# Patient Record
Sex: FEMALE | Race: WHITE | HISPANIC OR LATINO | Employment: UNEMPLOYED | URBAN - METROPOLITAN AREA
[De-identification: names, ages, dates, MRNs, and addresses within clinical notes are randomized per-mention and may not be internally consistent; named-entity substitution may affect disease eponyms.]

---

## 2019-02-25 ENCOUNTER — HOSPITAL ENCOUNTER (OUTPATIENT)
Dept: LAB | Facility: MEDICAL CENTER | Age: 39
End: 2019-02-25
Attending: NURSE PRACTITIONER

## 2019-02-25 LAB
ALBUMIN SERPL BCP-MCNC: 4.4 G/DL (ref 3.2–4.9)
ALBUMIN/GLOB SERPL: 1.6 G/DL
ALP SERPL-CCNC: 75 U/L (ref 30–99)
ALT SERPL-CCNC: 30 U/L (ref 2–50)
ANION GAP SERPL CALC-SCNC: 5 MMOL/L (ref 0–11.9)
AST SERPL-CCNC: 18 U/L (ref 12–45)
BASOPHILS # BLD AUTO: 0.6 % (ref 0–1.8)
BASOPHILS # BLD: 0.06 K/UL (ref 0–0.12)
BILIRUB SERPL-MCNC: 0.3 MG/DL (ref 0.1–1.5)
BUN SERPL-MCNC: 15 MG/DL (ref 8–22)
CALCIUM SERPL-MCNC: 10.1 MG/DL (ref 8.5–10.5)
CHLORIDE SERPL-SCNC: 108 MMOL/L (ref 96–112)
CHOLEST SERPL-MCNC: 203 MG/DL (ref 100–199)
CO2 SERPL-SCNC: 29 MMOL/L (ref 20–33)
CREAT SERPL-MCNC: 1.01 MG/DL (ref 0.5–1.4)
EOSINOPHIL # BLD AUTO: 0.16 K/UL (ref 0–0.51)
EOSINOPHIL NFR BLD: 1.7 % (ref 0–6.9)
ERYTHROCYTE [DISTWIDTH] IN BLOOD BY AUTOMATED COUNT: 48.6 FL (ref 35.9–50)
FASTING STATUS PATIENT QL REPORTED: NORMAL
GLOBULIN SER CALC-MCNC: 2.8 G/DL (ref 1.9–3.5)
GLUCOSE SERPL-MCNC: 93 MG/DL (ref 65–99)
HCT VFR BLD AUTO: 45.4 % (ref 37–47)
HDLC SERPL-MCNC: 51 MG/DL
HGB BLD-MCNC: 14.7 G/DL (ref 12–16)
IMM GRANULOCYTES # BLD AUTO: 0.1 K/UL (ref 0–0.11)
IMM GRANULOCYTES NFR BLD AUTO: 1 % (ref 0–0.9)
LDLC SERPL CALC-MCNC: 118 MG/DL
LYMPHOCYTES # BLD AUTO: 2.89 K/UL (ref 1–4.8)
LYMPHOCYTES NFR BLD: 30.2 % (ref 22–41)
MCH RBC QN AUTO: 32.2 PG (ref 27–33)
MCHC RBC AUTO-ENTMCNC: 32.4 G/DL (ref 33.6–35)
MCV RBC AUTO: 99.6 FL (ref 81.4–97.8)
MONOCYTES # BLD AUTO: 0.62 K/UL (ref 0–0.85)
MONOCYTES NFR BLD AUTO: 6.5 % (ref 0–13.4)
NEUTROPHILS # BLD AUTO: 5.73 K/UL (ref 2–7.15)
NEUTROPHILS NFR BLD: 60 % (ref 44–72)
NRBC # BLD AUTO: 0 K/UL
NRBC BLD-RTO: 0 /100 WBC
PLATELET # BLD AUTO: 282 K/UL (ref 164–446)
PMV BLD AUTO: 9.7 FL (ref 9–12.9)
POTASSIUM SERPL-SCNC: 3.7 MMOL/L (ref 3.6–5.5)
PROT SERPL-MCNC: 7.2 G/DL (ref 6–8.2)
RBC # BLD AUTO: 4.56 M/UL (ref 4.2–5.4)
SODIUM SERPL-SCNC: 142 MMOL/L (ref 135–145)
TRIGL SERPL-MCNC: 171 MG/DL (ref 0–149)
TSH SERPL DL<=0.005 MIU/L-ACNC: 1.58 UIU/ML (ref 0.38–5.33)
WBC # BLD AUTO: 9.6 K/UL (ref 4.8–10.8)

## 2019-02-25 PROCEDURE — 85025 COMPLETE CBC W/AUTO DIFF WBC: CPT

## 2019-02-25 PROCEDURE — 80053 COMPREHEN METABOLIC PANEL: CPT

## 2019-02-25 PROCEDURE — 36415 COLL VENOUS BLD VENIPUNCTURE: CPT

## 2019-02-25 PROCEDURE — 80061 LIPID PANEL: CPT

## 2019-02-25 PROCEDURE — 84443 ASSAY THYROID STIM HORMONE: CPT

## 2019-05-14 ENCOUNTER — APPOINTMENT (OUTPATIENT)
Dept: RADIOLOGY | Facility: MEDICAL CENTER | Age: 39
DRG: 644 | End: 2019-05-14
Attending: EMERGENCY MEDICINE
Payer: MEDICAID

## 2019-05-14 ENCOUNTER — HOSPITAL ENCOUNTER (INPATIENT)
Facility: MEDICAL CENTER | Age: 39
LOS: 9 days | DRG: 644 | End: 2019-05-23
Attending: EMERGENCY MEDICINE | Admitting: INTERNAL MEDICINE
Payer: MEDICAID

## 2019-05-14 DIAGNOSIS — R50.9 FEVER, UNSPECIFIED FEVER CAUSE: ICD-10-CM

## 2019-05-14 DIAGNOSIS — N64.3 GALACTORRHEA: ICD-10-CM

## 2019-05-14 DIAGNOSIS — G93.89 SUPRASELLAR MASS: ICD-10-CM

## 2019-05-14 PROBLEM — R73.9 HYPERGLYCEMIA: Status: ACTIVE | Noted: 2019-05-14

## 2019-05-14 PROBLEM — F41.9 ANXIETY: Status: ACTIVE | Noted: 2019-05-14

## 2019-05-14 PROBLEM — A41.9 SEPSIS (HCC): Status: ACTIVE | Noted: 2019-05-14

## 2019-05-14 LAB
ALBUMIN SERPL BCP-MCNC: 4.5 G/DL (ref 3.2–4.9)
ALBUMIN/GLOB SERPL: 1.4 G/DL
ALP SERPL-CCNC: 104 U/L (ref 30–99)
ALT SERPL-CCNC: 42 U/L (ref 2–50)
ANION GAP SERPL CALC-SCNC: 11 MMOL/L (ref 0–11.9)
APPEARANCE UR: CLEAR
AST SERPL-CCNC: 25 U/L (ref 12–45)
BACTERIA #/AREA URNS HPF: NEGATIVE /HPF
BASOPHILS # BLD AUTO: 0.3 % (ref 0–1.8)
BASOPHILS # BLD: 0.05 K/UL (ref 0–0.12)
BILIRUB SERPL-MCNC: 0.4 MG/DL (ref 0.1–1.5)
BILIRUB UR QL STRIP.AUTO: NEGATIVE
BUN SERPL-MCNC: 7 MG/DL (ref 8–22)
CALCIUM SERPL-MCNC: 9.3 MG/DL (ref 8.5–10.5)
CHLORIDE SERPL-SCNC: 106 MMOL/L (ref 96–112)
CO2 SERPL-SCNC: 24 MMOL/L (ref 20–33)
COLOR UR: YELLOW
CORTIS SERPL-MCNC: 19.2 UG/DL (ref 0–23)
CREAT SERPL-MCNC: 0.86 MG/DL (ref 0.5–1.4)
EOSINOPHIL # BLD AUTO: 0.15 K/UL (ref 0–0.51)
EOSINOPHIL NFR BLD: 1 % (ref 0–6.9)
EPI CELLS #/AREA URNS HPF: NEGATIVE /HPF
ERYTHROCYTE [DISTWIDTH] IN BLOOD BY AUTOMATED COUNT: 43.4 FL (ref 35.9–50)
FLUAV RNA SPEC QL NAA+PROBE: NEGATIVE
FLUBV RNA SPEC QL NAA+PROBE: NEGATIVE
FSH SERPL-ACNC: 1.3 MIU/ML
GLOBULIN SER CALC-MCNC: 3.3 G/DL (ref 1.9–3.5)
GLUCOSE SERPL-MCNC: 113 MG/DL (ref 65–99)
GLUCOSE UR STRIP.AUTO-MCNC: NEGATIVE MG/DL
HCG SERPL QL: NEGATIVE
HCT VFR BLD AUTO: 42 % (ref 37–47)
HGB BLD-MCNC: 13.7 G/DL (ref 12–16)
HYALINE CASTS #/AREA URNS LPF: NORMAL /LPF
IMM GRANULOCYTES # BLD AUTO: 0.12 K/UL (ref 0–0.11)
IMM GRANULOCYTES NFR BLD AUTO: 0.8 % (ref 0–0.9)
KETONES UR STRIP.AUTO-MCNC: NEGATIVE MG/DL
LACTATE BLD-SCNC: 1.3 MMOL/L (ref 0.5–2)
LEUKOCYTE ESTERASE UR QL STRIP.AUTO: ABNORMAL
LH SERPL-ACNC: <0.2 IU/L
LIPASE SERPL-CCNC: 9 U/L (ref 11–82)
LYMPHOCYTES # BLD AUTO: 2.38 K/UL (ref 1–4.8)
LYMPHOCYTES NFR BLD: 15.9 % (ref 22–41)
MCH RBC QN AUTO: 31.6 PG (ref 27–33)
MCHC RBC AUTO-ENTMCNC: 32.6 G/DL (ref 33.6–35)
MCV RBC AUTO: 96.8 FL (ref 81.4–97.8)
MICRO URNS: ABNORMAL
MONOCYTES # BLD AUTO: 0.97 K/UL (ref 0–0.85)
MONOCYTES NFR BLD AUTO: 6.5 % (ref 0–13.4)
NEUTROPHILS # BLD AUTO: 11.27 K/UL (ref 2–7.15)
NEUTROPHILS NFR BLD: 75.5 % (ref 44–72)
NITRITE UR QL STRIP.AUTO: NEGATIVE
NRBC # BLD AUTO: 0 K/UL
NRBC BLD-RTO: 0 /100 WBC
PH UR STRIP.AUTO: 6.5 [PH]
PLATELET # BLD AUTO: 400 K/UL (ref 164–446)
PMV BLD AUTO: 9.2 FL (ref 9–12.9)
POTASSIUM SERPL-SCNC: 3.6 MMOL/L (ref 3.6–5.5)
PROLACTIN SERPL-MCNC: 44.04 NG/ML (ref 2.8–26)
PROT SERPL-MCNC: 7.8 G/DL (ref 6–8.2)
PROT UR QL STRIP: NEGATIVE MG/DL
RBC # BLD AUTO: 4.34 M/UL (ref 4.2–5.4)
RBC # URNS HPF: NORMAL /HPF
RBC UR QL AUTO: ABNORMAL
S PYO DNA SPEC NAA+PROBE: NOT DETECTED
SODIUM SERPL-SCNC: 141 MMOL/L (ref 135–145)
SP GR UR STRIP.AUTO: 1.01
T4 FREE SERPL-MCNC: 0.93 NG/DL (ref 0.53–1.43)
TSH SERPL DL<=0.005 MIU/L-ACNC: 0.11 UIU/ML (ref 0.38–5.33)
UROBILINOGEN UR STRIP.AUTO-MCNC: 0.2 MG/DL
WBC # BLD AUTO: 14.9 K/UL (ref 4.8–10.8)
WBC #/AREA URNS HPF: NORMAL /HPF

## 2019-05-14 PROCEDURE — 84305 ASSAY OF SOMATOMEDIN: CPT

## 2019-05-14 PROCEDURE — 80053 COMPREHEN METABOLIC PANEL: CPT

## 2019-05-14 PROCEDURE — 700111 HCHG RX REV CODE 636 W/ 250 OVERRIDE (IP): Performed by: EMERGENCY MEDICINE

## 2019-05-14 PROCEDURE — 87040 BLOOD CULTURE FOR BACTERIA: CPT | Mod: 91

## 2019-05-14 PROCEDURE — 700117 HCHG RX CONTRAST REV CODE 255: Performed by: EMERGENCY MEDICINE

## 2019-05-14 PROCEDURE — 700102 HCHG RX REV CODE 250 W/ 637 OVERRIDE(OP)

## 2019-05-14 PROCEDURE — 84443 ASSAY THYROID STIM HORMONE: CPT

## 2019-05-14 PROCEDURE — A9270 NON-COVERED ITEM OR SERVICE: HCPCS

## 2019-05-14 PROCEDURE — 700105 HCHG RX REV CODE 258: Performed by: INTERNAL MEDICINE

## 2019-05-14 PROCEDURE — 87502 INFLUENZA DNA AMP PROBE: CPT

## 2019-05-14 PROCEDURE — 82533 TOTAL CORTISOL: CPT

## 2019-05-14 PROCEDURE — 83002 ASSAY OF GONADOTROPIN (LH): CPT

## 2019-05-14 PROCEDURE — 83605 ASSAY OF LACTIC ACID: CPT

## 2019-05-14 PROCEDURE — 96375 TX/PRO/DX INJ NEW DRUG ADDON: CPT

## 2019-05-14 PROCEDURE — 96365 THER/PROPH/DIAG IV INF INIT: CPT

## 2019-05-14 PROCEDURE — 70450 CT HEAD/BRAIN W/O DYE: CPT

## 2019-05-14 PROCEDURE — 99285 EMERGENCY DEPT VISIT HI MDM: CPT

## 2019-05-14 PROCEDURE — 83690 ASSAY OF LIPASE: CPT

## 2019-05-14 PROCEDURE — 85025 COMPLETE CBC W/AUTO DIFF WBC: CPT

## 2019-05-14 PROCEDURE — 82671 ASSAY OF ESTROGENS: CPT

## 2019-05-14 PROCEDURE — 36415 COLL VENOUS BLD VENIPUNCTURE: CPT

## 2019-05-14 PROCEDURE — 84703 CHORIONIC GONADOTROPIN ASSAY: CPT

## 2019-05-14 PROCEDURE — 700111 HCHG RX REV CODE 636 W/ 250 OVERRIDE (IP): Performed by: INTERNAL MEDICINE

## 2019-05-14 PROCEDURE — 74177 CT ABD & PELVIS W/CONTRAST: CPT

## 2019-05-14 PROCEDURE — 770006 HCHG ROOM/CARE - MED/SURG/GYN SEMI*

## 2019-05-14 PROCEDURE — 84146 ASSAY OF PROLACTIN: CPT

## 2019-05-14 PROCEDURE — 81001 URINALYSIS AUTO W/SCOPE: CPT

## 2019-05-14 PROCEDURE — 85730 THROMBOPLASTIN TIME PARTIAL: CPT

## 2019-05-14 PROCEDURE — 85610 PROTHROMBIN TIME: CPT

## 2019-05-14 PROCEDURE — 84439 ASSAY OF FREE THYROXINE: CPT

## 2019-05-14 PROCEDURE — 700105 HCHG RX REV CODE 258: Performed by: EMERGENCY MEDICINE

## 2019-05-14 PROCEDURE — 87651 STREP A DNA AMP PROBE: CPT

## 2019-05-14 PROCEDURE — 99223 1ST HOSP IP/OBS HIGH 75: CPT | Performed by: INTERNAL MEDICINE

## 2019-05-14 PROCEDURE — 82024 ASSAY OF ACTH: CPT

## 2019-05-14 PROCEDURE — 83001 ASSAY OF GONADOTROPIN (FSH): CPT

## 2019-05-14 RX ORDER — POLYETHYLENE GLYCOL 3350 17 G/17G
1 POWDER, FOR SOLUTION ORAL
Status: DISCONTINUED | OUTPATIENT
Start: 2019-05-14 | End: 2019-05-23 | Stop reason: HOSPADM

## 2019-05-14 RX ORDER — SODIUM CHLORIDE 9 MG/ML
1000 INJECTION, SOLUTION INTRAVENOUS ONCE
Status: COMPLETED | OUTPATIENT
Start: 2019-05-14 | End: 2019-05-14

## 2019-05-14 RX ORDER — AMOXICILLIN 250 MG
2 CAPSULE ORAL 2 TIMES DAILY
Status: DISCONTINUED | OUTPATIENT
Start: 2019-05-15 | End: 2019-05-23 | Stop reason: HOSPADM

## 2019-05-14 RX ORDER — SODIUM CHLORIDE 9 MG/ML
INJECTION, SOLUTION INTRAVENOUS CONTINUOUS
Status: ACTIVE | OUTPATIENT
Start: 2019-05-14 | End: 2019-05-15

## 2019-05-14 RX ORDER — ACETAMINOPHEN 325 MG/1
975 TABLET ORAL ONCE
Status: COMPLETED | OUTPATIENT
Start: 2019-05-14 | End: 2019-05-14

## 2019-05-14 RX ORDER — PROMETHAZINE HYDROCHLORIDE 25 MG/1
12.5-25 TABLET ORAL EVERY 4 HOURS PRN
Status: DISCONTINUED | OUTPATIENT
Start: 2019-05-14 | End: 2019-05-23 | Stop reason: HOSPADM

## 2019-05-14 RX ORDER — LORAZEPAM 2 MG/ML
0.5 INJECTION INTRAMUSCULAR EVERY 4 HOURS PRN
Status: DISCONTINUED | OUTPATIENT
Start: 2019-05-14 | End: 2019-05-23 | Stop reason: HOSPADM

## 2019-05-14 RX ORDER — OXYCODONE HYDROCHLORIDE 5 MG/1
5 TABLET ORAL
Status: DISCONTINUED | OUTPATIENT
Start: 2019-05-14 | End: 2019-05-23 | Stop reason: HOSPADM

## 2019-05-14 RX ORDER — OXYCODONE HYDROCHLORIDE 10 MG/1
10 TABLET ORAL
Status: DISCONTINUED | OUTPATIENT
Start: 2019-05-14 | End: 2019-05-23 | Stop reason: HOSPADM

## 2019-05-14 RX ORDER — ACETAMINOPHEN 325 MG/1
650 TABLET ORAL ONCE
Status: ACTIVE | OUTPATIENT
Start: 2019-05-14 | End: 2019-05-15

## 2019-05-14 RX ORDER — KETOROLAC TROMETHAMINE 30 MG/ML
15 INJECTION, SOLUTION INTRAMUSCULAR; INTRAVENOUS ONCE
Status: COMPLETED | OUTPATIENT
Start: 2019-05-14 | End: 2019-05-14

## 2019-05-14 RX ORDER — ONDANSETRON 2 MG/ML
4 INJECTION INTRAMUSCULAR; INTRAVENOUS EVERY 4 HOURS PRN
Status: DISCONTINUED | OUTPATIENT
Start: 2019-05-14 | End: 2019-05-23 | Stop reason: HOSPADM

## 2019-05-14 RX ORDER — SODIUM CHLORIDE 9 MG/ML
1000 INJECTION, SOLUTION INTRAVENOUS
Status: DISCONTINUED | OUTPATIENT
Start: 2019-05-14 | End: 2019-05-23 | Stop reason: HOSPADM

## 2019-05-14 RX ORDER — ONDANSETRON 4 MG/1
4 TABLET, ORALLY DISINTEGRATING ORAL EVERY 4 HOURS PRN
Status: DISCONTINUED | OUTPATIENT
Start: 2019-05-14 | End: 2019-05-23 | Stop reason: HOSPADM

## 2019-05-14 RX ORDER — HYDROMORPHONE HYDROCHLORIDE 1 MG/ML
0.5 INJECTION, SOLUTION INTRAMUSCULAR; INTRAVENOUS; SUBCUTANEOUS
Status: DISCONTINUED | OUTPATIENT
Start: 2019-05-14 | End: 2019-05-17

## 2019-05-14 RX ORDER — BISACODYL 10 MG
10 SUPPOSITORY, RECTAL RECTAL
Status: DISCONTINUED | OUTPATIENT
Start: 2019-05-14 | End: 2019-05-23 | Stop reason: HOSPADM

## 2019-05-14 RX ORDER — ENALAPRILAT 1.25 MG/ML
1.25 INJECTION INTRAVENOUS EVERY 6 HOURS PRN
Status: DISCONTINUED | OUTPATIENT
Start: 2019-05-14 | End: 2019-05-23 | Stop reason: HOSPADM

## 2019-05-14 RX ORDER — IBUPROFEN 200 MG
400 TABLET ORAL
Status: ON HOLD | COMMUNITY
End: 2021-02-05

## 2019-05-14 RX ORDER — SODIUM CHLORIDE 9 MG/ML
30 INJECTION, SOLUTION INTRAVENOUS
Status: DISCONTINUED | OUTPATIENT
Start: 2019-05-14 | End: 2019-05-23 | Stop reason: HOSPADM

## 2019-05-14 RX ORDER — ACETAMINOPHEN 325 MG/1
650 TABLET ORAL EVERY 6 HOURS PRN
Status: DISCONTINUED | OUTPATIENT
Start: 2019-05-14 | End: 2019-05-23 | Stop reason: HOSPADM

## 2019-05-14 RX ORDER — PROMETHAZINE HYDROCHLORIDE 25 MG/1
12.5-25 SUPPOSITORY RECTAL EVERY 4 HOURS PRN
Status: DISCONTINUED | OUTPATIENT
Start: 2019-05-14 | End: 2019-05-23 | Stop reason: HOSPADM

## 2019-05-14 RX ADMIN — SODIUM CHLORIDE 500 MG: 9 INJECTION, SOLUTION INTRAVENOUS at 23:43

## 2019-05-14 RX ADMIN — ACETAMINOPHEN 975 MG: 325 TABLET, FILM COATED ORAL at 17:31

## 2019-05-14 RX ADMIN — KETOROLAC TROMETHAMINE 15 MG: 30 INJECTION, SOLUTION INTRAMUSCULAR; INTRAVENOUS at 19:00

## 2019-05-14 RX ADMIN — IOHEXOL 100 ML: 350 INJECTION, SOLUTION INTRAVENOUS at 20:46

## 2019-05-14 RX ADMIN — CEFTRIAXONE SODIUM 2 G: 2 INJECTION, POWDER, FOR SOLUTION INTRAMUSCULAR; INTRAVENOUS at 20:15

## 2019-05-14 RX ADMIN — SODIUM CHLORIDE 1000 ML: 9 INJECTION, SOLUTION INTRAVENOUS at 18:00

## 2019-05-14 ASSESSMENT — ENCOUNTER SYMPTOMS
DEPRESSION: 0
STRIDOR: 0
CONSTIPATION: 0
NAUSEA: 1
CHILLS: 1
VOMITING: 1
PALPITATIONS: 0
COUGH: 0
FALLS: 0
SPUTUM PRODUCTION: 0
HEADACHES: 1
DIZZINESS: 1
DIARRHEA: 0
TINGLING: 0
LOSS OF CONSCIOUSNESS: 0
MYALGIAS: 1
SHORTNESS OF BREATH: 0
FEVER: 1
ABDOMINAL PAIN: 0
WEAKNESS: 1

## 2019-05-14 ASSESSMENT — LIFESTYLE VARIABLES: DO YOU DRINK ALCOHOL: NO

## 2019-05-14 NOTE — ED NOTES
Pt brought back from waiting room by volunteer. Pt ambulates with steady gait. No apparent distress.

## 2019-05-14 NOTE — ED TRIAGE NOTES
".[facility]  .  Chief Complaint   Patient presents with   • Headache     onset at 1100 today   • Abdominal Pain     c/o mid abd pain onset at 1100 today.   • N/V     since 1100     Patient to triage with above complaint. Patient giving vague answers in triage. Reports similar s/s 5 months ago. Patient rocking in chair and hyperventilating in triage, RN provided reassurance. .Blood Pressure: 130/73, Pulse: (!) 105, Respiration: 18, Temperature: 37.2 °C (99 °F), Height: 149.9 cm (4' 11\"), Weight: 81.8 kg (180 lb 5.4 oz), Pulse Oximetry: 100 %, O2 Delivery: None (Room Air)    Patient to lobby and instructed to inform staff of any needs.  "

## 2019-05-15 PROBLEM — E66.9 CLASS 2 OBESITY IN ADULT: Status: ACTIVE | Noted: 2019-05-15

## 2019-05-15 PROBLEM — R65.10 SIRS (SYSTEMIC INFLAMMATORY RESPONSE SYNDROME) (HCC): Status: ACTIVE | Noted: 2019-05-15

## 2019-05-15 LAB
ALBUMIN SERPL BCP-MCNC: 3.6 G/DL (ref 3.2–4.9)
ALBUMIN/GLOB SERPL: 1.2 G/DL
ALP SERPL-CCNC: 84 U/L (ref 30–99)
ALT SERPL-CCNC: 31 U/L (ref 2–50)
ANION GAP SERPL CALC-SCNC: 8 MMOL/L (ref 0–11.9)
APTT PPP: 28.7 SEC (ref 24.7–36)
AST SERPL-CCNC: 15 U/L (ref 12–45)
BILIRUB SERPL-MCNC: 0.3 MG/DL (ref 0.1–1.5)
BUN SERPL-MCNC: 7 MG/DL (ref 8–22)
CALCIUM SERPL-MCNC: 8.7 MG/DL (ref 8.5–10.5)
CHLORIDE SERPL-SCNC: 109 MMOL/L (ref 96–112)
CO2 SERPL-SCNC: 26 MMOL/L (ref 20–33)
CREAT SERPL-MCNC: 0.78 MG/DL (ref 0.5–1.4)
ERYTHROCYTE [DISTWIDTH] IN BLOOD BY AUTOMATED COUNT: 43.7 FL (ref 35.9–50)
GLOBULIN SER CALC-MCNC: 3 G/DL (ref 1.9–3.5)
GLUCOSE SERPL-MCNC: 115 MG/DL (ref 65–99)
HCT VFR BLD AUTO: 37.9 % (ref 37–47)
HGB BLD-MCNC: 12.2 G/DL (ref 12–16)
INR PPP: 1.14 (ref 0.87–1.13)
LACTATE BLD-SCNC: 0.9 MMOL/L (ref 0.5–2)
LACTATE BLD-SCNC: 1.3 MMOL/L (ref 0.5–2)
LACTATE BLD-SCNC: 1.9 MMOL/L (ref 0.5–2)
MCH RBC QN AUTO: 31.4 PG (ref 27–33)
MCHC RBC AUTO-ENTMCNC: 32.2 G/DL (ref 33.6–35)
MCV RBC AUTO: 97.7 FL (ref 81.4–97.8)
PLATELET # BLD AUTO: 338 K/UL (ref 164–446)
PMV BLD AUTO: 9.2 FL (ref 9–12.9)
POTASSIUM SERPL-SCNC: 4.1 MMOL/L (ref 3.6–5.5)
PROCALCITONIN SERPL-MCNC: <0.05 NG/ML
PROT SERPL-MCNC: 6.6 G/DL (ref 6–8.2)
PROTHROMBIN TIME: 14.7 SEC (ref 12–14.6)
RBC # BLD AUTO: 3.88 M/UL (ref 4.2–5.4)
SODIUM SERPL-SCNC: 143 MMOL/L (ref 135–145)
WBC # BLD AUTO: 10.1 K/UL (ref 4.8–10.8)

## 2019-05-15 PROCEDURE — 700105 HCHG RX REV CODE 258: Performed by: INTERNAL MEDICINE

## 2019-05-15 PROCEDURE — A9270 NON-COVERED ITEM OR SERVICE: HCPCS | Performed by: INTERNAL MEDICINE

## 2019-05-15 PROCEDURE — 700111 HCHG RX REV CODE 636 W/ 250 OVERRIDE (IP): Performed by: INTERNAL MEDICINE

## 2019-05-15 PROCEDURE — 83605 ASSAY OF LACTIC ACID: CPT | Mod: 91

## 2019-05-15 PROCEDURE — 36415 COLL VENOUS BLD VENIPUNCTURE: CPT

## 2019-05-15 PROCEDURE — 700102 HCHG RX REV CODE 250 W/ 637 OVERRIDE(OP): Performed by: INTERNAL MEDICINE

## 2019-05-15 PROCEDURE — 770021 HCHG ROOM/CARE - ISO PRIVATE

## 2019-05-15 PROCEDURE — 99233 SBSQ HOSP IP/OBS HIGH 50: CPT | Performed by: INTERNAL MEDICINE

## 2019-05-15 PROCEDURE — 84145 PROCALCITONIN (PCT): CPT

## 2019-05-15 PROCEDURE — 80053 COMPREHEN METABOLIC PANEL: CPT

## 2019-05-15 PROCEDURE — 85027 COMPLETE CBC AUTOMATED: CPT

## 2019-05-15 RX ORDER — LEVETIRACETAM 500 MG/1
500 TABLET ORAL 2 TIMES DAILY
Status: DISCONTINUED | OUTPATIENT
Start: 2019-05-15 | End: 2019-05-17

## 2019-05-15 RX ADMIN — SENNOSIDES,DOCUSATE SODIUM 2 TABLET: 50; 8.6 TABLET, FILM COATED ORAL at 17:00

## 2019-05-15 RX ADMIN — VANCOMYCIN HYDROCHLORIDE 2000 MG: 100 INJECTION, POWDER, LYOPHILIZED, FOR SOLUTION INTRAVENOUS at 01:44

## 2019-05-15 RX ADMIN — ONDANSETRON 4 MG: 2 INJECTION INTRAMUSCULAR; INTRAVENOUS at 00:44

## 2019-05-15 RX ADMIN — SODIUM CHLORIDE: 9 INJECTION, SOLUTION INTRAVENOUS at 00:43

## 2019-05-15 RX ADMIN — VANCOMYCIN HYDROCHLORIDE 1500 MG: 100 INJECTION, POWDER, LYOPHILIZED, FOR SOLUTION INTRAVENOUS at 09:25

## 2019-05-15 RX ADMIN — ACETAMINOPHEN 650 MG: 325 TABLET, FILM COATED ORAL at 17:01

## 2019-05-15 RX ADMIN — SODIUM CHLORIDE 500 MG: 9 INJECTION, SOLUTION INTRAVENOUS at 11:43

## 2019-05-15 RX ADMIN — PROCHLORPERAZINE EDISYLATE 5 MG: 5 INJECTION INTRAMUSCULAR; INTRAVENOUS at 04:01

## 2019-05-15 RX ADMIN — LEVETIRACETAM 500 MG: 500 TABLET ORAL at 17:01

## 2019-05-15 RX ADMIN — CEFTRIAXONE SODIUM 2 G: 2 INJECTION, POWDER, FOR SOLUTION INTRAMUSCULAR; INTRAVENOUS at 08:22

## 2019-05-15 ASSESSMENT — ENCOUNTER SYMPTOMS
VOMITING: 0
WHEEZING: 0
COUGH: 0
SPUTUM PRODUCTION: 0
BLURRED VISION: 0
SEIZURES: 0
DEPRESSION: 0
DIARRHEA: 0
CHILLS: 0
EYE PAIN: 0
WEIGHT LOSS: 0
NECK PAIN: 0
SPEECH CHANGE: 0
FEVER: 1
PND: 0
HEADACHES: 0
PALPITATIONS: 0
HEARTBURN: 0
TREMORS: 0
DIZZINESS: 0
FALLS: 0
ABDOMINAL PAIN: 0
CONSTIPATION: 0
BACK PAIN: 0
WEAKNESS: 1
SHORTNESS OF BREATH: 0
NAUSEA: 0

## 2019-05-15 ASSESSMENT — COGNITIVE AND FUNCTIONAL STATUS - GENERAL
MOBILITY SCORE: 24
SUGGESTED CMS G CODE MODIFIER DAILY ACTIVITY: CH
DAILY ACTIVITIY SCORE: 24
SUGGESTED CMS G CODE MODIFIER MOBILITY: CH

## 2019-05-15 ASSESSMENT — PATIENT HEALTH QUESTIONNAIRE - PHQ9
3. TROUBLE FALLING OR STAYING ASLEEP OR SLEEPING TOO MUCH: NEARLY EVERY DAY
6. FEELING BAD ABOUT YOURSELF - OR THAT YOU ARE A FAILURE OR HAVE LET YOURSELF OR YOUR FAMILY DOWN: NOT AL ALL
SUM OF ALL RESPONSES TO PHQ9 QUESTIONS 1 AND 2: 4
8. MOVING OR SPEAKING SO SLOWLY THAT OTHER PEOPLE COULD HAVE NOTICED. OR THE OPPOSITE, BEING SO FIGETY OR RESTLESS THAT YOU HAVE BEEN MOVING AROUND A LOT MORE THAN USUAL: NOT AT ALL
7. TROUBLE CONCENTRATING ON THINGS, SUCH AS READING THE NEWSPAPER OR WATCHING TELEVISION: NOT AT ALL
4. FEELING TIRED OR HAVING LITTLE ENERGY: NEARLY EVERY DAY
2. FEELING DOWN, DEPRESSED, IRRITABLE, OR HOPELESS: MORE THAN HALF THE DAYS
5. POOR APPETITE OR OVEREATING: NOT AT ALL
SUM OF ALL RESPONSES TO PHQ QUESTIONS 1-9: 10
1. LITTLE INTEREST OR PLEASURE IN DOING THINGS: MORE THAN HALF THE DAYS
9. THOUGHTS THAT YOU WOULD BE BETTER OFF DEAD, OR OF HURTING YOURSELF: NOT AT ALL

## 2019-05-15 ASSESSMENT — LIFESTYLE VARIABLES
EVER_SMOKED: NEVER
SUBSTANCE_ABUSE: 0

## 2019-05-15 NOTE — ED PROVIDER NOTES
ED Provider Note    Chief Complaint:   Fever, back pain, abdominal pain, headaches    HPI:  Johana Weston is a 38 y.o. female who presents with chief complaint of generalized body pain and fevers.  She is exclusively Mauritian-speaking,  service used for the entirety of this encounter.  Triage notes that she developed abdominal pain and headaches at 11:00 this morning.  I spent a long time clarifying this with the  service, she states she has had chronic abdominal pain, back pain, and headaches ongoing since October.  In November she stopped menstruating, she did see a physician at [facility] for this, was prescribed OCPs, however this did not alleviate her symptoms so she stopped taking them.  Additionally over the past several months she is noticed that she has been increasingly drowsy and anxious, she has noticed intermittent lactation.  2 to 3 months ago, she developed loss of appetite, intermittent vomiting, as well as pain in the anterior neck.  She is febrile on arrival to the emergency department, uncertain as to when those symptoms began.  She does have generalized back pain.  She denies abnormal vaginal bleeding or vaginal discharge, she denies any vaginal odor.  She has not had dysuria, no hematuria.  She does have polydipsia and polyuria.  She is unable to identify any exacerbating or alleviating factors, no sick contacts identified.  She has no history of hyperglycemia, no impaired immunity, denies lymphadenopathy.    I did ask multiple times and rephrase the question several ways, however she is not able to tell me if any of her symptoms acutely worsened nor when her symptoms acutely worsen.  States she presented to the emergency department today because she simply could not handle it anymore.    Review of Systems:  See HPI for pertinent positives and negatives. All other systems negative.    Past Medical History:       Social History:  Social History  "    Social History Main Topics   • Smoking status: Never Smoker   • Smokeless tobacco: Never Used   • Alcohol use Yes      Comment: occ   • Drug use: No   • Sexual activity: Not on file       Surgical History:  patient denies any surgical history    Current Medications:  Home Medications     Reviewed by Tari Barker (Pharmacy Tech) on 05/14/19 at 1820  Med List Status: Complete   Medication Last Dose Status   ibuprofen (MOTRIN) 200 MG Tab <2 wks ago Active                Allergies:  No Known Allergies    Physical Exam:  Vital Signs: /73   Pulse 79   Temp 37.9 °C (100.2 °F)   Resp 15   Ht 1.499 m (4' 11\")   Wt 81.8 kg (180 lb 5.4 oz)   SpO2 98%   BMI 36.42 kg/m²   Constitutional: Alert, uncomfortable appearing  HENT: Dry mucous membranes, normal posterior pharynx without patchy exudates, no intraoral lesions  Eyes: Pupils equal and reactive, normal conjunctiva, right eye with asymptomatic pterygium  Neck: Supple, normal range of motion, no stridor, no palpable lymphadenopathy, readily ranges the neck without evidence of severe discomfort.  Cardiovascular: Extremities are warm and well perfused, no murmur appreciated, normal cardiac auscultation, exam limited by body habitus  Pulmonary: No respiratory distress, normal work of breathing, no accessory muscule usage, breath sounds clear and equal bilaterally, no wheezing, no coarse breath sounds  Abdomen: Generalized tenderness to palpation that is non-localizable, exam limited by body habitus, no guarding, no rebound  Skin: Warm, dry, no rashes or lesions  Musculoskeletal: Normal range of motion in all extremities, no swelling or deformity noted  Neurologic: Alert, oriented, normal speech, normal motor function  Psychiatric: Anxious affect    Medical records reviewed for continuity of care.  No prior medical records available for review.    Labs:  Labs Reviewed   CBC WITH DIFFERENTIAL - Abnormal; Notable for the following:        Result Value    WBC " "14.9 (*)     MCHC 32.6 (*)     Neutrophils-Polys 75.50 (*)     Lymphocytes 15.90 (*)     Neutrophils (Absolute) 11.27 (*)     Monos (Absolute) 0.97 (*)     Immature Granulocytes (abs) 0.12 (*)     All other components within normal limits   COMP METABOLIC PANEL - Abnormal; Notable for the following:     Glucose 113 (*)     Bun 7 (*)     Alkaline Phosphatase 104 (*)     All other components within normal limits   LIPASE - Abnormal; Notable for the following:     Lipase 9 (*)     All other components within normal limits   URINALYSIS,CULTURE IF INDICATED - Abnormal; Notable for the following:     Leukocyte Esterase Trace (*)     Occult Blood Trace (*)     All other components within normal limits   TSH - Abnormal; Notable for the following:     TSH 0.110 (*)     All other components within normal limits   ESTIMATED GFR   LACTIC ACID   BLOOD CULTURE    Narrative:     Per Hospital Policy: Only change Specimen Src: to \"Line\" if  specified by physician order.   BLOOD CULTURE    Narrative:     Per Hospital Policy: Only change Specimen Src: to \"Line\" if  specified by physician order.   INFLUENZA A/B BY PCR   GROUP A STREP BY PCR   HCG QUAL SERUM   URINE MICROSCOPIC (W/UA)   FREE THYROXINE   INFLUENZA A/B BY PCR   PROLACTIN   FREE THYROXINE   ACTH   CORTISOL   IGF-1 SOMATOMEDIN   LUTEINIZING HORMONE SERUM   FSH   ESTROGENS FRACTIONATED       Radiology:  CT-ABDOMEN-PELVIS WITH   Final Result         1.  No acute abnormality.   2.  Appendicolith in the distal appendix without additional findings to suggests acute appendicitis.      CT-HEAD W/O   Final Result      Large suprasellar/sellar mass which measures 22 x 17 x 16 mm in size. This extends cephalad into the area of the optic chiasm as well as displaces and deforms the third ventricle with surrounding edema. Differential diagnosis includes a variety of    intracranial neoplasms to include pituitary adenoma as well as other suprasellar masses. A giant thrombosed aneurysm " remains a consideration as well. Further evaluation with brain MRI with contrast is recommended.      This was discussed with NERISSA CONTRERAS at 7:37 PM on 5/14/2019.      MR-UNLISTED MRI PROCEDURE    (Results Pending)      ED Medications Administered:  Medications   acetaminophen (TYLENOL) tablet 650 mg (0 mg Oral Held 5/14/19 1830)   acetaminophen (TYLENOL) tablet 975 mg (975 mg Oral Given 5/14/19 1731)   ketorolac (TORADOL) injection 15 mg (15 mg Intravenous Given 5/14/19 1900)   NS infusion 1,000 mL (0 mL Intravenous Stopped 5/14/19 1900)   cefTRIAXone (ROCEPHIN) 2 g in  mL IVPB (0 g Intravenous Stopped 5/14/19 2051)   iohexol (OMNIPAQUE) 350 mg/mL (100 mL Intravenous Given 5/14/19 2046)       Differential diagnosis:  Sepsis, influenza, electrolyte abnormality, strep pharyngitis, intra-abdominal infection including appendicitis or cholecystitis, pyelonephritis, urinary tract infection, pregnancy, ectopic pregnancy, meningitis, thyroid disorder    MDM:  History and physical exam as documented above.  History is limited as I am not able to clearly ascertain the timeline of her illness, even with multiple lines of questioning and  and roommate assistance.      Fluid bolus given on arrival to the emergency department out of concern for sepsis, due to CMS sepsis guidelines oral fluid challenge is not appropriate.  On reassessment after receiving fluids fever is improving.  Tylenol given for fever, generalized body aches treated with Toradol.    On laboratory evaluation TSH is low, however free T4 is normal less concerning for thyroid disorder.  Influenza PCR and group A strep PCR are negative.  hCG is negative ruling out pregnancy and pregnancy related complications.  Urinalysis is negative for bacteria, negative for epithelial cells, 0-2 white blood cells present.  There is no sterile pyuria concerning for genital tract infection, no evidence of urinary tract infection.  Lactic acid is within normal  limits.  Electrolytes are within normal limits, creatinine within normal limits, no evidence of renal insufficiency.  No significant transaminitis to suggest obstructive biliary process, bilirubin is normal.  Lipase is not elevated, no evidence of pancreatitis.  White blood count is elevated to 14.9.     CT head demonstrates a large suprasellar/sellar mass which measures 22 x 17 x 16 mm.  This extends into the area of the optic chiasm, and displaces and deforms the third ventricle with surrounding edema.  Differential diagnosis includes pituitary adenoma, other suprasellar masses, thrombosed aneurysm as well.    I discussed the read with Dr. Pedraza, radiologist.  There does not appear to be any definitive evidence of obstructing hydrocephalus, however due to the location of the tumor this cannot be entirely ruled out.  For this reason I have elected to defer lumbar puncture, patient will be treated with 2 g of Rocephin to cover for meningitis.    Call placed to Dr. Webster, neurosurgeon on-call this evening.  He recommends urgent MRI, this can be done as an inpatient tonight or tomorrow morning.  Additionally recommends further testing for pituitary dysfunction.    Plan at this time is for admission to hospitalist for continued antibiotics, MRI with and without contrast, subspecialist consultation, and pain management.  Case discussed with Dr. Ace, hospitalist, who kindly agrees to admit the patient.    Disposition:  Admit to hospitalist in guarded condition    Final Impression:  1. Suprasellar mass    2. Fever, unspecified fever cause    3. Galactorrhea        Electronically signed by: Nettie Cameron, 5/15/2019 12:13 AM

## 2019-05-15 NOTE — ED NOTES
Updated on POC.  Patient resting quietly in bed without distress, denies any complaints or needs at this time.  Call bell within reach.  States symptoms have improved.  Waiting for results

## 2019-05-15 NOTE — ASSESSMENT & PLAN NOTE
-Mild increase, no need for insulin at this time  -Continue to monitor, add insulin sliding scale if warranted

## 2019-05-15 NOTE — PROGRESS NOTES
Pt A&Ox4, [place]ambican speaking only. Pt denies pain at this time but is complaining of nausea, medicating per MAR. Pt frequently requesting water, edu pt about MD orders for strict NPO, pt understands but continues to need reinforcement, provided mouthswabs.  Attempted to obtain respiratory sputum sample, but patient requesting to be done at a later time.  Patient placed on droplet precautions for rule-out meningitis.   Fall and seizure precautions in place.

## 2019-05-15 NOTE — ASSESSMENT & PLAN NOTE
Sepsis currenlty ruled out  Procalcitonin less than 0.05    Patient clinically has no meningitis signs.  Patient fever quickly resolved.  Patient leukocytosis quickly resolved.  Patient has no neck pain or headache.  Patient comfortably sitting in chair in the morning and eating breakfast.  Discontinue IV antibiotics.   Discontinue isolation

## 2019-05-15 NOTE — CARE PLAN
Problem: Safety  Goal: Will remain free from falls    Intervention: Implement fall precautions  Fall precautions in place - bed in lowest position, bed alarm is on, call light and personal belongings within reach.       Problem: Psychosocial Needs:  Goal: Level of anxiety will decrease    Intervention: Identify and develop with patient strategies to cope with anxiety triggers  Provided therapeutic talk and touch to patient as pt is anxious and tearful of situation. Listened to patient concerns and comforted patient.

## 2019-05-15 NOTE — PROGRESS NOTES
[facility] Medicine Daily Progress Note    Date of Service  5/15/2019    Chief Complaint  38 y.o. female admitted 5/14/2019 with complaint of fever nausea vomiting and weakness    Hospital Course    Past medical history of obesity presented with complaint of fever nausea vomiting and weakness for the past several days.  Patient incidentally was noticed to have suprasellar mass.  Neurosurgery was consulted.  MRI was also ordered.      Interval Problem Update  5/19.  Patient was comfortable overnight and no significant overnight event.  Patient complains of weakness but denies significant headache or neck pain.  Patient has significant fever yesterday in the ER but resolved.  Patient has no nausea vomiting and able to eat breakfast this morning.    Consultants/Specialty  Neurosurgery    Code Status  Full code    Disposition  To be determined    Review of Systems  Review of Systems   Constitutional: Positive for fever. Negative for chills and weight loss.   HENT: Negative for congestion, ear discharge, ear pain, hearing loss and nosebleeds.    Eyes: Negative for blurred vision and pain.   Respiratory: Negative for cough, sputum production, shortness of breath and wheezing.    Cardiovascular: Negative for chest pain, palpitations, leg swelling and PND.   Gastrointestinal: Negative for abdominal pain, constipation, diarrhea, heartburn, nausea and vomiting.   Genitourinary: Negative for dysuria, frequency and hematuria.   Musculoskeletal: Negative for back pain, falls, joint pain and neck pain.   Skin: Negative for rash.   Neurological: Positive for weakness. Negative for dizziness, tremors, speech change, seizures and headaches.   Psychiatric/Behavioral: Negative for depression, substance abuse and suicidal ideas.        Physical Exam  Temp:  [36.4 °C (97.6 °F)-39.2 °C (102.5 °F)] 36.4 °C (97.6 °F)  Pulse:  [] 73  Resp:  [11-22] 15  BP: (102-130)/(59-78) 110/78  SpO2:  [95 %-100 %] 98 %    Physical Exam    Constitutional: She is oriented to person, place, and time. She appears well-developed and well-nourished.   HENT:   Head: Normocephalic.   Nose: Nose normal.   Mouth/Throat: No oropharyngeal exudate.   Eyes: Pupils are equal, round, and reactive to light. EOM are normal.   Neck: Normal range of motion. Neck supple. No JVD present. No thyromegaly present.   Cardiovascular: Normal rate, regular rhythm and normal heart sounds.  Exam reveals no gallop and no friction rub.    No murmur heard.  Pulmonary/Chest: Effort normal and breath sounds normal. No respiratory distress. She has no wheezes. She has no rales.   Abdominal: Soft. Bowel sounds are normal. She exhibits no distension and no mass. There is no tenderness. There is no rebound and no guarding.   Musculoskeletal: Normal range of motion. She exhibits no edema or tenderness.   Lymphadenopathy:     She has no cervical adenopathy.   Neurological: She is alert and oriented to person, place, and time. No cranial nerve deficit.   Skin: Skin is warm. No rash noted.   Psychiatric: Her behavior is normal.       Fluids    Intake/Output Summary (Last 24 hours) at 05/15/19 1438  Last data filed at 05/15/19 1100   Gross per 24 hour   Intake             1640 ml   Output             1000 ml   Net              640 ml       Laboratory  Recent Labs      05/14/19   1619  05/15/19   0143   WBC  14.9*  10.1   RBC  4.34  3.88*   HEMOGLOBIN  13.7  12.2   HEMATOCRIT  42.0  37.9   MCV  96.8  97.7   MCH  31.6  31.4   MCHC  32.6*  32.2*   RDW  43.4  43.7   PLATELETCT  400  338   MPV  9.2  9.2     Recent Labs      05/14/19   1619  05/15/19   0143   SODIUM  141  143   POTASSIUM  3.6  4.1   CHLORIDE  106  109   CO2  24  26   GLUCOSE  113*  115*   BUN  7*  7*   CREATININE  0.86  0.78   CALCIUM  9.3  8.7     Recent Labs      05/14/19   2152   APTT  28.7   INR  1.14*               Imaging  CT-ABDOMEN-PELVIS WITH   Final Result         1.  No acute abnormality.   2.  Appendicolith in the  distal appendix without additional findings to suggests acute appendicitis.      CT-HEAD W/O   Final Result      Large suprasellar/sellar mass which measures 22 x 17 x 16 mm in size. This extends cephalad into the area of the optic chiasm as well as displaces and deforms the third ventricle with surrounding edema. Differential diagnosis includes a variety of    intracranial neoplasms to include pituitary adenoma as well as other suprasellar masses. A giant thrombosed aneurysm remains a consideration as well. Further evaluation with brain MRI with contrast is recommended.      This was discussed with NERISSA CONTRERAS at 7:37 PM on 5/14/2019.      MR-UNLISTED MRI PROCEDURE    (Results Pending)        Assessment/Plan  * Suprasellar mass- (present on admission)   Assessment & Plan    -CT confirmed suprasellar mass  -Neurosurgery has been consulted  -MRI currently still pending  Further recommendation.  Neurosurgery.  Patient does have slightly elevated prolactin     Sepsis (HCC)- (present on admission)   Assessment & Plan    Sepsis currenlty ruled out    Patient clinically has no meningitis signs.  Patient fever quickly resolved.  Patient leukocytosis quickly resolved.  Patient has no neck pain or headache.  Patient comfortably sitting in chair in the morning and eating breakfast.  Discontinue IV antibiotics.     Class 2 obesity in adult- (present on admission)   Assessment & Plan    Body mass index is 36.65 kg/m².  With reduction education provided     SIRS (systemic inflammatory response syndrome) (HCC)- (present on admission)   Assessment & Plan    Probably not related to the infection source.  Patient does have fever and leukocytosis on admission but quickly resolved.  Negative lactic   Pending further culture result.  Discontinue IV antibiotics.     Anxiety- (present on admission)   Assessment & Plan    -This is due to her diagnosis of a mass  -Start low-dose Ativan     Hyperglycemia- (present on admission)    Assessment & Plan    -Mild increase, no need for insulin at this time  -Continue to monitor, add insulin sliding scale if warranted          VTE prophylaxis: SCD      Current Facility-Administered Medications:   •  levETIRAcetam (KEPPRA) tablet 500 mg, 500 mg, Oral, BID, Katerin Ortiz M.D.  •  acetaminophen (TYLENOL) tablet 650 mg, 650 mg, Oral, Once, Nettie Cameron M.D., Stopped at 05/14/19 1830  •  Pharmacy Consult Request ...Pain Management Review 1 Each, 1 Each, Other, PHARMACY TO DOSE, VIMAL Cueva.O.  •  senna-docusate (PERICOLACE or SENOKOT S) 8.6-50 MG per tablet 2 Tab, 2 Tab, Oral, BID, Stopped at 05/15/19 0600 **AND** polyethylene glycol/lytes (MIRALAX) PACKET 1 Packet, 1 Packet, Oral, QDAY PRN **AND** magnesium hydroxide (MILK OF MAGNESIA) suspension 30 mL, 30 mL, Oral, QDAY PRN **AND** bisacodyl (DULCOLAX) suppository 10 mg, 10 mg, Rectal, QDAY PRN, Charles Ace D.O.  •  NS infusion, , Intravenous, Continuous, Katerin Ortiz M.D., Last Rate: 50 mL/hr at 05/15/19 0825  •  acetaminophen (TYLENOL) tablet 650 mg, 650 mg, Oral, Q6HRS PRN, Charles Ace D.O.  •  oxyCODONE immediate-release (ROXICODONE) tablet 5 mg, 5 mg, Oral, Q3HRS PRN, Charles Ace D.O.  •  oxyCODONE immediate-release (ROXICODONE) tablet 10 mg, 10 mg, Oral, Q3HRS PRN, Charles Ace D.O.  •  HYDROmorphone pf (DILAUDID) injection 0.5 mg, 0.5 mg, Intravenous, Q3HRS PRN, Charles Ace D.O.  •  enalaprilat (VASOTEC) injection 1.25 mg, 1.25 mg, Intravenous, Q6HRS PRN, VIMAL Cueva.O.  •  ondansetron (ZOFRAN) syringe/vial injection 4 mg, 4 mg, Intravenous, Q4HRS PRN, Charles Ace D.O., 4 mg at 05/15/19 0044  •  ondansetron (ZOFRAN ODT) dispertab 4 mg, 4 mg, Oral, Q4HRS PRN, Charles Ace D.O.  •  promethazine (PHENERGAN) tablet 12.5-25 mg, 12.5-25 mg, Oral, Q4HRS PRN, Charles Ace D.O.  •  promethazine (PHENERGAN) suppository 12.5-25 mg, 12.5-25 mg, Rectal, Q4HRS PRN, Charles Ace D.O.  •  prochlorperazine  (COMPAZINE) injection 5-10 mg, 5-10 mg, Intravenous, Q4HRS PRN, Charles cAe D.O., 5 mg at 05/15/19 0401  •  NS infusion 2,454 mL, 30 mL/kg, Intravenous, Once PRN, LILIANA CuevaO.  •  NS (BOLUS) infusion 1,000 mL, 1,000 mL, Intravenous, Once PRN, Charles Ace D.O.  •  LORazepam (ATIVAN) injection 0.5 mg, 0.5 mg, Intravenous, Q4HRS PRN, Charles Ace D.O.

## 2019-05-15 NOTE — ASSESSMENT & PLAN NOTE
-CT confirmed suprasellar mass  -Neurosurgery has been consulted  -MRI finished confirmed with suprasellar mass and pituitary tumor.   Reviewed by neurosurgery, [facility] center.  Transfer initiated by neurosurgical team.hopefully transfer tomorrow  Continue supportive care during this hospital stay.       “You can access the [facility] Patient Portal, offered by [facility], by registering with the following website: [email or web address]”

## 2019-05-15 NOTE — CONSULTS
Neurosurgery Consult Note    Patient: Johana Weston    MRN: [id number]    Date of Consultation: 5/15/2019    Reason for Consultation: Suprasellar mass    Referring Physician: Dr. Nettie Cameron    History of Present Illness:  Johana Weston is a 38 y.o. female who presents with chief complaint of generalized body pain and fevers.  She is exclusively Mexican-speaking,  service used for the entirety of this encounter.  Triage notes that she developed abdominal pain and headaches at 11:00 this morning.  I spent a long time clarifying this with the  service, she states she has had chronic abdominal pain, back pain, and headaches ongoing since October.  In November she stopped menstruating, she did see a physician at [facility] for this, was prescribed OCPs, however this did not alleviate her symptoms so she stopped taking them.  Additionally over the past several months she is noticed that she has been increasingly drowsy and anxious, she has noticed intermittent galactorrhea.  Two or three months ago, she developed loss of appetite, intermittent vomiting, as well as pain in the anterior neck.    She complains of blurry vision, more in the right eye than the left.  She denies any loss of vision.  She is febrile on arrival to the emergency department, uncertain as to when those symptoms began.  She does have generalized back pain.  She denies abnormal vaginal bleeding or vaginal discharge, she denies any vaginal odor.  She has not had dysuria, no hematuria.  She does have polydipsia and polyuria.  She is unable to identify any exacerbating or alleviating factors, no sick contacts identified.  She has no history of hyperglycemia, no impaired immunity, denies lymphadenopathy.     I did ask multiple times and rephrase the question several ways, however she is not able to tell me if any of her symptoms acutely worsened nor when her symptoms acutely worsen.   States she presented to the emergency department today because she simply could not handle it anymore.    Review of Systems:  Constitutional: Denies fevers, chills, night sweats, or weight changes  Eyes: Denies eye pain  Ears/Nose/Throat/Mouth: Denies nasal congestion or sore throat   Cardiovascular: Denies chest pain or palpitations   Respiratory: Denies shortness of breath, or cough  Gastrointestinal/Hepatic: Denies diarrhea, or constipation  Genitourinary: Denies dysuria, frequency, or incontinence  Musculoskeletal/Rheum: Denies joint pain or swelling   Skin: Denies rash  Neurological: Deniesconfusion, memory loss, focal weakness, or parasthesias  Psychiatric: Denies mood disorder   Endocrine: Denies hx of diabetes or thyroid dysfunction  Heme/Oncology/Lymph Nodes: Denies enlarged lymph nodes, bruising, or known bleeding disorder  Allergic/Immunologic: Denies hx of autoimmune disorder    Medications:  Current Facility-Administered Medications   Medication Dose Route Frequency Provider Last Rate Last Dose   • vancomycin 1,500 mg in  mL IVPB  18 mg/kg Intravenous Q8HR Charles Ace D.O.       • acetaminophen (TYLENOL) tablet 650 mg  650 mg Oral Once Nettie Cameron M.D.   Stopped at 05/14/19 1830   • Pharmacy Consult Request ...Pain Management Review 1 Each  1 Each Other PHARMACY TO DOSE Charles Ace D.O.       • senna-docusate (PERICOLACE or SENOKOT S) 8.6-50 MG per tablet 2 Tab  2 Tab Oral BID Charles Ace D.O.   Stopped at 05/15/19 0600    And   • polyethylene glycol/lytes (MIRALAX) PACKET 1 Packet  1 Packet Oral QDAY PRN Charles Ace D.O.        And   • magnesium hydroxide (MILK OF MAGNESIA) suspension 30 mL  30 mL Oral QDAY PRN Charles Ace D.O.        And   • bisacodyl (DULCOLAX) suppository 10 mg  10 mg Rectal QDAY PRN Charles Ace D.O.       • NS infusion   Intravenous Gayla Ortiz M.D. 100 mL/hr at 05/15/19 0043     • acetaminophen (TYLENOL) tablet 650 mg  650 mg Oral  Q6HRS PRN LILIANA CuevaO.       • oxyCODONE immediate-release (ROXICODONE) tablet 5 mg  5 mg Oral Q3HRS PRN LILIANA CuevaO.       • oxyCODONE immediate-release (ROXICODONE) tablet 10 mg  10 mg Oral Q3HRS PRN VIMAL Cueva.O.       • HYDROmorphone pf (DILAUDID) injection 0.5 mg  0.5 mg Intravenous Q3HRS PRN VIMAL Cueva.O.       • enalaprilat (VASOTEC) injection 1.25 mg  1.25 mg Intravenous Q6HRS PRN VIMAL Cueva.O.       • ondansetron (ZOFRAN) syringe/vial injection 4 mg  4 mg Intravenous Q4HRS PRN VIMAL Cueva.O.   4 mg at 05/15/19 0044   • ondansetron (ZOFRAN ODT) dispertab 4 mg  4 mg Oral Q4HRS PRN VIMAL Cueva.O.       • promethazine (PHENERGAN) tablet 12.5-25 mg  12.5-25 mg Oral Q4HRS PRN LILIANA CuevaO.       • promethazine (PHENERGAN) suppository 12.5-25 mg  12.5-25 mg Rectal Q4HRS PRN VIMAL Cueva.O.       • prochlorperazine (COMPAZINE) injection 5-10 mg  5-10 mg Intravenous Q4HRS PRN VIMAL Cueva.O.   5 mg at 05/15/19 0401   • NS infusion 2,454 mL  30 mL/kg Intravenous Once PRN Charles Ace D.O.       • NS (BOLUS) infusion 1,000 mL  1,000 mL Intravenous Once PRN LILIANA CuevaO.       • cefTRIAXone (ROCEPHIN) 2 g in  mL IVPB  2 g Intravenous Q12HRS LILIANA CuevaO. 200 mL/hr at 05/15/19 0822 2 g at 05/15/19 0822   • MD Alert...Vancomycin per Pharmacy   Other PHARMACY TO DOSE Charles Ace D.O.       • LORazepam (ATIVAN) injection 0.5 mg  0.5 mg Intravenous Q4HRS PRN LILIANA CuevaO.       • levETIRAcetam (KEPPRA) 500 mg in  mL IVPB  500 mg Intravenous Q12HRS Charles Ace D.O.   Stopped at 05/14/19 9210       Allergies:  No Known Allergies    Past Medical History:  History reviewed. No pertinent past medical history.    Past Surgical History:  History reviewed. No pertinent surgical history.    Family History:  History reviewed. No pertinent family history.    Social History:  Social History     Social History    • Marital status: Single     Spouse name: N/A   • Number of children: N/A   • Years of education: N/A     Occupational History   • Not on file.     Social History Main Topics   • Smoking status: Never Smoker   • Smokeless tobacco: Never Used   • Alcohol use Yes      Comment: occ   • Drug use: No   • Sexual activity: Not on file     Other Topics Concern   • Not on file     Social History Narrative   • No narrative on file       Physical Examination:  Alert and oriented (Barbadian-speaking only)  Pupils 4 mm and reactive  Visual fields full to confrontation  Extraocular eye movements intact  No pronator drift  Power 5/5 in all 4 extremities  Sensation intact in all 4 extremities  No dysmetria or dysdiadochokinesia    Labs:  Recent Labs      05/14/19   1619  05/15/19   0143   WBC  14.9*  10.1   RBC  4.34  3.88*   HEMOGLOBIN  13.7  12.2   HEMATOCRIT  42.0  37.9   MCV  96.8  97.7   MCH  31.6  31.4   MCHC  32.6*  32.2*   RDW  43.4  43.7   PLATELETCT  400  338   MPV  9.2  9.2     Recent Labs      05/14/19   1619  05/15/19   0143   SODIUM  141  143   POTASSIUM  3.6  4.1   CHLORIDE  106  109   CO2  24  26   GLUCOSE  113*  115*   BUN  7*  7*   CREATININE  0.86  0.78   CALCIUM  9.3  8.7     Recent Labs      05/14/19   2152   APTT  28.7   INR  1.14*           Prolactin is elevated at 44; TSH is decreased at 0.11    Imaging:  Large suprasellar/sellar mass which measures 22 x 17 x 16 mm in size. This extends cephalad into the area of the optic chiasm as well as displaces and deforms the third ventricle with surrounding edema    Assessment and Plan:  Johana Weston presents with a vague history of headache, blurry vision, nausea/vomiting, and intermittent galactorrhea.  Her prolactin level is elevated at 44, and there is a suprasellar mass.  MRI is still pending.  Further recommendations will be provided after the MRI has been completed, but at this point in time there is no plan for urgent surgery.  From a  neurosurgery standpoint she can eat.      Wiliam Webster M.D.

## 2019-05-15 NOTE — ASSESSMENT & PLAN NOTE
Probably not related to bacterial infection source.  Possibly viral infection  Patient did have fever and leukocytosis on admission but quickly resolved.  Negative lactic   Blood culture and GAS cultures neg  She is still running low grade fevers and is now tachycardic, I will start her on ivf and also obtain cbc, lactic acid, and CXR.  Her UA in the past was neg, has not s/s either  Hold off on abx at this time

## 2019-05-15 NOTE — PROGRESS NOTES
"Pharmacy Kinetics 38 y.o. female on vancomycin day # 1 5/15/2019    New start vancomycin    Indication for Treatment: CNS infection    Pertinent history per medical record: Admitted on 2019 for headache related to a suprasellar mass. The mass is concerning for neoplasm, however of etiologies can not be ruled out. Given fever and myalgias with presence of the mass, antibiotics were started until CNS infection is ruled out. Unable to obtain LP due to possible hydrocephalus. Neurosurgery has been consulted.     Other antibiotics: ceftriaxone 2g IV q12h    Allergies: Patient has no known allergies.     List concerns for renal function: none    Pertinent cultures to date:    BLD In process    MRSA nares swab if pneumonia is a concern (ordered/positive/negative/n-a): n/a    Recent Labs      19   1619   WBC  14.9*   NEUTSPOLYS  75.50*     Recent Labs      19   1619   BUN  7*   CREATININE  0.86   ALBUMIN  4.5     No results for input(s): VANCOTROUGH, VANCOPEAK, VANCORANDOM in the last 72 hours.  Intake/Output Summary (Last 24 hours) at 05/15/19 0032  Last data filed at 19   Gross per 24 hour   Intake             1100 ml   Output                0 ml   Net             1100 ml      /73   Pulse 74   Temp 37.9 °C (100.2 °F)   Resp 15   Ht 1.499 m (4' 11\")   Wt 81.8 kg (180 lb 5.4 oz)   SpO2 99%  Temp (24hrs), Av.1 °C (100.6 °F), Min:37.2 °C (99 °F), Max:39.2 °C (102.5 °F)      A/P   1. Vancomycin dose change: start vancomycin 1500 mg IV q8h (18 mg/kg)  2. Next vancomycin level:  @ 830 (prior to 5th dose)  3. Goal trough: 18-22 mcg/mL  4. Comments: Unable to rule out CNS infection with fever, leukocytosis, and tachycardia, so antibiotic therapy started. Likely able to de-escalate quickly. Unable to obtain LP due to possible obstructive hydrocephalus. Follow for surgical plan from Neurosurgery and MRI results, these should help guide de-escalation. No MRSA nares given CNS target, " not pulmonary. Aggressive dosing for CNS, trough prior to 5th dose for steady state.    Logan Eller, PharmD

## 2019-05-15 NOTE — ED NOTES
keppra started at this time.  Patient resting quietly in bed without distress, denies any complaints or needs at this time.  Call bell within reach.  Waiting for transport to floor

## 2019-05-15 NOTE — CARE PLAN
Problem: Safety  Goal: Will remain free from injury  Outcome: PROGRESSING AS EXPECTED  Bed locked and in lowest position. Call light and personal belongings in reach. Bed alarm in place. Hourly rounding.     Problem: Venous Thromboembolism (VTW)/Deep Vein Thrombosis (DVT) Prevention:  Goal: Patient will participate in Venous Thrombosis (VTE)/Deep Vein Thrombosis (DVT)Prevention Measures  Outcome: PROGRESSING AS EXPECTED  SCDs in place. Encouraging ambulation.

## 2019-05-16 ENCOUNTER — APPOINTMENT (OUTPATIENT)
Dept: RADIOLOGY | Facility: MEDICAL CENTER | Age: 39
DRG: 644 | End: 2019-05-16
Attending: INTERNAL MEDICINE
Payer: MEDICAID

## 2019-05-16 ENCOUNTER — APPOINTMENT (OUTPATIENT)
Dept: RADIOLOGY | Facility: MEDICAL CENTER | Age: 39
DRG: 644 | End: 2019-05-16
Attending: NEUROLOGICAL SURGERY
Payer: MEDICAID

## 2019-05-16 LAB
ACTH PLAS-MCNC: 20 PG/ML (ref 6–58)
ANION GAP SERPL CALC-SCNC: 6 MMOL/L (ref 0–11.9)
BASOPHILS # BLD AUTO: 0.4 % (ref 0–1.8)
BASOPHILS # BLD: 0.03 K/UL (ref 0–0.12)
BUN SERPL-MCNC: 10 MG/DL (ref 8–22)
CALCIUM SERPL-MCNC: 9.2 MG/DL (ref 8.5–10.5)
CHLORIDE SERPL-SCNC: 109 MMOL/L (ref 96–112)
CO2 SERPL-SCNC: 26 MMOL/L (ref 20–33)
CREAT SERPL-MCNC: 0.75 MG/DL (ref 0.5–1.4)
EOSINOPHIL # BLD AUTO: 0.36 K/UL (ref 0–0.51)
EOSINOPHIL NFR BLD: 4.5 % (ref 0–6.9)
ERYTHROCYTE [DISTWIDTH] IN BLOOD BY AUTOMATED COUNT: 44 FL (ref 35.9–50)
GLUCOSE SERPL-MCNC: 124 MG/DL (ref 65–99)
HCT VFR BLD AUTO: 40.3 % (ref 37–47)
HGB BLD-MCNC: 13.4 G/DL (ref 12–16)
IGF-I SERPL-MCNC: 63 NG/ML (ref 80–277)
IGF-I Z-SCORE SERPL: -3
IMM GRANULOCYTES # BLD AUTO: 0.06 K/UL (ref 0–0.11)
IMM GRANULOCYTES NFR BLD AUTO: 0.8 % (ref 0–0.9)
LYMPHOCYTES # BLD AUTO: 1.57 K/UL (ref 1–4.8)
LYMPHOCYTES NFR BLD: 19.7 % (ref 22–41)
MCH RBC QN AUTO: 32.6 PG (ref 27–33)
MCHC RBC AUTO-ENTMCNC: 33.3 G/DL (ref 33.6–35)
MCV RBC AUTO: 98.1 FL (ref 81.4–97.8)
MONOCYTES # BLD AUTO: 0.38 K/UL (ref 0–0.85)
MONOCYTES NFR BLD AUTO: 4.8 % (ref 0–13.4)
NEUTROPHILS # BLD AUTO: 5.55 K/UL (ref 2–7.15)
NEUTROPHILS NFR BLD: 69.8 % (ref 44–72)
NRBC # BLD AUTO: 0 K/UL
NRBC BLD-RTO: 0 /100 WBC
PLATELET # BLD AUTO: 388 K/UL (ref 164–446)
PMV BLD AUTO: 9 FL (ref 9–12.9)
POTASSIUM SERPL-SCNC: 4.4 MMOL/L (ref 3.6–5.5)
RBC # BLD AUTO: 4.11 M/UL (ref 4.2–5.4)
SODIUM SERPL-SCNC: 141 MMOL/L (ref 135–145)
WBC # BLD AUTO: 8 K/UL (ref 4.8–10.8)

## 2019-05-16 PROCEDURE — 700117 HCHG RX CONTRAST REV CODE 255: Performed by: INTERNAL MEDICINE

## 2019-05-16 PROCEDURE — 80048 BASIC METABOLIC PNL TOTAL CA: CPT

## 2019-05-16 PROCEDURE — 85025 COMPLETE CBC W/AUTO DIFF WBC: CPT

## 2019-05-16 PROCEDURE — 36415 COLL VENOUS BLD VENIPUNCTURE: CPT

## 2019-05-16 PROCEDURE — 700111 HCHG RX REV CODE 636 W/ 250 OVERRIDE (IP): Performed by: INTERNAL MEDICINE

## 2019-05-16 PROCEDURE — 70553 MRI BRAIN STEM W/O & W/DYE: CPT

## 2019-05-16 PROCEDURE — A9585 GADOBUTROL INJECTION: HCPCS | Performed by: INTERNAL MEDICINE

## 2019-05-16 PROCEDURE — 99233 SBSQ HOSP IP/OBS HIGH 50: CPT | Performed by: INTERNAL MEDICINE

## 2019-05-16 PROCEDURE — 700102 HCHG RX REV CODE 250 W/ 637 OVERRIDE(OP): Performed by: INTERNAL MEDICINE

## 2019-05-16 PROCEDURE — 770021 HCHG ROOM/CARE - ISO PRIVATE

## 2019-05-16 PROCEDURE — A9270 NON-COVERED ITEM OR SERVICE: HCPCS | Performed by: INTERNAL MEDICINE

## 2019-05-16 RX ORDER — GADOBUTROL 604.72 MG/ML
7.5 INJECTION INTRAVENOUS ONCE
Status: COMPLETED | OUTPATIENT
Start: 2019-05-16 | End: 2019-05-16

## 2019-05-16 RX ORDER — DIPHENHYDRAMINE HYDROCHLORIDE 50 MG/ML
25 INJECTION INTRAMUSCULAR; INTRAVENOUS ONCE
Status: COMPLETED | OUTPATIENT
Start: 2019-05-16 | End: 2019-05-16

## 2019-05-16 RX ORDER — DIPHENHYDRAMINE HCL 25 MG
25 TABLET ORAL EVERY 8 HOURS PRN
Status: DISCONTINUED | OUTPATIENT
Start: 2019-05-16 | End: 2019-05-23 | Stop reason: HOSPADM

## 2019-05-16 RX ORDER — LORAZEPAM 2 MG/ML
0.5 INJECTION INTRAMUSCULAR ONCE
Status: COMPLETED | OUTPATIENT
Start: 2019-05-16 | End: 2019-05-16

## 2019-05-16 RX ORDER — LORAZEPAM 1 MG/1
0.5 TABLET ORAL ONCE
Status: DISCONTINUED | OUTPATIENT
Start: 2019-05-16 | End: 2019-05-16

## 2019-05-16 RX ADMIN — DIPHENHYDRAMINE HYDROCHLORIDE 25 MG: 50 INJECTION INTRAMUSCULAR; INTRAVENOUS at 08:33

## 2019-05-16 RX ADMIN — SENNOSIDES,DOCUSATE SODIUM 2 TABLET: 50; 8.6 TABLET, FILM COATED ORAL at 17:27

## 2019-05-16 RX ADMIN — DIPHENHYDRAMINE HCL 25 MG: 25 TABLET ORAL at 16:31

## 2019-05-16 RX ADMIN — GADOBUTROL 7.5 ML: 604.72 INJECTION INTRAVENOUS at 10:20

## 2019-05-16 RX ADMIN — ACETAMINOPHEN 650 MG: 325 TABLET, FILM COATED ORAL at 20:49

## 2019-05-16 RX ADMIN — LEVETIRACETAM 500 MG: 500 TABLET ORAL at 17:27

## 2019-05-16 RX ADMIN — LORAZEPAM 0.5 MG: 2 INJECTION INTRAMUSCULAR; INTRAVENOUS at 08:59

## 2019-05-16 RX ADMIN — LEVETIRACETAM 500 MG: 500 TABLET ORAL at 05:25

## 2019-05-16 RX ADMIN — LORAZEPAM 0.5 MG: 2 INJECTION INTRAMUSCULAR; INTRAVENOUS at 21:07

## 2019-05-16 ASSESSMENT — ENCOUNTER SYMPTOMS
WEAKNESS: 1
DEPRESSION: 0
WEIGHT LOSS: 0
SEIZURES: 0
SPEECH CHANGE: 0
FALLS: 0
COUGH: 0
HEADACHES: 0
PALPITATIONS: 0
TREMORS: 0
NAUSEA: 0
CHILLS: 0
DIZZINESS: 0
CONSTIPATION: 0
SPUTUM PRODUCTION: 0
FEVER: 1
EYE PAIN: 0
WHEEZING: 0
NECK PAIN: 0
DIARRHEA: 0
BACK PAIN: 0
ABDOMINAL PAIN: 0
BLURRED VISION: 0
HEARTBURN: 0
PND: 0
SHORTNESS OF BREATH: 0
VOMITING: 0

## 2019-05-16 ASSESSMENT — LIFESTYLE VARIABLES: SUBSTANCE_ABUSE: 0

## 2019-05-16 NOTE — PROGRESS NOTES
Neurosurgery Progress Note    Subjective:  Georgian speaking  Denies pain    Exam:  Alert, no acute distress  YEH spontaneously  Negative pronator drift  PERRL, EOM intact    BP  Min: 97/63  Max: 117/69  Pulse  Av.5  Min: 73  Max: 87  Resp  Av.2  Min: 15  Max: 20  Temp  Av.7 °C (98.1 °F)  Min: 36.4 °C (97.6 °F)  Max: 37.1 °C (98.7 °F)  SpO2  Av %  Min: 93 %  Max: 99 %    No Data Recorded    Recent Labs      19   1619  05/15/19   0143  19   0330   WBC  14.9*  10.1  8.0   RBC  4.34  3.88*  4.11*   HEMOGLOBIN  13.7  12.2  13.4   HEMATOCRIT  42.0  37.9  40.3   MCV  96.8  97.7  98.1*   MCH  31.6  31.4  32.6   MCHC  32.6*  32.2*  33.3*   RDW  43.4  43.7  44.0   PLATELETCT  400  338  388   MPV  9.2  9.2  9.0     Recent Labs      19   1619  05/15/19   0143  19   0330   SODIUM  141  143  141   POTASSIUM  3.6  4.1  4.4   CHLORIDE  106  109  109   CO2  24  26  26   GLUCOSE  113*  115*  124*   BUN  7*  7*  10   CREATININE  0.86  0.78  0.75   CALCIUM  9.3  8.7  9.2     Recent Labs      19   2152   APTT  28.7   INR  1.14*           Intake/Output       05/15/19 07 - 19 0659 19 07 - 19 0659       Total 1900-0659 Total       Intake    P.O.  540  1600 2140  --  -- --    P.O. 540 1600 2140 -- -- --    Total Intake 540 1600 2140 -- -- --       Output    Urine  --  -- --  --  -- --    Number of Times Voided 3 x 2 x 5 x -- -- --    Stool  --  -- --  --  -- --    Number of Times Stooled 1 x -- 1 x -- -- --    Total Output -- -- -- -- -- --       Net I/O     540 1600 2140 -- -- --            Intake/Output Summary (Last 24 hours) at 19 1040  Last data filed at 19 0400   Gross per 24 hour   Intake             1900 ml   Output                0 ml   Net             1900 ml            • levETIRAcetam  500 mg BID   • Pharmacy Consult Request  1 Each PHARMACY TO DOSE   • senna-docusate  2 Tab BID    And   • polyethylene glycol/lytes  1  Packet QDAY PRN    And   • magnesium hydroxide  30 mL QDAY PRN    And   • bisacodyl  10 mg QDAY PRN   • acetaminophen  650 mg Q6HRS PRN   • oxyCODONE immediate-release  5 mg Q3HRS PRN   • oxyCODONE immediate release  10 mg Q3HRS PRN   • HYDROmorphone  0.5 mg Q3HRS PRN   • enalaprilat  1.25 mg Q6HRS PRN   • ondansetron  4 mg Q4HRS PRN   • ondansetron  4 mg Q4HRS PRN   • promethazine  12.5-25 mg Q4HRS PRN   • promethazine  12.5-25 mg Q4HRS PRN   • prochlorperazine  5-10 mg Q4HRS PRN   • NS  30 mL/kg Once PRN   • NS  1,000 mL Once PRN   • LORazepam  0.5 mg Q4HRS PRN       Assessment and Plan:  Hospital day #3 suprasellar mass  POD #NA   Prophylactic anticoagulation: no         Start date/time: tbd  Neuro stable  Continue supportive care  Continue Kemoncho ETIENNE completed- Dr Webster to review

## 2019-05-16 NOTE — PROGRESS NOTES
Received bedside report and assumed care. Patient resting quietly with covers over her head. Does not indicate to be in any pain and/or discomfort at this time. Report given that the patient is very anxious. MRI scheduled, screen completed. Will call and see when she is scheduled. No medication scheduled for pt to receive prior to MRI. NIHARIKA   Subjective:      Patient ID: Vangie Eng is a 65 y.o. female.    Chief Complaint:  Papillary thyroid carcinoma      History of Present Illness  Ms. Eng presents for follow up of thyroid cancer and hypothyroidism. Last visit 5/2017.     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     Intermittent mild dysphagia to pills and solid foods. No choking. No voice changes or hoarseness.      Currently on levothyroxine 100 mcg PO on Mon-Sat and 50 mcg on Sunday.      No overt fatigue, good energy.     Has had some heat intolerance in the past year. BM's regular. No excessive hair loss or dry skin.   No palpitations or tremors.     S/p 32 mCi of WALKER in 11/2016.      Pathology report:  -Procedure: total thyroidectomy  -No lymph node sampling  -Tumor laterality: right lobe and left lobe  -Tumor focality: Multifocal, two foci  -Tumor size:  - 1.3 cm, left lobe  - 0.3 cm right lobe  -Histologic type: Papillary carcinoma. Follicular variant, infiltrative  -Margins:  -Margins are negative for invasive carcinoma  -No angioinvasion  -No lymphatic invasion  -No perineural invasion  -Extrathyroidal extension: Present, mild  -Pathologic staging: pT3 N0 MX  - Lymph nodes:  -Total number of lymph nodes examined: 1 (within specimen)  -Total number of lymph nodes involved: 0    Ultrasound 6/2017:  Status post thyroidectomy.  No sonographic evidence of malignancy in this patient with undetectable thyroglobulin levels.      Has HTN on lisinopril and bp well controlled.       Review of Systems   Constitutional: Negative for chills and fever.   Gastrointestinal: Negative for nausea.       Objective:   Physical Exam   Nursing note and vitals reviewed.      Lab Review:   Results for VANGIE ENG (MRN [id number]) as of 10/11/2018 07:51   Ref. Range 10/5/2018 15:38   TSH Latest Ref Range: 0.46 - 4.68 mIU/L 0.19 (L)   Free T4 Latest Ref Range: 0.78 - 2.19 ng/dL 1.59   Thyroglobulin Latest Units: ng/mL <0.1  "(L)   Thyroglobulin Ab Screen Latest Ref Range: < OR = 1 IU/mL <1       Assessment:     1. Papillary thyroid carcinoma    2. Postsurgical hypothyroidism    3. Essential hypertension      Plan:        1. And 2.)Patient with stage 3 papillary thyroid cancer based on age and T3 lesion (minimal ETE), no longer considered T3 lesion with new staging criteria, infiltrative follicular variant, s/p total thyroidectomy and 32 mCi of WALKER ablation  --Patient BROOKS intermediate risk based on minimal ETE (microscopic)  --Thyroglobulin now undetectable and no evidence of structural disease on ultrasound consistent with "excellent response" to therapy  --Goal TSH is 0.5-2.0  --TSH currently below goal  --Will decrease levothyroxine to 88 mcg PO daily  --Due for neck ultrasound now and will schedule     3.) On lisinopril for HTN and bp stable     Follow up in one year     Federico Adames M.D. Staff Endocrinology      "

## 2019-05-16 NOTE — CARE PLAN
Problem: Psychosocial Needs:  Goal: Level of anxiety will decrease  Patient had breakout of redness over entire body. Gave pt shower for relief. Also spoke with physician and patient was given benadryl. Patient is sitting up in bed resting quietly at this time. She has returned back from her MRI, between the ativan for anxiety and benadryl patient does not appear to be having anxiety at this time. WCTM.

## 2019-05-16 NOTE — PROGRESS NOTES
Spoke with physician, reordered MRI, gave order for med prior to MRI. Patient woke up, took her to the rest room. Patient's skin red from the neck down and she is itching. Phy gave order for benadryl. CNA giving pt shower.

## 2019-05-16 NOTE — PROGRESS NOTES
Assumed care of patient, received report from day RN. Communication board updated and reviewed POC with pt and family using interpretor throughout encounter. Pt is Comor[place] speaking. A&O x4. All questions answered at this time. Assessment complete. No other needs at this time. Call light and personal belongings within reach.

## 2019-05-16 NOTE — PROGRESS NOTES
Patient has not been taking any narcotics for pain. Removed continuous pulse ox. Patient on Room air above 92%. WCTM

## 2019-05-16 NOTE — PROGRESS NOTES
[facility] Medicine Daily Progress Note    Date of Service  5/16/2019    Chief Complaint  38 y.o. female admitted 5/14/2019 with complaint of fever nausea vomiting and weakness    Hospital Course    Past medical history of obesity presented with complaint of fever nausea vomiting and weakness for the past several days.  Patient incidentally was noticed to have suprasellar mass.  Neurosurgery was consulted.  MRI was also ordered.      Interval Problem Update  5/15.  Patient was comfortable overnight and no significant overnight event.  Patient complains of weakness but denies significant headache or neck pain.  Patient has significant fever yesterday in the ER but resolved.  Patient has no nausea vomiting and able to eat breakfast this morning.  5/16.  Patient is alert oriented and stable overnight.  Still lethargic and complains of general fatigue. Patient otherwise denies fever, chills, SOB, CP, headache, constipation, diarrhea, cough, or sputum.    Consultants/Specialty  Neurosurgery    Code Status  Full code    Disposition  To be determined    Review of Systems  Review of Systems   Constitutional: Positive for fever. Negative for chills and weight loss.   HENT: Negative for congestion, ear discharge, ear pain, hearing loss and nosebleeds.    Eyes: Negative for blurred vision and pain.   Respiratory: Negative for cough, sputum production, shortness of breath and wheezing.    Cardiovascular: Negative for chest pain, palpitations, leg swelling and PND.   Gastrointestinal: Negative for abdominal pain, constipation, diarrhea, heartburn, nausea and vomiting.   Genitourinary: Negative for dysuria, frequency and hematuria.   Musculoskeletal: Negative for back pain, falls, joint pain and neck pain.   Skin: Negative for rash.   Neurological: Positive for weakness. Negative for dizziness, tremors, speech change, seizures and headaches.   Psychiatric/Behavioral: Negative for depression, substance abuse and suicidal ideas.         Physical Exam  Temp:  [36.6 °C (97.9 °F)-37.1 °C (98.7 °F)] 36.7 °C (98.1 °F)  Pulse:  [76-87] 85  Resp:  [16-20] 18  BP: ()/(63-70) 115/70  SpO2:  [93 %-99 %] 93 %    Physical Exam   Constitutional: She is oriented to person, place, and time. She appears well-developed and well-nourished.   HENT:   Head: Normocephalic.   Nose: Nose normal.   Mouth/Throat: No oropharyngeal exudate.   Eyes: Pupils are equal, round, and reactive to light. EOM are normal.   Neck: Normal range of motion. Neck supple. No JVD present. No thyromegaly present.   Cardiovascular: Normal rate, regular rhythm and normal heart sounds.  Exam reveals no gallop and no friction rub.    No murmur heard.  Pulmonary/Chest: Effort normal and breath sounds normal. No respiratory distress. She has no wheezes. She has no rales.   Abdominal: Soft. Bowel sounds are normal. She exhibits no distension and no mass. There is no tenderness. There is no rebound and no guarding.   Musculoskeletal: Normal range of motion. She exhibits no edema or tenderness.   Lymphadenopathy:     She has no cervical adenopathy.   Neurological: She is alert and oriented to person, place, and time. No cranial nerve deficit.   Skin: Skin is warm. No rash noted.   Psychiatric: Her behavior is normal.       Fluids    Intake/Output Summary (Last 24 hours) at 05/16/19 1322  Last data filed at 05/16/19 1100   Gross per 24 hour   Intake             3080 ml   Output                0 ml   Net             3080 ml       Laboratory  Recent Labs      05/14/19   1619  05/15/19   0143  05/16/19   0330   WBC  14.9*  10.1  8.0   RBC  4.34  3.88*  4.11*   HEMOGLOBIN  13.7  12.2  13.4   HEMATOCRIT  42.0  37.9  40.3   MCV  96.8  97.7  98.1*   MCH  31.6  31.4  32.6   MCHC  32.6*  32.2*  33.3*   RDW  43.4  43.7  44.0   PLATELETCT  400  338  388   MPV  9.2  9.2  9.0     Recent Labs      05/14/19   1619  05/15/19   0143  05/16/19   0330   SODIUM  141  143  141   POTASSIUM  3.6  4.1  4.4   CHLORIDE   106  109  109   CO2  24  26  26   GLUCOSE  113*  115*  124*   BUN  7*  7*  10   CREATININE  0.86  0.78  0.75   CALCIUM  9.3  8.7  9.2     Recent Labs      05/14/19   2152   APTT  28.7   INR  1.14*               Imaging  MR-BRAIN-WITH & W/O   Final Result      1.  28 x 19 x 29 mm lobular cystic sellar and suprasellar mass which demonstrates thin linear enhancement of the periphery of the cystic component and avid enhancement of the solid component. The optic chiasm is not well visualized. The pituitary gland    appears to be visible inferiorly below the mass but is not clearly separate from the mass. Differential considerations include craniopharyngioma and pituitary macroadenoma with cystic degeneration or necrosis and others.   2.  Mild edema in the adjacent basal ganglia and left medial temporal lobe.   3.  No evidence of hydrocephalus.      CT-ABDOMEN-PELVIS WITH   Final Result         1.  No acute abnormality.   2.  Appendicolith in the distal appendix without additional findings to suggests acute appendicitis.      CT-HEAD W/O   Final Result      Large suprasellar/sellar mass which measures 22 x 17 x 16 mm in size. This extends cephalad into the area of the optic chiasm as well as displaces and deforms the third ventricle with surrounding edema. Differential diagnosis includes a variety of    intracranial neoplasms to include pituitary adenoma as well as other suprasellar masses. A giant thrombosed aneurysm remains a consideration as well. Further evaluation with brain MRI with contrast is recommended.      This was discussed with NERISSA CONTRERAS at 7:37 PM on 5/14/2019.           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  Neurosurgery.  Patient does have slightly elevated prolactin     Sepsis (HCC)- (present on admission)   Assessment  & Plan    Sepsis currenlty ruled out  Procalcitonin less than 0.05    Patient clinically has no meningitis signs.  Patient fever quickly resolved.  Patient leukocytosis quickly resolved.  Patient has no neck pain or headache.  Patient comfortably sitting in chair in the morning and eating breakfast.  Discontinue IV antibiotics.     Class 2 obesity in adult- (present on admission)   Assessment & Plan    Body mass index is 36.65 kg/m².  With reduction education provided     SIRS (systemic inflammatory response syndrome) (HCC)- (present on admission)   Assessment & Plan    Probably not related to bacterial infection source.  Possibly viral infection  Patient does have fever and leukocytosis on admission but quickly resolved.  Negative lactic   Pending further culture result.  Discontinue IV antibiotics.  Negative procalcitonin     Anxiety- (present on admission)   Assessment & Plan    -This is due to her diagnosis of a mass  -Start low-dose Ativan     Hyperglycemia- (present on admission)   Assessment & Plan    -Mild increase, no need for insulin at this time  -Continue to monitor, add insulin sliding scale if warranted          VTE prophylaxis: SCD, will hold off pharmacological DVT prophylaxis for pending procedure.      Current Facility-Administered Medications:   •  levETIRAcetam (KEPPRA) tablet 500 mg, 500 mg, Oral, BID, Katerin Ortiz M.D., 500 mg at 05/16/19 0525  •  Pharmacy Consult Request ...Pain Management Review 1 Each, 1 Each, Other, PHARMACY TO DOSE, Charles Ace D.O.  •  senna-docusate (PERICOLACE or SENOKOT S) 8.6-50 MG per tablet 2 Tab, 2 Tab, Oral, BID, 2 Tab at 05/15/19 1700 **AND** polyethylene glycol/lytes (MIRALAX) PACKET 1 Packet, 1 Packet, Oral, QDAY PRN **AND** magnesium hydroxide (MILK OF MAGNESIA) suspension 30 mL, 30 mL, Oral, QDAY PRN **AND** bisacodyl (DULCOLAX) suppository 10 mg, 10 mg, Rectal, QDAY PRN, Charles Ace D.O.  •  acetaminophen (TYLENOL) tablet 650 mg, 650 mg, Oral, Q6HRS  PRN, LILIANA CuevaONimco, 650 mg at 05/15/19 1701  •  oxyCODONE immediate-release (ROXICODONE) tablet 5 mg, 5 mg, Oral, Q3HRS PRN, Charles Ace D.O.  •  oxyCODONE immediate-release (ROXICODONE) tablet 10 mg, 10 mg, Oral, Q3HRS PRN, LILIANA CuevaO.  •  HYDROmorphone pf (DILAUDID) injection 0.5 mg, 0.5 mg, Intravenous, Q3HRS PRN, LILIANA CuevaO.  •  enalaprilat (VASOTEC) injection 1.25 mg, 1.25 mg, Intravenous, Q6HRS PRN, Charles Ace D.O.  •  ondansetron (ZOFRAN) syringe/vial injection 4 mg, 4 mg, Intravenous, Q4HRS PRN, LILIANA CuevaONimco, 4 mg at 05/15/19 0044  •  ondansetron (ZOFRAN ODT) dispertab 4 mg, 4 mg, Oral, Q4HRS PRN, LILIANA CuevaO.  •  promethazine (PHENERGAN) tablet 12.5-25 mg, 12.5-25 mg, Oral, Q4HRS PRN, Charles Ace D.O.  •  promethazine (PHENERGAN) suppository 12.5-25 mg, 12.5-25 mg, Rectal, Q4HRS PRN, Charles Ace D.O.  •  prochlorperazine (COMPAZINE) injection 5-10 mg, 5-10 mg, Intravenous, Q4HRS PRN, LILIANA CuevaONimco, 5 mg at 05/15/19 0401  •  NS infusion 2,454 mL, 30 mL/kg, Intravenous, Once PRN, Charles Ace D.O.  •  NS (BOLUS) infusion 1,000 mL, 1,000 mL, Intravenous, Once PRN, Charles Ace D.O.  •  LORazepam (ATIVAN) injection 0.5 mg, 0.5 mg, Intravenous, Q4HRS PRN, Charles Ace D.O.

## 2019-05-16 NOTE — CARE PLAN
Problem: Safety  Goal: Will remain free from injury  Pt is on Fall and Seizure Precautions. Pt is a stand by assist with the bed alarm on. Non slips socks on. Bed in the lowest locked position. Call light and personal belongings within reach.     Problem: Discharge Barriers/Planning  Goal: Patient's continuum of care needs will be met  Awaiting an MRI.

## 2019-05-16 NOTE — CARE PLAN
Problem: Knowledge Deficit  Goal: Knowledge of disease process/condition, treatment plan, diagnostic tests, and medications will improve  Outcome: PROGRESSING AS EXPECTED  Using the  to speak with patient to ease her anxiety. Patient given full explanation in order to calm her fears. MALLORY

## 2019-05-17 LAB
ANION GAP SERPL CALC-SCNC: 7 MMOL/L (ref 0–11.9)
BASOPHILS # BLD AUTO: 0.3 % (ref 0–1.8)
BASOPHILS # BLD: 0.02 K/UL (ref 0–0.12)
BUN SERPL-MCNC: 9 MG/DL (ref 8–22)
CALCIUM SERPL-MCNC: 8.9 MG/DL (ref 8.5–10.5)
CHLORIDE SERPL-SCNC: 112 MMOL/L (ref 96–112)
CO2 SERPL-SCNC: 24 MMOL/L (ref 20–33)
CREAT SERPL-MCNC: 0.84 MG/DL (ref 0.5–1.4)
EOSINOPHIL # BLD AUTO: 0.38 K/UL (ref 0–0.51)
EOSINOPHIL NFR BLD: 5.4 % (ref 0–6.9)
ERYTHROCYTE [DISTWIDTH] IN BLOOD BY AUTOMATED COUNT: 44.1 FL (ref 35.9–50)
GLUCOSE SERPL-MCNC: 112 MG/DL (ref 65–99)
HCT VFR BLD AUTO: 43.6 % (ref 37–47)
HGB BLD-MCNC: 14.2 G/DL (ref 12–16)
IMM GRANULOCYTES # BLD AUTO: 0.1 K/UL (ref 0–0.11)
IMM GRANULOCYTES NFR BLD AUTO: 1.4 % (ref 0–0.9)
LYMPHOCYTES # BLD AUTO: 1.71 K/UL (ref 1–4.8)
LYMPHOCYTES NFR BLD: 24.2 % (ref 22–41)
MCH RBC QN AUTO: 31.6 PG (ref 27–33)
MCHC RBC AUTO-ENTMCNC: 32.6 G/DL (ref 33.6–35)
MCV RBC AUTO: 97.1 FL (ref 81.4–97.8)
MONOCYTES # BLD AUTO: 0.37 K/UL (ref 0–0.85)
MONOCYTES NFR BLD AUTO: 5.2 % (ref 0–13.4)
NEUTROPHILS # BLD AUTO: 4.48 K/UL (ref 2–7.15)
NEUTROPHILS NFR BLD: 63.5 % (ref 44–72)
NRBC # BLD AUTO: 0 K/UL
NRBC BLD-RTO: 0 /100 WBC
PLATELET # BLD AUTO: 419 K/UL (ref 164–446)
PMV BLD AUTO: 8.9 FL (ref 9–12.9)
POTASSIUM SERPL-SCNC: 4.1 MMOL/L (ref 3.6–5.5)
RBC # BLD AUTO: 4.49 M/UL (ref 4.2–5.4)
SODIUM SERPL-SCNC: 143 MMOL/L (ref 135–145)
WBC # BLD AUTO: 7.1 K/UL (ref 4.8–10.8)

## 2019-05-17 PROCEDURE — 700102 HCHG RX REV CODE 250 W/ 637 OVERRIDE(OP): Performed by: INTERNAL MEDICINE

## 2019-05-17 PROCEDURE — 770021 HCHG ROOM/CARE - ISO PRIVATE

## 2019-05-17 PROCEDURE — A9270 NON-COVERED ITEM OR SERVICE: HCPCS | Performed by: INTERNAL MEDICINE

## 2019-05-17 PROCEDURE — 700111 HCHG RX REV CODE 636 W/ 250 OVERRIDE (IP): Performed by: INTERNAL MEDICINE

## 2019-05-17 PROCEDURE — 36415 COLL VENOUS BLD VENIPUNCTURE: CPT

## 2019-05-17 PROCEDURE — 99233 SBSQ HOSP IP/OBS HIGH 50: CPT | Performed by: INTERNAL MEDICINE

## 2019-05-17 PROCEDURE — 80048 BASIC METABOLIC PNL TOTAL CA: CPT

## 2019-05-17 PROCEDURE — 85025 COMPLETE CBC W/AUTO DIFF WBC: CPT

## 2019-05-17 RX ADMIN — SENNOSIDES,DOCUSATE SODIUM 2 TABLET: 50; 8.6 TABLET, FILM COATED ORAL at 17:42

## 2019-05-17 RX ADMIN — ONDANSETRON 4 MG: 4 TABLET, ORALLY DISINTEGRATING ORAL at 17:42

## 2019-05-17 RX ADMIN — DIPHENHYDRAMINE HCL 25 MG: 25 TABLET ORAL at 12:50

## 2019-05-17 RX ADMIN — ENOXAPARIN SODIUM 40 MG: 100 INJECTION SUBCUTANEOUS at 14:45

## 2019-05-17 RX ADMIN — OXYCODONE HYDROCHLORIDE 5 MG: 5 TABLET ORAL at 17:43

## 2019-05-17 RX ADMIN — OXYCODONE HYDROCHLORIDE 5 MG: 5 TABLET ORAL at 17:41

## 2019-05-17 ASSESSMENT — ENCOUNTER SYMPTOMS
WEAKNESS: 1
EYE PAIN: 0
NECK PAIN: 0
CHILLS: 0
HEADACHES: 0
PND: 0
BLURRED VISION: 0
COUGH: 0
NAUSEA: 0
ABDOMINAL PAIN: 0
PALPITATIONS: 0
CONSTIPATION: 0
DEPRESSION: 0
NERVOUS/ANXIOUS: 1
TREMORS: 0
FEVER: 1
HEARTBURN: 0
DIZZINESS: 0
SPUTUM PRODUCTION: 0
SHORTNESS OF BREATH: 0
WEIGHT LOSS: 0

## 2019-05-17 ASSESSMENT — LIFESTYLE VARIABLES: SUBSTANCE_ABUSE: 0

## 2019-05-17 NOTE — DISCHARGE PLANNING
Anticipated Discharge Disposition: TBD    Action: LSW received a message from ABHISHEK Garcia that Transfer Center  needs to get a hold of LSW. LSW called transfer center and they are inquiring about payer source. [place] doesn't want private pay. LSW contacted [facility] . Per P[facility] agent, pt is on their list to be screened for insurance. LSW will update transfer center after [facility] see's pt.       Barriers to Discharge: no insurance, no PCP    Plan: assist with dc. F/u with transfer center & [facility]

## 2019-05-17 NOTE — PROGRESS NOTES
Spiritual Care Note    Patient Information     Patient's Name: Johana Weston   MRN: [id number]    YOB: 1980   Age and Gender: 38 y.o. female   Service Area:    Room (and Bed): [place] [address]   Ethnicity or Nationality:     Primary Language: Kittitian   Christianity/Spiritual preference: None   Place of Residence:    Family/Friends/Others Present: Family   Clinical Team Present:    Medical Diagnosis(-es)/Procedure(s):    Code Status: Full Code    Date of Admission: 5/14/2019   Length of Stay: 3 days        Spiritual Care Provider Information:  Name of Spiritual Care Provider: Jama  Title of Spiritual Care Provider:   Phone Number: [phone]  E-mail: [email or web address]  Total time : 15 minutes    Spiritual Screen Results:    Gen Nursing  Spiritual Screen  Is your spiritual health or inner well-being important to you as you cope with your medical condition?: No  Would you like to receive a visit from our Spiritual Care team or your own Latter-day or spiritual leader?: No  Was spiritual care education provided to the patient?: Declined     Palliative Care  PC Christianity/Spiritual Screening  Was spiritual care education provided to the patient?: Declined      Encounter/Request Information  Encounter/Request Type   Visited With: Patient and family together  Nature of the Visit: Initial, On shift  Continue Visiting: No  General Visit: Yes  Referral From/ Origin of Request: Epic nursing    Religous Needs/Values  Christianity Needs Visit  Christianity Needs: Prayer    Spiritual Assessment   Spiritual Care Encounters  Observations/Symptoms: Accepting, Sadness  Interacton/Conversation: Patient only spoke Spainish so family translated. Seemed to just want prayer.  Assessment: Need  Need: Samina Issues  Intended Effects: Convey a Calming Presence  Outcomes: Acceptance  Plan: Visit Upon Request    Notes:

## 2019-05-17 NOTE — PROGRESS NOTES
Bedside report received from day shift RN.  Dr. Webster to bedside to discuss plan of care with patient.  Plans are for transfer to [place] once accepted.    Patient very upset; IV ativan given PRN for anxiety.  Tylenol given for elevated temperature.  Patient offered PRN pain medication which she declined.  Call light and personal items within reach.
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
## 2019-05-17 NOTE — PROGRESS NOTES
Received bedside report and assumed care. Patient resting quietly with eyes closed. Does not indicate to be in any pain and/or discomfort at this time. Night nurse reported Dr. Webster visited with the patient and gave her the news of her MRI results. Stated the patient would be transferred to [place]. At this time, waiting for new orders. Plan for today is to continue supportive care. WCTM.

## 2019-05-17 NOTE — DISCHARGE PLANNING
Transfer Center:    Received call from [facility].  Spoke with Smitha.  She reported that Dr. Webster of Neurosurgery had called requesting transfer of patient for higher level of care.  MD had spoken with Dr. Elías Escalante which she needed to verify acceptance.  At the meantime, she requested for medical records to review.  Demographics, H&P, Progress notes, Labs, and Radiology reports faxed @ [phone].  Confirmation received.    Plan:  Pending call back from Smitha @ [facility] with an accepting MD.

## 2019-05-17 NOTE — PROGRESS NOTES
[facility] Medicine Daily Progress Note    Date of Service  5/17/2019    Chief Complaint  38 y.o. female admitted 5/14/2019 with complaint of fever nausea vomiting and weakness    Hospital Course    Past medical history of obesity presented with complaint of fever nausea vomiting and weakness for the past several days.  Patient incidentally was noticed to have suprasellar mass.  Neurosurgery was consulted.  MRI was also ordered.      Interval Problem Update  5/15.  Patient was comfortable overnight and no significant overnight event.  Patient complains of weakness but denies significant headache or neck pain.  Patient has significant fever yesterday in the ER but resolved.  Patient has no nausea vomiting and able to eat breakfast this morning.  5/16.  Patient is alert oriented and stable overnight.  Still lethargic and complains of general fatigue. Patient otherwise denies fever, chills, SOB, CP, headache, constipation, diarrhea, cough, or sputum.  5/17.  Patient is pleasant and comfortable during the daytime.  Complains of general body achiness. Patient's pain is general, 2-3/10, intermittent and does not radiate to other location, sharp and with some tingling. Can be controlled by pain meds.     Consultants/Specialty  Neurosurgery    Code Status  Full code    Disposition  Transfer to [facility]    Review of Systems  Review of Systems   Constitutional: Positive for fever. Negative for chills and weight loss.   HENT: Negative for congestion, ear discharge, ear pain, hearing loss and nosebleeds.    Eyes: Negative for blurred vision and pain.   Respiratory: Negative for cough, sputum production and shortness of breath.    Cardiovascular: Negative for chest pain, palpitations, leg swelling and PND.   Gastrointestinal: Negative for abdominal pain, constipation, heartburn and nausea.   Genitourinary: Negative for dysuria, frequency and hematuria.   Musculoskeletal: Negative for neck pain.   Skin: Negative for  rash.   Neurological: Positive for weakness. Negative for dizziness, tremors and headaches.   Psychiatric/Behavioral: Negative for depression and substance abuse. The patient is nervous/anxious.         Physical Exam  Temp:  [36.7 °C (98.1 °F)-37.8 °C (100 °F)] 36.8 °C (98.3 °F)  Pulse:  [104-109] 109  Resp:  [17-19] 17  BP: ()/(72-82) 109/72  SpO2:  [92 %-97 %] 96 %    Physical Exam   Constitutional: She is oriented to person, place, and time. She appears well-developed and well-nourished.   HENT:   Head: Normocephalic.   Nose: Nose normal.   Mouth/Throat: No oropharyngeal exudate.   Eyes: Pupils are equal, round, and reactive to light. EOM are normal. Right eye exhibits no discharge. Left eye exhibits no discharge.   Neck: Normal range of motion. No JVD present. No thyromegaly present.   Cardiovascular: Normal rate, regular rhythm and normal heart sounds.  Exam reveals no gallop.    No murmur heard.  Pulmonary/Chest: Effort normal and breath sounds normal. No respiratory distress. She has no rales. She exhibits no tenderness.   Abdominal: Soft. Bowel sounds are normal. She exhibits no mass. There is no tenderness. There is no rebound and no guarding.   Musculoskeletal: Normal range of motion. She exhibits no edema or tenderness.   Neurological: She is alert and oriented to person, place, and time. No cranial nerve deficit.   Skin: Skin is warm. No rash noted.   Psychiatric: Her behavior is normal.       Fluids    Intake/Output Summary (Last 24 hours) at 05/17/19 1412  Last data filed at 05/17/19 1230   Gross per 24 hour   Intake            23745 ml   Output                0 ml   Net            62590 ml       Laboratory  Recent Labs      05/15/19   0143  05/16/19   0330  05/17/19   0332   WBC  10.1  8.0  7.1   RBC  3.88*  4.11*  4.49   HEMOGLOBIN  12.2  13.4  14.2   HEMATOCRIT  37.9  40.3  43.6   MCV  97.7  98.1*  97.1   MCH  31.4  32.6  31.6   MCHC  32.2*  33.3*  32.6*   RDW  43.7  44.0  44.1   PLATELETCT   338  388  419   MPV  9.2  9.0  8.9*     Recent Labs      05/15/19   0143  05/16/19   0330  05/17/19   0332   SODIUM  143  141  143   POTASSIUM  4.1  4.4  4.1   CHLORIDE  109  109  112   CO2  26  26  24   GLUCOSE  115*  124*  112*   BUN  7*  10  9   CREATININE  0.78  0.75  0.84   CALCIUM  8.7  9.2  8.9     Recent Labs      05/14/19   2152   APTT  28.7   INR  1.14*               Imaging  MR-BRAIN-WITH & W/O   Final Result      1.  28 x 19 x 29 mm lobular cystic sellar and suprasellar mass which demonstrates thin linear enhancement of the periphery of the cystic component and avid enhancement of the solid component. The optic chiasm is not well visualized. The pituitary gland    appears to be visible inferiorly below the mass but is not clearly separate from the mass. Differential considerations include craniopharyngioma and pituitary macroadenoma with cystic degeneration or necrosis and others.   2.  Mild edema in the adjacent basal ganglia and left medial temporal lobe.   3.  No evidence of hydrocephalus.      CT-ABDOMEN-PELVIS WITH   Final Result         1.  No acute abnormality.   2.  Appendicolith in the distal appendix without additional findings to suggests acute appendicitis.      CT-HEAD W/O   Final Result      Large suprasellar/sellar mass which measures 22 x 17 x 16 mm in size. This extends cephalad into the area of the optic chiasm as well as displaces and deforms the third ventricle with surrounding edema. Differential diagnosis includes a variety of    intracranial neoplasms to include pituitary adenoma as well as other suprasellar masses. A giant thrombosed aneurysm remains a consideration as well. Further evaluation with brain MRI with contrast is recommended.      This was discussed with NERISSA CONTRERAS at 7:37 PM on 5/14/2019.           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   Reviewed by neurosurgery, [facility]  Transfer initiated by neurosurgical team.  Continue supportive care during this hospital stay.     Sepsis (HCC)- (present on admission)   Assessment & Plan    Sepsis currenlty ruled out  Procalcitonin less than 0.05    Patient clinically has no meningitis signs.  Patient fever quickly resolved.  Patient leukocytosis quickly resolved.  Patient has no neck pain or headache.  Patient comfortably sitting in chair in the morning and eating breakfast.  Discontinue IV antibiotics.     Class 2 obesity in adult- (present on admission)   Assessment & Plan    Body mass index is 36.65 kg/m².  With reduction education provided     SIRS (systemic inflammatory response syndrome) (HCC)- (present on admission)   Assessment & Plan    Probably not related to bacterial infection source.  Possibly viral infection  Patient does have fever and leukocytosis on admission but quickly resolved.  Negative lactic   Pending further culture result.  Discontinue IV antibiotics.  Negative procalcitonin     Anxiety- (present on admission)   Assessment & Plan    -This is due to her diagnosis of a mass  -Patient is not a good candidate for benzodiazepine for anxiety at this moment.  Recommend spiritual support     Hyperglycemia- (present on admission)   Assessment & Plan    -Mild increase, no need for insulin at this time  -Continue to monitor, add insulin sliding scale if warranted          VTE prophylaxis: SCD, start patient on Lovenox.      Current Facility-Administered Medications:   •  enoxaparin (LOVENOX) inj 40 mg, 40 mg, Subcutaneous, DAILY, Katerin Ortiz M.D.  •  diphenhydrAMINE (BENADRYL) tablet/capsule 25 mg, 25 mg, Oral, Q8HRS PRN, Katerin Ortiz M.D., 25 mg at 05/17/19 1250  •  Pharmacy Consult Request ...Pain Management Review 1 Each, 1 Each, Other, PHARMACY TO DOSE, Charles Ace D.O.  •  senna-docusate (PERICOLACE  or SENOKOT S) 8.6-50 MG per tablet 2 Tab, 2 Tab, Oral, BID, 2 Tab at 05/16/19 1727 **AND** polyethylene glycol/lytes (MIRALAX) PACKET 1 Packet, 1 Packet, Oral, QDAY PRN **AND** magnesium hydroxide (MILK OF MAGNESIA) suspension 30 mL, 30 mL, Oral, QDAY PRN **AND** bisacodyl (DULCOLAX) suppository 10 mg, 10 mg, Rectal, QDAY PRN, VIMAL Cueva.O.  •  acetaminophen (TYLENOL) tablet 650 mg, 650 mg, Oral, Q6HRS PRN, VIMAL Cueva.O., 650 mg at 05/16/19 2049  •  oxyCODONE immediate-release (ROXICODONE) tablet 5 mg, 5 mg, Oral, Q3HRS PRN, VIMAL Cueva.O.  •  oxyCODONE immediate-release (ROXICODONE) tablet 10 mg, 10 mg, Oral, Q3HRS PRN, VIMAL Cueva.O.  •  enalaprilat (VASOTEC) injection 1.25 mg, 1.25 mg, Intravenous, Q6HRS PRN, VIMAL Cueva.O.  •  ondansetron (ZOFRAN) syringe/vial injection 4 mg, 4 mg, Intravenous, Q4HRS PRN, VIMAL Cueva.O., 4 mg at 05/15/19 0044  •  ondansetron (ZOFRAN ODT) dispertab 4 mg, 4 mg, Oral, Q4HRS PRN, VIMAL Cueva.O.  •  promethazine (PHENERGAN) tablet 12.5-25 mg, 12.5-25 mg, Oral, Q4HRS PRN, VIMAL Cueva.O.  •  promethazine (PHENERGAN) suppository 12.5-25 mg, 12.5-25 mg, Rectal, Q4HRS PRN, VIMAL Cueva.O.  •  prochlorperazine (COMPAZINE) injection 5-10 mg, 5-10 mg, Intravenous, Q4HRS PRN, VIMAL Cueva.O., 5 mg at 05/15/19 0401  •  NS infusion 2,454 mL, 30 mL/kg, Intravenous, Once PRN, Charles Ace D.O.  •  NS (BOLUS) infusion 1,000 mL, 1,000 mL, Intravenous, Once PRN, Charles Ace D.O.  •  LORazepam (ATIVAN) injection 0.5 mg, 0.5 mg, Intravenous, Q4HRS PRN, Charles Ace D.O., 0.5 mg at 05/16/19 2293

## 2019-05-17 NOTE — PROGRESS NOTES
Patient has been overwhelmed by the diagnosis of the brain mass. Patient was asked if she would like to have some spiritual care and she responded yes. Placed order and I also called with details of her situation. Calling Nikki Mckeon now and left message to cmb regarding the patient's status. Patient is overwhelmed.Patient has not been given any information. She does not understand why she is still in the hospital and hasn't been discharged.  Need orders for anxiety medication. TM

## 2019-05-17 NOTE — PROGRESS NOTES
Spoke with Dr. Darin Jordan in surgery. Explained pt needs more information regarding her situation. SW notified pt that financial people will be in today to talk to her about not having insurance. Friends are at bedside now. Spoke with Dr. Ortiz about anxiety medication. He stated the patient will have to cope, as she is not a candidate for that type of medication.  Spiritual Care has been ordered and I called and left message. Patient just showered and clean linen. WCTM

## 2019-05-17 NOTE — CARE PLAN
Problem: Safety  Goal: Will remain free from falls  Outcome: PROGRESSING AS EXPECTED  Patient calls when needing assistance or getting out of bed

## 2019-05-18 LAB
ANION GAP SERPL CALC-SCNC: 7 MMOL/L (ref 0–11.9)
BASOPHILS # BLD AUTO: 0.1 % (ref 0–1.8)
BASOPHILS # BLD: 0.01 K/UL (ref 0–0.12)
BUN SERPL-MCNC: 7 MG/DL (ref 8–22)
CALCIUM SERPL-MCNC: 8.9 MG/DL (ref 8.5–10.5)
CHLORIDE SERPL-SCNC: 108 MMOL/L (ref 96–112)
CO2 SERPL-SCNC: 27 MMOL/L (ref 20–33)
CREAT SERPL-MCNC: 0.83 MG/DL (ref 0.5–1.4)
EOSINOPHIL # BLD AUTO: 0.45 K/UL (ref 0–0.51)
EOSINOPHIL NFR BLD: 5.7 % (ref 0–6.9)
ERYTHROCYTE [DISTWIDTH] IN BLOOD BY AUTOMATED COUNT: 43.8 FL (ref 35.9–50)
ESTRADIOL SERPL HS-MCNC: 2.9 PG/ML
ESTROGEN SERPL CALC-MCNC: 12.1 PG/ML
ESTRONE SERPL-MCNC: 9.2 PG/ML
GLUCOSE SERPL-MCNC: 104 MG/DL (ref 65–99)
HCT VFR BLD AUTO: 39.8 % (ref 37–47)
HGB BLD-MCNC: 13 G/DL (ref 12–16)
IMM GRANULOCYTES # BLD AUTO: 0.08 K/UL (ref 0–0.11)
IMM GRANULOCYTES NFR BLD AUTO: 1 % (ref 0–0.9)
LYMPHOCYTES # BLD AUTO: 2.06 K/UL (ref 1–4.8)
LYMPHOCYTES NFR BLD: 26.3 % (ref 22–41)
MCH RBC QN AUTO: 31.8 PG (ref 27–33)
MCHC RBC AUTO-ENTMCNC: 32.7 G/DL (ref 33.6–35)
MCV RBC AUTO: 97.3 FL (ref 81.4–97.8)
MONOCYTES # BLD AUTO: 0.51 K/UL (ref 0–0.85)
MONOCYTES NFR BLD AUTO: 6.5 % (ref 0–13.4)
NEUTROPHILS # BLD AUTO: 4.72 K/UL (ref 2–7.15)
NEUTROPHILS NFR BLD: 60.4 % (ref 44–72)
NRBC # BLD AUTO: 0 K/UL
NRBC BLD-RTO: 0 /100 WBC
PLATELET # BLD AUTO: 392 K/UL (ref 164–446)
PMV BLD AUTO: 8.9 FL (ref 9–12.9)
POTASSIUM SERPL-SCNC: 3.9 MMOL/L (ref 3.6–5.5)
RBC # BLD AUTO: 4.09 M/UL (ref 4.2–5.4)
SODIUM SERPL-SCNC: 142 MMOL/L (ref 135–145)
WBC # BLD AUTO: 7.8 K/UL (ref 4.8–10.8)

## 2019-05-18 PROCEDURE — 700102 HCHG RX REV CODE 250 W/ 637 OVERRIDE(OP): Performed by: INTERNAL MEDICINE

## 2019-05-18 PROCEDURE — 36415 COLL VENOUS BLD VENIPUNCTURE: CPT

## 2019-05-18 PROCEDURE — A9270 NON-COVERED ITEM OR SERVICE: HCPCS | Performed by: INTERNAL MEDICINE

## 2019-05-18 PROCEDURE — 700111 HCHG RX REV CODE 636 W/ 250 OVERRIDE (IP): Performed by: INTERNAL MEDICINE

## 2019-05-18 PROCEDURE — 770021 HCHG ROOM/CARE - ISO PRIVATE

## 2019-05-18 PROCEDURE — 80048 BASIC METABOLIC PNL TOTAL CA: CPT

## 2019-05-18 PROCEDURE — 99232 SBSQ HOSP IP/OBS MODERATE 35: CPT | Performed by: INTERNAL MEDICINE

## 2019-05-18 PROCEDURE — 85025 COMPLETE CBC W/AUTO DIFF WBC: CPT

## 2019-05-18 RX ORDER — MORPHINE SULFATE 4 MG/ML
2 INJECTION, SOLUTION INTRAMUSCULAR; INTRAVENOUS EVERY 4 HOURS PRN
Status: DISCONTINUED | OUTPATIENT
Start: 2019-05-18 | End: 2019-05-23 | Stop reason: HOSPADM

## 2019-05-18 RX ORDER — BUTALBITAL, ACETAMINOPHEN AND CAFFEINE 50; 325; 40 MG/1; MG/1; MG/1
1 TABLET ORAL EVERY 6 HOURS PRN
Status: DISCONTINUED | OUTPATIENT
Start: 2019-05-18 | End: 2019-05-18

## 2019-05-18 RX ADMIN — SENNOSIDES,DOCUSATE SODIUM 2 TABLET: 50; 8.6 TABLET, FILM COATED ORAL at 20:23

## 2019-05-18 RX ADMIN — ENOXAPARIN SODIUM 40 MG: 100 INJECTION SUBCUTANEOUS at 04:38

## 2019-05-18 RX ADMIN — PROCHLORPERAZINE EDISYLATE 10 MG: 5 INJECTION INTRAMUSCULAR; INTRAVENOUS at 09:14

## 2019-05-18 RX ADMIN — MORPHINE SULFATE 2 MG: 4 INJECTION INTRAVENOUS at 09:09

## 2019-05-18 RX ADMIN — OXYCODONE HYDROCHLORIDE 10 MG: 10 TABLET ORAL at 00:57

## 2019-05-18 RX ADMIN — OXYCODONE HYDROCHLORIDE 10 MG: 10 TABLET ORAL at 23:35

## 2019-05-18 RX ADMIN — ACETAMINOPHEN 650 MG: 325 TABLET, FILM COATED ORAL at 15:29

## 2019-05-18 RX ADMIN — ACETAMINOPHEN 650 MG: 325 TABLET, FILM COATED ORAL at 09:07

## 2019-05-18 RX ADMIN — OXYCODONE HYDROCHLORIDE 10 MG: 10 TABLET ORAL at 15:29

## 2019-05-18 RX ADMIN — SENNOSIDES,DOCUSATE SODIUM 2 TABLET: 50; 8.6 TABLET, FILM COATED ORAL at 04:37

## 2019-05-18 RX ADMIN — OXYCODONE HYDROCHLORIDE 10 MG: 10 TABLET ORAL at 07:45

## 2019-05-18 RX ADMIN — ONDANSETRON 4 MG: 2 INJECTION INTRAMUSCULAR; INTRAVENOUS at 07:44

## 2019-05-18 ASSESSMENT — ENCOUNTER SYMPTOMS
COUGH: 0
PND: 0
WEAKNESS: 1
TREMORS: 0
HEARTBURN: 0
WEIGHT LOSS: 0
SHORTNESS OF BREATH: 0
DEPRESSION: 0
NAUSEA: 0
NERVOUS/ANXIOUS: 1
NECK PAIN: 0
HEADACHES: 0
CONSTIPATION: 0
BLURRED VISION: 0
SPUTUM PRODUCTION: 0
PALPITATIONS: 0
EYE PAIN: 0
DIZZINESS: 0
CHILLS: 0
ABDOMINAL PAIN: 0
FEVER: 1

## 2019-05-18 ASSESSMENT — LIFESTYLE VARIABLES: SUBSTANCE_ABUSE: 0

## 2019-05-18 NOTE — PROGRESS NOTES
[facility] Medicine Daily Progress Note    Date of Service  5/18/2019    Chief Complaint  38 y.o. female admitted 5/14/2019 with complaint of fever nausea vomiting and weakness    Hospital Course    Past medical history of obesity presented with complaint of fever nausea vomiting and weakness for the past several days.  Patient incidentally was noticed to have suprasellar mass.  Neurosurgery was consulted.  MRI was also ordered.      Interval Problem Update  5/15.  Patient was comfortable overnight and no significant overnight event.  Patient complains of weakness but denies significant headache or neck pain.  Patient has significant fever yesterday in the ER but resolved.  Patient has no nausea vomiting and able to eat breakfast this morning.  5/16.  Patient is alert oriented and stable overnight.  Still lethargic and complains of general fatigue. Patient otherwise denies fever, chills, SOB, CP, headache, constipation, diarrhea, cough, or sputum.  5/17.  Patient is pleasant and comfortable during the daytime.  Complains of general body achiness. Patient's pain is general, 2-3/10, intermittent and does not radiate to other location, sharp and with some tingling. Can be controlled by pain meds.   5/18.  Patient is lethargic and still complains of headache.  Patient said her headache is severe.  This headache has been there for possible mild. Patient's pain is local , 7-8/10, intermittent and does not radiate to other location, sharp and with some tingling. Can be controlled by pain meds.      Consultants/Specialty  Neurosurgery    Code Status  Full code    Disposition  Transfer to [facility]    Review of Systems  Review of Systems   Constitutional: Positive for fever. Negative for chills and weight loss.   HENT: Negative for congestion, ear discharge, ear pain, hearing loss and nosebleeds.    Eyes: Negative for blurred vision and pain.   Respiratory: Negative for cough, sputum production and shortness of  breath.    Cardiovascular: Negative for chest pain, palpitations, leg swelling and PND.   Gastrointestinal: Negative for abdominal pain, constipation, heartburn and nausea.   Genitourinary: Negative for dysuria, frequency and hematuria.   Musculoskeletal: Negative for neck pain.   Skin: Negative for rash.   Neurological: Positive for weakness. Negative for dizziness, tremors and headaches.   Psychiatric/Behavioral: Negative for depression and substance abuse. The patient is nervous/anxious.         Physical Exam  Temp:  [36.6 °C (97.9 °F)-37.6 °C (99.7 °F)] 37.1 °C (98.7 °F)  Pulse:  [64-92] 64  Resp:  [17] 17  BP: ()/(46-63) 114/63  SpO2:  [92 %-97 %] 92 %    Physical Exam   Constitutional: She is oriented to person, place, and time. She appears well-developed and well-nourished.   HENT:   Head: Normocephalic.   Nose: Nose normal.   Mouth/Throat: No oropharyngeal exudate.   Eyes: Pupils are equal, round, and reactive to light. EOM are normal. Right eye exhibits no discharge. Left eye exhibits no discharge.   Neck: Normal range of motion. No JVD present. No thyromegaly present.   Cardiovascular: Normal rate, regular rhythm and normal heart sounds.  Exam reveals no gallop.    No murmur heard.  Pulmonary/Chest: Effort normal and breath sounds normal. No respiratory distress. She has no rales. She exhibits no tenderness.   Abdominal: Soft. Bowel sounds are normal. She exhibits no mass. There is no tenderness. There is no rebound and no guarding.   Musculoskeletal: Normal range of motion. She exhibits no edema or tenderness.   Neurological: She is alert and oriented to person, place, and time. No cranial nerve deficit.   Skin: Skin is warm. No rash noted.   Psychiatric: Her behavior is normal.       Fluids    Intake/Output Summary (Last 24 hours) at 05/18/19 1629  Last data filed at 05/18/19 1308   Gross per 24 hour   Intake             1997 ml   Output                0 ml   Net             1997 ml        Laboratory  Recent Labs      05/16/19   0330  05/17/19   0332  05/18/19   0345   WBC  8.0  7.1  7.8   RBC  4.11*  4.49  4.09*   HEMOGLOBIN  13.4  14.2  13.0   HEMATOCRIT  40.3  43.6  39.8   MCV  98.1*  97.1  97.3   MCH  32.6  31.6  31.8   MCHC  33.3*  32.6*  32.7*   RDW  44.0  44.1  43.8   PLATELETCT  388  419  392   MPV  9.0  8.9*  8.9*     Recent Labs      05/16/19   0330  05/17/19   0332  05/18/19   0345   SODIUM  141  143  142   POTASSIUM  4.4  4.1  3.9   CHLORIDE  109  112  108   CO2  26  24  27   GLUCOSE  124*  112*  104*   BUN  10  9  7*   CREATININE  0.75  0.84  0.83   CALCIUM  9.2  8.9  8.9                   Imaging  MR-BRAIN-WITH & W/O   Final Result      1.  28 x 19 x 29 mm lobular cystic sellar and suprasellar mass which demonstrates thin linear enhancement of the periphery of the cystic component and avid enhancement of the solid component. The optic chiasm is not well visualized. The pituitary gland    appears to be visible inferiorly below the mass but is not clearly separate from the mass. Differential considerations include craniopharyngioma and pituitary macroadenoma with cystic degeneration or necrosis and others.   2.  Mild edema in the adjacent basal ganglia and left medial temporal lobe.   3.  No evidence of hydrocephalus.      CT-ABDOMEN-PELVIS WITH   Final Result         1.  No acute abnormality.   2.  Appendicolith in the distal appendix without additional findings to suggests acute appendicitis.      CT-HEAD W/O   Final Result      Large suprasellar/sellar mass which measures 22 x 17 x 16 mm in size. This extends cephalad into the area of the optic chiasm as well as displaces and deforms the third ventricle with surrounding edema. Differential diagnosis includes a variety of    intracranial neoplasms to include pituitary adenoma as well as other suprasellar masses. A giant thrombosed aneurysm remains a consideration as well. Further evaluation with brain MRI with contrast is  recommended.      This was discussed with NERISSA CONTRERAS at 7:37 PM on 5/14/2019.           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   Reviewed by neurosurgery, recommend transfer patient to [facility].  Transfer initiated by neurosurgical team.  Continue supportive care during this hospital stay.    Patient complains of headache, ordered more pain medication to help with symptoms.     Sepsis (HCC)- (present on admission)   Assessment & Plan    Sepsis currenlty ruled out  Procalcitonin less than 0.05    Patient clinically has no meningitis signs.  Patient fever quickly resolved.  Patient leukocytosis quickly resolved.  Patient has no neck pain or headache.  Patient comfortably sitting in chair in the morning and eating breakfast.  Discontinue IV antibiotics.    Discontinue isolation     Class 2 obesity in adult- (present on admission)   Assessment & Plan    Body mass index is 36.65 kg/m².  With reduction education provided     SIRS (systemic inflammatory response syndrome) (HCC)- (present on admission)   Assessment & Plan    Probably not related to bacterial infection source.  Possibly viral infection  Patient does have fever and leukocytosis on admission but quickly resolved.  Negative lactic   Pending further culture result.  Discontinue IV antibiotics.  Negative procalcitonin     Anxiety- (present on admission)   Assessment & Plan    -This is due to her diagnosis of a mass  -Patient is not a good candidate for benzodiazepine for anxiety at this moment.  Recommend spiritual support     Hyperglycemia- (present on admission)   Assessment & Plan    -Mild increase, no need for insulin at this time  -Continue to monitor, add insulin sliding scale if warranted          VTE prophylaxis: SCD, start patient on Lovenox.      Current  Facility-Administered Medications:   •  morphine (pf) 4 mg/ml injection 2 mg, 2 mg, Intravenous, Q4HRS PRN, Katerin Ortiz M.D., 2 mg at 05/18/19 0909  •  enoxaparin (LOVENOX) inj 40 mg, 40 mg, Subcutaneous, DAILY, Katerin Ortiz M.D., 40 mg at 05/18/19 0438  •  diphenhydrAMINE (BENADRYL) tablet/capsule 25 mg, 25 mg, Oral, Q8HRS PRN, Katerin Ortiz M.D., 25 mg at 05/17/19 1250  •  Pharmacy Consult Request ...Pain Management Review 1 Each, 1 Each, Other, PHARMACY TO DOSE, VIMAL Cueva.O.  •  senna-docusate (PERICOLACE or SENOKOT S) 8.6-50 MG per tablet 2 Tab, 2 Tab, Oral, BID, 2 Tab at 05/18/19 0437 **AND** polyethylene glycol/lytes (MIRALAX) PACKET 1 Packet, 1 Packet, Oral, QDAY PRN **AND** magnesium hydroxide (MILK OF MAGNESIA) suspension 30 mL, 30 mL, Oral, QDAY PRN **AND** bisacodyl (DULCOLAX) suppository 10 mg, 10 mg, Rectal, QDAY PRN, Charles Ace D.O.  •  acetaminophen (TYLENOL) tablet 650 mg, 650 mg, Oral, Q6HRS PRN, Charles Ace D.O., 650 mg at 05/18/19 1529  •  oxyCODONE immediate-release (ROXICODONE) tablet 5 mg, 5 mg, Oral, Q3HRS PRN, Charles Ace D.O., 5 mg at 05/17/19 1743  •  oxyCODONE immediate-release (ROXICODONE) tablet 10 mg, 10 mg, Oral, Q3HRS PRN, Charles Ace D.O., 10 mg at 05/18/19 1528  •  enalaprilat (VASOTEC) injection 1.25 mg, 1.25 mg, Intravenous, Q6HRS PRN, Charles Ace D.O.  •  ondansetron (ZOFRAN) syringe/vial injection 4 mg, 4 mg, Intravenous, Q4HRS PRN, LILIANA CuevaONimco, 4 mg at 05/18/19 0744  •  ondansetron (ZOFRAN ODT) dispertab 4 mg, 4 mg, Oral, Q4HRS PRN, VIMAL Cueva.O., 4 mg at 05/17/19 1742  •  promethazine (PHENERGAN) tablet 12.5-25 mg, 12.5-25 mg, Oral, Q4HRS PRN, LILIANA CuevaO.  •  promethazine (PHENERGAN) suppository 12.5-25 mg, 12.5-25 mg, Rectal, Q4HRS PRN, VIMAL Cueva.O.  •  prochlorperazine (COMPAZINE) injection 5-10 mg, 5-10 mg, Intravenous, Q4HRS PRN, LILINAA CuevaO., 10 mg at 05/18/19 0914  •  NS infusion 2,454 mL, 30 mL/kg,  Intravenous, Once PRN, Charles Ace D.O.  •  NS (BOLUS) infusion 1,000 mL, 1,000 mL, Intravenous, Once PRN, Charles Ace D.O.  •  LORazepam (ATIVAN) injection 0.5 mg, 0.5 mg, Intravenous, Q4HRS PRN, Charles Ace D.O., 0.5 mg at 05/16/19 5417

## 2019-05-18 NOTE — PROGRESS NOTES
Paged Katerin Khan MD returned call. RN alerted MD that pt had 10/10 HA pain and gave oxycodone. 1 hr later pt's HA pain remains 10/10. MD stated that he will place orders for pain medication.

## 2019-05-18 NOTE — CARE PLAN
Problem: Safety  Goal: Will remain free from falls  Outcome: PROGRESSING AS EXPECTED  Pt up to bathroom with assistance, non skid socks in place. Bed locked and in lowest position, bed alarm on, call light within reach. Hourly rounding in place.    Problem: Urinary Elimination:  Goal: Ability to reestablish a normal urinary elimination pattern will improve  Outcome: PROGRESSING SLOWER THAN EXPECTED  Pt having urgency/ frequency with urination

## 2019-05-18 NOTE — PROGRESS NOTES
Assumed care of pt at approximately 1900. Pt AxO4, able to make needs known. Pt family at bedside, pt and family updated on plan of care and possible transfer to [place]. Pt up to bathroom with SBA x2. Pt reporting headache, medicated per MAR. Pt now resting comfortably. Bed locked and in lowest position, bed alarm on. Call light within reach. Hourly rounding in place.

## 2019-05-19 LAB
BACTERIA BLD CULT: NORMAL
BACTERIA BLD CULT: NORMAL
SIGNIFICANT IND 70042: NORMAL
SIGNIFICANT IND 70042: NORMAL
SITE SITE: NORMAL
SITE SITE: NORMAL
SOURCE SOURCE: NORMAL
SOURCE SOURCE: NORMAL

## 2019-05-19 PROCEDURE — 770021 HCHG ROOM/CARE - ISO PRIVATE

## 2019-05-19 PROCEDURE — 700102 HCHG RX REV CODE 250 W/ 637 OVERRIDE(OP): Performed by: INTERNAL MEDICINE

## 2019-05-19 PROCEDURE — 700111 HCHG RX REV CODE 636 W/ 250 OVERRIDE (IP): Performed by: INTERNAL MEDICINE

## 2019-05-19 PROCEDURE — A9270 NON-COVERED ITEM OR SERVICE: HCPCS | Performed by: INTERNAL MEDICINE

## 2019-05-19 PROCEDURE — 99232 SBSQ HOSP IP/OBS MODERATE 35: CPT | Performed by: INTERNAL MEDICINE

## 2019-05-19 RX ADMIN — SENNOSIDES,DOCUSATE SODIUM 2 TABLET: 50; 8.6 TABLET, FILM COATED ORAL at 04:01

## 2019-05-19 RX ADMIN — ENOXAPARIN SODIUM 40 MG: 100 INJECTION SUBCUTANEOUS at 04:00

## 2019-05-19 RX ADMIN — ACETAMINOPHEN 650 MG: 325 TABLET, FILM COATED ORAL at 16:06

## 2019-05-19 RX ADMIN — PROMETHAZINE HYDROCHLORIDE 25 MG: 25 TABLET ORAL at 10:01

## 2019-05-19 ASSESSMENT — ENCOUNTER SYMPTOMS
FEVER: 0
DEPRESSION: 0
DIZZINESS: 0
WEAKNESS: 1
DOUBLE VISION: 0
ABDOMINAL PAIN: 0
HEADACHES: 0
CONSTIPATION: 0
BLURRED VISION: 0
PALPITATIONS: 0
NECK PAIN: 0
EYE PAIN: 0
COUGH: 0
SPUTUM PRODUCTION: 0
TREMORS: 0
NERVOUS/ANXIOUS: 1
SHORTNESS OF BREATH: 0
PND: 0
VOMITING: 0
NAUSEA: 0
WEIGHT LOSS: 0

## 2019-05-19 ASSESSMENT — LIFESTYLE VARIABLES: SUBSTANCE_ABUSE: 0

## 2019-05-19 NOTE — PROGRESS NOTES
This RN and Dr William CROOK to room to assess pt and update her on POC using  services. Pt asked MD and RN questions and stated she felt updated and had no further questions at this time.     Throughout the rest of the day, pt repeatedly asked both for water and for when she would be sent home/ sent to [place]. Much education provided on these topics.     Per Dr Thomas, pt is to remain on a fluid restriction of 2L/day. Pt has constantly been asking for water.

## 2019-05-19 NOTE — CARE PLAN
Problem: Safety  Goal: Will remain free from falls  Outcome: PROGRESSING AS EXPECTED  Call light/belongings within reach. Pt educated on how to use call light for assistance.

## 2019-05-19 NOTE — PROGRESS NOTES
Spoke with a representative from [place] requesting unit fax #. Paper work for transfer back agreement will be faxed to the unit, requiring 3 signatures. Pt is still pending bed and insurance authorization. RN was informed Monday at the earliest.

## 2019-05-19 NOTE — PROGRESS NOTES
Assumed care of pt at approximately 1900. Pt A&Ox4, able to make needs known. Family at bedside; questions about plan of care addressed and clarified. Pt educated about 2L fluid restriction; pt continuing to ask for fluids despite education. Family also witnessed to be bringing pt additional fluids; education regarding fluid restriction provided. Pt up to bathroom to void x2. Bed locked and in lowest position, bed alarm on, non skid socks in place. Call light within reach. Hourly rounding in place.

## 2019-05-19 NOTE — PROGRESS NOTES
[facility] Medicine Daily Progress Note    Date of Service  5/19/2019    Chief Complaint  38 y.o. female admitted 5/14/2019 with complaint of fever nausea vomiting and weakness    Hospital Course    Past medical history of obesity presented with complaint of fever nausea vomiting and weakness for the past several days.  Patient incidentally was noticed to have suprasellar mass.  Neurosurgery was consulted.  MRI was also ordered.      Interval Problem Update  5/15.  Patient was comfortable overnight and no significant overnight event.  Patient complains of weakness but denies significant headache or neck pain.  Patient has significant fever yesterday in the ER but resolved.  Patient has no nausea vomiting and able to eat breakfast this morning.  5/16.  Patient is alert oriented and stable overnight.  Still lethargic and complains of general fatigue. Patient otherwise denies fever, chills, SOB, CP, headache, constipation, diarrhea, cough, or sputum.  5/17.  Patient is pleasant and comfortable during the daytime.  Complains of general body achiness. Patient's pain is general, 2-3/10, intermittent and does not radiate to other location, sharp and with some tingling. Can be controlled by pain meds.   5/18.  Patient is lethargic and still complains of headache.  Patient said her headache is severe.  This headache has been there for possible mild. Patient's pain is local , 7-8/10, intermittent and does not radiate to other location, sharp and with some tingling. Can be controlled by pain meds.  5/19.  Patient is comfortable in no significant overnight event.  Patient complains of general body achiness.  General fatigue as well. Patient's pain is general , 3-4/10, intermittent and does not radiate to other location, sharp and with some tingling. Can be controlled by pain meds.     Consultants/Specialty  Neurosurgery    Code Status  Full code    Disposition  Transfer to [facility]    Review of Systems  Review of  Systems   Constitutional: Negative for fever and weight loss.   HENT: Negative for congestion, ear discharge, ear pain, hearing loss and nosebleeds.    Eyes: Negative for blurred vision, double vision and pain.   Respiratory: Negative for cough, sputum production and shortness of breath.    Cardiovascular: Negative for chest pain, palpitations, leg swelling and PND.   Gastrointestinal: Negative for abdominal pain, constipation, nausea and vomiting.   Genitourinary: Negative for dysuria, frequency and hematuria.   Musculoskeletal: Negative for neck pain.   Skin: Negative for rash.   Neurological: Positive for weakness. Negative for dizziness, tremors and headaches.   Psychiatric/Behavioral: Negative for depression and substance abuse. The patient is nervous/anxious.         Physical Exam  Temp:  [36.4 °C (97.5 °F)-38.1 °C (100.6 °F)] 37.4 °C (99.4 °F)  Pulse:  [] 112  Resp:  [15-18] 18  BP: (104-136)/(61-92) 121/81  SpO2:  [93 %-99 %] 96 %    Physical Exam   Constitutional: She is oriented to person, place, and time. She appears well-developed and well-nourished. No distress.   HENT:   Head: Normocephalic and atraumatic.   Nose: Nose normal.   Mouth/Throat: No oropharyngeal exudate.   Eyes: Pupils are equal, round, and reactive to light. EOM are normal. Right eye exhibits no discharge. Left eye exhibits no discharge.   Neck: Normal range of motion. No JVD present. No thyromegaly present.   Cardiovascular: Normal rate, regular rhythm and normal heart sounds.  Exam reveals no gallop and no friction rub.    Pulmonary/Chest: Effort normal and breath sounds normal. No respiratory distress. She has no wheezes. She exhibits no tenderness.   Abdominal: Soft. Bowel sounds are normal. She exhibits no mass. There is no tenderness. There is no guarding.   Musculoskeletal: Normal range of motion. She exhibits no edema or tenderness.   Neurological: She is alert and oriented to person, place, and time. No cranial nerve  deficit.   Skin: Skin is warm. No rash noted. She is not diaphoretic.   Psychiatric: Her behavior is normal.       Fluids    Intake/Output Summary (Last 24 hours) at 05/19/19 1507  Last data filed at 05/19/19 0900   Gross per 24 hour   Intake             1877 ml   Output                0 ml   Net             1877 ml       Laboratory  Recent Labs      05/17/19   0332  05/18/19   0345   WBC  7.1  7.8   RBC  4.49  4.09*   HEMOGLOBIN  14.2  13.0   HEMATOCRIT  43.6  39.8   MCV  97.1  97.3   MCH  31.6  31.8   MCHC  32.6*  32.7*   RDW  44.1  43.8   PLATELETCT  419  392   MPV  8.9*  8.9*     Recent Labs      05/17/19   0332  05/18/19   0345   SODIUM  143  142   POTASSIUM  4.1  3.9   CHLORIDE  112  108   CO2  24  27   GLUCOSE  112*  104*   BUN  9  7*   CREATININE  0.84  0.83   CALCIUM  8.9  8.9                   Imaging  MR-BRAIN-WITH & W/O   Final Result      1.  28 x 19 x 29 mm lobular cystic sellar and suprasellar mass which demonstrates thin linear enhancement of the periphery of the cystic component and avid enhancement of the solid component. The optic chiasm is not well visualized. The pituitary gland    appears to be visible inferiorly below the mass but is not clearly separate from the mass. Differential considerations include craniopharyngioma and pituitary macroadenoma with cystic degeneration or necrosis and others.   2.  Mild edema in the adjacent basal ganglia and left medial temporal lobe.   3.  No evidence of hydrocephalus.      CT-ABDOMEN-PELVIS WITH   Final Result         1.  No acute abnormality.   2.  Appendicolith in the distal appendix without additional findings to suggests acute appendicitis.      CT-HEAD W/O   Final Result      Large suprasellar/sellar mass which measures 22 x 17 x 16 mm in size. This extends cephalad into the area of the optic chiasm as well as displaces and deforms the third ventricle with surrounding edema. Differential diagnosis includes a variety of    intracranial neoplasms to  include pituitary adenoma as well as other suprasellar masses. A giant thrombosed aneurysm remains a consideration as well. Further evaluation with brain MRI with contrast is recommended.      This was discussed with NERISSA CONTRERAS at 7:37 PM on 5/14/2019.           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   Reviewed by neurosurgery, recommend transfer patient to [facility].  Transfer initiated by neurosurgical team.  Continue supportive care during this hospital stay.    Patient complains of headache, ordered more pain medication to help with symptoms.  Pending transfer to [facility]     Sepsis (HCC)- (present on admission)   Assessment & Plan    Sepsis currenlty ruled out  Procalcitonin less than 0.05    Patient clinically has no meningitis signs.  Patient fever quickly resolved.  Patient leukocytosis quickly resolved.  Patient has no neck pain or headache.  Patient comfortably sitting in chair in the morning and eating breakfast.  Discontinue IV antibiotics.    Discontinue isolation     Class 2 obesity in adult- (present on admission)   Assessment & Plan    Body mass index is 36.65 kg/m².  With reduction education provided     SIRS (systemic inflammatory response syndrome) (HCC)- (present on admission)   Assessment & Plan    Probably not related to bacterial infection source.  Possibly viral infection  Patient does have fever and leukocytosis on admission but quickly resolved.  Negative lactic   Pending further culture result.  Discontinue IV antibiotics.  Negative procalcitonin     Anxiety- (present on admission)   Assessment & Plan    -This is due to her diagnosis of a mass  -Patient is not a good candidate for benzodiazepine for anxiety at this moment.  Recommend spiritual support  Pending transfer to [facility]      Hyperglycemia- (present on admission)   Assessment & Plan    -Mild increase, no need for insulin at this time  -Continue to monitor, add insulin sliding scale if warranted          VTE prophylaxis: SCD, start patient on Lovenox.      Current Facility-Administered Medications:   •  morphine (pf) 4 mg/ml injection 2 mg, 2 mg, Intravenous, Q4HRS PRN, Katerin Ortiz M.D., 2 mg at 05/18/19 0909  •  enoxaparin (LOVENOX) inj 40 mg, 40 mg, Subcutaneous, DAILY, Katerin Ortiz M.D., 40 mg at 05/19/19 0400  •  diphenhydrAMINE (BENADRYL) tablet/capsule 25 mg, 25 mg, Oral, Q8HRS PRN, Katerin Ortiz M.D., 25 mg at 05/17/19 1250  •  Pharmacy Consult Request ...Pain Management Review 1 Each, 1 Each, Other, PHARMACY TO DOSE, Charles Ace D.O.  •  senna-docusate (PERICOLACE or SENOKOT S) 8.6-50 MG per tablet 2 Tab, 2 Tab, Oral, BID, 2 Tab at 05/19/19 0401 **AND** polyethylene glycol/lytes (MIRALAX) PACKET 1 Packet, 1 Packet, Oral, QDAY PRN **AND** magnesium hydroxide (MILK OF MAGNESIA) suspension 30 mL, 30 mL, Oral, QDAY PRN **AND** bisacodyl (DULCOLAX) suppository 10 mg, 10 mg, Rectal, QDAY PRN, Charles Ace D.O.  •  acetaminophen (TYLENOL) tablet 650 mg, 650 mg, Oral, Q6HRS PRN, Charles Ace D.O., 650 mg at 05/18/19 1529  •  oxyCODONE immediate-release (ROXICODONE) tablet 5 mg, 5 mg, Oral, Q3HRS PRN, Charles Ace D.O., 5 mg at 05/17/19 1743  •  oxyCODONE immediate-release (ROXICODONE) tablet 10 mg, 10 mg, Oral, Q3HRS PRN, Charles Ace D.O., 10 mg at 05/18/19 2335  •  enalaprilat (VASOTEC) injection 1.25 mg, 1.25 mg, Intravenous, Q6HRS PRN, Charles Ace D.O.  •  ondansetron (ZOFRAN) syringe/vial injection 4 mg, 4 mg, Intravenous, Q4HRS PRN, LILIANA CuevaO., 4 mg at 05/18/19 0744  •  ondansetron (ZOFRAN ODT) dispertab 4 mg, 4 mg, Oral, Q4HRS PRN, LILIANA CuevaO., 4 mg at 05/17/19 1742  •  promethazine (PHENERGAN) tablet 12.5-25 mg, 12.5-25 mg, Oral, Q4HRS PRN, LILIANA CuevaO., 25 mg at 05/19/19 1001  •   promethazine (PHENERGAN) suppository 12.5-25 mg, 12.5-25 mg, Rectal, Q4HRS PRN, Charles Ace D.O.  •  prochlorperazine (COMPAZINE) injection 5-10 mg, 5-10 mg, Intravenous, Q4HRS PRN, LILIANA CuevaO., 10 mg at 05/18/19 0986  •  NS infusion 2,454 mL, 30 mL/kg, Intravenous, Once PRN, Charles Ace D.O.  •  NS (BOLUS) infusion 1,000 mL, 1,000 mL, Intravenous, Once PRN, LILIANA CuevaO.  •  LORazepam (ATIVAN) injection 0.5 mg, 0.5 mg, Intravenous, Q4HRS PRN, LILIANA CuevaO., 0.5 mg at 05/16/19 4250

## 2019-05-19 NOTE — PROGRESS NOTES
Received call from Sherry at [facility]; pt report given to Sherry, questions about pt care answered. Per Sherry, paper work is being processed regarding pt's acceptance to [facility] and MD will contact in near future.

## 2019-05-19 NOTE — PROGRESS NOTES
Call light within reach, personal belongings available, bed in lowest position, treaded socks on, and bed alarm on. Hourly rounding in place

## 2019-05-19 NOTE — CARE PLAN
Problem: Fluid Volume:  Goal: Will maintain balanced intake and output    Intervention: Monitor, educate, and encourage compliance with therapeutic intake of liquids  Pt educated about 2L fluid restriction; pt continuing to ask for liquids despite education. Family noted to be bringing liquids in for pt as well; education provided to family also.      Problem: Safety  Goal: Will remain free from falls  Outcome: PROGRESSING AS EXPECTED  Pt ambulating to bathroom with assistance; non skids socks in place. Bed locked and in lowest position, bed alarm on, call light within reach.

## 2019-05-20 LAB
ANION GAP SERPL CALC-SCNC: 8 MMOL/L (ref 0–11.9)
BUN SERPL-MCNC: 10 MG/DL (ref 8–22)
CALCIUM SERPL-MCNC: 8.8 MG/DL (ref 8.5–10.5)
CHLORIDE SERPL-SCNC: 105 MMOL/L (ref 96–112)
CO2 SERPL-SCNC: 24 MMOL/L (ref 20–33)
CREAT SERPL-MCNC: 0.89 MG/DL (ref 0.5–1.4)
GLUCOSE SERPL-MCNC: 96 MG/DL (ref 65–99)
POTASSIUM SERPL-SCNC: 3.8 MMOL/L (ref 3.6–5.5)
SODIUM SERPL-SCNC: 137 MMOL/L (ref 135–145)

## 2019-05-20 PROCEDURE — 80048 BASIC METABOLIC PNL TOTAL CA: CPT

## 2019-05-20 PROCEDURE — 700111 HCHG RX REV CODE 636 W/ 250 OVERRIDE (IP): Performed by: INTERNAL MEDICINE

## 2019-05-20 PROCEDURE — 770021 HCHG ROOM/CARE - ISO PRIVATE

## 2019-05-20 PROCEDURE — 99232 SBSQ HOSP IP/OBS MODERATE 35: CPT | Performed by: INTERNAL MEDICINE

## 2019-05-20 PROCEDURE — A9270 NON-COVERED ITEM OR SERVICE: HCPCS | Performed by: INTERNAL MEDICINE

## 2019-05-20 PROCEDURE — 36415 COLL VENOUS BLD VENIPUNCTURE: CPT

## 2019-05-20 PROCEDURE — 700102 HCHG RX REV CODE 250 W/ 637 OVERRIDE(OP): Performed by: INTERNAL MEDICINE

## 2019-05-20 RX ADMIN — ONDANSETRON 4 MG: 2 INJECTION INTRAMUSCULAR; INTRAVENOUS at 17:46

## 2019-05-20 RX ADMIN — OXYCODONE HYDROCHLORIDE 5 MG: 5 TABLET ORAL at 00:24

## 2019-05-20 RX ADMIN — ENOXAPARIN SODIUM 40 MG: 100 INJECTION SUBCUTANEOUS at 04:34

## 2019-05-20 RX ADMIN — ACETAMINOPHEN 650 MG: 325 TABLET, FILM COATED ORAL at 16:39

## 2019-05-20 RX ADMIN — SENNOSIDES,DOCUSATE SODIUM 2 TABLET: 50; 8.6 TABLET, FILM COATED ORAL at 04:34

## 2019-05-20 ASSESSMENT — ENCOUNTER SYMPTOMS
BLURRED VISION: 0
VOMITING: 0
DEPRESSION: 0
WEIGHT LOSS: 0
EYE PAIN: 0
ORTHOPNEA: 0
SHORTNESS OF BREATH: 0
FEVER: 0
NECK PAIN: 0
NERVOUS/ANXIOUS: 1
DIZZINESS: 0
NAUSEA: 1
HEADACHES: 1
ABDOMINAL PAIN: 0
WEAKNESS: 1
CONSTIPATION: 0
TREMORS: 0
COUGH: 0
DOUBLE VISION: 0
DIARRHEA: 0
PND: 0
SPUTUM PRODUCTION: 0

## 2019-05-20 ASSESSMENT — LIFESTYLE VARIABLES: SUBSTANCE_ABUSE: 0

## 2019-05-20 NOTE — PROGRESS NOTES
[facility] Medicine Daily Progress Note    Date of Service  5/20/2019    Chief Complaint  38 y.o. female admitted 5/14/2019 with complaint of fever nausea vomiting and weakness    Hospital Course    Past medical history of obesity presented with complaint of fever nausea vomiting and weakness for the past several days.  Patient incidentally was noticed to have suprasellar mass.  Neurosurgery was consulted.  MRI was also ordered.      Interval Problem Update  5/15.  Patient was comfortable overnight and no significant overnight event.  Patient complains of weakness but denies significant headache or neck pain.  Patient has significant fever yesterday in the ER but resolved.  Patient has no nausea vomiting and able to eat breakfast this morning.  5/16.  Patient is alert oriented and stable overnight.  Still lethargic and complains of general fatigue. Patient otherwise denies fever, chills, SOB, CP, headache, constipation, diarrhea, cough, or sputum.  5/17.  Patient is pleasant and comfortable during the daytime.  Complains of general body achiness. Patient's pain is general, 2-3/10, intermittent and does not radiate to other location, sharp and with some tingling. Can be controlled by pain meds.   5/18.  Patient is lethargic and still complains of headache.  Patient said her headache is severe.  This headache has been there for possible mild. Patient's pain is local , 7-8/10, intermittent and does not radiate to other location, sharp and with some tingling. Can be controlled by pain meds.  5/19.  Patient is comfortable in no significant overnight event.  Patient complains of general body achiness.  General fatigue as well. Patient's pain is general , 3-4/10, intermittent and does not radiate to other location, sharp and with some tingling. Can be controlled by pain meds.   5/20.  Patient's headache is better today.  Still complains of nausea sometimes.  Patient's pain can be better controlled by pain medication.  Still  awaiting for transfer to [facility].  Patient otherwise remained stable overnight and denies chills, vomiting diarrhea, abdominal pain, chest pain or shortness of breath.    Consultants/Specialty  Neurosurgery    Code Status  Full code    Disposition  Transfer to [facility]    Review of Systems  Review of Systems   Constitutional: Negative for fever and weight loss.   HENT: Negative for congestion, ear discharge, ear pain, hearing loss and nosebleeds.    Eyes: Negative for blurred vision, double vision and pain.   Respiratory: Negative for cough, sputum production and shortness of breath.    Cardiovascular: Negative for chest pain, orthopnea, leg swelling and PND.   Gastrointestinal: Positive for nausea. Negative for abdominal pain, constipation, diarrhea and vomiting.   Genitourinary: Negative for dysuria, frequency and hematuria.   Musculoskeletal: Negative for neck pain.   Skin: Negative for rash.   Neurological: Positive for weakness and headaches. Negative for dizziness and tremors.   Psychiatric/Behavioral: Negative for depression and substance abuse. The patient is nervous/anxious.         Physical Exam  Temp:  [37.1 °C (98.8 °F)-38.5 °C (101.3 °F)] 37.1 °C (98.8 °F)  Pulse:  [] 108  Resp:  [16-18] 18  BP: ()/(66-85) 89/66  SpO2:  [95 %-99 %] 95 %    Physical Exam   Constitutional: She is oriented to person, place, and time. No distress.   HENT:   Head: Normocephalic and atraumatic.   Nose: Nose normal.   Mouth/Throat: No oropharyngeal exudate.   Eyes: Pupils are equal, round, and reactive to light. Conjunctivae and EOM are normal. Right eye exhibits no discharge. Left eye exhibits no discharge.   Neck: Normal range of motion. No JVD present. No thyromegaly present.   Cardiovascular: Normal rate, regular rhythm and normal heart sounds.  Exam reveals no gallop and no friction rub.    Pulmonary/Chest: Effort normal and breath sounds normal. No respiratory distress. She has no  wheezes.   Abdominal: Soft. Bowel sounds are normal. She exhibits no distension and no mass. There is no tenderness. There is no guarding.   Musculoskeletal: Normal range of motion. She exhibits no edema or tenderness.   Neurological: She is alert and oriented to person, place, and time. No cranial nerve deficit.   Skin: Skin is warm. No rash noted. She is not diaphoretic.   Psychiatric: Her behavior is normal.       Fluids    Intake/Output Summary (Last 24 hours) at 05/20/19 1508  Last data filed at 05/20/19 0024   Gross per 24 hour   Intake              840 ml   Output                0 ml   Net              840 ml       Laboratory  Recent Labs      05/18/19   0345   WBC  7.8   RBC  4.09*   HEMOGLOBIN  13.0   HEMATOCRIT  39.8   MCV  97.3   MCH  31.8   MCHC  32.7*   RDW  43.8   PLATELETCT  392   MPV  8.9*     Recent Labs      05/18/19   0345  05/20/19   0421   SODIUM  142  137   POTASSIUM  3.9  3.8   CHLORIDE  108  105   CO2  27  24   GLUCOSE  104*  96   BUN  7*  10   CREATININE  0.83  0.89   CALCIUM  8.9  8.8                   Imaging  MR-BRAIN-WITH & W/O   Final Result      1.  28 x 19 x 29 mm lobular cystic sellar and suprasellar mass which demonstrates thin linear enhancement of the periphery of the cystic component and avid enhancement of the solid component. The optic chiasm is not well visualized. The pituitary gland    appears to be visible inferiorly below the mass but is not clearly separate from the mass. Differential considerations include craniopharyngioma and pituitary macroadenoma with cystic degeneration or necrosis and others.   2.  Mild edema in the adjacent basal ganglia and left medial temporal lobe.   3.  No evidence of hydrocephalus.      CT-ABDOMEN-PELVIS WITH   Final Result         1.  No acute abnormality.   2.  Appendicolith in the distal appendix without additional findings to suggests acute appendicitis.      CT-HEAD W/O   Final Result      Large suprasellar/sellar mass which measures 22  x 17 x 16 mm in size. This extends cephalad into the area of the optic chiasm as well as displaces and deforms the third ventricle with surrounding edema. Differential diagnosis includes a variety of    intracranial neoplasms to include pituitary adenoma as well as other suprasellar masses. A giant thrombosed aneurysm remains a consideration as well. Further evaluation with brain MRI with contrast is recommended.      This was discussed with NERISSA CONTRERAS at 7:37 PM on 5/14/2019.           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   Reviewed by neurosurgery, recommend transfer patient to [facility] center.  Transfer initiated by neurosurgical team.  Continue supportive care during this hospital stay.    Patient complains of headache, ordered more pain medication to help with symptoms.  Pending transfer to [facility]  Transfer center and  to help     Sepsis (HCC)- (present on admission)   Assessment & Plan    Sepsis currenlty ruled out  Procalcitonin less than 0.05    Patient clinically has no meningitis signs.  Patient fever quickly resolved.  Patient leukocytosis quickly resolved.  Patient has no neck pain or headache.  Patient comfortably sitting in chair in the morning and eating breakfast.  Discontinue IV antibiotics.   Discontinue isolation     Class 2 obesity in adult- (present on admission)   Assessment & Plan    Body mass index is 36.65 kg/m².  With reduction education provided     SIRS (systemic inflammatory response syndrome) (HCC)- (present on admission)   Assessment & Plan     Probably not related to bacterial infection source.  Possibly viral infection  Patient does have fever and leukocytosis on admission but quickly resolved.  Negative lactic   Pending further culture result.  Discontinue IV antibiotics.      Anxiety- (present on admission)   Assessment & Plan    -This is due to her diagnosis of a mass  -Start low-dose Ativan     Hyperglycemia- (present on admission)   Assessment & Plan    -Mild increase, no need for insulin at this time  -Continue to monitor, add insulin sliding scale if warranted           VTE prophylaxis: Lovenox.      Current Facility-Administered Medications:   •  morphine (pf) 4 mg/ml injection 2 mg, 2 mg, Intravenous, Q4HRS PRN, Katerin Ortiz M.D., 2 mg at 05/18/19 0909  •  enoxaparin (LOVENOX) inj 40 mg, 40 mg, Subcutaneous, DAILY, Katerin Ortiz M.D., 40 mg at 05/20/19 0434  •  diphenhydrAMINE (BENADRYL) tablet/capsule 25 mg, 25 mg, Oral, Q8HRS PRN, Katerin Ortiz M.D., 25 mg at 05/17/19 1250  •  Pharmacy Consult Request ...Pain Management Review 1 Each, 1 Each, Other, PHARMACY TO DOSE, VIMAL Cueva.O.  •  senna-docusate (PERICOLACE or SENOKOT S) 8.6-50 MG per tablet 2 Tab, 2 Tab, Oral, BID, 2 Tab at 05/20/19 0434 **AND** polyethylene glycol/lytes (MIRALAX) PACKET 1 Packet, 1 Packet, Oral, QDAY PRN **AND** magnesium hydroxide (MILK OF MAGNESIA) suspension 30 mL, 30 mL, Oral, QDAY PRN **AND** bisacodyl (DULCOLAX) suppository 10 mg, 10 mg, Rectal, QDAY PRN, Charles Ace D.O.  •  acetaminophen (TYLENOL) tablet 650 mg, 650 mg, Oral, Q6HRS PRN, Charles Ace D.O., 650 mg at 05/19/19 1606  •  oxyCODONE immediate-release (ROXICODONE) tablet 5 mg, 5 mg, Oral, Q3HRS PRN, Charles Ace D.O., 5 mg at 05/20/19 0024  •  oxyCODONE immediate-release (ROXICODONE) tablet 10 mg, 10 mg, Oral, Q3HRS PRN, Charles Ace D.O., 10 mg at 05/18/19 2335  •  enalaprilat (VASOTEC) injection 1.25 mg, 1.25 mg, Intravenous, Q6HRS PRN, Charles Ace D.O.  •  ondansetron (ZOFRAN) syringe/vial injection 4 mg, 4 mg, Intravenous, Q4HRS PRN, LILIANA CuevaO., 4 mg at 05/18/19 9784  •  ondansetron (ZOFRAN ODT) dispertab 4 mg, 4 mg, Oral, Q4HRS PRN, VIMAL Cueva.O., 4 mg at 05/17/19 6568  •  promethazine  (PHENERGAN) tablet 12.5-25 mg, 12.5-25 mg, Oral, Q4HRS PRN, LILIANA CuevaO., 25 mg at 05/19/19 1001  •  promethazine (PHENERGAN) suppository 12.5-25 mg, 12.5-25 mg, Rectal, Q4HRS PRN, LILIANA CuevaO.  •  prochlorperazine (COMPAZINE) injection 5-10 mg, 5-10 mg, Intravenous, Q4HRS PRN, LILIANA CuevaO., 10 mg at 05/18/19 0914  •  NS infusion 2,454 mL, 30 mL/kg, Intravenous, Once PRN, LILIANA CuevaO.  •  NS (BOLUS) infusion 1,000 mL, 1,000 mL, Intravenous, Once PRN, LILIANA CuevaO.  •  LORazepam (ATIVAN) injection 0.5 mg, 0.5 mg, Intravenous, Q4HRS PRN, VIMAL Cueva.O., 0.5 mg at 05/16/19 6235

## 2019-05-20 NOTE — PROGRESS NOTES
Assumed care of pt at approximately 1900. Pt A&Ox4, able to make needs known. Pt currently reporting no pain. Pt up to bathroom x2, SBA. Pt asking for fluids; educated pt about current fluid restriction however pt continuing to ask for fluids despite education. Pt now resting comfortably. Bed locked and in lowest position, bed alarm on, call light and belongings within reach. Hourly rounding in place.

## 2019-05-20 NOTE — CARE PLAN
Problem: Fluid Volume:  Goal: Will maintain balanced intake and output    Intervention: Monitor, educate, and encourage compliance with therapeutic intake of liquids  Pt reminded/ educated about 2L fluid restriction; pt continuing to ask for more fluids despite education.      Problem: Safety  Goal: Will remain free from injury  Outcome: PROGRESSING AS EXPECTED  Bed locked and in lowest position, bed alarm on. Call light and belongings within reach. Hourly rounding in place.

## 2019-05-21 ENCOUNTER — APPOINTMENT (OUTPATIENT)
Dept: RADIOLOGY | Facility: MEDICAL CENTER | Age: 39
DRG: 644 | End: 2019-05-21
Attending: HOSPITALIST
Payer: MEDICAID

## 2019-05-21 LAB
LACTATE BLD-SCNC: 0.8 MMOL/L (ref 0.5–2)
LACTATE BLD-SCNC: 1.1 MMOL/L (ref 0.5–2)

## 2019-05-21 PROCEDURE — 36415 COLL VENOUS BLD VENIPUNCTURE: CPT

## 2019-05-21 PROCEDURE — 71046 X-RAY EXAM CHEST 2 VIEWS: CPT

## 2019-05-21 PROCEDURE — 99233 SBSQ HOSP IP/OBS HIGH 50: CPT | Performed by: HOSPITALIST

## 2019-05-21 PROCEDURE — 700102 HCHG RX REV CODE 250 W/ 637 OVERRIDE(OP): Performed by: INTERNAL MEDICINE

## 2019-05-21 PROCEDURE — A9270 NON-COVERED ITEM OR SERVICE: HCPCS | Performed by: INTERNAL MEDICINE

## 2019-05-21 PROCEDURE — 83605 ASSAY OF LACTIC ACID: CPT | Mod: 91

## 2019-05-21 PROCEDURE — 770006 HCHG ROOM/CARE - MED/SURG/GYN SEMI*

## 2019-05-21 RX ADMIN — PROMETHAZINE HYDROCHLORIDE 25 MG: 25 TABLET ORAL at 10:52

## 2019-05-21 RX ADMIN — ACETAMINOPHEN 650 MG: 325 TABLET, FILM COATED ORAL at 16:37

## 2019-05-21 ASSESSMENT — ENCOUNTER SYMPTOMS
NAUSEA: 0
TREMORS: 0
DIZZINESS: 0
EYE PAIN: 0
NERVOUS/ANXIOUS: 0
SPUTUM PRODUCTION: 0
WEAKNESS: 1
ABDOMINAL PAIN: 0
HEADACHES: 0
VOMITING: 0
COUGH: 0
DIARRHEA: 0
ORTHOPNEA: 0
DEPRESSION: 0
DOUBLE VISION: 0
PND: 0
NECK PAIN: 0
WEIGHT LOSS: 0
BLURRED VISION: 0
CONSTIPATION: 0
FEVER: 0
SHORTNESS OF BREATH: 0

## 2019-05-21 ASSESSMENT — LIFESTYLE VARIABLES: SUBSTANCE_ABUSE: 0

## 2019-05-21 NOTE — CARE PLAN
Problem: Infection  Goal: Will remain free from infection  Outcome: PROGRESSING AS EXPECTED  Patient educated on proper handwashing. Pt verbalizes understanding. Will continue to monitor.

## 2019-05-21 NOTE — DISCHARGE PLANNING
Spoke to Jane at [employer] counseling, she is unsure weather patient can get financially cleared with emergency medicaid once his application is approved. She will call me back.

## 2019-05-21 NOTE — DISCHARGE PLANNING
Anticipated Discharge Disposition: [facility]    Action: LSW called transfer center  and spoke with an agent who stated they are trying to get pt emergency medicaid for the transfer and hopefully get standard Medicaid/[employer] while at [facility], however, due to pt being undocumented [facility]'s financial team is skeptical. Ilia from Transfer Center stated he will contact [facility] today to figure it out.     Barriers to Discharge: undocumented, pending insurance, complex medical needs, no social support.     Plan: f/u with transfer [place]

## 2019-05-21 NOTE — CARE PLAN
Problem: Fluid Volume:  Goal: Will maintain balanced intake and output  Outcome: PROGRESSING AS EXPECTED  Fluid restriction per MD order

## 2019-05-21 NOTE — CARE PLAN
Problem: Safety  Goal: Will remain free from injury  Outcome: PROGRESSING AS EXPECTED  Bed locked and in lowest position, non skid socks in place. Call light and belongings within reach.     Problem: Knowledge Deficit  Goal: Knowledge of disease process/condition, treatment plan, diagnostic tests, and medications will improve  Outcome: PROGRESSING AS EXPECTED  Pt and pt's visitors at bedside updated on plan of care. Medication education and information about transfer to [facility] provided.

## 2019-05-21 NOTE — DISCHARGE PLANNING
Patient accepted at [facility] by Dr. Fransisca Shankar. [address], [place] [address]       Transfer is pending bed availability.

## 2019-05-21 NOTE — PROGRESS NOTES
Assumed care of pt at approximately 1900. Pt A&Ox4, able to make needs known. Visitors/ friends of patient at bedside at time of shift change. Both pt and visitors updated on plan of care; questions about treatment and pending transfer to [place] clarified. Pt reporting abdominal pain however declined pharm intervention. Pt up to bathroom to void x1. Pt now resting comfortably. Bed locked and in lowest position, non skid socks in place. Call light and belongings within reach. Hourly rounding in place.

## 2019-05-21 NOTE — DISCHARGE PLANNING
Anticipated Discharge Disposition: [facility]    Action: LSW received a call from Rachele WEISS with [facility] transfer [place] ([phone]). Per Larry, [facility] accepts pt but is requesting a take back agreement(TBA). Larry ended conversation early and LSW didn't have time to provide fax number.     LSW called [facility] transfer center  and spoke with Pat who stated [facility] hasn't informed them that they have accepted pt. Pat stated he will call [facility] and finalize the details and fax LSW the take back agreement form. LSW provided Pat LSW fax number.     LSW received a call from Mercy with [facility] inquiring of LSW's fax number for TBA. LSW provided fax number. LSW called [facility] transfer [facility] & informed them that [facility] is to fax TBA to LSW.    Pat from [facility] transfer [facility] informed LSW he just got off the phone with [facility] and they finalized the details however, [facility] doesn't have a bed for pt. Pat stated it would probably be a couple of days.     LSW received the TBA.     Barriers to Discharge: bed availability @ [facility]     Plan: f/u with care team, both transfer centers & pt.

## 2019-05-21 NOTE — PROGRESS NOTES
Neurosurgery Progress Note    Subjective:  English speaking  Denies pain    Exam:  Alert, appears anxious  YEH spontaneously, bilateral  intact  Tongue midline, facial movements symmetric  PERRL, EOM intact    BP  Min: 100/67  Max: 117/71  Pulse  Av.5  Min: 89  Max: 110  Resp  Av  Min: 16  Max: 18  Temp  Av.4 °C (99.4 °F)  Min: 36.2 °C (97.2 °F)  Max: 38.8 °C (101.8 °F)  SpO2  Av.5 %  Min: 95 %  Max: 100 %    No Data Recorded        Recent Labs      19   0421   SODIUM  137   POTASSIUM  3.8   CHLORIDE  105   CO2  24   GLUCOSE  96   BUN  10   CREATININE  0.89   CALCIUM  8.8               Intake/Output       19 0700 - 19 0659 19 07 - 19 0659      [id number] 3625-3293 Total 7502-9985 9404-4025 Total       Intake    P.O.  1500  240 1740  --  -- --    P.O. 8714 942 7361 -- -- --    Total Intake 7408 046 7051 -- -- --       Output    Urine  --  -- --  --  -- --    Number of Times Voided -- 1 x 1 x -- -- --    Emesis  --  100 100  --  -- --    Emesis -- 100 100 -- -- --    Emesis - Number of Times -- 1 x 1 x -- -- --    Total Output -- 100 100 -- -- --       Net I/O     4477 [id number] 3370 -- -- --            Intake/Output Summary (Last 24 hours) at 19 0857  Last data filed at 19 0100   Gross per 24 hour   Intake             1740 ml   Output              100 ml   Net             1640 ml            • morphine injection  2 mg Q4HRS PRN   • enoxaparin (LOVENOX) injection  40 mg DAILY   • diphenhydrAMINE  25 mg Q8HRS PRN   • Pharmacy Consult Request  1 Each PHARMACY TO DOSE   • senna-docusate  2 Tab BID    And   • polyethylene glycol/lytes  1 Packet QDAY PRN    And   • magnesium hydroxide  30 mL QDAY PRN    And   • bisacodyl  10 mg QDAY PRN   • acetaminophen  650 mg Q6HRS PRN   • oxyCODONE immediate-release  5 mg Q3HRS PRN   • oxyCODONE immediate release  10 mg Q3HRS PRN   • enalaprilat  1.25 mg Q6HRS PRN   • ondansetron  4 mg Q4HRS PRN   • ondansetron  4 mg Q4HRS  PRN   • promethazine  12.5-25 mg Q4HRS PRN   • promethazine  12.5-25 mg Q4HRS PRN   • prochlorperazine  5-10 mg Q4HRS PRN   • NS  30 mL/kg Once PRN   • NS  1,000 mL Once PRN   • LORazepam  0.5 mg Q4HRS PRN       Assessment and Plan:  Hospital day #8 suprasellar mass  POD #NA   Prophylactic anticoagulation: no         Start date/time: tbd  Neuro stable  Continue supportive care  Continue [facility]  Transfer to [place]- Dr Elías Escalante, CAT working on insurance barriers

## 2019-05-21 NOTE — PROGRESS NOTES
[facility] Medicine Daily Progress Note    Date of Service  5/21/2019    Chief Complaint  38 y.o. female admitted 5/14/2019 with complaint of fever nausea vomiting and weakness    Hospital Course    Past medical history of obesity presented with complaint of fever nausea vomiting and weakness for the past several days.  Patient incidentally was noticed to have suprasellar mass.  Neurosurgery was consulted.  MRI was also ordered.      Interval Problem Update  5/15.  Patient was comfortable overnight and no significant overnight event.  Patient complains of weakness but denies significant headache or neck pain.  Patient has significant fever yesterday in the ER but resolved.  Patient has no nausea vomiting and able to eat breakfast this morning.  5/16.  Patient is alert oriented and stable overnight.  Still lethargic and complains of general fatigue. Patient otherwise denies fever, chills, SOB, CP, headache, constipation, diarrhea, cough, or sputum.  5/17.  Patient is pleasant and comfortable during the daytime.  Complains of general body achiness. Patient's pain is general, 2-3/10, intermittent and does not radiate to other location, sharp and with some tingling. Can be controlled by pain meds.   5/18.  Patient is lethargic and still complains of headache.  Patient said her headache is severe.  This headache has been there for possible mild. Patient's pain is local , 7-8/10, intermittent and does not radiate to other location, sharp and with some tingling. Can be controlled by pain meds.  5/19.  Patient is comfortable in no significant overnight event.  Patient complains of general body achiness.  General fatigue as well. Patient's pain is general , 3-4/10, intermittent and does not radiate to other location, sharp and with some tingling. Can be controlled by pain meds.   5/20.  Patient's headache is better today.  Still complains of nausea sometimes.  Patient's pain can be better controlled by pain medication.  Still  awaiting for transfer to [facility].  Patient otherwise remained stable overnight and denies chills, vomiting diarrhea, abdominal pain, chest pain or shortness of breath.    5/21: seen and examined this morning, doing well. She is c/o right arm iv site pain. Otherwise no HA, no blurred vision no nausea or vomiting  Consultants/Specialty  Neurosurgery    Code Status  Full code    Disposition  Transfer to [facility] hopefully tomorrow.     Review of Systems  Review of Systems   Constitutional: Positive for malaise/fatigue. Negative for fever and weight loss.   HENT: Negative for congestion, ear discharge, ear pain, hearing loss and nosebleeds.    Eyes: Negative for blurred vision, double vision and pain.   Respiratory: Negative for cough, sputum production and shortness of breath.    Cardiovascular: Negative for chest pain, orthopnea, leg swelling and PND.   Gastrointestinal: Negative for abdominal pain, constipation, diarrhea, nausea and vomiting.   Genitourinary: Negative for dysuria, frequency and hematuria.   Musculoskeletal: Negative for neck pain.   Skin: Negative for rash.   Neurological: Positive for weakness. Negative for dizziness, tremors and headaches.   Psychiatric/Behavioral: Negative for depression and substance abuse. The patient is not nervous/anxious.         Physical Exam  Temp:  [36.2 °C (97.2 °F)-37.6 °C (99.6 °F)] 37.6 °C (99.6 °F)  Pulse:  [] 107  Resp:  [16-18] 18  BP: (102-117)/(64-75) 117/71  SpO2:  [95 %-100 %] 100 %    Physical Exam   Constitutional: She is oriented to person, place, and time. No distress.   HENT:   Head: Normocephalic and atraumatic.   Nose: Nose normal.   Mouth/Throat: No oropharyngeal exudate.   Eyes: Pupils are equal, round, and reactive to light. Conjunctivae and EOM are normal. Right eye exhibits no discharge. Left eye exhibits no discharge.   Neck: Normal range of motion. No JVD present. No thyromegaly present.   Cardiovascular: Normal  rate, regular rhythm and normal heart sounds.  Exam reveals no gallop and no friction rub.    Pulmonary/Chest: Effort normal and breath sounds normal. No respiratory distress. She has no wheezes.   Abdominal: Soft. Bowel sounds are normal. She exhibits no distension and no mass. There is no tenderness. There is no guarding.   Musculoskeletal: Normal range of motion. She exhibits no edema or tenderness.   Neurological: She is alert and oriented to person, place, and time. No cranial nerve deficit.   Skin: Skin is warm. No rash noted. She is not diaphoretic.   Psychiatric: Her behavior is normal.       Fluids    Intake/Output Summary (Last 24 hours) at 05/21/19 1602  Last data filed at 05/21/19 0100   Gross per 24 hour   Intake              240 ml   Output              100 ml   Net              140 ml       Laboratory      Recent Labs      05/20/19   0421   SODIUM  137   POTASSIUM  3.8   CHLORIDE  105   CO2  24   GLUCOSE  96   BUN  10   CREATININE  0.89   CALCIUM  8.8                   Imaging  MR-BRAIN-WITH & W/O   Final Result      1.  28 x 19 x 29 mm lobular cystic sellar and suprasellar mass which demonstrates thin linear enhancement of the periphery of the cystic component and avid enhancement of the solid component. The optic chiasm is not well visualized. The pituitary gland    appears to be visible inferiorly below the mass but is not clearly separate from the mass. Differential considerations include craniopharyngioma and pituitary macroadenoma with cystic degeneration or necrosis and others.   2.  Mild edema in the adjacent basal ganglia and left medial temporal lobe.   3.  No evidence of hydrocephalus.      CT-ABDOMEN-PELVIS WITH   Final Result         1.  No acute abnormality.   2.  Appendicolith in the distal appendix without additional findings to suggests acute appendicitis.      CT-HEAD W/O   Final Result      Large suprasellar/sellar mass which measures 22 x 17 x 16 mm in size. This extends cephalad  into the area of the optic chiasm as well as displaces and deforms the third ventricle with surrounding edema. Differential diagnosis includes a variety of    intracranial neoplasms to include pituitary adenoma as well as other suprasellar masses. A giant thrombosed aneurysm remains a consideration as well. Further evaluation with brain MRI with contrast is recommended.      This was discussed with NERISSA CONTRERAS at 7:37 PM on 5/14/2019.           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   Reviewed by neurosurgery, recommend transfer patient to [facility].  Transfer initiated by neurosurgical team.hopefully transfer tomorrow  Continue supportive care during this hospital stay.         Sepsis (HCC)- (present on admission)   Assessment & Plan    Sepsis currenlty ruled out  Procalcitonin less than 0.05    Patient clinically has no meningitis signs.  Patient fever quickly resolved.  Patient leukocytosis quickly resolved.  Patient has no neck pain or headache.  Patient comfortably sitting in chair in the morning and eating breakfast.  Discontinue IV antibiotics.   Discontinue isolation     Class 2 obesity in adult- (present on admission)   Assessment & Plan    Body mass index is 36.65 kg/m².  With reduction education provided     SIRS (systemic inflammatory response syndrome) (HCC)- (present on admission)   Assessment & Plan     Probably not related to bacterial infection source.  Possibly viral infection  Patient did have fever and leukocytosis on admission but quickly resolved.  Negative lactic   Blood culture and GAS cultures neg  She is still running low grade fevers and is now tachycardic, I will start her on ivf and also obtain cbc, lactic acid, and CXR.  Her UA in the past was neg, has not s/s either  Hold off on abx at this time      Anxiety- (present on admission)   Assessment & Plan    -This is due to her diagnosis of a mass   Ativan     Hyperglycemia- (present on admission)   Assessment & Plan    -Mild increase, no need for insulin at this time  -Continue to monitor, add insulin sliding scale if warranted           VTE prophylaxis: Lovenox.      Current Facility-Administered Medications:   •  morphine (pf) 4 mg/ml injection 2 mg, 2 mg, Intravenous, Q4HRS PRN, Katerin Ortiz M.D., 2 mg at 05/18/19 0909  •  enoxaparin (LOVENOX) inj 40 mg, 40 mg, Subcutaneous, DAILY, Katerin Ortiz M.D., 40 mg at 05/20/19 0434  •  diphenhydrAMINE (BENADRYL) tablet/capsule 25 mg, 25 mg, Oral, Q8HRS PRN, Katerin Ortiz M.D., 25 mg at 05/17/19 1250  •  Pharmacy Consult Request ...Pain Management Review 1 Each, 1 Each, Other, PHARMACY TO DOSE, VIMAL Cueva.O.  •  senna-docusate (PERICOLACE or SENOKOT S) 8.6-50 MG per tablet 2 Tab, 2 Tab, Oral, BID, 2 Tab at 05/20/19 0434 **AND** polyethylene glycol/lytes (MIRALAX) PACKET 1 Packet, 1 Packet, Oral, QDAY PRN **AND** magnesium hydroxide (MILK OF MAGNESIA) suspension 30 mL, 30 mL, Oral, QDAY PRN **AND** bisacodyl (DULCOLAX) suppository 10 mg, 10 mg, Rectal, QDAY PRN, Charles Ace D.O.  •  acetaminophen (TYLENOL) tablet 650 mg, 650 mg, Oral, Q6HRS PRN, Charles Ace D.O., 650 mg at 05/20/19 1639  •  oxyCODONE immediate-release (ROXICODONE) tablet 5 mg, 5 mg, Oral, Q3HRS PRN, Charles Ace D.O., 5 mg at 05/20/19 0024  •  oxyCODONE immediate-release (ROXICODONE) tablet 10 mg, 10 mg, Oral, Q3HRS PRN, Charles Ace D.O., 10 mg at 05/18/19 2335  •  enalaprilat (VASOTEC) injection 1.25 mg, 1.25 mg, Intravenous, Q6HRS PRN, LILIANA CuevaO.  •  ondansetron (ZOFRAN) syringe/vial injection 4 mg, 4 mg, Intravenous, Q4HRS PRN, LILIANA CuevaO., 4 mg at 05/20/19 1746  •  ondansetron (ZOFRAN ODT) dispertab 4 mg, 4 mg, Oral, Q4HRS PRN, VIMAL Cueva.O., 4 mg at 05/17/19 1742  •  promethazine (PHENERGAN) tablet  12.5-25 mg, 12.5-25 mg, Oral, Q4HRS PRN, VIMAL Cueva.O., 25 mg at 05/21/19 1052  •  promethazine (PHENERGAN) suppository 12.5-25 mg, 12.5-25 mg, Rectal, Q4HRS PRN, LILIANA CuevaO.  •  prochlorperazine (COMPAZINE) injection 5-10 mg, 5-10 mg, Intravenous, Q4HRS PRN, VIMAL Cueva.O., 10 mg at 05/18/19 0914  •  NS infusion 2,454 mL, 30 mL/kg, Intravenous, Once PRN, LILIANA CuevaO.  •  NS (BOLUS) infusion 1,000 mL, 1,000 mL, Intravenous, Once PRN, LILIANA CuevaO.  •  LORazepam (ATIVAN) injection 0.5 mg, 0.5 mg, Intravenous, Q4HRS PRN, VIMAL Cueva.O., 0.5 mg at 05/16/19 7300    I spent a total of 45 minutes of time during this clinical encounter of which > 50% was devoted to counseling and coordinating care including review of records, pertinent lab data and studies, as well as discussing diagnostic evaluation and work up, planned therapeutic interventions and future disposition of care.we discussed plans to hopefully transfer tomorrow for surgery on Thursday by jocelyne.patient very concerned and anxious. All ? Answered. Where indicated, the assessment and plan reflect discussion of patient with consultants, other healthcare providers, family members, and additional research needed to obtain further information in formulating the plan of care of this patient. This time includes no procedures or overlap with other providers.

## 2019-05-21 NOTE — CARE PLAN
Problem: Safety  Goal: Will remain free from falls  Outcome: PROGRESSING AS EXPECTED  Call light/belongings within reach.

## 2019-05-22 LAB
BASOPHILS # BLD AUTO: 0.4 % (ref 0–1.8)
BASOPHILS # BLD: 0.06 K/UL (ref 0–0.12)
EOSINOPHIL # BLD AUTO: 0.39 K/UL (ref 0–0.51)
EOSINOPHIL NFR BLD: 2.5 % (ref 0–6.9)
ERYTHROCYTE [DISTWIDTH] IN BLOOD BY AUTOMATED COUNT: 41.8 FL (ref 35.9–50)
HCT VFR BLD AUTO: 39.9 % (ref 37–47)
HGB BLD-MCNC: 13.7 G/DL (ref 12–16)
IMM GRANULOCYTES # BLD AUTO: 0.15 K/UL (ref 0–0.11)
IMM GRANULOCYTES NFR BLD AUTO: 1 % (ref 0–0.9)
LACTATE BLD-SCNC: 1.1 MMOL/L (ref 0.5–2)
LACTATE BLD-SCNC: 1.3 MMOL/L (ref 0.5–2)
LYMPHOCYTES # BLD AUTO: 2.04 K/UL (ref 1–4.8)
LYMPHOCYTES NFR BLD: 13.3 % (ref 22–41)
MCH RBC QN AUTO: 32.5 PG (ref 27–33)
MCHC RBC AUTO-ENTMCNC: 34.3 G/DL (ref 33.6–35)
MCV RBC AUTO: 94.5 FL (ref 81.4–97.8)
MONOCYTES # BLD AUTO: 1.1 K/UL (ref 0–0.85)
MONOCYTES NFR BLD AUTO: 7.2 % (ref 0–13.4)
NEUTROPHILS # BLD AUTO: 11.59 K/UL (ref 2–7.15)
NEUTROPHILS NFR BLD: 75.6 % (ref 44–72)
NRBC # BLD AUTO: 0 K/UL
NRBC BLD-RTO: 0 /100 WBC
PLATELET # BLD AUTO: 336 K/UL (ref 164–446)
PMV BLD AUTO: 8.5 FL (ref 9–12.9)
RBC # BLD AUTO: 4.22 M/UL (ref 4.2–5.4)
WBC # BLD AUTO: 15.3 K/UL (ref 4.8–10.8)

## 2019-05-22 PROCEDURE — 700105 HCHG RX REV CODE 258: Performed by: HOSPITALIST

## 2019-05-22 PROCEDURE — 700102 HCHG RX REV CODE 250 W/ 637 OVERRIDE(OP): Performed by: INTERNAL MEDICINE

## 2019-05-22 PROCEDURE — 83605 ASSAY OF LACTIC ACID: CPT | Mod: 91

## 2019-05-22 PROCEDURE — 99233 SBSQ HOSP IP/OBS HIGH 50: CPT | Performed by: HOSPITALIST

## 2019-05-22 PROCEDURE — 700111 HCHG RX REV CODE 636 W/ 250 OVERRIDE (IP): Performed by: INTERNAL MEDICINE

## 2019-05-22 PROCEDURE — 770006 HCHG ROOM/CARE - MED/SURG/GYN SEMI*

## 2019-05-22 PROCEDURE — 85025 COMPLETE CBC W/AUTO DIFF WBC: CPT

## 2019-05-22 PROCEDURE — 700111 HCHG RX REV CODE 636 W/ 250 OVERRIDE (IP): Performed by: HOSPITALIST

## 2019-05-22 PROCEDURE — A9270 NON-COVERED ITEM OR SERVICE: HCPCS | Performed by: INTERNAL MEDICINE

## 2019-05-22 PROCEDURE — 87040 BLOOD CULTURE FOR BACTERIA: CPT

## 2019-05-22 PROCEDURE — 36415 COLL VENOUS BLD VENIPUNCTURE: CPT

## 2019-05-22 RX ADMIN — PIPERACILLIN AND TAZOBACTAM 3.38 G: 3; .375 INJECTION, POWDER, LYOPHILIZED, FOR SOLUTION INTRAVENOUS; PARENTERAL at 12:55

## 2019-05-22 RX ADMIN — PIPERACILLIN AND TAZOBACTAM 3.38 G: 3; .375 INJECTION, POWDER, LYOPHILIZED, FOR SOLUTION INTRAVENOUS; PARENTERAL at 09:20

## 2019-05-22 RX ADMIN — ONDANSETRON 4 MG: 2 INJECTION INTRAMUSCULAR; INTRAVENOUS at 07:09

## 2019-05-22 RX ADMIN — ONDANSETRON 4 MG: 2 INJECTION INTRAMUSCULAR; INTRAVENOUS at 15:49

## 2019-05-22 RX ADMIN — PIPERACILLIN AND TAZOBACTAM 3.38 G: 3; .375 INJECTION, POWDER, LYOPHILIZED, FOR SOLUTION INTRAVENOUS; PARENTERAL at 20:46

## 2019-05-22 RX ADMIN — SENNOSIDES,DOCUSATE SODIUM 2 TABLET: 50; 8.6 TABLET, FILM COATED ORAL at 05:25

## 2019-05-22 RX ADMIN — ACETAMINOPHEN 650 MG: 325 TABLET, FILM COATED ORAL at 11:15

## 2019-05-22 NOTE — PROGRESS NOTES
Patient resting in bed. Patient is AOx4. Swazi speaking. Patient c/o of nausea, refusing medication, alternative measures provided. POC discussed with patient, pending transfer to [place]. Transfer back agreement signed by Dr. Haji and patient, left a VM with CAT.

## 2019-05-22 NOTE — CARE PLAN
Problem: Communication  Goal: The ability to communicate needs accurately and effectively will improve  Outcome: PROGRESSING AS EXPECTED  Pt is primarily Russian-speaking but is able to make some needs known to healthcare staff. Language line is used in order to meet pt's needs effectively.     Problem: Safety  Goal: Will remain free from injury  Outcome: PROGRESSING AS EXPECTED  [place] fall precautions and seizure precautions in place. Bed locked and in lowest position. Call light within reach. Pt has a steady gait and is a SBA.

## 2019-05-22 NOTE — DISCHARGE PLANNING
CHAUW obtained signed transfer agreement and faxed it to transfer center @      Addendum: CHAUW called film room  and requesting films of all diagnostic studies. Spoke with Latoya who stated someone will deliver the films to charge nursing station.

## 2019-05-22 NOTE — CARE PLAN
Problem: Communication  Goal: The ability to communicate needs accurately and effectively will improve  Outcome: PROGRESSING AS EXPECTED  Patient AOx4. POC discussed with patient. Patient is Emirati speaking     Problem: Urinary Elimination:  Goal: Ability to reestablish a normal urinary elimination pattern will improve  Outcome: PROGRESSING AS EXPECTED

## 2019-05-22 NOTE — DISCHARGE SUMMARY
Discharge Summary    CHIEF COMPLAINT ON ADMISSION  Chief Complaint   Patient presents with   • Headache     onset at 1100 today   • Abdominal Pain     c/o mid abd pain onset at 1100 today.   • N/V     since 1100       Reason for Admission  Abd pain     Admission Date  5/14/2019    CODE STATUS  Full Code    HPI & HOSPITAL COURSE  Johana Weston is a 38 y.o. female who presents with chief complaint of generalized body pain and fevers.  She is exclusively Ukrainian-speaking,  service used for the entirety of her hospital stay.  she states she has had chronic abdominal pain, back pain, and headaches ongoing since October.  In November she stopped menstruating, she did see a physician at [facility] for this, was prescribed OCPs, however this did not alleviate her symptoms so she stopped taking them.  Additionally over the past several months she is noticed that she has been increasingly drowsy and anxious, she has noticed intermittent galactorrhea.  Two or three months ago, she developed loss of appetite, intermittent vomiting, as well as pain in the anterior neck.    She complains of blurry vision, more in the right eye than the left.  She denies any loss of vision.  she was febrile on arrival and has been running low grade fevers in the hospital. uncertain as to when those symptoms began.  She does have generalized back pain.  She denies abnormal vaginal bleeding or vaginal discharge, she denies any vaginal odor.  She has not had dysuria, no hematuria.  She does have polydipsia and polyuria.  She is unable to identify any exacerbating or alleviating factors, no sick contacts identified.  She has no history of hyperglycemia, no impaired immunity, denies lymphadenopathy.she was admitted and obtained a   HCT which revealed a large Large suprasellar/sellar mass which measures 22 x 17 x 16 mm in size. This extends cephalad into the area of the optic chiasm as well as displaces and  deforms the third ventricle with surrounding edema. Differential diagnosis includes a variety of   intracranial neoplasms to include pituitary adenoma as well as other suprasellar masses. A giant thrombosed aneurysm remains a consideration as well. Further evaluation with brain MRI with contrast is recommended.    MRI Scott: There is a complex lobular cystic and solid sellar and suprasellar mass which measures 28 x 19 x 29 mm. There is thin linear enhancement of the periphery of the cystic component and avid enhancement of the solid component. The pituitary gland appears to   be visible inferiorly below the mass but is not clearly separate from the mass. The optic chiasm is not well visualized. There is mild edema in the adjacent basal ganglia and left medial temporal lobe. There is no evidence of hydrocephalus. The   ventricular system is within normal limits. The bony calvaria are intact. There is no evidence of extra-axial fluid collection or intracranial mass effect.    There is no evidence of abnormal enhancement in the brain parenchyma.    There are normal flow voids in the cavernous carotid arteries and M1 segments. There are normal flow voids in the distal vertebral basilar system. There are normal flow voids in the dural venous sinuses. The visualized paranasal sinuses and mastoid air   cells appear clear.    Neurosurgery was consulted and recommended and decision was made to transfer patient to an academic center, [facility].      She initially was febrile and had a white count which quickly resolved on day 2 of hospitalization, however during the stay she became febrile, tachycardic and white count increased to 15K. She has had a negative  Ct A/P, CXR, negative UA.she has had a negative lactic acid and negative initial blood and GAS cultures.  She denies any infectious symptoms. I have obtained Blood cultures and started her on IV abx empirically however no infectious source has been found. She is also on  IV fluids.     At this time patient is medical stable,to be transferred to [facility] for continued care.  Patient understood and agreed with the above plan       Therefore, she is discharged in good and stable condition to a critical access hospital.    The patient met 2-midnight criteria for an inpatient stay at the time of discharge.    Discharge Date  5/22/19    FOLLOW UP ITEMS POST DISCHARGE  pcp  Transfer to [facility] for NSG    DISCHARGE DIAGNOSES  Principal Problem:    Suprasellar mass POA: Yes  Active Problems:    Sepsis (HCC) POA: Yes    Hyperglycemia POA: Yes    Anxiety POA: Yes    SIRS (systemic inflammatory response syndrome) (HCC) POA: Yes    Class 2 obesity in adult POA: Yes  Resolved Problems:    * No resolved hospital problems. *      FOLLOW UP  No future appointments.  No follow-up provider specified.    MEDICATIONS ON DISCHARGE     Medication List      CONTINUE taking these medications      Instructions   ibuprofen 200 MG Tabs  Commonly known as:  MOTRIN   Take 400 mg by mouth 1 time daily as needed for Headache.  Dose:  400 mg            Allergies  No Known Allergies    DIET  Orders Placed This Encounter   Procedures   • Diet Order Regular     Standing Status:   Standing     Number of Occurrences:   1     Order Specific Question:   Diet:     Answer:   Regular [1]       ACTIVITY  As tolerated.  Weight bearing as tolerated    CONSULTATIONS  NSG    PROCEDURES  none    LABORATORY  Lab Results   Component Value Date    SODIUM 137 05/20/2019    POTASSIUM 3.8 05/20/2019    CHLORIDE 105 05/20/2019    CO2 24 05/20/2019    GLUCOSE 96 05/20/2019    BUN 10 05/20/2019    CREATININE 0.89 05/20/2019        Lab Results   Component Value Date    WBC 15.3 (H) 05/22/2019    HEMOGLOBIN 13.7 05/22/2019    HEMATOCRIT 39.9 05/22/2019    PLATELETCT 336 05/22/2019        Total time of the discharge process exceeds 45 minutes.

## 2019-05-22 NOTE — DISCHARGE PLANNING
Call from dov @ [facility] YULISSA. They are doing everything poss to obtain a bed today as pt has procedure scheduled for tomorrow. Transfer Back Agreement rec'd and sent to  on neuro [phone] for signatures. DC summary is needed. Both documents need to be sent back to [facility] before bed will be released. Left messages for SWs re same @ [phone] & [phone].

## 2019-05-22 NOTE — CARE PLAN
Problem: Nutritional:  Goal: Achieve adequate nutritional intake  Patient will consume >50% of meals  Outcome: PROGRESSING SLOWER THAN EXPECTED  See RD note

## 2019-05-22 NOTE — PROGRESS NOTES
Assumed pt care at 1900. Bedside report received from AGNES Ellington. No family at bedside. Pt primarily Kittitian- speaking and requesting that she eat dinner after her CXR. Fall precautions in place. Bed locked and in lowest position. Call light within reach. Pt has a steady gait and is SBA. No needs at this time.     Pt back from CXR and is eating a burrito brought in from family.     At 2330, this RN received a call from Nolvia at [facility] requesting a copy of the patient's discharge summary and the transfer back agreement.     Per the transfer facility, the transfer back agreement would need to be signed by the patient, the MD, and a representative of the hospital prior to the patient being transferred to [place]. The discharge summary can be faxed once the patient is discharged.     Language line used to communicate with pt. Pt A&Ox4, no c/o SOB or pain. Pt c/o N/V - medicated per MAR.

## 2019-05-23 VITALS
HEIGHT: 59 IN | HEART RATE: 93 BPM | DIASTOLIC BLOOD PRESSURE: 87 MMHG | SYSTOLIC BLOOD PRESSURE: 102 MMHG | RESPIRATION RATE: 16 BRPM | BODY MASS INDEX: 37.16 KG/M2 | TEMPERATURE: 97.1 F | OXYGEN SATURATION: 99 % | WEIGHT: 184.3 LBS

## 2019-05-23 PROBLEM — R65.10 SIRS (SYSTEMIC INFLAMMATORY RESPONSE SYNDROME) (HCC): Status: RESOLVED | Noted: 2019-05-15 | Resolved: 2019-05-23

## 2019-05-23 PROBLEM — F41.9 ANXIETY: Status: RESOLVED | Noted: 2019-05-14 | Resolved: 2019-05-23

## 2019-05-23 PROBLEM — A41.9 SEPSIS (HCC): Status: RESOLVED | Noted: 2019-05-14 | Resolved: 2019-05-23

## 2019-05-23 PROBLEM — R73.9 HYPERGLYCEMIA: Status: RESOLVED | Noted: 2019-05-14 | Resolved: 2019-05-23

## 2019-05-23 LAB
BASOPHILS # BLD AUTO: 0.5 % (ref 0–1.8)
BASOPHILS # BLD: 0.06 K/UL (ref 0–0.12)
EOSINOPHIL # BLD AUTO: 0.45 K/UL (ref 0–0.51)
EOSINOPHIL NFR BLD: 3.5 % (ref 0–6.9)
ERYTHROCYTE [DISTWIDTH] IN BLOOD BY AUTOMATED COUNT: 41.7 FL (ref 35.9–50)
HCT VFR BLD AUTO: 37.3 % (ref 37–47)
HGB BLD-MCNC: 12.3 G/DL (ref 12–16)
IMM GRANULOCYTES # BLD AUTO: 0.13 K/UL (ref 0–0.11)
IMM GRANULOCYTES NFR BLD AUTO: 1 % (ref 0–0.9)
LYMPHOCYTES # BLD AUTO: 2.33 K/UL (ref 1–4.8)
LYMPHOCYTES NFR BLD: 18.3 % (ref 22–41)
MCH RBC QN AUTO: 31 PG (ref 27–33)
MCHC RBC AUTO-ENTMCNC: 33 G/DL (ref 33.6–35)
MCV RBC AUTO: 94 FL (ref 81.4–97.8)
MONOCYTES # BLD AUTO: 0.86 K/UL (ref 0–0.85)
MONOCYTES NFR BLD AUTO: 6.8 % (ref 0–13.4)
NEUTROPHILS # BLD AUTO: 8.9 K/UL (ref 2–7.15)
NEUTROPHILS NFR BLD: 69.9 % (ref 44–72)
NRBC # BLD AUTO: 0 K/UL
NRBC BLD-RTO: 0 /100 WBC
PLATELET # BLD AUTO: 311 K/UL (ref 164–446)
PMV BLD AUTO: 9.1 FL (ref 9–12.9)
RBC # BLD AUTO: 3.97 M/UL (ref 4.2–5.4)
WBC # BLD AUTO: 12.7 K/UL (ref 4.8–10.8)

## 2019-05-23 PROCEDURE — 700105 HCHG RX REV CODE 258: Performed by: HOSPITALIST

## 2019-05-23 PROCEDURE — 99239 HOSP IP/OBS DSCHRG MGMT >30: CPT | Performed by: HOSPITALIST

## 2019-05-23 PROCEDURE — 85025 COMPLETE CBC W/AUTO DIFF WBC: CPT

## 2019-05-23 PROCEDURE — 700111 HCHG RX REV CODE 636 W/ 250 OVERRIDE (IP): Performed by: HOSPITALIST

## 2019-05-23 PROCEDURE — 36415 COLL VENOUS BLD VENIPUNCTURE: CPT

## 2019-05-23 PROCEDURE — A9270 NON-COVERED ITEM OR SERVICE: HCPCS | Performed by: INTERNAL MEDICINE

## 2019-05-23 PROCEDURE — 700102 HCHG RX REV CODE 250 W/ 637 OVERRIDE(OP): Performed by: INTERNAL MEDICINE

## 2019-05-23 RX ADMIN — PIPERACILLIN AND TAZOBACTAM 3.38 G: 3; .375 INJECTION, POWDER, LYOPHILIZED, FOR SOLUTION INTRAVENOUS; PARENTERAL at 04:52

## 2019-05-23 RX ADMIN — ACETAMINOPHEN 650 MG: 325 TABLET, FILM COATED ORAL at 00:14

## 2019-05-23 RX ADMIN — ACETAMINOPHEN 650 MG: 325 TABLET, FILM COATED ORAL at 10:31

## 2019-05-23 RX ADMIN — PIPERACILLIN AND TAZOBACTAM 3.38 G: 3; .375 INJECTION, POWDER, LYOPHILIZED, FOR SOLUTION INTRAVENOUS; PARENTERAL at 12:57

## 2019-05-23 ASSESSMENT — ENCOUNTER SYMPTOMS
BLURRED VISION: 0
DIARRHEA: 0
WEAKNESS: 1
FEVER: 0
NECK PAIN: 0
EYE PAIN: 0
ORTHOPNEA: 0
DEPRESSION: 0
COUGH: 0
DOUBLE VISION: 0
TREMORS: 0
NERVOUS/ANXIOUS: 1
SHORTNESS OF BREATH: 0
WEIGHT LOSS: 0
CONSTIPATION: 0
SPUTUM PRODUCTION: 0
VOMITING: 0
ABDOMINAL PAIN: 0
PND: 0
DIZZINESS: 0

## 2019-05-23 ASSESSMENT — LIFESTYLE VARIABLES: SUBSTANCE_ABUSE: 0

## 2019-05-23 NOTE — PROGRESS NOTES
[facility] Medicine Daily Progress Note    Date of Service  5/23/2019    Chief Complaint  38 y.o. female admitted 5/14/2019 with complaint of fever nausea vomiting and weakness    Hospital Course    Past medical history of obesity presented with complaint of fever nausea vomiting and weakness for the past several days.  Patient incidentally was noticed to have suprasellar mass.  Neurosurgery was consulted.  MRI was also ordered.      Interval Problem Update  5/22: Patient seen and examined this morning she is eager to go to [place] for her surgery which is planned for tomorrow however social work contacted [place] who states they have no beds therefore patient has been placed on the wait list for surgery.    She denies any headache any other pain this morning    5/23: seen this morning, is frustrated and asking when she will be transferred, I explained to her it will be when [facility] has a bed available  Consultants/Specialty  Neurosurgery    Code Status  Full code    Disposition  Transfer to [facility]-currently patient is placed on a waiting list for her surgery at [place]    Review of Systems  Review of Systems   Constitutional: Negative for fever and weight loss.   HENT: Negative for congestion, ear discharge, ear pain, hearing loss and nosebleeds.    Eyes: Negative for blurred vision, double vision and pain.   Respiratory: Negative for cough, sputum production and shortness of breath.    Cardiovascular: Negative for chest pain, orthopnea, leg swelling and PND.   Gastrointestinal: Negative for abdominal pain, constipation, diarrhea and vomiting.   Genitourinary: Negative for dysuria, frequency and hematuria.   Musculoskeletal: Negative for neck pain.   Skin: Negative for rash.   Neurological: Positive for weakness. Negative for dizziness and tremors.   Psychiatric/Behavioral: Negative for depression and substance abuse. The patient is nervous/anxious.         Physical Exam  Temp:  [36.4 °C (97.5  °F)-37.7 °C (99.8 °F)] 37.5 °C (99.5 °F)  Pulse:  [80-99] 95  Resp:  [16-18] 16  BP: ()/(60-73) 110/72  SpO2:  [96 %-100 %] 100 %    Physical Exam   Constitutional: She is oriented to person, place, and time. No distress.   HENT:   Head: Normocephalic and atraumatic.   Nose: Nose normal.   Mouth/Throat: No oropharyngeal exudate.   Eyes: Pupils are equal, round, and reactive to light. Conjunctivae and EOM are normal. Right eye exhibits no discharge. Left eye exhibits no discharge.   Neck: Normal range of motion. No JVD present. No thyromegaly present.   Cardiovascular: Normal rate, regular rhythm and normal heart sounds.  Exam reveals no gallop and no friction rub.    Pulmonary/Chest: Effort normal and breath sounds normal. No respiratory distress. She has no wheezes.   Abdominal: Soft. Bowel sounds are normal. She exhibits no distension and no mass. There is no tenderness. There is no guarding.   Musculoskeletal: Normal range of motion. She exhibits no edema or tenderness.   Neurological: She is alert and oriented to person, place, and time. No cranial nerve deficit.   Skin: Skin is warm. No rash noted. She is not diaphoretic.   Psychiatric: Her behavior is normal.       Fluids    Intake/Output Summary (Last 24 hours) at 05/23/19 1337  Last data filed at 05/23/19 1030   Gross per 24 hour   Intake             4780 ml   Output                0 ml   Net             4780 ml       Laboratory  Recent Labs      05/22/19   0008  05/23/19   0345   WBC  15.3*  12.7*   RBC  4.22  3.97*   HEMOGLOBIN  13.7  12.3   HEMATOCRIT  39.9  37.3   MCV  94.5  94.0   MCH  32.5  31.0   MCHC  34.3  33.0*   RDW  41.8  41.7   PLATELETCT  336  311   MPV  8.5*  9.1                       Imaging  DX-CHEST-2 VIEWS   Final Result      No acute cardiopulmonary abnormality.      MR-BRAIN-WITH & W/O   Final Result      1.  28 x 19 x 29 mm lobular cystic sellar and suprasellar mass which demonstrates thin linear enhancement of the periphery of  the cystic component and avid enhancement of the solid component. The optic chiasm is not well visualized. The pituitary gland    appears to be visible inferiorly below the mass but is not clearly separate from the mass. Differential considerations include craniopharyngioma and pituitary macroadenoma with cystic degeneration or necrosis and others.   2.  Mild edema in the adjacent basal ganglia and left medial temporal lobe.   3.  No evidence of hydrocephalus.      CT-ABDOMEN-PELVIS WITH   Final Result         1.  No acute abnormality.   2.  Appendicolith in the distal appendix without additional findings to suggests acute appendicitis.      CT-HEAD W/O   Final Result      Large suprasellar/sellar mass which measures 22 x 17 x 16 mm in size. This extends cephalad into the area of the optic chiasm as well as displaces and deforms the third ventricle with surrounding edema. Differential diagnosis includes a variety of    intracranial neoplasms to include pituitary adenoma as well as other suprasellar masses. A giant thrombosed aneurysm remains a consideration as well. Further evaluation with brain MRI with contrast is recommended.      This was discussed with NERISSA CONTRERAS at 7:37 PM on 5/14/2019.           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   Reviewed by neurosurgery, [facility]   Pending a bed at [place]  Continue supportive care during this hospital stay.         Sepsis (HCC)- (present on admission)   Assessment & Plan    Sepsis currenlty ruled out  Procalcitonin less than 0.05    English initially when patient came in she had slight leukocytosis and was febrile however that resolved therefore no antibiotics were started.  This morning patient's leukocytosis has increased and she has been running a  low-grade fever since her admission I have ordered stat blood cultures on her.  Her procalcitonin is negative.  She denies any complaints of cough shortness of breath.  At this time I will continue to hold antibiotics and follow blood cultures  Monitor CBCs daily     Class 2 obesity in adult- (present on admission)   Assessment & Plan    Body mass index is 36.65 kg/m².  With reduction education provided     SIRS (systemic inflammatory response syndrome) (HCC)- (present on admission)   Assessment & Plan     Probably not related to bacterial infection source.  Possibly viral infection  Patient did have fever and leukocytosis on admission but quickly resolved.  Negative lactic   Blood culture and GAS cultures neg  She is still running low grade fevers and is now tachycardic, her chest x-ray has been negative her lactic acid is negative  Blood cultures neg from 5/22  her UA in the past was neg, has not s/s either  Hold off on abx at this time     Anxiety- (present on admission)   Assessment & Plan    -This is due to her diagnosis of a mass   Ativan     Hyperglycemia- (present on admission)   Assessment & Plan    -Mild increase, no need for insulin at this time  -Continue to monitor, add insulin sliding scale if warranted           VTE prophylaxis: Lovenox.      Current Facility-Administered Medications:   •  [COMPLETED] piperacillin-tazobactam (ZOSYN) 3.375 g in  mL IVPB, 3.375 g, Intravenous, Once, Stopped at 05/22/19 0950 **AND** piperacillin-tazobactam (ZOSYN) 3.375 g in  mL IVPB, 3.375 g, Intravenous, Q8HRS, Daisy Haji M.D., Last Rate: 25 mL/hr at 05/23/19 1257, 3.375 g at 05/23/19 1257  •  morphine (pf) 4 mg/ml injection 2 mg, 2 mg, Intravenous, Q4HRS PRN, Katerin Ortiz M.D., 2 mg at 05/18/19 0909  •  enoxaparin (LOVENOX) inj 40 mg, 40 mg, Subcutaneous, DAILY, Katerin Ortiz M.D., 40 mg at 05/20/19 0434  •  diphenhydrAMINE (BENADRYL) tablet/capsule 25 mg, 25 mg, Oral, Q8HRS PRN, Katerin Ortiz M.D., 25 mg at  05/17/19 1250  •  Pharmacy Consult Request ...Pain Management Review 1 Each, 1 Each, Other, PHARMACY TO DOSE, Charles Ace D.O.  •  senna-docusate (PERICOLACE or SENOKOT S) 8.6-50 MG per tablet 2 Tab, 2 Tab, Oral, BID, 2 Tab at 05/22/19 0525 **AND** polyethylene glycol/lytes (MIRALAX) PACKET 1 Packet, 1 Packet, Oral, QDAY PRN **AND** magnesium hydroxide (MILK OF MAGNESIA) suspension 30 mL, 30 mL, Oral, QDAY PRN **AND** bisacodyl (DULCOLAX) suppository 10 mg, 10 mg, Rectal, QDAY PRN, VIMAL Cueva.O.  •  acetaminophen (TYLENOL) tablet 650 mg, 650 mg, Oral, Q6HRS PRN, VIMAL Cueva.O., 650 mg at 05/23/19 1031  •  oxyCODONE immediate-release (ROXICODONE) tablet 5 mg, 5 mg, Oral, Q3HRS PRN, VIMAL Cueva.O., 5 mg at 05/20/19 0024  •  oxyCODONE immediate-release (ROXICODONE) tablet 10 mg, 10 mg, Oral, Q3HRS PRN, Charles Ace D.O., 10 mg at 05/18/19 2335  •  enalaprilat (VASOTEC) injection 1.25 mg, 1.25 mg, Intravenous, Q6HRS PRN, VIMAL Cueva.O.  •  ondansetron (ZOFRAN) syringe/vial injection 4 mg, 4 mg, Intravenous, Q4HRS PRN, VIMAL Cueva.O., 4 mg at 05/22/19 1549  •  ondansetron (ZOFRAN ODT) dispertab 4 mg, 4 mg, Oral, Q4HRS PRN, VIMAL Cueva.O., 4 mg at 05/17/19 1742  •  promethazine (PHENERGAN) tablet 12.5-25 mg, 12.5-25 mg, Oral, Q4HRS PRN, VIMAL Cueva.O., 25 mg at 05/21/19 1052  •  promethazine (PHENERGAN) suppository 12.5-25 mg, 12.5-25 mg, Rectal, Q4HRS PRN, Charles Ace D.O.  •  prochlorperazine (COMPAZINE) injection 5-10 mg, 5-10 mg, Intravenous, Q4HRS PRN, LILIANA CuevaO., 10 mg at 05/18/19 0988  •  NS infusion 2,454 mL, 30 mL/kg, Intravenous, Once PRN, Charles Ace D.O.  •  NS (BOLUS) infusion 1,000 mL, 1,000 mL, Intravenous, Once PRN, LILIANA CuevaO.  •  LORazepam (ATIVAN) injection 0.5 mg, 0.5 mg, Intravenous, Q4HRS PRN, LILIANA CuevaO., 0.5 mg at 05/16/19 6521

## 2019-05-23 NOTE — PROGRESS NOTES
Patient resting in bed. Patient is AOx4. [place]orese speaking. Patient c/o of headache, medication given per MAR. POC discussed with patient, pending transfer to [place]. Safety education provided. Up to bathroom with SBA. Hourly rounding.

## 2019-05-23 NOTE — CARE PLAN
Problem: Knowledge Deficit  Goal: Knowledge of disease process/condition, treatment plan, diagnostic tests, and medications will improve  Outcome: PROGRESSING SLOWER THAN EXPECTED  POC discussed with patient.     Problem: Urinary Elimination:  Goal: Ability to reestablish a normal urinary elimination pattern will improve  Outcome: PROGRESSING AS EXPECTED

## 2019-05-23 NOTE — DISCHARGE PLANNING
Per Charissa @ [place] transfer center: no beds as of right now. Pt is wait listed for surgery w/ date/time TBD.

## 2019-05-23 NOTE — CARE PLAN
Problem: Communication  Goal: The ability to communicate needs accurately and effectively will improve  Outcome: PROGRESSING AS EXPECTED  Pt is A&Ox4, calls appropriately using call light. Pt is able to make needs known through Language line or through family.     Problem: Safety  Goal: Will remain free from injury  Outcome: PROGRESSING AS EXPECTED  Seizure precautions in place. Pt is a SBA with a steady gait.

## 2019-05-23 NOTE — PROGRESS NOTES
[facility] Medicine Daily Progress Note    Date of Service  5/23/2019    Chief Complaint  38 y.o. female admitted 5/14/2019 with complaint of fever nausea vomiting and weakness    Hospital Course    Past medical history of obesity presented with complaint of fever nausea vomiting and weakness for the past several days.  Patient incidentally was noticed to have suprasellar mass.  Neurosurgery was consulted.  MRI was also ordered.      Interval Problem Update  5/22: Patient seen and examined this morning she is eager to go to [place] for her surgery which is planned for tomorrow however social work contacted [place] who states they have no beds therefore patient has been placed on the wait list for surgery.    She denies any headache any other pain this morning  Consultants/Specialty  Neurosurgery    Code Status  Full code    Disposition  Transfer to [facility]-currently patient is placed on a waiting list for her surgery at [place]    Review of Systems  Review of Systems   Constitutional: Negative for fever and weight loss.   HENT: Negative for congestion, ear discharge, ear pain, hearing loss and nosebleeds.    Eyes: Negative for blurred vision, double vision and pain.   Respiratory: Negative for cough, sputum production and shortness of breath.    Cardiovascular: Negative for chest pain, orthopnea, leg swelling and PND.   Gastrointestinal: Negative for abdominal pain, constipation, diarrhea and vomiting.   Genitourinary: Negative for dysuria, frequency and hematuria.   Musculoskeletal: Negative for neck pain.   Skin: Negative for rash.   Neurological: Positive for weakness. Negative for dizziness and tremors.   Psychiatric/Behavioral: Negative for depression and substance abuse. The patient is nervous/anxious.         Physical Exam  Temp:  [36.4 °C (97.5 °F)-37.7 °C (99.8 °F)] 37.5 °C (99.5 °F)  Pulse:  [80-99] 95  Resp:  [16-18] 16  BP: ()/(60-73) 110/72  SpO2:  [96 %-100 %] 100 %    Physical  Exam   Constitutional: She is oriented to person, place, and time. No distress.   HENT:   Head: Normocephalic and atraumatic.   Nose: Nose normal.   Mouth/Throat: No oropharyngeal exudate.   Eyes: Pupils are equal, round, and reactive to light. Conjunctivae and EOM are normal. Right eye exhibits no discharge. Left eye exhibits no discharge.   Neck: Normal range of motion. No JVD present. No thyromegaly present.   Cardiovascular: Normal rate, regular rhythm and normal heart sounds.  Exam reveals no gallop and no friction rub.    Pulmonary/Chest: Effort normal and breath sounds normal. No respiratory distress. She has no wheezes.   Abdominal: Soft. Bowel sounds are normal. She exhibits no distension and no mass. There is no tenderness. There is no guarding.   Musculoskeletal: Normal range of motion. She exhibits no edema or tenderness.   Neurological: She is alert and oriented to person, place, and time. No cranial nerve deficit.   Skin: Skin is warm. No rash noted. She is not diaphoretic.   Psychiatric: Her behavior is normal.       Fluids    Intake/Output Summary (Last 24 hours) at 05/23/19 1332  Last data filed at 05/23/19 1030   Gross per 24 hour   Intake             4780 ml   Output                0 ml   Net             4780 ml       Laboratory  Recent Labs      05/22/19   0008  05/23/19   0345   WBC  15.3*  12.7*   RBC  4.22  3.97*   HEMOGLOBIN  13.7  12.3   HEMATOCRIT  39.9  37.3   MCV  94.5  94.0   MCH  32.5  31.0   MCHC  34.3  33.0*   RDW  41.8  41.7   PLATELETCT  336  311   MPV  8.5*  9.1                       Imaging  DX-CHEST-2 VIEWS   Final Result      No acute cardiopulmonary abnormality.      MR-BRAIN-WITH & W/O   Final Result      1.  28 x 19 x 29 mm lobular cystic sellar and suprasellar mass which demonstrates thin linear enhancement of the periphery of the cystic component and avid enhancement of the solid component. The optic chiasm is not well visualized. The pituitary gland    appears to be  visible inferiorly below the mass but is not clearly separate from the mass. Differential considerations include craniopharyngioma and pituitary macroadenoma with cystic degeneration or necrosis and others.   2.  Mild edema in the adjacent basal ganglia and left medial temporal lobe.   3.  No evidence of hydrocephalus.      CT-ABDOMEN-PELVIS WITH   Final Result         1.  No acute abnormality.   2.  Appendicolith in the distal appendix without additional findings to suggests acute appendicitis.      CT-HEAD W/O   Final Result      Large suprasellar/sellar mass which measures 22 x 17 x 16 mm in size. This extends cephalad into the area of the optic chiasm as well as displaces and deforms the third ventricle with surrounding edema. Differential diagnosis includes a variety of    intracranial neoplasms to include pituitary adenoma as well as other suprasellar masses. A giant thrombosed aneurysm remains a consideration as well. Further evaluation with brain MRI with contrast is recommended.      This was discussed with NERISSA CONTRERAS at 7:37 PM on 5/14/2019.           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   Reviewed by neurosurgery, recommend transfer patient to [facility] center.   Pending a bed at [place]  Continue supportive care during this hospital stay.         Sepsis (HCC)- (present on admission)   Assessment & Plan    Sepsis currenlty ruled out  Procalcitonin less than 0.05    English initially when patient came in she had slight leukocytosis and was febrile however that resolved therefore no antibiotics were started.  This morning patient's leukocytosis has increased and she has been running a low-grade fever since her admission I have ordered stat blood cultures on her.  Her procalcitonin is negative.  She denies any complaints of  cough shortness of breath.  At this time I will continue to hold antibiotics and follow blood cultures  Monitor CBCs daily     Class 2 obesity in adult- (present on admission)   Assessment & Plan    Body mass index is 36.65 kg/m².  With reduction education provided     SIRS (systemic inflammatory response syndrome) (HCC)- (present on admission)   Assessment & Plan     Probably not related to bacterial infection source.  Possibly viral infection  Patient did have fever and leukocytosis on admission but quickly resolved.  Negative lactic   Blood culture and GAS cultures neg  She is still running low grade fevers and is now tachycardic, her chest x-ray has been negative her lactic acid is negative at this time I will order blood cultures and monitor   her UA in the past was neg, has not s/s either  Hold off on abx at this time     Anxiety- (present on admission)   Assessment & Plan    -This is due to her diagnosis of a mass   Ativan     Hyperglycemia- (present on admission)   Assessment & Plan    -Mild increase, no need for insulin at this time  -Continue to monitor, add insulin sliding scale if warranted           VTE prophylaxis: Lovenox.      Current Facility-Administered Medications:   •  [COMPLETED] piperacillin-tazobactam (ZOSYN) 3.375 g in  mL IVPB, 3.375 g, Intravenous, Once, Stopped at 05/22/19 0950 **AND** piperacillin-tazobactam (ZOSYN) 3.375 g in  mL IVPB, 3.375 g, Intravenous, Q8HRS, Dasiy Haji M.D., Last Rate: 25 mL/hr at 05/23/19 1257, 3.375 g at 05/23/19 1257  •  morphine (pf) 4 mg/ml injection 2 mg, 2 mg, Intravenous, Q4HRS PRN, Katerin Ortiz M.D., 2 mg at 05/18/19 0909  •  enoxaparin (LOVENOX) inj 40 mg, 40 mg, Subcutaneous, DAILY, Katerin Ortiz M.D., 40 mg at 05/20/19 0434  •  diphenhydrAMINE (BENADRYL) tablet/capsule 25 mg, 25 mg, Oral, Q8HRS PRN, Katerin Ortiz M.D., 25 mg at 05/17/19 1250  •  Pharmacy Consult Request ...Pain Management Review 1 Each, 1 Each, Other, PHARMACY TO DOSE, Charles RIBEIRO  EMANUEL Ace.  •  senna-docusate (PERICOLACE or SENOKOT S) 8.6-50 MG per tablet 2 Tab, 2 Tab, Oral, BID, 2 Tab at 05/22/19 0525 **AND** polyethylene glycol/lytes (MIRALAX) PACKET 1 Packet, 1 Packet, Oral, QDAY PRN **AND** magnesium hydroxide (MILK OF MAGNESIA) suspension 30 mL, 30 mL, Oral, QDAY PRN **AND** bisacodyl (DULCOLAX) suppository 10 mg, 10 mg, Rectal, QDAY PRN, VIMAL Cueva.O.  •  acetaminophen (TYLENOL) tablet 650 mg, 650 mg, Oral, Q6HRS PRN, LILIANA CuevaO., 650 mg at 05/23/19 1031  •  oxyCODONE immediate-release (ROXICODONE) tablet 5 mg, 5 mg, Oral, Q3HRS PRN, VIMAL Cueva.O., 5 mg at 05/20/19 0024  •  oxyCODONE immediate-release (ROXICODONE) tablet 10 mg, 10 mg, Oral, Q3HRS PRN, VIMAL Cueva.O., 10 mg at 05/18/19 2335  •  enalaprilat (VASOTEC) injection 1.25 mg, 1.25 mg, Intravenous, Q6HRS PRN, VIMAL Cueva.O.  •  ondansetron (ZOFRAN) syringe/vial injection 4 mg, 4 mg, Intravenous, Q4HRS PRN, VIMAL Cueva.O., 4 mg at 05/22/19 1549  •  ondansetron (ZOFRAN ODT) dispertab 4 mg, 4 mg, Oral, Q4HRS PRN, VIMAL Cueva.O., 4 mg at 05/17/19 1742  •  promethazine (PHENERGAN) tablet 12.5-25 mg, 12.5-25 mg, Oral, Q4HRS PRN, LILIANA CuevaO., 25 mg at 05/21/19 1052  •  promethazine (PHENERGAN) suppository 12.5-25 mg, 12.5-25 mg, Rectal, Q4HRS PRN, Charles Ace D.O.  •  prochlorperazine (COMPAZINE) injection 5-10 mg, 5-10 mg, Intravenous, Q4HRS PRN, Charles Ace D.O., 10 mg at 05/18/19 0914  •  NS infusion 2,454 mL, 30 mL/kg, Intravenous, Once PRN, Charles Ace D.O.  •  NS (BOLUS) infusion 1,000 mL, 1,000 mL, Intravenous, Once PRN, Charles Ace D.O.  •  LORazepam (ATIVAN) injection 0.5 mg, 0.5 mg, Intravenous, Q4HRS PRN, Charles Ace D.O., 0.5 mg at 05/16/19 4490    I spent a total of 45 minutes of time during this clinical encounter of which > 50% was devoted to counseling and coordinating care including review of records, pertinent lab data and  studies, as well as discussing diagnostic evaluation and work up, planned therapeutic interventions and future disposition of care. Where indicated, the assessment and plan reflect discussion of patient with consultants, other healthcare providers, family members, and additional research needed to obtain further information in formulating the plan of care of this patient. This time includes no procedures or overlap with other providers.  Patient is very eager to go to [place] for however I did discuss with her in detail and try to calm her down that there are no beds available at [place] and she has been placed on the waiting list for surgery.

## 2019-05-23 NOTE — DISCHARGE PLANNING
Transfer Center:    Received call from [facility].  Spoke with Harpreet.  She reported possible bed assignment but need to verify mode of transportation and contact information for the attending MD.  Reported to Harpreet that she will be coming via [facility], ground EMS and Dr. Daisy Haji's contact information was provided.    Plan:  Pending call back from [facility] with room information.

## 2019-05-23 NOTE — PROGRESS NOTES
Assumed pt care at 1900. Bedside report received from AGNES Cabezas. Family at the bedside. Discussed with pt and family medication and regimen and POC. Pt's family anxious to know when surgery will be performed in [place]. Discussed with family that we are waiting on transportation, however the transfer back agreement has already been faxed over. Family verbalized understanding. Seizure precautions in place. Call light within reach. No needs at this time.     Spoke with Julia from the [place] transfer center at 0500 and gave updates regarding pt.

## 2019-05-23 NOTE — DISCHARGE PLANNING
Bed assigned @ [place]. Pt going to [id number], Dr. Gongora via [facility] by ground,1745 bedside pickup. SW to do COBRA. Films to disc ordered. Bedside nurse notified.

## 2019-05-24 NOTE — DISCHARGE INSTRUCTIONS
Discharge Instructions    Discharged to other by ambulance with self. Discharged via ambulance, hospital escort: Yes.  Special equipment needed: Not Applicable    Be sure to schedule a follow-up appointment with your primary care doctor or any specialists as instructed.     Discharge Plan:   Diet Plan: Discussed  Activity Level: Discussed  Confirmed Follow up Appointment: Patient to Call and Schedule Appointment  Confirmed Symptoms Management: Discussed  Medication Reconciliation Updated: Yes  Influenza Vaccine Indication: Not indicated: Previously immunized this influenza season and > 8 years of age    I understand that a diet low in cholesterol, fat, and sodium is recommended for good health. Unless I have been given specific instructions below for another diet, I accept this instruction as my diet prescription.   Other diet: Regular    Special Instructions: None    · Is patient discharged on Warfarin / Coumadin?   No     Depression / Suicide Risk    As you are discharged from this [facility] facility, it is important to learn how to keep safe from harming yourself.    Recognize the warning signs:  · Abrupt changes in personality, positive or negative- including increase in energy   · Giving away possessions  · Change in eating patterns- significant weight changes-  positive or negative  · Change in sleeping patterns- unable to sleep or sleeping all the time   · Unwillingness or inability to communicate  · Depression  · Unusual sadness, discouragement and loneliness  · Talk of wanting to die  · Neglect of personal appearance   · Rebelliousness- reckless behavior  · Withdrawal from people/activities they love  · Confusion- inability to concentrate     If you or a loved one observes any of these behaviors or has concerns about self-harm, here's what you can do:  · Talk about it- your feelings and reasons for harming yourself  · Remove any means that you might use to hurt yourself (examples: pills, rope, extension  cords, firearm)  · Get professional help from the community (Mental Health, Substance Abuse, psychological counseling)  · Do not be alone:Call your Safe Contact- someone whom you trust who will be there for you.  · Call your local CRISIS HOTLINE 655-3588 or 886-428-3524  · Call your local Children's Mobile Crisis Response Team [place] (956) 666-4316 or www.[employer]  · Call the toll free National Suicide Prevention Hotlines   · National Suicide Prevention Lifeline 740-182-LCBJ (3330)  · National Hope Line Network 800-SUICIDE (683-9905)

## 2019-05-24 NOTE — PROGRESS NOTES
Patient d/c to [facility] with [facility] via ambulance. Gathered all patient belongs. Discharge instructions given to patient. IV was not removed. Called [place] [facility] updating them on discharge time. All questions answered.

## 2019-05-27 NOTE — DOCUMENTATION QUERY
[facility]                                                                       Query Response Note      PATIENT:               COLT ANN  ACCT #:                  [id number]  MRN:                     [id number]  :                      1980  ADMIT DATE:       2019 4:37 PM  DISCH DATE:        2019 6:59 PM  RESPONDING  PROVIDER #:        [id number]           QUERY TEXT:    There is conflicting documentation in the medical record.    Sepsis ruled out  is documented from  to  progress notes.  Sepsis POA is documented in the DC summary.    Based on treatment, clinical findings and risk factors, can this documentation be further clarified?  [Unable to determine]]    The patient's Clinical Indicators include:  Clinical indicator-  PN- procalcitonin less than 0.05, clinically no meningitis signs, fever and leukocytosis  quickly resolved;  per DC summary- negative CT, CXR, UA, lactic acid, initial blood cultures.  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

## 2019-05-28 ENCOUNTER — HOSPITAL ENCOUNTER (OUTPATIENT)
Facility: MEDICAL CENTER | Age: 39
DRG: 644 | End: 2019-05-28
Payer: MEDICAID

## 2019-05-28 NOTE — PROGRESS NOTES
Facesheet, PCS form, Approved Services form faxed to [facility]. Dr. Prado has accepted pt for transfer. Bed pending.

## 2019-05-29 NOTE — PROGRESS NOTES
This document serves as a transfer acceptance note.  The patient has not physically been seen by a physician at this facility yet.    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  Patient went to the operating room on 5/24.  Now stable for transfer back to [facility].  The patient has been having hyponatremia, is getting every 6 hours sodium checks with oral DDAVP, is getting Cortef, currently at 30 mg in a.m. and 20 mg in the to be reduced tomorrow to 20 mg at 8 AM and 10 mg at 3 PM.  Patient had some erratic behavior and psychiatry recommended Haldol as needed.  She had a EVD this was removed, staples in place.  Not sure if we need to endocrine here to direct further replacement.    Accepting RN to call hospitalists on arrival for full orders.  Accepting RN to notify involved subspecialty on arrival.

## 2019-05-29 NOTE — PROGRESS NOTES
Copied/pasted from Transfer Center Call dated 5/28/19. CSN: [id number]. Pt did not admit yesterday as planned.       Sarthak Prado M.D. Physician Signed Hospital Medicine  Progress Notes Date of Service: 5/28/2019  6:02 PM         []Hide copied text  []Hover for attribution information  This document serves as a transfer acceptance note.  The patient has not physically been seen by a physician at this facility yet.     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  Patient went to the operating room on 5/24.  Now stable for transfer back to [facility].  The patient has been having hyponatremia, is getting every 6 hours sodium checks with oral DDAVP, is getting Cortef, currently at 30 mg in a.m. and 20 mg in the to be reduced tomorrow to 20 mg at 8 AM and 10 mg at 3 PM.  Patient had some erratic behavior and psychiatry recommended Haldol as needed.  She had a EVD this was removed, staples in place.  Not sure if we need to endocrine here to direct further replacement.     Accepting RN to call hospitalists on arrival for full orders.  Accepting RN to notify involved subspecialty on arrival.

## 2019-05-30 ENCOUNTER — HOSPITAL ENCOUNTER (INPATIENT)
Facility: MEDICAL CENTER | Age: 39
LOS: 4 days | DRG: 644 | End: 2019-06-03
Attending: HOSPITALIST | Admitting: FAMILY MEDICINE
Payer: MEDICAID

## 2019-05-30 ENCOUNTER — APPOINTMENT (OUTPATIENT)
Dept: RADIOLOGY | Facility: MEDICAL CENTER | Age: 39
DRG: 644 | End: 2019-05-30
Attending: INTERNAL MEDICINE
Payer: MEDICAID

## 2019-05-30 DIAGNOSIS — E23.2 DIABETES INSIPIDUS (HCC): ICD-10-CM

## 2019-05-30 PROBLEM — E27.40 ADRENAL INSUFFICIENCY (HCC): Status: ACTIVE | Noted: 2019-05-30

## 2019-05-30 PROBLEM — E03.9 ACQUIRED HYPOTHYROIDISM: Status: ACTIVE | Noted: 2019-05-30

## 2019-05-30 PROBLEM — D72.829 LEUKOCYTOSIS: Status: ACTIVE | Noted: 2019-05-30

## 2019-05-30 PROBLEM — R79.89 LOW TSH LEVEL: Status: ACTIVE | Noted: 2019-05-30

## 2019-05-30 PROBLEM — E87.6 HYPOKALEMIA: Status: ACTIVE | Noted: 2019-05-30

## 2019-05-30 PROBLEM — E87.1 HYPONATREMIA: Status: ACTIVE | Noted: 2019-05-30

## 2019-05-30 PROBLEM — E03.8 OTHER SPECIFIED HYPOTHYROIDISM: Status: ACTIVE | Noted: 2019-05-30

## 2019-05-30 PROBLEM — D44.4 CRANIOPHARYNGIOMA (HCC): Status: ACTIVE | Noted: 2019-05-30

## 2019-05-30 LAB
ANION GAP SERPL CALC-SCNC: 11 MMOL/L (ref 0–11.9)
ANION GAP SERPL CALC-SCNC: 9 MMOL/L (ref 0–11.9)
BASOPHILS # BLD AUTO: 0.9 % (ref 0–1.8)
BASOPHILS # BLD: 0.11 K/UL (ref 0–0.12)
BUN SERPL-MCNC: 12 MG/DL (ref 8–22)
BUN SERPL-MCNC: 13 MG/DL (ref 8–22)
CA-I SERPL-SCNC: 1.2 MMOL/L (ref 1.1–1.3)
CALCIUM SERPL-MCNC: 8.9 MG/DL (ref 8.5–10.5)
CALCIUM SERPL-MCNC: 9 MG/DL (ref 8.5–10.5)
CHLORIDE SERPL-SCNC: 104 MMOL/L (ref 96–112)
CHLORIDE SERPL-SCNC: 99 MMOL/L (ref 96–112)
CO2 SERPL-SCNC: 25 MMOL/L (ref 20–33)
CO2 SERPL-SCNC: 25 MMOL/L (ref 20–33)
CREAT SERPL-MCNC: 0.69 MG/DL (ref 0.5–1.4)
CREAT SERPL-MCNC: 0.76 MG/DL (ref 0.5–1.4)
EOSINOPHIL # BLD AUTO: 0 K/UL (ref 0–0.51)
EOSINOPHIL NFR BLD: 0 % (ref 0–6.9)
ERYTHROCYTE [DISTWIDTH] IN BLOOD BY AUTOMATED COUNT: 42.6 FL (ref 35.9–50)
GLUCOSE BLD-MCNC: 140 MG/DL (ref 65–99)
GLUCOSE BLD-MCNC: 98 MG/DL (ref 65–99)
GLUCOSE SERPL-MCNC: 109 MG/DL (ref 65–99)
GLUCOSE SERPL-MCNC: 168 MG/DL (ref 65–99)
HCT VFR BLD AUTO: 35.3 % (ref 37–47)
HGB BLD-MCNC: 11.6 G/DL (ref 12–16)
INR PPP: 1.09 (ref 0.87–1.13)
LYMPHOCYTES # BLD AUTO: 3.26 K/UL (ref 1–4.8)
LYMPHOCYTES NFR BLD: 25.9 % (ref 22–41)
MAGNESIUM SERPL-MCNC: 2.5 MG/DL (ref 1.5–2.5)
MANUAL DIFF BLD: NORMAL
MCH RBC QN AUTO: 31.6 PG (ref 27–33)
MCHC RBC AUTO-ENTMCNC: 32.9 G/DL (ref 33.6–35)
MCV RBC AUTO: 96.2 FL (ref 81.4–97.8)
METAMYELOCYTES NFR BLD MANUAL: 1.9 %
MONOCYTES # BLD AUTO: 0.35 K/UL (ref 0–0.85)
MONOCYTES NFR BLD AUTO: 2.8 % (ref 0–13.4)
MORPHOLOGY BLD-IMP: NORMAL
NEUTROPHILS # BLD AUTO: 8.63 K/UL (ref 2–7.15)
NEUTROPHILS NFR BLD: 66.7 % (ref 44–72)
NEUTS BAND NFR BLD MANUAL: 1.8 % (ref 0–10)
NRBC # BLD AUTO: 0.02 K/UL
NRBC BLD-RTO: 0.2 /100 WBC
PHOSPHATE SERPL-MCNC: 4.7 MG/DL (ref 2.5–4.5)
PLATELET # BLD AUTO: 418 K/UL (ref 164–446)
PLATELET BLD QL SMEAR: NORMAL
PMV BLD AUTO: 9.1 FL (ref 9–12.9)
POTASSIUM SERPL-SCNC: 3.3 MMOL/L (ref 3.6–5.5)
POTASSIUM SERPL-SCNC: 4.3 MMOL/L (ref 3.6–5.5)
PROTHROMBIN TIME: 14.2 SEC (ref 12–14.6)
RBC # BLD AUTO: 3.67 M/UL (ref 4.2–5.4)
RBC BLD AUTO: NORMAL
SODIUM SERPL-SCNC: 135 MMOL/L (ref 135–145)
SODIUM SERPL-SCNC: 138 MMOL/L (ref 135–145)
T4 FREE SERPL-MCNC: 0.89 NG/DL (ref 0.53–1.43)
TSH SERPL DL<=0.005 MIU/L-ACNC: 0.02 UIU/ML (ref 0.38–5.33)
WBC # BLD AUTO: 12.6 K/UL (ref 4.8–10.8)

## 2019-05-30 PROCEDURE — 76937 US GUIDE VASCULAR ACCESS: CPT

## 2019-05-30 PROCEDURE — 05HC33Z INSERTION OF INFUSION DEVICE INTO LEFT BASILIC VEIN, PERCUTANEOUS APPROACH: ICD-10-PCS | Performed by: INTERNAL MEDICINE

## 2019-05-30 PROCEDURE — 80048 BASIC METABOLIC PNL TOTAL CA: CPT

## 2019-05-30 PROCEDURE — 82330 ASSAY OF CALCIUM: CPT

## 2019-05-30 PROCEDURE — 700102 HCHG RX REV CODE 250 W/ 637 OVERRIDE(OP): Performed by: INTERNAL MEDICINE

## 2019-05-30 PROCEDURE — A9270 NON-COVERED ITEM OR SERVICE: HCPCS | Performed by: INTERNAL MEDICINE

## 2019-05-30 PROCEDURE — 770022 HCHG ROOM/CARE - ICU (200)

## 2019-05-30 PROCEDURE — 85027 COMPLETE CBC AUTOMATED: CPT

## 2019-05-30 PROCEDURE — A9270 NON-COVERED ITEM OR SERVICE: HCPCS | Performed by: HOSPITALIST

## 2019-05-30 PROCEDURE — 99223 1ST HOSP IP/OBS HIGH 75: CPT | Performed by: FAMILY MEDICINE

## 2019-05-30 PROCEDURE — 84100 ASSAY OF PHOSPHORUS: CPT

## 2019-05-30 PROCEDURE — 82962 GLUCOSE BLOOD TEST: CPT | Mod: 91

## 2019-05-30 PROCEDURE — 85610 PROTHROMBIN TIME: CPT

## 2019-05-30 PROCEDURE — 84439 ASSAY OF FREE THYROXINE: CPT

## 2019-05-30 PROCEDURE — 99291 CRITICAL CARE FIRST HOUR: CPT | Performed by: INTERNAL MEDICINE

## 2019-05-30 PROCEDURE — 700102 HCHG RX REV CODE 250 W/ 637 OVERRIDE(OP): Performed by: FAMILY MEDICINE

## 2019-05-30 PROCEDURE — A9270 NON-COVERED ITEM OR SERVICE: HCPCS | Performed by: FAMILY MEDICINE

## 2019-05-30 PROCEDURE — 83735 ASSAY OF MAGNESIUM: CPT

## 2019-05-30 PROCEDURE — 700102 HCHG RX REV CODE 250 W/ 637 OVERRIDE(OP): Performed by: HOSPITALIST

## 2019-05-30 PROCEDURE — 84443 ASSAY THYROID STIM HORMONE: CPT

## 2019-05-30 PROCEDURE — 97162 PT EVAL MOD COMPLEX 30 MIN: CPT

## 2019-05-30 PROCEDURE — 85007 BL SMEAR W/DIFF WBC COUNT: CPT

## 2019-05-30 PROCEDURE — 97166 OT EVAL MOD COMPLEX 45 MIN: CPT

## 2019-05-30 RX ORDER — LANOLIN ALCOHOL/MO/W.PET/CERES
9 CREAM (GRAM) TOPICAL
COMMUNITY
Start: 2019-05-29 | End: 2020-05-28

## 2019-05-30 RX ORDER — PROMETHAZINE HYDROCHLORIDE 25 MG/1
12.5-25 TABLET ORAL EVERY 4 HOURS PRN
Status: DISCONTINUED | OUTPATIENT
Start: 2019-05-30 | End: 2019-06-03 | Stop reason: HOSPADM

## 2019-05-30 RX ORDER — HYDROCODONE BITARTRATE AND ACETAMINOPHEN 5; 325 MG/1; MG/1
1-2 TABLET ORAL EVERY 4 HOURS PRN
Status: ON HOLD | COMMUNITY
Start: 2019-05-29 | End: 2019-06-03

## 2019-05-30 RX ORDER — POLYETHYLENE GLYCOL 3350 17 G/17G
1 POWDER, FOR SOLUTION ORAL
Status: DISCONTINUED | OUTPATIENT
Start: 2019-05-30 | End: 2019-06-03 | Stop reason: HOSPADM

## 2019-05-30 RX ORDER — POTASSIUM CHLORIDE 20 MEQ/1
20 TABLET, EXTENDED RELEASE ORAL DAILY
Status: DISCONTINUED | OUTPATIENT
Start: 2019-05-30 | End: 2019-06-03 | Stop reason: HOSPADM

## 2019-05-30 RX ORDER — FAMOTIDINE 20 MG/1
20 TABLET, FILM COATED ORAL 2 TIMES DAILY
COMMUNITY
Start: 2019-05-29 | End: 2020-05-28

## 2019-05-30 RX ORDER — DOCUSATE SODIUM 100 MG/1
100 CAPSULE, LIQUID FILLED ORAL 2 TIMES DAILY
COMMUNITY
Start: 2019-05-29 | End: 2020-05-28

## 2019-05-30 RX ORDER — BISACODYL 10 MG
10 SUPPOSITORY, RECTAL RECTAL
Status: DISCONTINUED | OUTPATIENT
Start: 2019-05-30 | End: 2019-06-03 | Stop reason: HOSPADM

## 2019-05-30 RX ORDER — OXYCODONE HYDROCHLORIDE 5 MG/1
5 TABLET ORAL
Status: DISCONTINUED | OUTPATIENT
Start: 2019-05-30 | End: 2019-06-03 | Stop reason: HOSPADM

## 2019-05-30 RX ORDER — MORPHINE SULFATE 4 MG/ML
2 INJECTION, SOLUTION INTRAMUSCULAR; INTRAVENOUS
Status: DISCONTINUED | OUTPATIENT
Start: 2019-05-30 | End: 2019-06-03 | Stop reason: HOSPADM

## 2019-05-30 RX ORDER — ACETAMINOPHEN 325 MG/1
650 TABLET ORAL EVERY 6 HOURS PRN
Status: DISCONTINUED | OUTPATIENT
Start: 2019-05-30 | End: 2019-06-03 | Stop reason: HOSPADM

## 2019-05-30 RX ORDER — ONDANSETRON 4 MG/1
4 TABLET, ORALLY DISINTEGRATING ORAL EVERY 4 HOURS PRN
Status: DISCONTINUED | OUTPATIENT
Start: 2019-05-30 | End: 2019-06-03 | Stop reason: HOSPADM

## 2019-05-30 RX ORDER — LEVOTHYROXINE SODIUM 0.03 MG/1
25 TABLET ORAL
Status: DISCONTINUED | OUTPATIENT
Start: 2019-05-30 | End: 2019-05-31

## 2019-05-30 RX ORDER — OXYCODONE HYDROCHLORIDE 5 MG/1
2.5 TABLET ORAL
Status: DISCONTINUED | OUTPATIENT
Start: 2019-05-30 | End: 2019-06-03 | Stop reason: HOSPADM

## 2019-05-30 RX ORDER — AMOXICILLIN 250 MG
2 CAPSULE ORAL 2 TIMES DAILY
Status: DISCONTINUED | OUTPATIENT
Start: 2019-05-30 | End: 2019-06-03 | Stop reason: HOSPADM

## 2019-05-30 RX ORDER — DESMOPRESSIN ACETATE 0.1 MG/1
200 TABLET ORAL EVERY 12 HOURS
Status: DISCONTINUED | OUTPATIENT
Start: 2019-05-30 | End: 2019-05-30

## 2019-05-30 RX ORDER — ACETAMINOPHEN 325 MG/1
650 TABLET ORAL EVERY 6 HOURS PRN
COMMUNITY
Start: 2019-05-29 | End: 2020-10-09

## 2019-05-30 RX ORDER — HYDROCORTISONE 10 MG/1
10 TABLET ORAL DAILY
Status: ON HOLD | COMMUNITY
Start: 2019-05-29 | End: 2019-06-03

## 2019-05-30 RX ORDER — HYDROCORTISONE 10 MG/1
20 TABLET ORAL DAILY
Status: DISCONTINUED | OUTPATIENT
Start: 2019-05-30 | End: 2019-06-03 | Stop reason: HOSPADM

## 2019-05-30 RX ORDER — ONDANSETRON 2 MG/ML
4 INJECTION INTRAMUSCULAR; INTRAVENOUS EVERY 4 HOURS PRN
Status: DISCONTINUED | OUTPATIENT
Start: 2019-05-30 | End: 2019-06-03 | Stop reason: HOSPADM

## 2019-05-30 RX ORDER — HYDROCORTISONE 20 MG/1
20 TABLET ORAL EVERY MORNING
Status: ON HOLD | COMMUNITY
Start: 2019-05-29 | End: 2019-06-03

## 2019-05-30 RX ORDER — ACETAMINOPHEN 325 MG/1
650 TABLET ORAL EVERY 6 HOURS PRN
Status: DISCONTINUED | OUTPATIENT
Start: 2019-05-30 | End: 2019-05-30

## 2019-05-30 RX ORDER — DESMOPRESSIN ACETATE 0.1 MG/1
200 TABLET ORAL EVERY 12 HOURS
Status: DISCONTINUED | OUTPATIENT
Start: 2019-05-30 | End: 2019-05-31

## 2019-05-30 RX ORDER — PROMETHAZINE HYDROCHLORIDE 25 MG/1
12.5-25 SUPPOSITORY RECTAL EVERY 4 HOURS PRN
Status: DISCONTINUED | OUTPATIENT
Start: 2019-05-30 | End: 2019-06-03 | Stop reason: HOSPADM

## 2019-05-30 RX ORDER — HYDROCORTISONE 10 MG/1
10 TABLET ORAL DAILY
Status: DISCONTINUED | OUTPATIENT
Start: 2019-05-30 | End: 2019-06-03 | Stop reason: HOSPADM

## 2019-05-30 RX ORDER — FAMOTIDINE 20 MG/1
20 TABLET, FILM COATED ORAL 2 TIMES DAILY
Status: DISCONTINUED | OUTPATIENT
Start: 2019-05-30 | End: 2019-06-03 | Stop reason: HOSPADM

## 2019-05-30 RX ADMIN — HYDROCORTISONE 20 MG: 10 TABLET ORAL at 09:42

## 2019-05-30 RX ADMIN — HYDROCORTISONE 10 MG: 10 TABLET ORAL at 15:29

## 2019-05-30 RX ADMIN — OXYCODONE HYDROCHLORIDE 5 MG: 5 TABLET ORAL at 16:26

## 2019-05-30 RX ADMIN — ACETAMINOPHEN 650 MG: 325 TABLET, FILM COATED ORAL at 07:35

## 2019-05-30 RX ADMIN — DESMOPRESSIN ACETATE 200 MCG: 0.1 TABLET ORAL at 09:41

## 2019-05-30 RX ADMIN — LEVOTHYROXINE SODIUM 25 MCG: 25 TABLET ORAL at 17:14

## 2019-05-30 RX ADMIN — ACETAMINOPHEN 650 MG: 325 TABLET, FILM COATED ORAL at 19:44

## 2019-05-30 RX ADMIN — POTASSIUM CHLORIDE 20 MEQ: 1500 TABLET, EXTENDED RELEASE ORAL at 15:29

## 2019-05-30 RX ADMIN — DESMOPRESSIN ACETATE 200 MCG: 0.1 TABLET ORAL at 21:02

## 2019-05-30 RX ADMIN — FAMOTIDINE 20 MG: 20 TABLET ORAL at 17:16

## 2019-05-30 RX ADMIN — SENNOSIDES,DOCUSATE SODIUM 2 TABLET: 8.6; 5 TABLET, FILM COATED ORAL at 17:15

## 2019-05-30 ASSESSMENT — ENCOUNTER SYMPTOMS
DIARRHEA: 0
BLURRED VISION: 1
CHILLS: 0
ORTHOPNEA: 0
SENSORY CHANGE: 0
NAUSEA: 0
SORE THROAT: 0
SPEECH CHANGE: 0
FOCAL WEAKNESS: 0
BACK PAIN: 0
EYE PAIN: 0
SEIZURES: 0
NECK PAIN: 0
NERVOUS/ANXIOUS: 0
HEADACHES: 1
DIZZINESS: 0
WEAKNESS: 1
CONSTIPATION: 0
BLURRED VISION: 0
HEARTBURN: 0
ABDOMINAL PAIN: 0
DOUBLE VISION: 0
PALPITATIONS: 0
EYE DISCHARGE: 0
WHEEZING: 0
COUGH: 0
WEAKNESS: 0
VOMITING: 0
PHOTOPHOBIA: 0
SPUTUM PRODUCTION: 0
MYALGIAS: 0
FEVER: 0
EYE REDNESS: 0
HEADACHES: 0
SHORTNESS OF BREATH: 0

## 2019-05-30 ASSESSMENT — COGNITIVE AND FUNCTIONAL STATUS - GENERAL
DAILY ACTIVITIY SCORE: 18
SUGGESTED CMS G CODE MODIFIER MOBILITY: CH
TOILETING: A LITTLE
EATING MEALS: A LITTLE
CLIMB 3 TO 5 STEPS WITH RAILING: A LITTLE
DRESSING REGULAR UPPER BODY CLOTHING: A LITTLE
SUGGESTED CMS G CODE MODIFIER DAILY ACTIVITY: CJ
MOBILITY SCORE: 24
TOILETING: A LITTLE
SUGGESTED CMS G CODE MODIFIER MOBILITY: CJ
DAILY ACTIVITIY SCORE: 22
PERSONAL GROOMING: A LITTLE
SUGGESTED CMS G CODE MODIFIER DAILY ACTIVITY: CK
HELP NEEDED FOR BATHING: A LITTLE
WALKING IN HOSPITAL ROOM: A LITTLE
HELP NEEDED FOR BATHING: A LITTLE
MOBILITY SCORE: 22
DRESSING REGULAR LOWER BODY CLOTHING: A LITTLE

## 2019-05-30 ASSESSMENT — GAIT ASSESSMENTS
GAIT LEVEL OF ASSIST: SUPERVISED
DISTANCE (FEET): 150

## 2019-05-30 ASSESSMENT — ACTIVITIES OF DAILY LIVING (ADL): TOILETING: INDEPENDENT

## 2019-05-30 ASSESSMENT — PATIENT HEALTH QUESTIONNAIRE - PHQ9
4. FEELING TIRED OR HAVING LITTLE ENERGY: SEVERAL DAYS
2. FEELING DOWN, DEPRESSED, IRRITABLE, OR HOPELESS: SEVERAL DAYS
8. MOVING OR SPEAKING SO SLOWLY THAT OTHER PEOPLE COULD HAVE NOTICED. OR THE OPPOSITE, BEING SO FIGETY OR RESTLESS THAT YOU HAVE BEEN MOVING AROUND A LOT MORE THAN USUAL: NOT AT ALL
3. TROUBLE FALLING OR STAYING ASLEEP OR SLEEPING TOO MUCH: NOT AT ALL
SUM OF ALL RESPONSES TO PHQ QUESTIONS 1-9: 5
SUM OF ALL RESPONSES TO PHQ9 QUESTIONS 1 AND 2: 1
9. THOUGHTS THAT YOU WOULD BE BETTER OFF DEAD, OR OF HURTING YOURSELF: NOT AT ALL
1. LITTLE INTEREST OR PLEASURE IN DOING THINGS: NOT AT ALL
5. POOR APPETITE OR OVEREATING: NOT AT ALL
7. TROUBLE CONCENTRATING ON THINGS, SUCH AS READING THE NEWSPAPER OR WATCHING TELEVISION: NOT AT ALL
6. FEELING BAD ABOUT YOURSELF - OR THAT YOU ARE A FAILURE OR HAVE LET YOURSELF OR YOUR FAMILY DOWN: NEARLY EVERY DAY

## 2019-05-30 ASSESSMENT — COPD QUESTIONNAIRES
COPD SCREENING SCORE: 0
DURING THE PAST 4 WEEKS HOW MUCH DID YOU FEEL SHORT OF BREATH: NONE/LITTLE OF THE TIME
DO YOU EVER COUGH UP ANY MUCUS OR PHLEGM?: NO/ONLY WITH OCCASIONAL COLDS OR INFECTIONS
HAVE YOU SMOKED AT LEAST 100 CIGARETTES IN YOUR ENTIRE LIFE: NO/DON'T KNOW

## 2019-05-30 ASSESSMENT — LIFESTYLE VARIABLES
EVER_SMOKED: NEVER

## 2019-05-30 NOTE — PROGRESS NOTES
0620 pt to s114 placed on ICU monitor. Report received from [employer]. Pt [place]an speaking only used language to communicate pt AOx 4 per .

## 2019-05-30 NOTE — PROGRESS NOTES
Dr Jensen at bedside to assess patient, discussed patients complaint of blurred vision from the right eye intermittently. Patient stated she had it before surgery and then started again yesterday afternoon/night

## 2019-05-30 NOTE — ASSESSMENT & PLAN NOTE
Continue solucortef at 20 day, 10 afternoon per endo at [place]  Needs endo here, will try to set her up on Monday

## 2019-05-30 NOTE — PROGRESS NOTES
Dr. Jensen at bedside using  to assess patient, orders for ACU check with meals with the low dose sliding scale, will put in orders.

## 2019-05-30 NOTE — PROGRESS NOTES
2 RN skin check competed.  stapled superior head incision noted   Right lateral leg incision noted closed with dermabond and steri strips.   Bilateral heels cracked and calloused

## 2019-05-30 NOTE — CONSULTS
Critical Care Consultation    Date of consult: 5/30/2019    Referring Physician  Sarthak Prado M.D.    Reason for Consultation  Back from [place] after craniopharyngoma resection    History of Presenting Illness  38 y.o. female who presented 5/30/2019 with hx of suprasellar mass who was intiially admitted at [facility] on 5/14, transferred to [facility] for transsphenoidal resection of craniopharyngioma. Post op, pt developed hydrocephalus and s/p EVD. EVD was removed. Was diagnosed as panhypopituitarism, diabetes insipidus. Pt was placed on DDAVP 0.2mg BID with close monitoring for sodium Q6H, hydrocortisone 20/10. Pt was transferred back to [facility] today.     En route, reportedly pt was hypotensive and required 2L NS fluid boluses. At ICU arrival, pt's BP has improved and in 110s. HR in 90s, afebrile. DDAVP, hydrocortisone was resumed. Pt alert, oriented, Thai speaking only. C/o blurry vision bilaterally that has been occurring since 10/2018    Code Status  Prior    Review of Systems  Review of Systems   Constitutional: Negative for chills, fever and malaise/fatigue.   HENT: Negative for ear discharge, ear pain and nosebleeds.    Eyes: Positive for blurred vision. Negative for double vision, photophobia, pain, discharge and redness.   Respiratory: Negative for cough, sputum production and shortness of breath.    Cardiovascular: Negative for chest pain, palpitations and orthopnea.   Gastrointestinal: Negative for abdominal pain, constipation, diarrhea, nausea and vomiting.   Genitourinary: Negative for dysuria, frequency and urgency.   Skin: Negative for rash.   Neurological: Negative for seizures, weakness and headaches.   Psychiatric/Behavioral: The patient is not nervous/anxious.    All other systems reviewed and are negative.      Past Medical History   has no past medical history on file.    Surgical History   has no past surgical history on file.    Family History  family history is not on  file.    Social History   reports that she has never smoked. She has never used smokeless tobacco. She reports that she drinks alcohol. She reports that she does not use drugs.    Medications  Home Medications    **Home medications have not yet been reviewed for this encounter**       No current facility-administered medications for this encounter.        Allergies  No Known Allergies    Vital Signs last 24 hours       Physical Exam  Physical Exam   Constitutional: She is oriented to person, place, and time. She appears well-nourished. No distress.   HENT:   Head: Normocephalic and atraumatic.   Mouth/Throat: Oropharynx is clear and moist. No oropharyngeal exudate.   Eyes: Conjunctivae are normal. No scleral icterus.   Neck: Normal range of motion. Neck supple. No tracheal deviation present.   Cardiovascular: Normal rate, regular rhythm and normal heart sounds.  Exam reveals no friction rub.    No murmur heard.  Pulmonary/Chest: Effort normal and breath sounds normal. No respiratory distress. She has no wheezes. She has no rales.   Diminished at bases   Abdominal: Soft. Bowel sounds are normal. She exhibits no distension. There is no tenderness. There is no rebound and no guarding.   Musculoskeletal: She exhibits no edema or tenderness.   Neurological: She is alert and oriented to person, place, and time. No cranial nerve deficit.   Skin: Skin is warm. No rash noted. She is not diaphoretic. No erythema.   Psychiatric: She has a normal mood and affect. Her behavior is normal.   Nursing note and vitals reviewed.      Fluids  No intake or output data in the 24 hours ending 05/30/19 0622    Laboratory  No results found for this or any previous visit (from the past 48 hour(s)).    Imaging  IR-MIDLINE CATHETER INSERTION WO GUIDANCE > AGE 3   Final Result                  Ultrasound-guided midline placement performed by qualified nursing staff    as above.              Assessment/Plan  * Diabetes insipidus (HCC)    Assessment & Plan    Continue DDAVP 0.2mg BID  Sodium Q6H in next 24 hours to keep normal sodium 135-145.  If sodium is within range, can decrease interval to Q12H x24 hours, then Q24H  Monitor UOP. Goal UOP at least 40cc/hr.       Acquired hypothyroidism   Assessment & Plan    TSH is low at 0.02. Check free T4  Start synthroid 0.25mcg daily.      Adrenal insufficiency (HCC)- (present on admission)   Assessment & Plan    On hydrocortisone 20/10 at [place]. 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   Now with hypopituitarism  Advance diet as tolerated  Mobility level 4       I have assessed his respiratory status, blood pressure, hemodynamics and cardiovascular status.  Given the hypopituitarism from cranipharyngioma, we're closely monitoring sodium due to diabetes inspidus, actively titrating DDAVP dose if sodium is high. She's at risk of worsening endocrine function if DDAVP is subtherapeutic. BP has been maintained with hydrocortisone. Pt did have hypotension en route back to [facility]. He is at increased risk for worsening respiratory and cardiovascular system dysfunction.  High risk of deterioration and worsening vital organ dysfunction and death without the above critical care interventions.     Discussed patient condition and risk of morbidity and/or mortality with Hospitalist, RN, RT, Pharmacy, Charge nurse / hot rounds and Patient.    The patient remains critically ill.  Critical care time = 45 minutes in directly providing and coordinating critical care and extensive data review.  No time overlap and excludes procedures.

## 2019-05-30 NOTE — H&P
Hospital Medicine History & Physical Note    Date of Service  5/30/2019    Primary Care Physician  Pcp Pt States None    Consultants  Critical care    Code Status  Full    Chief Complaint  Suprasellar/sellar mass    History of Presenting Illness  38 y.o. female who presented 5/30/2019 with history of suprasellar/sellar mass.  She is a transfer from [place].  Patient was initially admitted here on 5/14/2019 for fever and myalgias.  Since October she has been having symptoms of headache, nausea, myalgia, and amenorrhea.  She also developed galactorrhea later on.  On her initial admission here which was for fever myalgia, infection work-up was noted to be negative.  CT scan of the head and MRI showed a suprasellar/sellar mass.  Neurosurgery was consulted but was felt that she needed transfer to [facility].  [facility] underwent transsphenoidal resection of the craniopharyngioma, however she developed hydrocephalus and EVD was placed and according to reports it has been removed.  She also developed hypotension, hyponatremia, and adrenal insufficiency postoperatively.  She has been placed on Cortef, DDAVP.  She remains on Cortef although reduced dose of 20 mg in the morning and 10 mg at night.  They have recommended to continue DDAVP, continue checking serial BMPs.  She was transferred back to [facility] for further care.  Care is also been consulted on the case.    Review of Systems  Review of Systems   Constitutional: Positive for malaise/fatigue. Negative for chills and fever.   HENT: Negative for hearing loss and sore throat.    Eyes: Negative for blurred vision.   Respiratory: Negative for cough, shortness of breath and wheezing.    Cardiovascular: Negative for chest pain, palpitations and leg swelling.   Gastrointestinal: Negative for abdominal pain, diarrhea, heartburn, nausea and vomiting.   Genitourinary: Negative for dysuria.   Musculoskeletal: Negative for back pain, joint pain, myalgias and neck pain.   Skin:  Negative for rash.   Neurological: Positive for weakness and headaches. Negative for dizziness, sensory change, speech change and focal weakness.   Psychiatric/Behavioral: The patient is not nervous/anxious.        Past Medical History   has a past medical history of Craniopharyngioma (HCC).    Surgical History   has a past surgical history that includes transsphenoidal resection.     Family History  Family history is unknown by patient.     Social History   reports that she has never smoked. She has never used smokeless tobacco. She reports that she drinks alcohol. She reports that she does not use drugs.    Allergies  No Known Allergies    Medications  Prior to Admission Medications   Prescriptions Last Dose Informant Patient Reported? Taking?   HYDROcodone-acetaminophen (NORCO) 5-325 MG Tab per tablet   Yes Yes   Sig: Take 1-2 tablet by mouth every four hours as needed.   acetaminophen (TYLENOL) 325 MG Tab   Yes Yes   Sig: Take 650 mg by mouth every 6 hours as needed.   docusate sodium (COLACE) 100 MG Cap   Yes Yes   Sig: Take 100 mg by mouth 2 Times a Day.   famotidine (PEPCID) 20 MG Tab   Yes Yes   Sig: Take 20 mg by mouth 2 Times a Day.   hydrocortisone (CORTEF) 10 MG Tab   Yes Yes   Sig: Take 10 mg by mouth every day. At 1500   hydrocortisone (CORTEF) 20 MG Tab   Yes Yes   Sig: Take 20 mg by mouth every morning. Take at 0800   ibuprofen (MOTRIN) 200 MG Tab   Yes No   Sig: Take 400 mg by mouth 1 time daily as needed for Headache.   insulin lispro (HUMALOG) 100 UNIT/ML Solution   Yes Yes   Si-8 Units by Subdermal route 3 times a day, with meals.   insulin lispro (HUMALOG) 100 UNIT/ML Solution   Yes Yes   Si-5 Units by Subdermal route every bedtime.   melatonin 3 MG Tab   Yes Yes   Sig: Take 9 mg by mouth every bedtime.      Facility-Administered Medications: None       Physical Exam  Temp:  [36.7 °C (98.1 °F)-37.1 °C (98.8 °F)] 37.1 °C (98.7 °F)  Pulse:  [51-70] 70  Resp:  [15-19] 17  BP: (113)/(60)  113/60  SpO2:  [92 %-100 %] 99 %    Physical Exam   Constitutional: She is oriented to person, place, and time. She appears well-developed and well-nourished.   HENT:   Head: Normocephalic.   Incision with staples at the right frontal area   Eyes: Pupils are equal, round, and reactive to light. Conjunctivae and EOM are normal.   Neck: No tracheal deviation present. No thyromegaly present.   Cardiovascular: Normal rate and regular rhythm.    Pulmonary/Chest: Effort normal and breath sounds normal.   Abdominal: Soft. Bowel sounds are normal. She exhibits no distension. There is no tenderness.   Musculoskeletal: She exhibits no edema.   Lymphadenopathy:     She has no cervical adenopathy.   Neurological: She is alert and oriented to person, place, and time. No cranial nerve deficit.   MMT 5/5   Skin: Skin is warm and dry.   Nursing note and vitals reviewed.      Laboratory:  Recent Labs      05/30/19   0951   WBC  12.6*   RBC  3.67*   HEMOGLOBIN  11.6*   HEMATOCRIT  35.3*   MCV  96.2   MCH  31.6   MCHC  32.9*   RDW  42.6   PLATELETCT  418   MPV  9.1     Recent Labs      05/30/19   1120   SODIUM  135   POTASSIUM  3.3*   CHLORIDE  99   CO2  25   GLUCOSE  168*   BUN  13   CREATININE  0.76   CALCIUM  9.0     Recent Labs      05/30/19   1120   GLUCOSE  168*     Recent Labs      05/30/19   0951   INR  1.09             No results for input(s): TROPONINI in the last 72 hours.    Urinalysis:    No results found     Imaging:  IR-MIDLINE CATHETER INSERTION WO GUIDANCE > AGE 3   Final Result                  Ultrasound-guided midline placement performed by qualified nursing staff    as above.                Assessment/Plan:  I anticipate this patient will require at least two midnights for appropriate medical management, necessitating inpatient admission.    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)   Assessment & Plan    s/p  transsphenoidal resection   Pain control  Bowel protocol  Encourage I-S  PT and OT evaluation     Hyponatremia- (present on admission)   Assessment & Plan    Serial bmp  DDAVP     Hypokalemia- (present on admission)   Assessment & Plan    Start Kdur     Low TSH level- (present on admission)   Assessment & Plan    Likely secondary to steroid  Check FT4 and FT3     Leukocytosis- (present on admission)   Assessment & Plan    Likely secondary to steroid  Follow cbc     Hyperglycemia- (present on admission)   Assessment & Plan    accuchecks  May need SSI coverage         VTE prophylaxis: SCD

## 2019-05-30 NOTE — PROGRESS NOTES
Dr. Yann Del Castillo at bedside to do patients admit, orders for new labs, orders placed and  at bedside to collect

## 2019-05-30 NOTE — THERAPY
"Physical Therapy Evaluation completed.   Bed Mobility:  Supine to Sit: Supervised  Transfers: Sit to Stand: Modified Independent  Gait: Level Of Assist: Supervised with No Equipment Needed       Plan of Care: Patient with no further skilled PT needs in the acute care setting at this time  Discharge Recommendations: Equipment: No Equipment Needed. Post-acute therapy PT evaluation completed.  Patient is currently not being actively followed for therapy services at this time, however may be seen if requested by attending provider for 1 more visit within 30 days to address any discharge or equipment needs    See \"Rehab Therapy-Acute\" Patient Summary Report for complete documentation.     "

## 2019-05-30 NOTE — ASSESSMENT & PLAN NOTE
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  The problems is that she had no knowledge of these appointments and has no way to get all the way back down to [facility].  We will need to get her established here before dc.    Note from [facility] Encocrinologist:  Impression:  37 yo F with no reported medical history who was transferred from [facility] to [facility] to undergo neurosurgical intervention for newly detected 3.2 x 2.6 x 2.6 cm suprasellar mass c/w craniopharyngioma. 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 Endocrine is consulted for post-TSS management.     She had radiographic evidence of both compression of the optic apparatus and mass effect on the pituitary itself.  Preoperative lab work notable for hyperprolactinemia to PRL 75.8 and suppressed gonadotropins. Hyperprolactinemia was most likely due to stalk effect, given expected stalk compression with a lesion of this size. She also had evidence of ADH deficit given intraoperative DI, which will now be furthered by stalk transection.     Recommendations:  We will follow closely with the primary team for assessment of hypopituitarism following resection.      # Adrenal Axis: She received 100mg IV HC and 10mg IV dexamethasone intraoperatively. HC taper in progress. Currently hemodynamically stable.  -Please decrease hydrocortisone to 20mg qAM and 10mg qPM starting tomorrow (5/29), with plan to maintain this dose.      # Sodium Balance: She received 2mcg IV DDAVP intraoperatively in the setting of diabetes insipidus. Following transection of pituitary stalk, would anticipate further DI and a likely permanent DDAVP requirement going forward. Since she started drinking to  thirst on 5/26, she has been able to maintain near-euntaremia in the setting of her DI. That being said, given her large, dilute UOP from her DI, standing DDAVP would likely help improve her quality of life going forward.  -Please increase standing PO DDAVP to 0.2mg BID, starting tonight. Hold dose if Na is <135.    The patient is at risk of diabetes insipidus as well as SIADH. Please monitor for central DI as follows:   - check serum Na and urine specific gravity simultaneously q6h  - please monitor q1h urine output - if UOP >250cc/h for >2 hours and urine is dilute with SPGR >1.004, and/or patient is hypernatremic, may consider giving DDAVP 0.1 mg po x 1 and monitor for response  - please feel free to call the endocrine fellow on call to discuss  - please do not give DDAVP if Na <135     # Thyroid Function:   - we will reassess FT4 as an outpatient      # Gonadal Axis: Suppression of her gonadal axis was likely responsible for her preoperative irregular menses.  - we will reassess as an outpatient     # Growth Hormone: Pre-op IGF-1 level drawn and pending.  - we will reassess IGF-1 as an outpatient    Summary of Transfer Recommendations:  -Patient should be maintained on HC 20mg qAM and 10mg qPM starting 5/29.  -She should be on standing PO DDAVP 0.2mg BID  -She should continue to drink to thirst.  -Would recommend continuing to check q6h serum Na and urine SpGr for now. Once she demonstrates stability, can space out lab checks.  -As she is scheduled for NSGY follow-up on 6/7, we will see if we can schedule her for Endocrine follow-up with Dr. Patel or Dr. Ledesma during the same visit. Ultimately, she should establish longterm endocrine follow-up closer to her home in [place]. She should ask her PCP for a local endocrinology referral.       Laura Brasher MD - 05/28/2019 11:18 AM PDT    -Will attempt to establish local follow up prior to discharge.

## 2019-05-30 NOTE — CARE PLAN
Problem: Communication  Goal: The ability to communicate needs accurately and effectively will improve    Intervention: Educate patient and significant other/support system about the plan of care, procedures, treatments, medications and allow for questions  Discussed plan of care with patient using  , all questions and concerns answered. Demonstratied understanding.       Problem: Pain Management  Goal: Pain level will decrease to patient's comfort goal    Intervention: Follow pain managment plan developed in collaboration with patient and Interdisciplinary Team  Appropriate pain measuring tool used for assessment. Addressing patients pain needs with appropriate interventions. Assessing pain interventions every two hours or/and PRN.

## 2019-05-30 NOTE — PROCEDURES
Vascular Access Team    Date of Insertion: 5/30/19  Arm Circumference: n/a  Line Length: 10  Line Size: 18  Vein Occupancy %: 27  Reason for Midline: access   Labs: WBC 12.7, , on 5/20/19 BUN 10, Cr 0.89, GFR >60, INR 1.09    Orders confirmed, vessel patency confirmed with ultrasound. Risks and benefits of procedure explained to patient and education regarding line associated bloodstream infections provided. Questions answered.     PowerGlide Midline placed in LUE per MD order with ultrasound guidance. 18g, 10 cm line placed in basilic vein after 1 attempt(s).  Catheter inserted with good blood return. Secured with 0cm external from insertion site.  Flushed without resistance with 10 mL 0.9% normal saline. Midline secured with Biopatch and Tegaderm.     Midline placement is confirmed by nurse using ultrasound and ability to flush and draw blood. Midline is appropriate for use at this time.  No X-ray is needed for placement confirmation. Pt tolerated procedure well.  Patient condition relayed to unit RN or ordering physician via this post procedure note in the EMR.    Ultrasound images uploaded to PACS and viewable in the EMR - yes  Ultrasound imaged printed and placed in paper chart - no      BARD PowerGlide Midline ref # [id number], Lot # [id number]

## 2019-05-30 NOTE — PROGRESS NOTES
Spoke with Maryse ([id number]) on the mobile  to complete admit profile. Patient requested that I call her Aunt and gave me the phone number. Patient repeated she is okay with us speaking to her doctors from [place].

## 2019-05-30 NOTE — PROGRESS NOTES
Patient presented in IDT rounds, MDs will review chart to place appriopate orders. MD notified about patient passing bedside eval and okay for regular diet. Okay to mobilize as tolerated and orders for q6 BMPs to trend sodium.

## 2019-05-30 NOTE — THERAPY
"Occupational Therapy Evaluation completed.   Functional Status:  Pt presents to skilled OT services following suprasellar mass now post transsphenoidal resection. Pt was able to perform basic self care tasks, functional mobility and t/f's with supervision, BUE strength, ROM and coordination intact. Pt demonstrates flat affect, c/o blurry vision bilaterally(able to maneuver around dynamic environment w/o concerns), and c/o fatigue. Pt will benefit from 1-2 more ADL session with emphasis on higher level ADLs and IADLs assessment as pt has limited support in the community.   Plan of Care: Will benefit from Occupational Therapy 4 times per week  Discharge Recommendations:  Equipment: Will Continue to Assess for Equipment Needs. Post-acute therapy Recommend home health or outpatient transitional care services for continued occupational therapy services      See \"Rehab Therapy-Acute\" Patient Summary Report for complete documentation.    "

## 2019-05-30 NOTE — PROGRESS NOTES
Spoke with Nettie Fisher, patients nurse praticioner at [facility] about patient care. NP is okay if MDs contact her for questions, [phone].   Patient said it is okay to share information with any of her care providers at [facility] if needed.

## 2019-05-30 NOTE — PROGRESS NOTES
9608: Used the mobile video  to communicate with patient, spoke with Elías ([id number]). All questions appriopately answered by patient.

## 2019-05-30 NOTE — PROGRESS NOTES
Updated Dr. Jensen about patients blurred vision in both eyes and lab values, no new orders at this time

## 2019-05-31 PROBLEM — K59.00 CONSTIPATION: Status: ACTIVE | Noted: 2019-05-31

## 2019-05-31 PROBLEM — H53.8 BLURRY VISION, BILATERAL: Status: ACTIVE | Noted: 2019-05-31

## 2019-05-31 LAB
ANION GAP SERPL CALC-SCNC: 7 MMOL/L (ref 0–11.9)
ANION GAP SERPL CALC-SCNC: 7 MMOL/L (ref 0–11.9)
BASOPHILS # BLD AUTO: 0.9 % (ref 0–1.8)
BASOPHILS # BLD: 0.13 K/UL (ref 0–0.12)
BUN SERPL-MCNC: 12 MG/DL (ref 8–22)
BUN SERPL-MCNC: 12 MG/DL (ref 8–22)
CALCIUM SERPL-MCNC: 8.6 MG/DL (ref 8.5–10.5)
CALCIUM SERPL-MCNC: 8.9 MG/DL (ref 8.5–10.5)
CHLORIDE SERPL-SCNC: 103 MMOL/L (ref 96–112)
CHLORIDE SERPL-SCNC: 104 MMOL/L (ref 96–112)
CO2 SERPL-SCNC: 25 MMOL/L (ref 20–33)
CO2 SERPL-SCNC: 27 MMOL/L (ref 20–33)
CREAT SERPL-MCNC: 0.75 MG/DL (ref 0.5–1.4)
CREAT SERPL-MCNC: 0.78 MG/DL (ref 0.5–1.4)
EKG IMPRESSION: NORMAL
EOSINOPHIL # BLD AUTO: 0.24 K/UL (ref 0–0.51)
EOSINOPHIL NFR BLD: 1.7 % (ref 0–6.9)
ERYTHROCYTE [DISTWIDTH] IN BLOOD BY AUTOMATED COUNT: 44.6 FL (ref 35.9–50)
GLUCOSE BLD-MCNC: 107 MG/DL (ref 65–99)
GLUCOSE BLD-MCNC: 84 MG/DL (ref 65–99)
GLUCOSE BLD-MCNC: 88 MG/DL (ref 65–99)
GLUCOSE BLD-MCNC: 99 MG/DL (ref 65–99)
GLUCOSE SERPL-MCNC: 88 MG/DL (ref 65–99)
GLUCOSE SERPL-MCNC: 99 MG/DL (ref 65–99)
HCT VFR BLD AUTO: 32.7 % (ref 37–47)
HGB BLD-MCNC: 10.6 G/DL (ref 12–16)
LYMPHOCYTES # BLD AUTO: 5.26 K/UL (ref 1–4.8)
LYMPHOCYTES NFR BLD: 36.8 % (ref 22–41)
MANUAL DIFF BLD: NORMAL
MCH RBC QN AUTO: 31.5 PG (ref 27–33)
MCHC RBC AUTO-ENTMCNC: 32.4 G/DL (ref 33.6–35)
MCV RBC AUTO: 97.3 FL (ref 81.4–97.8)
METAMYELOCYTES NFR BLD MANUAL: 0.9 %
MONOCYTES # BLD AUTO: 0.37 K/UL (ref 0–0.85)
MONOCYTES NFR BLD AUTO: 2.6 % (ref 0–13.4)
MORPHOLOGY BLD-IMP: NORMAL
MYELOCYTES NFR BLD MANUAL: 0.9 %
NEUTROPHILS # BLD AUTO: 8.04 K/UL (ref 2–7.15)
NEUTROPHILS NFR BLD: 54.4 % (ref 44–72)
NEUTS BAND NFR BLD MANUAL: 1.8 % (ref 0–10)
NRBC # BLD AUTO: 0 K/UL
NRBC BLD-RTO: 0 /100 WBC
OSMOLALITY UR: 123 MOSM/KG H2O (ref 300–900)
PLATELET # BLD AUTO: 397 K/UL (ref 164–446)
PLATELET BLD QL SMEAR: NORMAL
PMV BLD AUTO: 9.1 FL (ref 9–12.9)
POTASSIUM SERPL-SCNC: 3.7 MMOL/L (ref 3.6–5.5)
POTASSIUM SERPL-SCNC: 4.1 MMOL/L (ref 3.6–5.5)
RBC # BLD AUTO: 3.36 M/UL (ref 4.2–5.4)
RBC BLD AUTO: NORMAL
SODIUM SERPL-SCNC: 136 MMOL/L (ref 135–145)
SODIUM SERPL-SCNC: 137 MMOL/L (ref 135–145)
T3FREE SERPL-MCNC: 3.2 PG/ML (ref 2.4–4.2)
WBC # BLD AUTO: 14.3 K/UL (ref 4.8–10.8)

## 2019-05-31 PROCEDURE — 93005 ELECTROCARDIOGRAM TRACING: CPT | Performed by: INTERNAL MEDICINE

## 2019-05-31 PROCEDURE — 700102 HCHG RX REV CODE 250 W/ 637 OVERRIDE(OP): Performed by: INTERNAL MEDICINE

## 2019-05-31 PROCEDURE — A9270 NON-COVERED ITEM OR SERVICE: HCPCS | Performed by: HOSPITALIST

## 2019-05-31 PROCEDURE — A9270 NON-COVERED ITEM OR SERVICE: HCPCS | Performed by: INTERNAL MEDICINE

## 2019-05-31 PROCEDURE — 700102 HCHG RX REV CODE 250 W/ 637 OVERRIDE(OP): Performed by: HOSPITALIST

## 2019-05-31 PROCEDURE — 93010 ELECTROCARDIOGRAM REPORT: CPT | Performed by: INTERNAL MEDICINE

## 2019-05-31 PROCEDURE — 99233 SBSQ HOSP IP/OBS HIGH 50: CPT | Performed by: INTERNAL MEDICINE

## 2019-05-31 PROCEDURE — 85027 COMPLETE CBC AUTOMATED: CPT

## 2019-05-31 PROCEDURE — 82962 GLUCOSE BLOOD TEST: CPT

## 2019-05-31 PROCEDURE — 83935 ASSAY OF URINE OSMOLALITY: CPT

## 2019-05-31 PROCEDURE — 770022 HCHG ROOM/CARE - ICU (200)

## 2019-05-31 PROCEDURE — 84481 FREE ASSAY (FT-3): CPT

## 2019-05-31 PROCEDURE — 99233 SBSQ HOSP IP/OBS HIGH 50: CPT | Performed by: HOSPITALIST

## 2019-05-31 PROCEDURE — 80048 BASIC METABOLIC PNL TOTAL CA: CPT | Mod: 91

## 2019-05-31 PROCEDURE — 700102 HCHG RX REV CODE 250 W/ 637 OVERRIDE(OP): Performed by: FAMILY MEDICINE

## 2019-05-31 PROCEDURE — 85007 BL SMEAR W/DIFF WBC COUNT: CPT

## 2019-05-31 PROCEDURE — A9270 NON-COVERED ITEM OR SERVICE: HCPCS | Performed by: FAMILY MEDICINE

## 2019-05-31 RX ORDER — ECHINACEA PURPUREA EXTRACT 125 MG
2 TABLET ORAL
Status: DISCONTINUED | OUTPATIENT
Start: 2019-05-31 | End: 2019-06-03 | Stop reason: HOSPADM

## 2019-05-31 RX ORDER — DESMOPRESSIN ACETATE 0.1 MG/1
300 TABLET ORAL EVERY 12 HOURS
Status: DISCONTINUED | OUTPATIENT
Start: 2019-05-31 | End: 2019-06-01

## 2019-05-31 RX ADMIN — DESMOPRESSIN ACETATE 200 MCG: 0.1 TABLET ORAL at 09:48

## 2019-05-31 RX ADMIN — FAMOTIDINE 20 MG: 20 TABLET ORAL at 05:46

## 2019-05-31 RX ADMIN — ACETAMINOPHEN 650 MG: 325 TABLET, FILM COATED ORAL at 03:33

## 2019-05-31 RX ADMIN — OXYCODONE HYDROCHLORIDE 5 MG: 5 TABLET ORAL at 05:47

## 2019-05-31 RX ADMIN — POTASSIUM CHLORIDE 20 MEQ: 1500 TABLET, EXTENDED RELEASE ORAL at 05:46

## 2019-05-31 RX ADMIN — SENNOSIDES,DOCUSATE SODIUM 2 TABLET: 8.6; 5 TABLET, FILM COATED ORAL at 05:46

## 2019-05-31 RX ADMIN — FAMOTIDINE 20 MG: 20 TABLET ORAL at 18:19

## 2019-05-31 RX ADMIN — HYDROCORTISONE 10 MG: 10 TABLET ORAL at 16:02

## 2019-05-31 RX ADMIN — HYDROCORTISONE 20 MG: 10 TABLET ORAL at 08:21

## 2019-05-31 RX ADMIN — DESMOPRESSIN ACETATE 300 MCG: 0.1 TABLET ORAL at 18:19

## 2019-05-31 RX ADMIN — LEVOTHYROXINE SODIUM 25 MCG: 25 TABLET ORAL at 05:47

## 2019-05-31 RX ADMIN — Medication 2 SPRAY: at 18:20

## 2019-05-31 ASSESSMENT — ENCOUNTER SYMPTOMS
COUGH: 0
BLURRED VISION: 0
PALPITATIONS: 0
LOSS OF CONSCIOUSNESS: 0
WEAKNESS: 0
SEIZURES: 0
SHORTNESS OF BREATH: 0
DIZZINESS: 0
ABDOMINAL PAIN: 0
FEVER: 0
VOMITING: 0
MYALGIAS: 0
NAUSEA: 0
CONSTIPATION: 1
NERVOUS/ANXIOUS: 0
FALLS: 0
HEADACHES: 0
CHILLS: 0

## 2019-05-31 ASSESSMENT — LIFESTYLE VARIABLES
TOTAL SCORE: 0
AVERAGE NUMBER OF DAYS PER WEEK YOU HAVE A DRINK CONTAINING ALCOHOL: 2
EVER HAD A DRINK FIRST THING IN THE MORNING TO STEADY YOUR NERVES TO GET RID OF A HANGOVER: NO
ON A TYPICAL DAY WHEN YOU DRINK ALCOHOL HOW MANY DRINKS DO YOU HAVE: 3
TOTAL SCORE: 0
HAVE PEOPLE ANNOYED YOU BY CRITICIZING YOUR DRINKING: NO
HOW MANY TIMES IN THE PAST YEAR HAVE YOU HAD 5 OR MORE DRINKS IN A DAY: 0
HAVE YOU EVER FELT YOU SHOULD CUT DOWN ON YOUR DRINKING: NO
EVER FELT BAD OR GUILTY ABOUT YOUR DRINKING: NO
DO YOU DRINK ALCOHOL: YES
TOTAL SCORE: 0
CONSUMPTION TOTAL: NEGATIVE

## 2019-05-31 NOTE — PROGRESS NOTES
2 RN skin check complete with AGNES Villa.  Devices in place EKG leads, BP cuff, pulse ox, peripheral IVs.   Skin assessed under the following devices listed above.   Preventative measures in place including pillows for repositioning.  Following areas of concern:   Patient has staples to anterior head, C/DI  and approximated   Patient has bruising below belly button, PTA   Right lateral leg incision closed with dermabond and steri strips  Bilateral heels cracked and calloused, moisturizer applied   Generalized bruising to bilateral UE   Patient has scarring/raised skin above perineal area, patient has had for 4 years      Wound consult placedYES/NO: n/a    Wound reported YES/NO: N\A  Appropriate LDAs opened YES/NO: yes

## 2019-05-31 NOTE — PROGRESS NOTES
Critical Care Progress Note    Date of admission  5/30/2019    Chief Complaint  38 y.o. female who presented 5/30/2019 with hx of suprasellar mass who was intiially admitted at [facility] on 5/14, transferred to [place] for transsphenoidal resection of craniopharyngioma. Post op, pt developed hydrocephalus and s/p EVD. EVD was removed. Was diagnosed as panhypopituitarism, diabetes insipidus. Pt was placed on DDAVP 0.2mg BID with close monitoring for sodium Q6H, hydrocortisone 20/10. Pt was transferred back to [place] today.      En route, reportedly pt was hypotensive and required 2L NS fluid boluses. At ICU arrival, pt's BP has improved and in 110s. HR in 90s, afebrile. DDAVP, hydrocortisone was resumed. Pt alert, oriented, Amharic speaking only. C/o blurry vision bilaterally that has been occurring since 10/2018    Hospital Course    5/30 admitted to ICU from [place].       Interval Problem Update  Reviewed last 24 hour events:  No acute changes overnight. Alert and oriented x4  Afebrile  C/o hasn't had bowel movements. Last was at [place].   No hypotension, BP maintained at MAP >65  HR in 60-70s  Sodium was in 135-138  On room air, sat >95%  UOP 3L last night  Mobility level 4 yesterday.     Review of Systems  Review of Systems   Constitutional: Negative for chills, fever and malaise/fatigue.   HENT: Negative for nosebleeds.    Respiratory: Negative for cough and shortness of breath.    Cardiovascular: Negative for chest pain and palpitations.   Gastrointestinal: Positive for constipation. Negative for abdominal pain, nausea and vomiting.   Genitourinary: Negative for dysuria and urgency.   Musculoskeletal: Negative for falls.   Skin: Negative for rash.   Neurological: Negative for seizures, loss of consciousness, weakness and headaches.   Psychiatric/Behavioral: The patient is not nervous/anxious.         Vital Signs for last 24 hours   Temp:  [36.7 °C (98.1 °F)-37.5 °C (99.5 °F)] 37.1 °C (98.8 °F)  Pulse:   [] 64  Resp:  [12-74] 13  BP: (113)/(60) 113/60  SpO2:  [92 %-100 %] 98 %    Hemodynamic parameters for last 24 hours       Respiratory Information for the last 24 hours       Physical Exam   Physical Exam   Constitutional: She is oriented to person, place, and time. She appears well-developed and well-nourished. No distress.   HENT:   Head: Normocephalic and atraumatic.   Eyes: No scleral icterus.   Neck: Normal range of motion. Neck supple. No tracheal deviation present.   Cardiovascular: Normal rate, regular rhythm and normal heart sounds.  Exam reveals no friction rub.    No murmur heard.  Pulmonary/Chest: Effort normal and breath sounds normal. No respiratory distress. She has no wheezes. She has no rales.   Abdominal: Soft. Bowel sounds are normal. She exhibits no distension. There is no tenderness. There is no rebound and no guarding.   Musculoskeletal: She exhibits no edema or tenderness.   Neurological: She is alert and oriented to person, place, and time. No cranial nerve deficit.   Skin: Skin is warm. No rash noted. No erythema. No pallor.   Psychiatric: She has a normal mood and affect. Her behavior is normal.   Nursing note and vitals reviewed.      Medications  Current Facility-Administered Medications   Medication Dose Route Frequency Provider Last Rate Last Dose   • fentaNYL (SUBLIMAZE) injection 25 mcg  25 mcg Intravenous Q HOUR PRN Manuel Jensen, D.O.       • hydrocortisone (CORTEF) tablet 10 mg  10 mg Oral DAILY Manuel Jensen D.O.   10 mg at 05/30/19 1529    And   • hydrocortisone (CORTEF) tablet 20 mg  20 mg Oral DAILY Manuel Jensen D.O.   20 mg at 05/30/19 0942   • senna-docusate (PERICOLACE or SENOKOT S) 8.6-50 MG per tablet 2 Tab  2 Tab Oral BID Yann Del Castillo M.D.   2 Tab at 05/31/19 0546    And   • polyethylene glycol/lytes (MIRALAX) PACKET 1 Packet  1 Packet Oral QDAY PRN Yann Del Castillo M.D.        And   • magnesium hydroxide (MILK OF MAGNESIA) suspension 30 mL  30 mL Oral QDAY PRN  Yann Del Castillo M.D.        And   • bisacodyl (DULCOLAX) suppository 10 mg  10 mg Rectal QDAY PRN Yann Del Castillo M.D.       • Respiratory Care per Protocol   Nebulization Continuous RT Yann Del Castillo M.D.       • acetaminophen (TYLENOL) tablet 650 mg  650 mg Oral Q6HRS PRN Yann Del Castillo M.D.   650 mg at 05/31/19 0333   • Pharmacy Consult Request ...Pain Management Review 1 Each  1 Each Other PHARMACY TO DOSE Yann Del Castillo M.D.        And   • oxyCODONE immediate-release (ROXICODONE) tablet 2.5 mg  2.5 mg Oral Q3HRS PRN Yann Del Castillo M.D.        And   • oxyCODONE immediate-release (ROXICODONE) tablet 5 mg  5 mg Oral Q3HRS PRN Yann Del Castillo M.D.   5 mg at 05/31/19 0547    And   • morphine (pf) 4 mg/ml injection 2 mg  2 mg Intravenous Q3HRS PRN Yann Del Castillo M.D.       • ondansetron (ZOFRAN) syringe/vial injection 4 mg  4 mg Intravenous Q4HRS PRN Yann Del Castillo M.D.       • ondansetron (ZOFRAN ODT) dispertab 4 mg  4 mg Oral Q4HRS PRN Yann Del Castillo M.D.       • promethazine (PHENERGAN) tablet 12.5-25 mg  12.5-25 mg Oral Q4HRS PRN Yann Del Castillo M.D.       • promethazine (PHENERGAN) suppository 12.5-25 mg  12.5-25 mg Rectal Q4HRS PRN Yann Del Castillo M.D.       • prochlorperazine (COMPAZINE) injection 5-10 mg  5-10 mg Intravenous Q4HRS PRN Yann Del Castillo M.D.       • potassium chloride SA (Kdur) tablet 20 mEq  20 mEq Oral DAILY Yann Del Castillo M.D.   20 mEq at 05/31/19 0546   • famotidine (PEPCID) tablet 20 mg  20 mg Oral BID Yann Del Castillo M.D.   20 mg at 05/31/19 0546   • insulin regular (HUMULIN R) injection 1-6 Units  1-6 Units Subcutaneous 4X/DAY LILIANA OconnorO.   Stopped at 05/30/19 1716    And   • glucose 4 g chewable tablet 16 g  16 g Oral Q15 MIN PRN LILIANA DouglassO.        And   • DEXTROSE 10% BOLUS 250 mL  250 mL Intravenous Q15 MIN PRN VIMAL Douglass.O.       • levothyroxine (SYNTHROID) tablet 25 mcg  25 mcg Oral AM ES LILIANA DouglassONimco   25 mcg at 05/31/19  0547   • desmopressin (DDAVP) tablet 200 mcg  200 mcg Oral Q12HR Chuck Perrin M.D.   200 mcg at 05/30/19 2102       Fluids    Intake/Output Summary (Last 24 hours) at 05/31/19 0623  Last data filed at 05/31/19 0400   Gross per 24 hour   Intake             6600 ml   Output             4800 ml   Net             1800 ml       Laboratory          Recent Labs      05/30/19   0951  05/30/19   1120  05/30/19   1726  05/31/19   0010   SODIUM   --   135  138  136   POTASSIUM   --   3.3*  4.3  3.7   CHLORIDE   --   99  104  104   CO2   --   25  25  25   BUN   --   13  12  12   CREATININE   --   0.76  0.69  0.75   MAGNESIUM  2.5   --    --    --    PHOSPHORUS   --   4.7*   --    --    CALCIUM   --   9.0  8.9  8.6     Recent Labs      05/30/19   1120  05/30/19   1726  05/31/19   0010   GLUCOSE  168*  109*  99     Recent Labs      05/30/19   0951   WBC  12.6*   NEUTSPOLYS  66.70   LYMPHOCYTES  25.90   MONOCYTES  2.80   EOSINOPHILS  0.00   BASOPHILS  0.90     Recent Labs      05/30/19   0951   RBC  3.67*   HEMOGLOBIN  11.6*   HEMATOCRIT  35.3*   PLATELETCT  418   PROTHROMBTM  14.2   INR  1.09       Imaging  X-Ray:  I have personally reviewed the images and compared with prior images.    Assessment/Plan  * Diabetes insipidus (HCC)   Assessment & Plan    Urine osmolality is low at 123, will increase DDAVP to 0.3 BID.   BMP daily      Acquired hypothyroidism   Assessment & Plan    TSH is low at 0.02. Free T4 0.89  Discontinue synthroid     Adrenal insufficiency (HCC)- (present on admission)   Assessment & Plan    On hydrocortisone 20/10 at [facility].      Craniopharyngioma (HCC)- (present on admission)   Assessment & Plan    S/p transsphenoidal resection at [facility].   Now with hypopituitarism  Advance diet as tolerated  Mobility level 4     Constipation   Assessment & Plan    Bowel regimen for constipation      Blurry vision, bilateral   Assessment & Plan    Has been chronic and ongoing intermittently since 10/2018. Not affecting  her normal daily activities since. She did say it's improving a little bit  Suspect related to the craniopharnygioma  Will monitor for now, unless worsening and would consider MRI brain        Ok to leave ICU from my standpoint.     VTE:  Heparin  Ulcer: H2 Antagonist  Lines: Central Line  Ongoing indication addressed    I have performed a physical exam and reviewed and updated ROS and Plan today (5/31/2019). In review of yesterday's note (5/30/2019), there are no changes except as documented above.     Discussed patient condition and risk of morbidity and/or mortality with Hospitalist, RN, RT and Pharmacy

## 2019-05-31 NOTE — CARE PLAN
Problem: Safety  Goal: Will remain free from injury  Bed in low locked position, room near nurses station, call light and personal belongings within reach.     Problem: Pain Management  Goal: Pain level will decrease to patient's comfort goal  Pt medicated per MAR and active MD order.  Assessment documented in flowsheets.

## 2019-05-31 NOTE — ASSESSMENT & PLAN NOTE
Central diabetes insipidus.   Urine osmolality is low at 123, DDAVP was increased to 0.3 BID.   Sodium today was 135 and this afternoon was 132  Will hold DDAVP and recheck sodium tomorrow.

## 2019-05-31 NOTE — PROGRESS NOTES
0830: Patient presented in IDT rounds. Discussed patients plan of care, MDs aware that patient complained of chest pain this morning and an EKG was performed. Orders to change BMP to once a day, orders for an urine osmolality test. Discussed patient would be appriopiate to transfer once urine test came back normal.

## 2019-05-31 NOTE — PROGRESS NOTES
Hospital Medicine Daily Progress Note    Date of Service  5/31/2019    Chief Complaint  38 y.o. female admitted 5/30/2019 from [facility] after having a suprasellar mass removed    Hospital Course    38 y.o. female who presented 5/30/2019 with history of suprasellar/sellar mass. She was initially admitted here on 5/14/2019 for fever and myalgias.  Since October she had been having symptoms of headache, nausea, myalgia, and amenorrhea.  She had also developed galactorrhea.  CT scan of the head and MRI showed a suprasellar/sellar mass at that time, and Neurosurgery was consulted, but it was felt that she needed transfer to [facility].  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  She had also developed hydrocephalus for which an EVD was placed and eventually removed.  She also developed hypotension, hyponatremia, DI, and adrenal insufficiency postoperatively.  She was placed on Cortef and DDAVP.  She remains on Cortef although reduced dose of 20 mg in the morning and 10 mg at night.  They have recommended to continue DDAVP, continue checking serial BMPs.  She was transferred back to [facility] for further care.         Interval Problem Update  Chest pain 530, EKG normal  A&Ox4  Blurred vision periodically, chronic  Tylenol x2 for pain  Up to commode  SR  BP good  Tmax 99.1  RA  Regular diet  BM pta  3L UOP   midline upper right arm  K 4.1  On DDAVP and hydrocortisone, synthroid; T4 0.89  Na good      Consultants/Specialty  Intensivist    Code Status  FULL    D/W tx team on rounds, family at bedside    Disposition  Transfer to medical floor, ?home tomororw if Na remains stable    Review of Systems  Review of Systems   Constitutional: Positive for malaise/fatigue. Negative for chills and fever.   HENT:        Nasal pain   Eyes: Negative for blurred vision.   Respiratory: Negative for cough and shortness of  breath.    Cardiovascular: Negative for chest pain and palpitations.   Gastrointestinal: Negative for abdominal pain, nausea and vomiting.   Genitourinary: Negative for dysuria.   Musculoskeletal: Negative for myalgias.   Neurological: Negative for dizziness and headaches.   All other systems reviewed and are negative.       Physical Exam  Temp:  [36.7 °C (98.1 °F)-37.5 °C (99.5 °F)] 37.1 °C (98.8 °F)  Pulse:  [] 65  Resp:  [12-74] 45  BP: (113)/(60) 113/60  SpO2:  [92 %-100 %] 98 %    Physical Exam   Constitutional: She is oriented to person, place, and time. She appears well-developed and well-nourished.   HENT:   Head: Normocephalic and atraumatic.   Nose with crust, hard, no drainage   Cardiovascular: Normal rate, regular rhythm and normal heart sounds.    No murmur heard.  Pulmonary/Chest: Effort normal and breath sounds normal. No respiratory distress. She has no wheezes. She has no rales.   Abdominal: Soft. Bowel sounds are normal. She exhibits no distension. There is no tenderness.   Musculoskeletal: Normal range of motion. She exhibits no edema.   Neurological: She is alert and oriented to person, place, and time.   Skin: Skin is warm and dry. No erythema.   Nursing note and vitals reviewed.      Fluids    Intake/Output Summary (Last 24 hours) at 05/31/19 0826  Last data filed at 05/31/19 0400   Gross per 24 hour   Intake             6200 ml   Output             4400 ml   Net             1800 ml       Laboratory  Recent Labs      05/30/19   0951  05/31/19   0530   WBC  12.6*  14.3*   RBC  3.67*  3.36*   HEMOGLOBIN  11.6*  10.6*   HEMATOCRIT  35.3*  32.7*   MCV  96.2  97.3   MCH  31.6  31.5   MCHC  32.9*  32.4*   RDW  42.6  44.6   PLATELETCT  418  397   MPV  9.1  9.1     Recent Labs      05/30/19   1726  05/31/19   0010  05/31/19   0530   SODIUM  138  136  137   POTASSIUM  4.3  3.7  4.1   CHLORIDE  104  104  103   CO2  25  25  27   GLUCOSE  109*  99  88   BUN  12  12  12   CREATININE  0.69  0.75  0.78    CALCIUM  8.9  8.6  8.9     Recent Labs      05/30/19   0951   INR  1.09               Imaging  IR-MIDLINE CATHETER INSERTION WO GUIDANCE > AGE 3   Final Result                  Ultrasound-guided midline placement performed by qualified nursing staff    as above.               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    Note from [facility] Encocrinologist:  Impression:  39 yo F with no reported medical history who was transferred from OSH to [facility] to undergo neurosurgical intervention for newly detected 3.2 x 2.6 x 2.6 cm suprasellar mass c/w craniopharyngioma. 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 Endocrine is consulted for post-TSS management.     She had radiographic evidence of both compression of the optic apparatus and mass effect on the pituitary itself.  Preoperative lab work notable for hyperprolactinemia to PRL 75.8 and suppressed gonadotropins. Hyperprolactinemia was most likely due to stalk effect, given expected stalk compression with a lesion of this size. She also had evidence of ADH deficit given intraoperative DI, which will now be furthered by stalk transection.     Recommendations:  We will follow closely with the primary team for assessment of hypopituitarism following resection.      # Adrenal Axis: She received 100mg  IV HC and 10mg IV dexamethasone intraoperatively. HC taper in progress. Currently hemodynamically stable.  -Please decrease hydrocortisone to 20mg qAM and 10mg qPM starting tomorrow (5/29), with plan to maintain this dose.      # Sodium Balance: She received 2mcg IV DDAVP intraoperatively in the setting of diabetes insipidus. Following transection of pituitary stalk, would anticipate further DI and a likely permanent DDAVP requirement going forward. Since she started drinking to thirst on 5/26, she has been able to maintain near-euntaremia in the setting of her DI. That being said, given her large, dilute UOP from her DI, standing DDAVP would likely help improve her quality of life going forward.  -Please increase standing PO DDAVP to 0.2mg BID, starting tonight. Hold dose if Na is <135.    The patient is at risk of diabetes insipidus as well as SIADH. Please monitor for central DI as follows:   - check serum Na and urine specific gravity simultaneously q6h  - please monitor q1h urine output - if UOP >250cc/h for >2 hours and urine is dilute with SPGR >1.004, and/or patient is hypernatremic, may consider giving DDAVP 0.1 mg po x 1 and monitor for response  - please feel free to call the endocrine fellow on call to discuss  - please do not give DDAVP if Na <135     # Thyroid Function:   - we will reassess FT4 as an outpatient      # Gonadal Axis: Suppression of her gonadal axis was likely responsible for her preoperative irregular menses.  - we will reassess as an outpatient     # Growth Hormone: Pre-op IGF-1 level drawn and pending.  - we will reassess IGF-1 as an outpatient    Summary of Transfer Recommendations:  -Patient should be maintained on HC 20mg qAM and 10mg qPM starting 5/29.  -She should be on standing PO DDAVP 0.2mg BID  -She should continue to drink to thirst.  -Would recommend continuing to check q6h serum Na and urine SpGr for now. Once she demonstrates stability, can space out lab checks.  -As she  is scheduled for NSGY follow-up on 6/7, we will see if we can schedule her for Endocrine follow-up with Dr. Patel or Dr. Ledesma during the same visit. Ultimately, she should establish longterm endocrine follow-up closer to her home in [place]. She should ask her PCP for a local endocrinology referral.       Laura Brasher MD - 05/28/2019 11:18 AM PDT     Hyponatremia- (present on admission)   Assessment & Plan    Serial bmp  DDAVP  137 stable for now     Hypokalemia- (present on admission)   Assessment & Plan    On Kdur     Low TSH level- (present on admission)   Assessment & Plan    Likely secondary to steroid  Normal FT4 and FT3     Leukocytosis- (present on admission)   Assessment & Plan    Likely secondary to steroid  Follow cbc     Hyperglycemia- (present on admission)   Assessment & Plan    Resolved, no coverage needed     Blurry vision, bilateral- (present on admission)   Assessment & Plan    Remains          VTE prophylaxis: SCDs

## 2019-05-31 NOTE — CARE PLAN
Problem: Communication  Goal: The ability to communicate needs accurately and effectively will improve    Intervention: Educate patient and significant other/support system about the plan of care, procedures, treatments, medications and allow for questions  Discussed plan of care with patient using mobile , all questions and concerns answered. Demonstratied understanding.       Problem: Pain Management  Goal: Pain level will decrease to patient's comfort goal    Intervention: Follow pain managment plan developed in collaboration with patient and Interdisciplinary Team  Appropriate pain measuring tool used for assessment. Addressing patients pain needs with appropriate interventions. Assessing pain interventions every two hours or/and PRN.

## 2019-05-31 NOTE — ASSESSMENT & PLAN NOTE
S/p transsphenoidal resection at [place].   Now with hypopituitarism  Advance diet as tolerated  Mobility level 4

## 2019-05-31 NOTE — ASSESSMENT & PLAN NOTE
Has been chronic and ongoing intermittently since 10/2018. Not affecting her normal daily activities since. She did say it's improving a little bit  Suspect related to the craniopharnygioma  Will monitor for now, unless worsening and would consider MRI brain

## 2019-05-31 NOTE — PROGRESS NOTES
Spiritual Care Note    Patient Information     Patient's Name: Johana Weston   MRN: [id number]    YOB: 1980   Age and Gender: 38 y.o. female   Service Area:    Room (and Bed): [place] [address]   Ethnicity or Nationality:    Primary Language: Macedonian   Anabaptism/Spiritual preference: None   Place of Residence:    Family/Friends/Others Present:    Clinical Team Present:    Medical Diagnosis(-es)/Procedure(s):    Code Status: Full Code    Date of Admission: 5/30/2019   Length of Stay: 1 days        Spiritual Care Provider Information:  Name of Spiritual Care Provider: Jama  Title of Spiritual Care Provider:   Phone Number: [phone]  E-mail: [email or web address]  Total time : 15 minutes    Spiritual Screen Results:    Gen Nursing  Spiritual Screen  Is your spiritual health or inner well-being important to you as you cope with your medical condition?: Yes  Would you like to receive a visit from our Spiritual Care team or your own Religion or spiritual leader?: Yes  Was spiritual care education provided to the patient?: Yes  Cultural/Spiritual Needs During Care: Ceremonial  Ceremonial Considerations: Prayer  Sources of Hope/Zita: Family, Art/Music     Palliative Care  PC Anabaptism/Spiritual Screening  Was spiritual care education provided to the patient?: Yes      Encounter/Request Information  Encounter/Request Type   Visited With: Patient  Nature of the Visit: Initial, On shift  Continue Visiting: Yes  General Visit: Yes  Referral From/ Origin of Request: Epic nursing    Religous Needs/Values  Anabaptism Needs Visit  Anabaptism Needs: Prayer    Spiritual Assessment   Spiritual Care Encounters  Observations/Symptoms: Anxious, Pain  Interacton/Conversation: Prayed with patient ursula and interpretor.  Assessment: Need  Need: Samina Issues  Intended Effects: Samina Affirmation  Plan: Visit Upon Request    Notes:

## 2019-06-01 LAB
ANION GAP SERPL CALC-SCNC: 6 MMOL/L (ref 0–11.9)
ANION GAP SERPL CALC-SCNC: 7 MMOL/L (ref 0–11.9)
BASOPHILS # BLD AUTO: 0.6 % (ref 0–1.8)
BASOPHILS # BLD: 0.07 K/UL (ref 0–0.12)
BUN SERPL-MCNC: 10 MG/DL (ref 8–22)
BUN SERPL-MCNC: 11 MG/DL (ref 8–22)
CALCIUM SERPL-MCNC: 8.6 MG/DL (ref 8.5–10.5)
CALCIUM SERPL-MCNC: 9 MG/DL (ref 8.5–10.5)
CHLORIDE SERPL-SCNC: 101 MMOL/L (ref 96–112)
CHLORIDE SERPL-SCNC: 102 MMOL/L (ref 96–112)
CO2 SERPL-SCNC: 25 MMOL/L (ref 20–33)
CO2 SERPL-SCNC: 26 MMOL/L (ref 20–33)
CREAT SERPL-MCNC: 0.7 MG/DL (ref 0.5–1.4)
CREAT SERPL-MCNC: 0.77 MG/DL (ref 0.5–1.4)
EOSINOPHIL # BLD AUTO: 0.32 K/UL (ref 0–0.51)
EOSINOPHIL NFR BLD: 2.5 % (ref 0–6.9)
ERYTHROCYTE [DISTWIDTH] IN BLOOD BY AUTOMATED COUNT: 45.1 FL (ref 35.9–50)
GLUCOSE BLD-MCNC: 71 MG/DL (ref 65–99)
GLUCOSE BLD-MCNC: 82 MG/DL (ref 65–99)
GLUCOSE SERPL-MCNC: 114 MG/DL (ref 65–99)
GLUCOSE SERPL-MCNC: 89 MG/DL (ref 65–99)
HCT VFR BLD AUTO: 32.3 % (ref 37–47)
HGB BLD-MCNC: 10.7 G/DL (ref 12–16)
IMM GRANULOCYTES # BLD AUTO: 0.57 K/UL (ref 0–0.11)
IMM GRANULOCYTES NFR BLD AUTO: 4.5 % (ref 0–0.9)
LYMPHOCYTES # BLD AUTO: 3.89 K/UL (ref 1–4.8)
LYMPHOCYTES NFR BLD: 30.8 % (ref 22–41)
MCH RBC QN AUTO: 31.8 PG (ref 27–33)
MCHC RBC AUTO-ENTMCNC: 33.1 G/DL (ref 33.6–35)
MCV RBC AUTO: 95.8 FL (ref 81.4–97.8)
MONOCYTES # BLD AUTO: 0.5 K/UL (ref 0–0.85)
MONOCYTES NFR BLD AUTO: 4 % (ref 0–13.4)
MORPHOLOGY BLD-IMP: NORMAL
NEUTROPHILS # BLD AUTO: 7.28 K/UL (ref 2–7.15)
NEUTROPHILS NFR BLD: 57.6 % (ref 44–72)
NRBC # BLD AUTO: 0 K/UL
NRBC BLD-RTO: 0 /100 WBC
PLATELET # BLD AUTO: 349 K/UL (ref 164–446)
PMV BLD AUTO: 9.8 FL (ref 9–12.9)
POTASSIUM SERPL-SCNC: 3.5 MMOL/L (ref 3.6–5.5)
POTASSIUM SERPL-SCNC: 4.4 MMOL/L (ref 3.6–5.5)
RBC # BLD AUTO: 3.37 M/UL (ref 4.2–5.4)
SODIUM SERPL-SCNC: 132 MMOL/L (ref 135–145)
SODIUM SERPL-SCNC: 135 MMOL/L (ref 135–145)
WBC # BLD AUTO: 12.6 K/UL (ref 4.8–10.8)

## 2019-06-01 PROCEDURE — 700102 HCHG RX REV CODE 250 W/ 637 OVERRIDE(OP): Performed by: INTERNAL MEDICINE

## 2019-06-01 PROCEDURE — 80048 BASIC METABOLIC PNL TOTAL CA: CPT | Mod: 91

## 2019-06-01 PROCEDURE — A9270 NON-COVERED ITEM OR SERVICE: HCPCS | Performed by: INTERNAL MEDICINE

## 2019-06-01 PROCEDURE — 82962 GLUCOSE BLOOD TEST: CPT

## 2019-06-01 PROCEDURE — 770006 HCHG ROOM/CARE - MED/SURG/GYN SEMI*

## 2019-06-01 PROCEDURE — 85025 COMPLETE CBC W/AUTO DIFF WBC: CPT

## 2019-06-01 PROCEDURE — 99232 SBSQ HOSP IP/OBS MODERATE 35: CPT | Performed by: INTERNAL MEDICINE

## 2019-06-01 PROCEDURE — 700102 HCHG RX REV CODE 250 W/ 637 OVERRIDE(OP): Performed by: FAMILY MEDICINE

## 2019-06-01 PROCEDURE — 700111 HCHG RX REV CODE 636 W/ 250 OVERRIDE (IP): Performed by: FAMILY MEDICINE

## 2019-06-01 PROCEDURE — 99233 SBSQ HOSP IP/OBS HIGH 50: CPT | Performed by: HOSPITALIST

## 2019-06-01 PROCEDURE — A9270 NON-COVERED ITEM OR SERVICE: HCPCS | Performed by: FAMILY MEDICINE

## 2019-06-01 RX ORDER — POTASSIUM CHLORIDE 20 MEQ/1
20 TABLET, EXTENDED RELEASE ORAL 3 TIMES DAILY
Status: COMPLETED | OUTPATIENT
Start: 2019-06-01 | End: 2019-06-02

## 2019-06-01 RX ORDER — DESMOPRESSIN ACETATE 0.1 MG/1
200 TABLET ORAL EVERY 12 HOURS
Status: DISCONTINUED | OUTPATIENT
Start: 2019-06-02 | End: 2019-06-03 | Stop reason: HOSPADM

## 2019-06-01 RX ADMIN — POTASSIUM CHLORIDE 20 MEQ: 1500 TABLET, EXTENDED RELEASE ORAL at 05:13

## 2019-06-01 RX ADMIN — OXYCODONE HYDROCHLORIDE 5 MG: 5 TABLET ORAL at 01:03

## 2019-06-01 RX ADMIN — OXYCODONE HYDROCHLORIDE 5 MG: 5 TABLET ORAL at 12:04

## 2019-06-01 RX ADMIN — ONDANSETRON 4 MG: 2 INJECTION INTRAMUSCULAR; INTRAVENOUS at 03:43

## 2019-06-01 RX ADMIN — HYDROCORTISONE 20 MG: 10 TABLET ORAL at 08:03

## 2019-06-01 RX ADMIN — POTASSIUM CHLORIDE 20 MEQ: 1500 TABLET, EXTENDED RELEASE ORAL at 12:04

## 2019-06-01 RX ADMIN — OXYCODONE HYDROCHLORIDE 5 MG: 5 TABLET ORAL at 20:59

## 2019-06-01 RX ADMIN — HYDROCORTISONE 10 MG: 10 TABLET ORAL at 16:11

## 2019-06-01 RX ADMIN — FAMOTIDINE 20 MG: 20 TABLET ORAL at 05:13

## 2019-06-01 RX ADMIN — DESMOPRESSIN ACETATE 300 MCG: 0.1 TABLET ORAL at 05:13

## 2019-06-01 RX ADMIN — FAMOTIDINE 20 MG: 20 TABLET ORAL at 17:39

## 2019-06-01 RX ADMIN — SENNOSIDES,DOCUSATE SODIUM 2 TABLET: 8.6; 5 TABLET, FILM COATED ORAL at 17:39

## 2019-06-01 RX ADMIN — POTASSIUM CHLORIDE 20 MEQ: 1500 TABLET, EXTENDED RELEASE ORAL at 17:39

## 2019-06-01 ASSESSMENT — ENCOUNTER SYMPTOMS
WEAKNESS: 0
PALPITATIONS: 0
BLURRED VISION: 0
LOSS OF CONSCIOUSNESS: 0
COUGH: 0
NERVOUS/ANXIOUS: 0
FEVER: 0
SEIZURES: 0
CHILLS: 0
NAUSEA: 0
MYALGIAS: 0
ABDOMINAL PAIN: 0
SHORTNESS OF BREATH: 0
CONSTIPATION: 1
DIZZINESS: 0
HEADACHES: 0
VOMITING: 0
FALLS: 0

## 2019-06-01 NOTE — PROGRESS NOTES
2 RN Skin check complete    Incision to anterior head, stapled, MAX, CDI  Incision to right lateral hip, dermabonded, MAX, CDI  Stent in left nare  Diffuse bruising to bilateral UEs    All documented wounds assessed. See integumentary/wound flowsheets for documentation. All wound prevention protocols in place.

## 2019-06-01 NOTE — PROGRESS NOTES
Critical Care Progress Note    Date of admission  5/30/2019    Chief Complaint  38 y.o. female who presented 5/30/2019 with hx of suprasellar mass who was intiially admitted at [facility] on 5/14, transferred to [place] for transsphenoidal resection of craniopharyngioma. Post op, pt developed hydrocephalus and s/p EVD. EVD was removed. Was diagnosed as panhypopituitarism, diabetes insipidus. Pt was placed on DDAVP 0.2mg BID with close monitoring for sodium Q6H, hydrocortisone 20/10. Pt was transferred back to [place] today.      En route, reportedly pt was hypotensive and required 2L NS fluid boluses. At ICU arrival, pt's BP has improved and in 110s. HR in 90s, afebrile. DDAVP, hydrocortisone was resumed. Pt alert, oriented, Portuguese speaking only. C/o blurry vision bilaterally that has been occurring since 10/2018    Hospital Course    5/30 admitted to ICU from [facility].       Interval Problem Update  Reviewed last 24 hour events:  No acute changes overnight. Alert and oriented x4  Sodium 135 and 132 this am.   Afebrile  C/o hasn't had bowel movements. Last was at [place].   No hypotension, BP maintained at MAP >65  HR in 70s  Sodium was in 135-138  On room air, sat >95%  UOP 3L last night  Mobility level 4 yesterday.     Review of Systems  Review of Systems   Constitutional: Negative for chills, fever and malaise/fatigue.   HENT: Negative for nosebleeds.    Respiratory: Negative for cough and shortness of breath.    Cardiovascular: Negative for chest pain and palpitations.   Gastrointestinal: Positive for constipation. Negative for abdominal pain, nausea and vomiting.   Genitourinary: Negative for dysuria and urgency.   Musculoskeletal: Negative for falls.   Skin: Negative for rash.   Neurological: Negative for seizures, loss of consciousness, weakness and headaches.   Psychiatric/Behavioral: The patient is not nervous/anxious.         Vital Signs for last 24 hours   Temp:  [36.9 °C (98.4 °F)-37.7 °C (99.9 °F)] 36.9  °C (98.4 °F)  Pulse:  [58-89] 66  Resp:  [0-52] 20  SpO2:  [93 %-100 %] 100 %    Hemodynamic parameters for last 24 hours       Respiratory Information for the last 24 hours       Physical Exam   Physical Exam   Constitutional: She is oriented to person, place, and time. She appears well-developed and well-nourished. No distress.   HENT:   Head: Normocephalic and atraumatic.   Eyes: No scleral icterus.   Neck: Normal range of motion. Neck supple.   Cardiovascular: Normal rate, regular rhythm and normal heart sounds.  Exam reveals no friction rub.    No murmur heard.  Pulmonary/Chest: Effort normal and breath sounds normal. No respiratory distress. She has no wheezes. She has no rales.   Abdominal: Soft. Bowel sounds are normal. She exhibits no distension. There is no tenderness. There is no rebound and no guarding.   Musculoskeletal: She exhibits no edema or tenderness.   Neurological: She is alert and oriented to person, place, and time. No cranial nerve deficit.   Skin: Skin is warm. No rash noted. No erythema. No pallor.   Psychiatric: She has a normal mood and affect. Her behavior is normal.   Nursing note and vitals reviewed.      Medications  Current Facility-Administered Medications   Medication Dose Route Frequency Provider Last Rate Last Dose   • potassium chloride SA (Kdur) tablet 20 mEq  20 mEq Oral TID Manuel Jensen D.O.   20 mEq at 06/01/19 1204   • [START ON 6/2/2019] desmopressin (DDAVP) tablet 200 mcg  200 mcg Oral Q12HRS Eden Sena M.D.       • sodium chloride (OCEAN) 0.65 % nasal spray 2 Spray  2 Spray Nasal Q2HRS PRN Eden Sena M.D.   2 Spray at 05/31/19 1820   • fentaNYL (SUBLIMAZE) injection 25 mcg  25 mcg Intravenous Q HOUR PRN Manuel Jensen D.O.       • hydrocortisone (CORTEF) tablet 10 mg  10 mg Oral DAILY Manuel Jensen D.O.   10 mg at 06/01/19 1611    And   • hydrocortisone (CORTEF) tablet 20 mg  20 mg Oral DAILY Manuel Jensen D.O.   20 mg at 06/01/19 0803   • senna-docusate (PERICOLACE  or SENOKOT S) 8.6-50 MG per tablet 2 Tab  2 Tab Oral BID Yann Del Castillo M.D.   Stopped at 05/31/19 1800    And   • polyethylene glycol/lytes (MIRALAX) PACKET 1 Packet  1 Packet Oral QDAY PRN Yann Del Castillo M.D.        And   • magnesium hydroxide (MILK OF MAGNESIA) suspension 30 mL  30 mL Oral QDAY PRN Yann Del Castillo M.D.        And   • bisacodyl (DULCOLAX) suppository 10 mg  10 mg Rectal QDAY PRN Yann Del Castillo M.D.       • Respiratory Care per Protocol   Nebulization Continuous RT Yann Del Castillo M.D.       • acetaminophen (TYLENOL) tablet 650 mg  650 mg Oral Q6HRS PRN Yann Del Castillo M.D.   650 mg at 05/31/19 0333   • Pharmacy Consult Request ...Pain Management Review 1 Each  1 Each Other PHARMACY TO DOSE Yann Del Castillo M.D.        And   • oxyCODONE immediate-release (ROXICODONE) tablet 2.5 mg  2.5 mg Oral Q3HRS PRN Yann Del Castillo M.D.        And   • oxyCODONE immediate-release (ROXICODONE) tablet 5 mg  5 mg Oral Q3HRS PRN Yann Del Castillo M.D.   5 mg at 06/01/19 1204    And   • morphine (pf) 4 mg/ml injection 2 mg  2 mg Intravenous Q3HRS PRN Yann Del Castillo M.D.       • ondansetron (ZOFRAN) syringe/vial injection 4 mg  4 mg Intravenous Q4HRS PRN Yann Del Castillo M.D.   4 mg at 06/01/19 0343   • ondansetron (ZOFRAN ODT) dispertab 4 mg  4 mg Oral Q4HRS PRN Yann Del Castillo M.D.       • promethazine (PHENERGAN) tablet 12.5-25 mg  12.5-25 mg Oral Q4HRS PRN Yann Del Castillo M.D.       • promethazine (PHENERGAN) suppository 12.5-25 mg  12.5-25 mg Rectal Q4HRS PRN Yann Del Castillo M.D.       • prochlorperazine (COMPAZINE) injection 5-10 mg  5-10 mg Intravenous Q4HRS PRN Yann Del Castillo M.D.       • potassium chloride SA (Kdur) tablet 20 mEq  20 mEq Oral DAILY Yann Del Castillo M.D.   20 mEq at 06/01/19 0513   • famotidine (PEPCID) tablet 20 mg  20 mg Oral BID Yann Del Castillo M.D.   20 mg at 06/01/19 0513       Fluids    Intake/Output Summary (Last 24 hours) at 06/01/19 1634  Last data  filed at 06/01/19 1600   Gross per 24 hour   Intake              820 ml   Output             1850 ml   Net            -1030 ml       Laboratory          Recent Labs      05/30/19   0951  05/30/19   1120   05/31/19   0530  06/01/19   0640  06/01/19   1355   SODIUM   --   135   < >  137  135  132*   POTASSIUM   --   3.3*   < >  4.1  3.5*  4.4   CHLORIDE   --   99   < >  103  102  101   CO2   --   25   < >  27  26  25   BUN   --   13   < >  12  11  10   CREATININE   --   0.76   < >  0.78  0.70  0.77   MAGNESIUM  2.5   --    --    --    --    --    PHOSPHORUS   --   4.7*   --    --    --    --    CALCIUM   --   9.0   < >  8.9  8.6  9.0    < > = values in this interval not displayed.     Recent Labs      05/31/19 0530  06/01/19 0640  06/01/19   1355   GLUCOSE  88  89  114*     Recent Labs      05/30/19   0951  05/31/19 0530  06/01/19   0509   WBC  12.6*  14.3*  12.6*   NEUTSPOLYS  66.70  54.40  57.60   LYMPHOCYTES  25.90  36.80  30.80   MONOCYTES  2.80  2.60  4.00   EOSINOPHILS  0.00  1.70  2.50   BASOPHILS  0.90  0.90  0.60     Recent Labs      05/30/19   0951  05/31/19   0530  06/01/19   0509   RBC  3.67*  3.36*  3.37*   HEMOGLOBIN  11.6*  10.6*  10.7*   HEMATOCRIT  35.3*  32.7*  32.3*   PLATELETCT  418  397  349   PROTHROMBTM  14.2   --    --    INR  1.09   --    --        Imaging  X-Ray:  I have personally reviewed the images and compared with prior images.    Assessment/Plan  * Diabetes insipidus (HCC)- (present on admission)   Assessment & Plan    Central diabetes insipidus.   Urine osmolality is low at 123, DDAVP was increased to 0.3 BID.   Sodium today was 135 and this afternoon was 132  Will hold DDAVP and recheck sodium tomorrow.       Adrenal insufficiency (HCC)- (present on admission)   Assessment & Plan    On hydrocortisone 20/10 at [place].      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   Now with hypopituitarism  Advance diet as  tolerated  Mobility level 4     Constipation- (present on admission)   Assessment & Plan    Bowel regimen for constipation      Blurry vision, bilateral- (present on admission)   Assessment & Plan    Has been chronic and ongoing intermittently since 10/2018. Not affecting her normal daily activities since. She did say it's improving a little bit  Suspect related to the craniopharnygioma  Will monitor for now, unless worsening and would consider MRI brain        Need to consult endocrine for the hypopituitarism.     VTE:  Heparin  Ulcer: H2 Antagonist  Lines: Central Line  Ongoing indication addressed    I have performed a physical exam and reviewed and updated ROS and Plan today (6/1/2019). In review of yesterday's note (5/31/2019), there are no changes except as documented above.     Discussed patient condition and risk of morbidity and/or mortality with Hospitalist, RN, RT and Pharmacy    Ok to leave ICU from my standpoint.   Will sign off, please call with questions

## 2019-06-01 NOTE — PROGRESS NOTES
2 RN skin check complete with [place].  Devices in place EKG leads, BP cuff, pulse ox, peripheral IVs.              Skin assessed under the following devices listed above.              Preventative measures in place including pillows for repositioning.  Following areas of concern:   Patient has staples to anterior head, C/DI  and approximated   Patient has bruising below belly button, PTA   Right lateral leg incision closed with dermabond and steri strips  Bilateral heels cracked and calloused, moisturizer applied   Generalized bruising to bilateral UE   Patient has scarring/raised skin above perineal area, patient has had for 4 years        Wound consult placedYES/NO: n/a    Wound reported YES/NO: N\A  Appropriate LDAs opened YES/NO: yes

## 2019-06-01 NOTE — PROGRESS NOTES
1600: Spoke with Dr. Jones about patients stent in her left nose being visible with residue backed up causing the patient discomfort. Okay for ocean nasal spray, orders entered in epic. Also spoke up possible consult with neurology and endocrine, MD will follow up.

## 2019-06-01 NOTE — PROGRESS NOTES
Hospital Medicine Daily Progress Note    Date of Service  6/1/2019    Chief Complaint  38 y.o. female admitted 5/30/2019 from [facility] after having a suprasellar mass removed    Hospital Course    38 y.o. female who presented 5/30/2019 with history of suprasellar/sellar mass. She was initially admitted here on 5/14/2019 for fever and myalgias.  Since October she had been having symptoms of headache, nausea, myalgia, and amenorrhea.  She had also developed galactorrhea.  CT scan of the head and MRI showed a suprasellar/sellar mass at that time, and Neurosurgery was consulted, but it was felt that she needed transfer to [facility].  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  She had also developed hydrocephalus for which an EVD was placed and eventually removed.  She also developed hypotension, hyponatremia, DI, and adrenal insufficiency postoperatively.  She was placed on Cortef and DDAVP.  She remains on Cortef although reduced dose of 20 mg in the morning and 10 mg at night.  They have recommended to continue DDAVP, continue checking serial BMPs.  She was transferred back to [facility] for further care.         Interval Problem Update  Neuro intact, q4  Still with blurred vision r>l, but has improved dramatically throughout the day  Right nare stent in place  Oxy 5 x1 last night  VSS  Afeb  Reg diet, tolerating, bm last night  1000 clear yellow urine last night  Na 135, will check this afternoon    Consultants/Specialty  Intensivist    Code Status  FULL    D/W tx team on rounds, patient via  iPAD    Disposition  Transfer to medical floor, will need to establish with endo and PCP here, possibly ENT and NSG?    [facility] had made her appointments for this coming week but sent her back to [facility]; she had no idea of these appointments, nor does she have any way of getting down there for  them.      Review of Systems  Review of Systems   Constitutional: Negative for chills, fever and malaise/fatigue.        Feels much better     HENT:        Nasal pain   Eyes: Negative for blurred vision (much improved).   Respiratory: Negative for cough and shortness of breath.    Cardiovascular: Negative for chest pain and palpitations.   Gastrointestinal: Negative for abdominal pain, nausea and vomiting.   Genitourinary: Negative for dysuria.   Musculoskeletal: Negative for myalgias.   Neurological: Negative for dizziness and headaches.   All other systems reviewed and are negative.       Physical Exam  Temp:  [36.9 °C (98.5 °F)-37.7 °C (99.9 °F)] 36.9 °C (98.5 °F)  Pulse:  [62-89] 68  Resp:  [0-52] 16  SpO2:  [96 %-100 %] 100 %    Physical Exam   Constitutional: She is oriented to person, place, and time.   HENT:   Congested nasal passages; no drainage   Cardiovascular: Normal rate, regular rhythm and normal heart sounds.    No murmur heard.  Pulmonary/Chest: Effort normal and breath sounds normal. No respiratory distress. She has no wheezes. She has no rales.   Abdominal: Soft. Bowel sounds are normal. She exhibits no distension. There is no tenderness.   Musculoskeletal: Normal range of motion. She exhibits no edema.   Neurological: She is alert and oriented to person, place, and time.   Skin: Skin is warm and dry. No erythema.   Nursing note and vitals reviewed.      Fluids    Intake/Output Summary (Last 24 hours) at 06/01/19 0824  Last data filed at 06/01/19 0200   Gross per 24 hour   Intake             2400 ml   Output             2100 ml   Net              300 ml       Laboratory  Recent Labs      05/30/19   0951  05/31/19   0530  06/01/19   0509   WBC  12.6*  14.3*  12.6*   RBC  3.67*  3.36*  3.37*   HEMOGLOBIN  11.6*  10.6*  10.7*   HEMATOCRIT  35.3*  32.7*  32.3*   MCV  96.2  97.3  95.8   MCH  31.6  31.5  31.8   MCHC  32.9*  32.4*  33.1*   RDW  42.6  44.6  45.1   PLATELETCT  418  397  349   MPV  9.1  9.1   9.8     Recent Labs      05/31/19   0010  05/31/19   0530  06/01/19   0640   SODIUM  136  137  135   POTASSIUM  3.7  4.1  3.5*   CHLORIDE  104  103  102   CO2  25  27  26   GLUCOSE  99  88  89   BUN  12  12  11   CREATININE  0.75  0.78  0.70   CALCIUM  8.6  8.9  8.6     Recent Labs      05/30/19   0951   INR  1.09               Imaging  IR-MIDLINE CATHETER INSERTION WO GUIDANCE > AGE 3   Final Result                  Ultrasound-guided midline placement performed by qualified nursing staff    as above.               Assessment/Plan  * Diabetes insipidus (HCC)- (present on admission)   Assessment & Plan    Dr. 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  The problems is that she had no knowledge of these appointments and has no way to get all the way back down to [place].  We will need to get her established here before dc.    Note from [facility] Encocrinologist:  Impression:  37 yo F with no reported medical history who was transferred from [facility] to [facility] to undergo neurosurgical intervention for newly detected 3.2 x 2.6 x 2.6 cm suprasellar mass c/w craniopharyngioma. 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 Endocrine is consulted for post-TSS management.     She had radiographic evidence of both  compression of the optic apparatus and mass effect on the pituitary itself.  Preoperative lab work notable for hyperprolactinemia to PRL 75.8 and suppressed gonadotropins. Hyperprolactinemia was most likely due to stalk effect, given expected stalk compression with a lesion of this size. She also had evidence of ADH deficit given intraoperative DI, which will now be furthered by stalk transection.     Recommendations:  We will follow closely with the primary team for assessment of hypopituitarism following resection.      # Adrenal Axis: She received 100mg IV HC and 10mg IV dexamethasone intraoperatively. HC taper in progress. Currently hemodynamically stable.  -Please decrease hydrocortisone to 20mg qAM and 10mg qPM starting tomorrow (5/29), with plan to maintain this dose.      # Sodium Balance: She received 2mcg IV DDAVP intraoperatively in the setting of diabetes insipidus. Following transection of pituitary stalk, would anticipate further DI and a likely permanent DDAVP requirement going forward. Since she started drinking to thirst on 5/26, she has been able to maintain near-euntaremia in the setting of her DI. That being said, given her large, dilute UOP from her DI, standing DDAVP would likely help improve her quality of life going forward.  -Please increase standing PO DDAVP to 0.2mg BID, starting tonight. Hold dose if Na is <135.    The patient is at risk of diabetes insipidus as well as SIADH. Please monitor for central DI as follows:   - check serum Na and urine specific gravity simultaneously q6h  - please monitor q1h urine output - if UOP >250cc/h for >2 hours and urine is dilute with SPGR >1.004, and/or patient is hypernatremic, may consider giving DDAVP 0.1 mg po x 1 and monitor for response  - please feel free to call the endocrine fellow on call to discuss  - please do not give DDAVP if Na <135     # Thyroid Function:   - we will reassess FT4 as an outpatient      # Gonadal Axis: Suppression of her  gonadal axis was likely responsible for her preoperative irregular menses.  - we will reassess as an outpatient     # Growth Hormone: Pre-op IGF-1 level drawn and pending.  - we will reassess IGF-1 as an outpatient    Summary of Transfer Recommendations:  -Patient should be maintained on HC 20mg qAM and 10mg qPM starting 5/29.  -She should be on standing PO DDAVP 0.2mg BID  -She should continue to drink to thirst.  -Would recommend continuing to check q6h serum Na and urine SpGr for now. Once she demonstrates stability, can space out lab checks.  -As she is scheduled for NSGY follow-up on 6/7, we will see if we can schedule her for Endocrine follow-up with Dr. Patel or Dr. Ledesma during the same visit. Ultimately, she should establish longterm endocrine follow-up closer to her home in [place]. She should ask her PCP for a local endocrinology referral.       Laura Brasher MD - 05/28/2019 11:18 AM PDT     Hyponatremia- (present on admission)   Assessment & Plan    Down to 132; recheck in am, holding DDAVP     Hypokalemia- (present on admission)   Assessment & Plan    Still low today, replacing     Low TSH level- (present on admission)   Assessment & Plan    Likely secondary to steroid  Normal FT4 and FT3     Leukocytosis- (present on admission)   Assessment & Plan    Likely secondary to steroid  Improved cbc today     Hyperglycemia- (present on admission)   Assessment & Plan    Resolved, no coverage needed; dc'd ssi     Constipation- (present on admission)   Assessment & Plan    Resolved; large bm this am     Blurry vision, bilateral- (present on admission)   Assessment & Plan    Almost completely resolved          VTE prophylaxis: SCDs

## 2019-06-01 NOTE — PROGRESS NOTES
2 RN skin check complete with Nery DILCIA and AGNES Roy.  Pt head staples are C/D/I and approximated. R lower thigh bandage C/D/I and healing.   Wound consult placedYES/NO: N\A    Wound reported YES/NO: N\A  Appropriate LDAs opened YES/NO: N\A

## 2019-06-01 NOTE — CARE PLAN
Problem: Knowledge Deficit  Goal: Knowledge of disease process/condition, treatment plan, diagnostic tests, and medications will improve    Intervention: Assess knowledge level of disease process/condition, treatment plan, diagnostic tests, and medications  Pt and family educated on POC and goals. Pt and family verbalized understanding, answered all questions, no further needs at this time, pt resting comfortably.       Problem: Respiratory:  Goal: Respiratory status will improve    Intervention: Assess and monitor pulmonary status  Collaborating with RT and Interdisciplinary team on pt's treatment measures to improve respiratory function.

## 2019-06-02 LAB
ANION GAP SERPL CALC-SCNC: 10 MMOL/L (ref 0–11.9)
ANION GAP SERPL CALC-SCNC: 8 MMOL/L (ref 0–11.9)
BUN SERPL-MCNC: 12 MG/DL (ref 8–22)
BUN SERPL-MCNC: 13 MG/DL (ref 8–22)
CALCIUM SERPL-MCNC: 8.9 MG/DL (ref 8.5–10.5)
CALCIUM SERPL-MCNC: 9.4 MG/DL (ref 8.5–10.5)
CHLORIDE SERPL-SCNC: 103 MMOL/L (ref 96–112)
CHLORIDE SERPL-SCNC: 108 MMOL/L (ref 96–112)
CO2 SERPL-SCNC: 24 MMOL/L (ref 20–33)
CO2 SERPL-SCNC: 27 MMOL/L (ref 20–33)
CREAT SERPL-MCNC: 0.78 MG/DL (ref 0.5–1.4)
CREAT SERPL-MCNC: 0.89 MG/DL (ref 0.5–1.4)
ERYTHROCYTE [DISTWIDTH] IN BLOOD BY AUTOMATED COUNT: 44.7 FL (ref 35.9–50)
GLUCOSE SERPL-MCNC: 146 MG/DL (ref 65–99)
GLUCOSE SERPL-MCNC: 90 MG/DL (ref 65–99)
HCT VFR BLD AUTO: 37.8 % (ref 37–47)
HGB BLD-MCNC: 12.1 G/DL (ref 12–16)
MCH RBC QN AUTO: 31.2 PG (ref 27–33)
MCHC RBC AUTO-ENTMCNC: 32 G/DL (ref 33.6–35)
MCV RBC AUTO: 97.4 FL (ref 81.4–97.8)
PLATELET # BLD AUTO: 503 K/UL (ref 164–446)
PMV BLD AUTO: 8.9 FL (ref 9–12.9)
POTASSIUM SERPL-SCNC: 3.9 MMOL/L (ref 3.6–5.5)
POTASSIUM SERPL-SCNC: 4.5 MMOL/L (ref 3.6–5.5)
RBC # BLD AUTO: 3.88 M/UL (ref 4.2–5.4)
SODIUM SERPL-SCNC: 135 MMOL/L (ref 135–145)
SODIUM SERPL-SCNC: 145 MMOL/L (ref 135–145)
SP GR UR STRIP.AUTO: 1.02
WBC # BLD AUTO: 13.8 K/UL (ref 4.8–10.8)

## 2019-06-02 PROCEDURE — 700102 HCHG RX REV CODE 250 W/ 637 OVERRIDE(OP): Performed by: FAMILY MEDICINE

## 2019-06-02 PROCEDURE — 36415 COLL VENOUS BLD VENIPUNCTURE: CPT

## 2019-06-02 PROCEDURE — A9270 NON-COVERED ITEM OR SERVICE: HCPCS | Performed by: FAMILY MEDICINE

## 2019-06-02 PROCEDURE — 99232 SBSQ HOSP IP/OBS MODERATE 35: CPT | Performed by: INTERNAL MEDICINE

## 2019-06-02 PROCEDURE — 81002 URINALYSIS NONAUTO W/O SCOPE: CPT

## 2019-06-02 PROCEDURE — A9270 NON-COVERED ITEM OR SERVICE: HCPCS | Performed by: HOSPITALIST

## 2019-06-02 PROCEDURE — 700102 HCHG RX REV CODE 250 W/ 637 OVERRIDE(OP): Performed by: INTERNAL MEDICINE

## 2019-06-02 PROCEDURE — 80048 BASIC METABOLIC PNL TOTAL CA: CPT

## 2019-06-02 PROCEDURE — 85027 COMPLETE CBC AUTOMATED: CPT

## 2019-06-02 PROCEDURE — A9270 NON-COVERED ITEM OR SERVICE: HCPCS | Performed by: INTERNAL MEDICINE

## 2019-06-02 PROCEDURE — 700102 HCHG RX REV CODE 250 W/ 637 OVERRIDE(OP): Performed by: HOSPITALIST

## 2019-06-02 PROCEDURE — 770006 HCHG ROOM/CARE - MED/SURG/GYN SEMI*

## 2019-06-02 RX ADMIN — POTASSIUM CHLORIDE 20 MEQ: 1500 TABLET, EXTENDED RELEASE ORAL at 05:14

## 2019-06-02 RX ADMIN — HYDROCORTISONE 20 MG: 10 TABLET ORAL at 09:18

## 2019-06-02 RX ADMIN — OXYCODONE HYDROCHLORIDE 5 MG: 5 TABLET ORAL at 00:34

## 2019-06-02 RX ADMIN — OXYCODONE HYDROCHLORIDE 5 MG: 5 TABLET ORAL at 13:25

## 2019-06-02 RX ADMIN — FAMOTIDINE 20 MG: 20 TABLET ORAL at 17:20

## 2019-06-02 RX ADMIN — OXYCODONE HYDROCHLORIDE 5 MG: 5 TABLET ORAL at 21:36

## 2019-06-02 RX ADMIN — HYDROCORTISONE 10 MG: 10 TABLET ORAL at 14:36

## 2019-06-02 RX ADMIN — ACETAMINOPHEN 650 MG: 325 TABLET, FILM COATED ORAL at 05:14

## 2019-06-02 RX ADMIN — FAMOTIDINE 20 MG: 20 TABLET ORAL at 05:14

## 2019-06-02 RX ADMIN — OXYCODONE HYDROCHLORIDE 5 MG: 5 TABLET ORAL at 05:13

## 2019-06-02 RX ADMIN — SENNOSIDES,DOCUSATE SODIUM 2 TABLET: 8.6; 5 TABLET, FILM COATED ORAL at 17:19

## 2019-06-02 RX ADMIN — DESMOPRESSIN ACETATE 200 MCG: 0.1 TABLET ORAL at 05:14

## 2019-06-02 RX ADMIN — ACETAMINOPHEN 650 MG: 325 TABLET, FILM COATED ORAL at 21:36

## 2019-06-02 ASSESSMENT — ENCOUNTER SYMPTOMS
VOMITING: 0
TINGLING: 0
CONSTIPATION: 0
SHORTNESS OF BREATH: 0
FOCAL WEAKNESS: 0
FEVER: 0
CHILLS: 0
ABDOMINAL PAIN: 0
DOUBLE VISION: 0
SPEECH CHANGE: 0
HEADACHES: 0
DIZZINESS: 0
TREMORS: 0
WEAKNESS: 0
PALPITATIONS: 0
BLURRED VISION: 1
DIARRHEA: 0
MYALGIAS: 0
SENSORY CHANGE: 0
NAUSEA: 0

## 2019-06-02 NOTE — PROGRESS NOTES
Pt arrived to floor at 1825.  Certified interpretor used in order to communicate with pt as pt speaks only Ma[place].  Pt has no complaints and denies any needs except for wanting dinner; kitchen called for tray.

## 2019-06-02 NOTE — PROGRESS NOTES
Report called to receiving AGNES Maxwell. Pt will be transported to [facility]-. Family updated. All patients belongings accounted for.

## 2019-06-02 NOTE — PROGRESS NOTES
Pt A&Ox4, [place]e speaking only and using  phone today for assessment, updates and questions. Neuro assessment intact w/o abnormality aside from blurry vision which she reports is much improved. BP in AM was lower but pt appears to have this trend - NP notified. Reports pain in head, PRN meds given and pt reports ease of pain. No BM today, education done about bowel program and wanting pt to have regular stool with the pain meds she is receiving, pt now understand and agreeable to take stool softeners she previously had declined - BTs still active. Pt having good urine output, hat in toilet had been tossed at some point so education done on notifying staff of urination in hat so we can monitor output. Staples to head are C/D/I, steri strips to thigh intact. Pt up independently, steady on feet. Calling appropriately to make needs known, hourly rounding in place.

## 2019-06-02 NOTE — CARE PLAN
Problem: Communication  Goal: The ability to communicate needs accurately and effectively will improve  Outcome: PROGRESSING AS EXPECTED  Using phone  for communication through shift for English interpretation. Pt understanding of call light and able to make needs known    Problem: Pain Management  Goal: Pain level will decrease to patient's comfort goal  Outcome: PROGRESSING AS EXPECTED  Pt denies pain this shift, able to complete all ADLs independently

## 2019-06-02 NOTE — CARE PLAN
Problem: Communication  Goal: The ability to communicate needs accurately and effectively will improve  Outcome: PROGRESSING AS EXPECTED    Intervention: Educate patient and significant other/support system about the plan of care, procedures, treatments, medications and allow for questions  Discussed plan of care for duration of shift, pt and family acknowledge understanding.      Problem: Pain Management  Goal: Pain level will decrease to patient's comfort goal  Outcome: PROGRESSING AS EXPECTED  Pain managed per MAR

## 2019-06-02 NOTE — PROGRESS NOTES
Patient A/Ox4 and is ambulatory, will discuss having SCD order discontinued. Complaints of pain managed per MAR. Pt was able to shower during shift. R eye appears to be red and pt states that she has blurred vision. RN educated pt about not rubbing her eye with her fist which can cause further irritation. Staples in tact in patient head. Incision on R upper thigh intact with dermabond and closure strips. No signs of distress noted.

## 2019-06-02 NOTE — PROGRESS NOTES
Patient refusing SCD's at this time. Pt is A/Ox4 and is frequently ambulatory. Will discuss with day RN to have SCD order discontinued. Charge notified.

## 2019-06-02 NOTE — PROGRESS NOTES
Hospital Medicine Daily Progress Note    Date of Service  6/2/2019    Chief Complaint  38 y.o. female admitted 5/30/2019 from [facility] after having a suprasellar mass removed    Hospital Course    38 y.o. female who presented 5/30/2019 with history of suprasellar/sellar mass. She was initially admitted here on 5/14/2019 for fever and myalgias.  Since October she had been having symptoms of headache, nausea, myalgia, and amenorrhea.  She had also developed galactorrhea.  CT scan of the head and MRI showed a suprasellar/sellar mass at that time, and Neurosurgery was consulted, but it was felt that she needed transfer to [facility].  [facility] performed an endoscopic endonasal approach and resection of the suprasellar craniopharyngioma with reconstruction with duragen, right nasoseptal flap.  She had also developed hydrocephalus for which an EVD was placed and eventually removed.  She also developed hypotension, hyponatremia, DI, and adrenal insufficiency postoperatively.  She was placed on Cortef and DDAVP.  She remains on Cortef although reduced dose of 20 mg in the morning and 10 mg at night.  They have recommended to continue DDAVP, continue checking serial BMPs.  She was transferred back to [facility] for further care.         Interval Problem Update  6/2:  Reports further improvement in blurry vision.  Denies pain or discomfort.  Anxious to get out of the hospital.  Na 145 this am.  VSS.      Consultants/Specialty  Intensivist s/o    Code Status  FULL    Disposition  Transferred from ICU, will need to establish with endo and PCP here, possibly ENT and NSG?    [facility] had made her appointments for this coming week but sent her back to [facility]; she had no idea of these appointments, nor does she have any way of getting down there for them.    CM following.      Review of Systems  Review of Systems   Constitutional: Negative for chills, fever and malaise/fatigue.             HENT: Negative for congestion.    Eyes:  Positive for blurred vision (much improved). Negative for double vision.   Respiratory: Negative for shortness of breath.    Cardiovascular: Negative for chest pain, palpitations and leg swelling.   Gastrointestinal: Negative for abdominal pain, constipation, diarrhea, nausea and vomiting.   Genitourinary: Negative for dysuria and urgency.   Musculoskeletal: Negative for joint pain and myalgias.   Skin: Negative for itching and rash.   Neurological: Negative for dizziness, tingling, tremors, sensory change, speech change, focal weakness, weakness and headaches.        Physical Exam  Temp:  [36.3 °C (97.3 °F)-37 °C (98.6 °F)] 36.3 °C (97.3 °F)  Pulse:  [53-86] 73  Resp:  [16-20] 16  BP: ()/(59-75) 98/59  SpO2:  [93 %-100 %] 99 %    Physical Exam   Constitutional: She is oriented to person, place, and time. She appears well-developed and well-nourished. No distress.   HENT:   Head: Normocephalic and atraumatic.   Mouth/Throat: No oropharyngeal exudate.   Congested nasal passages; no drainage   Eyes: Conjunctivae are normal. Right eye exhibits no discharge. Left eye exhibits no discharge.   Neck: Normal range of motion. No tracheal deviation present.   Cardiovascular: Normal rate, regular rhythm and normal heart sounds.  Exam reveals no friction rub.    No murmur heard.  Pulmonary/Chest: Effort normal and breath sounds normal. No stridor. No respiratory distress. She has no wheezes.   Abdominal: Soft. Bowel sounds are normal. She exhibits no distension. There is no tenderness.   Musculoskeletal: Normal range of motion. She exhibits no edema.   Neurological: She is alert and oriented to person, place, and time. No cranial nerve deficit.   Skin: Skin is warm and dry. She is not diaphoretic. No erythema.   Psychiatric: She has a normal mood and affect.   Nursing note and vitals reviewed.      Fluids    Intake/Output Summary (Last 24 hours) at 06/02/19 0958  Last data filed at 06/02/19 0900   Gross per 24 hour    Intake              600 ml   Output              700 ml   Net             -100 ml       Laboratory  Recent Labs      05/31/19   0530  06/01/19   0509  06/02/19   0236   WBC  14.3*  12.6*  13.8*   RBC  3.36*  3.37*  3.88*   HEMOGLOBIN  10.6*  10.7*  12.1   HEMATOCRIT  32.7*  32.3*  37.8   MCV  97.3  95.8  97.4   MCH  31.5  31.8  31.2   MCHC  32.4*  33.1*  32.0*   RDW  44.6  45.1  44.7   PLATELETCT  397  349  503*   MPV  9.1  9.8  8.9*     Recent Labs      06/01/19   0640  06/01/19   1355  06/02/19   0236   SODIUM  135  132*  145   POTASSIUM  3.5*  4.4  3.9   CHLORIDE  102  101  108   CO2  26  25  27   GLUCOSE  89  114*  90   BUN  11  10  12   CREATININE  0.70  0.77  0.78   CALCIUM  8.6  9.0  9.4                   Imaging  IR-MIDLINE CATHETER INSERTION WO GUIDANCE > AGE 3   Final Result                  Ultrasound-guided midline placement performed by qualified nursing staff    as above.               Assessment/Plan  * Diabetes insipidus (HCC)- (present on admission)   Assessment & Plan    Continue DDAVP at .2 mg BID  Monitor urine output closely  Will check SPGR.       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  The problems is that she had no knowledge of these appointments and has no way to get all the way back down to [place].  We will need to get her established here before dc.    Note from [facility] Encocrinologist:  Impression:  39 yo F with no reported medical history who was transferred from [facility] to [facility] to undergo neurosurgical  intervention for newly detected 3.2 x 2.6 x 2.6 cm suprasellar mass c/w craniopharyngioma. 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 Endocrine is consulted for post-TSS management.     She had radiographic evidence of both compression of the optic apparatus and mass effect on the pituitary itself.  Preoperative lab work notable for hyperprolactinemia to PRL 75.8 and suppressed gonadotropins. Hyperprolactinemia was most likely due to stalk effect, given expected stalk compression with a lesion of this size. She also had evidence of ADH deficit given intraoperative DI, which will now be furthered by stalk transection.     Recommendations:  We will follow closely with the primary team for assessment of hypopituitarism following resection.      # Adrenal Axis: She received 100mg IV HC and 10mg IV dexamethasone intraoperatively. HC taper in progress. Currently hemodynamically stable.  -Please decrease hydrocortisone to 20mg qAM and 10mg qPM starting tomorrow (5/29), with plan to maintain this dose.      # Sodium Balance: She received 2mcg IV DDAVP intraoperatively in the setting of diabetes insipidus. Following transection of pituitary stalk, would anticipate further DI and a likely permanent DDAVP requirement going forward. Since she started drinking to thirst on 5/26, she has been able to maintain near-euntaremia in the setting of her DI. That being said, given her large, dilute UOP from her DI, standing DDAVP would likely help improve her quality of life going forward.  -Please increase standing PO DDAVP to 0.2mg BID, starting tonight. Hold dose if Na is <135.    The patient is at risk of diabetes insipidus as well as SIADH. Please monitor for central DI as follows:   - check serum Na and urine specific gravity simultaneously q6h  - please monitor q1h urine output - if UOP >250cc/h for >2 hours and urine is dilute with SPGR >1.004,  and/or patient is hypernatremic, may consider giving DDAVP 0.1 mg po x 1 and monitor for response  - please feel free to call the endocrine fellow on call to discuss  - please do not give DDAVP if Na <135     # Thyroid Function:   - we will reassess FT4 as an outpatient      # Gonadal Axis: Suppression of her gonadal axis was likely responsible for her preoperative irregular menses.  - we will reassess as an outpatient     # Growth Hormone: Pre-op IGF-1 level drawn and pending.  - we will reassess IGF-1 as an outpatient    Summary of Transfer Recommendations:  -Patient should be maintained on HC 20mg qAM and 10mg qPM starting 5/29.  -She should be on standing PO DDAVP 0.2mg BID  -She should continue to drink to thirst.  -Would recommend continuing to check q6h serum Na and urine SpGr for now. Once she demonstrates stability, can space out lab checks.  -As she is scheduled for NSGY follow-up on 6/7, we will see if we can schedule her for Endocrine follow-up with Dr. Patel or Dr. Ledesma during the same visit. Ultimately, she should establish longterm endocrine follow-up closer to her home in [place]. She should ask her PCP for a local endocrinology referral.       Laura Brasher MD - 05/28/2019 11:18 AM PDT    -Will attempt to establish local follow up prior to discharge.     Hyponatremia- (present on admission)   Assessment & Plan    Improved this am  Na 145  Continue DDAVP at 0.2 mg      Hypokalemia- (present on admission)   Assessment & Plan    Replaced  Resolved.     Low TSH level- (present on admission)   Assessment & Plan    Likely secondary to steroid  Normal FT4 and FT3     Leukocytosis- (present on admission)   Assessment & Plan    Likely secondary to steroid  No evidence of infection.     Hyperglycemia- (present on admission)   Assessment & Plan    Resolved, no coverage needed; dc'd ssi     Constipation- (present on admission)   Assessment & Plan    Resolved     Blurry vision, bilateral- (present on  admission)   Assessment & Plan    Almost completely resolved          VTE prophylaxis: SCDs.  Ambulatory.

## 2019-06-03 VITALS
RESPIRATION RATE: 18 BRPM | TEMPERATURE: 97.7 F | SYSTOLIC BLOOD PRESSURE: 101 MMHG | HEART RATE: 74 BPM | BODY MASS INDEX: 35.1 KG/M2 | HEIGHT: 60 IN | WEIGHT: 178.79 LBS | OXYGEN SATURATION: 100 % | DIASTOLIC BLOOD PRESSURE: 71 MMHG

## 2019-06-03 PROBLEM — R73.9 HYPERGLYCEMIA: Status: RESOLVED | Noted: 2019-05-14 | Resolved: 2019-06-03

## 2019-06-03 PROBLEM — E87.6 HYPOKALEMIA: Status: RESOLVED | Noted: 2019-05-30 | Resolved: 2019-06-03

## 2019-06-03 PROBLEM — K59.00 CONSTIPATION: Status: RESOLVED | Noted: 2019-05-31 | Resolved: 2019-06-03

## 2019-06-03 LAB
ANION GAP SERPL CALC-SCNC: 4 MMOL/L (ref 0–11.9)
BUN SERPL-MCNC: 10 MG/DL (ref 8–22)
CALCIUM SERPL-MCNC: 9.5 MG/DL (ref 8.5–10.5)
CHLORIDE SERPL-SCNC: 106 MMOL/L (ref 96–112)
CO2 SERPL-SCNC: 29 MMOL/L (ref 20–33)
CREAT SERPL-MCNC: 0.84 MG/DL (ref 0.5–1.4)
GLUCOSE SERPL-MCNC: 93 MG/DL (ref 65–99)
POTASSIUM SERPL-SCNC: 3.9 MMOL/L (ref 3.6–5.5)
SODIUM SERPL-SCNC: 139 MMOL/L (ref 135–145)

## 2019-06-03 PROCEDURE — 700102 HCHG RX REV CODE 250 W/ 637 OVERRIDE(OP): Performed by: HOSPITALIST

## 2019-06-03 PROCEDURE — A9270 NON-COVERED ITEM OR SERVICE: HCPCS | Performed by: FAMILY MEDICINE

## 2019-06-03 PROCEDURE — A9270 NON-COVERED ITEM OR SERVICE: HCPCS | Performed by: HOSPITALIST

## 2019-06-03 PROCEDURE — 700102 HCHG RX REV CODE 250 W/ 637 OVERRIDE(OP): Performed by: FAMILY MEDICINE

## 2019-06-03 PROCEDURE — 99239 HOSP IP/OBS DSCHRG MGMT >30: CPT | Performed by: INTERNAL MEDICINE

## 2019-06-03 PROCEDURE — 36415 COLL VENOUS BLD VENIPUNCTURE: CPT

## 2019-06-03 PROCEDURE — 80048 BASIC METABOLIC PNL TOTAL CA: CPT

## 2019-06-03 PROCEDURE — 700102 HCHG RX REV CODE 250 W/ 637 OVERRIDE(OP): Performed by: INTERNAL MEDICINE

## 2019-06-03 PROCEDURE — A9270 NON-COVERED ITEM OR SERVICE: HCPCS | Performed by: INTERNAL MEDICINE

## 2019-06-03 RX ORDER — ECHINACEA PURPUREA EXTRACT 125 MG
2 TABLET ORAL
Qty: 1 BOTTLE | Refills: 3 | Status: SHIPPED | OUTPATIENT
Start: 2019-06-03 | End: 2020-10-09

## 2019-06-03 RX ORDER — DESMOPRESSIN ACETATE 0.1 MG/1
0.2 TABLET ORAL EVERY 12 HOURS
Qty: 60 TAB | Refills: 2 | Status: ON HOLD | OUTPATIENT
Start: 2019-06-03 | End: 2021-02-05

## 2019-06-03 RX ORDER — HYDROCORTISONE 10 MG/1
10 TABLET ORAL DAILY
Qty: 30 TAB | Refills: 11 | Status: SHIPPED | OUTPATIENT
Start: 2019-06-03 | End: 2020-06-02

## 2019-06-03 RX ORDER — HYDROCORTISONE 20 MG/1
20 TABLET ORAL EVERY MORNING
Qty: 30 TAB | Refills: 11 | Status: SHIPPED | OUTPATIENT
Start: 2019-06-03 | End: 2020-06-02

## 2019-06-03 RX ADMIN — HYDROCORTISONE 20 MG: 10 TABLET ORAL at 08:00

## 2019-06-03 RX ADMIN — FAMOTIDINE 20 MG: 20 TABLET ORAL at 05:32

## 2019-06-03 RX ADMIN — POTASSIUM CHLORIDE 20 MEQ: 1500 TABLET, EXTENDED RELEASE ORAL at 05:32

## 2019-06-03 RX ADMIN — DESMOPRESSIN ACETATE 200 MCG: 0.1 TABLET ORAL at 05:32

## 2019-06-03 RX ADMIN — HYDROCORTISONE 10 MG: 10 TABLET ORAL at 14:54

## 2019-06-03 RX ADMIN — SENNOSIDES,DOCUSATE SODIUM 2 TABLET: 8.6; 5 TABLET, FILM COATED ORAL at 05:32

## 2019-06-03 NOTE — PROGRESS NOTES
Discharge instructions given to patient via  as well as with Jane her roommate present and all questions answered. Midline removed. Pt understanding of discharge plan and follow-ups. Pt leaves w/ Jane in personal vehicle and able to ambulate out independently, declines need for escort out. AVS given and copy signed in chart.

## 2019-06-03 NOTE — DISCHARGE INSTRUCTIONS
Discharge Instructions    Discharged to home by car with friend/roommate. Discharged via walking, hospital escort: Refused.  Special equipment needed: Not Applicable    Be sure to schedule a follow-up appointment with your primary care doctor or any specialists as instructed.     Discharge Plan:   Diet Plan: Discussed  Activity Level: Discussed  Confirmed Follow up Appointment: No (Comments) (PCP apt scheduled for next month, pt should receive call for specialty apt in next few days)  Confirmed Symptoms Management: Discussed  Medication Reconciliation Updated: Yes  Influenza Vaccine Indication: Patient Refuses    I understand that a diet low in cholesterol, fat, and sodium is recommended for good health. Unless I have been given specific instructions below for another diet, I accept this instruction as my diet prescription.   Other diet: Regular    Special Instructions: None    · Is patient discharged on Warfarin / Coumadin?   No     Depression / Suicide Risk    As you are discharged from this [facility] facility, it is important to learn how to keep safe from harming yourself.    Recognize the warning signs:  · Abrupt changes in personality, positive or negative- including increase in energy   · Giving away possessions  · Change in eating patterns- significant weight changes-  positive or negative  · Change in sleeping patterns- unable to sleep or sleeping all the time   · Unwillingness or inability to communicate  · Depression  · Unusual sadness, discouragement and loneliness  · Talk of wanting to die  · Neglect of personal appearance   · Rebelliousness- reckless behavior  · Withdrawal from people/activities they love  · Confusion- inability to concentrate     If you or a loved one observes any of these behaviors or has concerns about self-harm, here's what you can do:  · Talk about it- your feelings and reasons for harming yourself  · Remove any means that you might use to hurt yourself (examples: pills, rope,  extension cords, firearm)  · Get professional help from the community (Mental Health, Substance Abuse, psychological counseling)  · Do not be alone:Call your Safe Contact- someone whom you trust who will be there for you.  · Call your local CRISIS HOTLINE 312-5397 or 001-179-9792  · Call your local Children's Mobile Crisis Response Team [place] (479) 071-5616 or www.[facility]  · Call the toll free National Suicide Prevention Hotlines   · National Suicide Prevention Lifeline 750-922-ISAW (1774)  · National Hope Line Network 800-SUICIDE (780-0766)

## 2019-06-03 NOTE — CARE PLAN
Problem: Safety  Goal: Will remain free from injury  Outcome: PROGRESSING AS EXPECTED  Reinforced w/ patient to inform staff of any changes in vision or strength, sit at edge of bed before ambulating, pt understanding    Problem: Infection  Goal: Will remain free from infection  Outcome: PROGRESSING AS EXPECTED  R lateral thigh and head staples appear healing, clean and dry w/ no redness or swelling

## 2019-06-03 NOTE — PROGRESS NOTES
Patient A/Ox4. Continues to complain of headache. PRN given as well as heat pack since pt refused ice pack. Staples in head c/d/i. Dermabond and steri strips intact on R lateral thigh. Pt also complaining that her midline insertion site is starting to bother her. Bed in low and locked position. Hourly rounding done.

## 2019-06-03 NOTE — CARE PLAN
Problem: Communication  Goal: The ability to communicate needs accurately and effectively will improve  Outcome: PROGRESSING AS EXPECTED    Intervention: Educate patient and significant other/support system about the plan of care, procedures, treatments, medications and allow for questions  Discussed plan of care for duration of shift, pt acknowledges understanding. All questions answered. Needs met.      Problem: Pain Management  Goal: Pain level will decrease to patient's comfort goal  Outcome: PROGRESSING AS EXPECTED  Patient complaints of pain being managed per MAR and with heat packs.

## 2019-06-03 NOTE — DISCHARGE SUMMARY
Discharge Summary    CHIEF COMPLAINT ON ADMISSION  No chief complaint on file.      Reason for Admission  s/p suprosellar mass resection     Admission Date  5/30/2019    CODE STATUS  Full Code    HPI & HOSPITAL COURSE  This is a 38 y.o. 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  She had originally presented to [facility] on 5/14/2019 for headache, nausea, myalgia, amenorrhea, and galactorrhea when workup showed suprasellar mass on MRI.  She was transferred to [place] for the above procedure.      The patient developed post operative hydrocephalus requiring temporary EVD which was removed.  Since her procedure the patient has had resulting diabetes insipidus, adrenal insufficiency, hypotension, and hyponatremia.  She was evaluated by endocrinology at [place] [facility] and was initiated on DDAVP and Cortef for management of her DI and adrenal insufficiency.  The patient was then transferred back to [facility] for further management.  En route she became hypotensive requiring fluid boluses.  After admission she was restarted on DDAVP and Cortef with resulting stabilization of her blood pressure.  She is currently on Cortef 20 mg in the morning and 10 mg in the evening per recommendation of [place] endocrinology.  Her fluid status and sodium have remained stable on DDAVP 200 mcg twice daily.  She is anticipated to require DDAVP chronically.      She is currently at her mental and physical baseline.  She has maintained stable vital signs.  Her acute needs have resolved.  Dr. Cowan with [place] [facility] endocrinology recommends establishing with follow up in [place] as this is where the patient resides and she lacks transport to [place].  I discussed the case with Dr. Hammer who has agreed to see her as an outpatient.  A follow up appointment with [place] endocrinology has been made for 8/9/2019 and she  was been given information to use her insurance to establish transportation.  The patient has also been provided with a primary care appointment and will need to establish with local outpatient neurosurgery.  At this time she is safe for discharge and is recommended to maintain close outpatient follow up.      Therefore, she is discharged in good and stable condition to home with close outpatient follow-up.    The patient met 2-midnight criteria for an inpatient stay at the time of discharge.    Discharge Date  6/3/2019    DISCHARGE DIAGNOSES  Principal Problem:    Diabetes insipidus (HCC) POA: Yes  Active Problems:    Hyponatremia POA: Yes    Craniopharyngioma (HCC) POA: Yes    Adrenal insufficiency (HCC) POA: Yes    Leukocytosis POA: Yes    Low TSH level POA: Yes    Blurry vision, bilateral POA: Yes  Resolved Problems:    Hyperglycemia POA: Yes    Hypokalemia POA: Yes    Constipation POA: Yes      FOLLOW UP  Future Appointments  Date Time Provider Department Center   7/22/2019 3:00 PM Nova Pond M.D. [facility] None     Martínez Hammer M.D.  [address] [place] [id number]  [phone]      Se chan enviado kane referencia a endocrinología. La oficina lo llamará directamente para programar kane ana maria. Si no recibe kane llamada dentro de los 2 días, llame para concertar kane ana maria.    [address] [id number] [phone]    Si desea programar kane ana maria antes con un proveedor de atención primaria, llame a esta oficina para programar kane ana maria.    [facility]  [phone]    Si desea programar kane ana maria antes con un proveedor de atención primaria, llame a esta oficina para programar kane ana maria.      MEDICATIONS ON DISCHARGE     Medication List      START taking these medications      Instructions   desmopressin 0.1 MG Tabs  Commonly known as:  DDAVP   Take 2 Tabs by mouth every 12 hours.  Dose:  0.2 mg     sodium chloride 0.65 %  Soln  Commonly known as:  OCEAN   Spray 2 Sprays in nose every 2 hours as needed.  Dose:  2 Spray        CONTINUE taking these medications      Instructions   acetaminophen 325 MG Tabs  Commonly known as:  TYLENOL   Take 650 mg by mouth every 6 hours as needed.  Dose:  650 mg     docusate sodium 100 MG Caps  Commonly known as:  COLACE   Take 100 mg by mouth 2 Times a Day.  Dose:  100 mg     famotidine 20 MG Tabs  Commonly known as:  PEPCID   Take 20 mg by mouth 2 Times a Day.  Dose:  20 mg     * HUMALOG 100 UNIT/ML Soln  Generic drug:  insulin lispro   0-8 Units by Subdermal route 3 times a day, with meals.  Dose:  0-8 Units     * HUMALOG 100 UNIT/ML Soln  Generic drug:  insulin lispro   0-5 Units by Subdermal route every bedtime.  Dose:  0-5 Units     HYDROcodone-acetaminophen 5-325 MG Tabs per tablet  Commonly known as:  NORCO   Take 1-2 tablet by mouth every four hours as needed.  Dose:  1-2 tablet     * hydrocortisone 10 MG Tabs  Commonly known as:  CORTEF   Take 1 Tab by mouth every day. At 1500  Dose:  10 mg     * hydrocortisone 20 MG Tabs  Commonly known as:  CORTEF   Take 1 Tab by mouth every morning. Take at 0800  Dose:  20 mg     ibuprofen 200 MG Tabs  Commonly known as:  MOTRIN   Take 400 mg by mouth 1 time daily as needed for Headache.  Dose:  400 mg     melatonin 3 MG Tabs   Take 9 mg by mouth every bedtime.  Dose:  9 mg        * This list has 4 medication(s) that are the same as other medications prescribed for you. Read the directions carefully, and ask your doctor or other care provider to review them with you.                Allergies  Allergies   Allergen Reactions   • Pork Allergy        DIET  Orders Placed This Encounter   Procedures   • Diet Order Regular     Standing Status:   Standing     Number of Occurrences:   1     Order Specific Question:   Diet:     Answer:   Regular [1]       ACTIVITY  As tolerated.  Weight bearing as tolerated    CONSULTATIONS  Pulmonary - Dr. Jensen     LABORATORY  Lab  Results   Component Value Date    SODIUM 139 06/03/2019    POTASSIUM 3.9 06/03/2019    CHLORIDE 106 06/03/2019    CO2 29 06/03/2019    GLUCOSE 93 06/03/2019    BUN 10 06/03/2019    CREATININE 0.84 06/03/2019        Lab Results   Component Value Date    WBC 13.8 (H) 06/02/2019    HEMOGLOBIN 12.1 06/02/2019    HEMATOCRIT 37.8 06/02/2019    PLATELETCT 503 (H) 06/02/2019        Total time of the discharge process was 46 minutes.

## 2019-06-11 ENCOUNTER — HOSPITAL ENCOUNTER (OUTPATIENT)
Dept: RADIOLOGY | Facility: MEDICAL CENTER | Age: 39
End: 2019-06-11

## 2019-08-08 ENCOUNTER — HOSPITAL ENCOUNTER (OUTPATIENT)
Dept: RADIOLOGY | Facility: MEDICAL CENTER | Age: 39
End: 2019-08-08
Attending: PHYSICIAN ASSISTANT
Payer: COMMERCIAL

## 2019-08-08 DIAGNOSIS — D44.3 NEOPLASM OF UNCERTAIN BEHAVIOR OF PITUITARY GLAND AND CRANIOPHARYNGEAL DUCT (HCC): ICD-10-CM

## 2019-08-08 DIAGNOSIS — D44.4 NEOPLASM OF UNCERTAIN BEHAVIOR OF PITUITARY GLAND AND CRANIOPHARYNGEAL DUCT (HCC): ICD-10-CM

## 2019-08-08 PROCEDURE — 700117 HCHG RX CONTRAST REV CODE 255: Performed by: PHYSICIAN ASSISTANT

## 2019-08-08 PROCEDURE — A9585 GADOBUTROL INJECTION: HCPCS | Performed by: PHYSICIAN ASSISTANT

## 2019-08-08 PROCEDURE — 70553 MRI BRAIN STEM W/O & W/DYE: CPT

## 2019-08-08 RX ORDER — GADOBUTROL 604.72 MG/ML
10 INJECTION INTRAVENOUS ONCE
Status: COMPLETED | OUTPATIENT
Start: 2019-08-08 | End: 2019-08-08

## 2019-08-08 RX ADMIN — GADOBUTROL 10 ML: 604.72 INJECTION INTRAVENOUS at 14:30

## 2020-02-10 ENCOUNTER — HOSPITAL ENCOUNTER (EMERGENCY)
Facility: MEDICAL CENTER | Age: 40
End: 2020-02-10
Attending: EMERGENCY MEDICINE
Payer: COMMERCIAL

## 2020-02-10 ENCOUNTER — APPOINTMENT (OUTPATIENT)
Dept: RADIOLOGY | Facility: MEDICAL CENTER | Age: 40
End: 2020-02-10
Attending: EMERGENCY MEDICINE
Payer: COMMERCIAL

## 2020-02-10 VITALS
RESPIRATION RATE: 24 BRPM | HEIGHT: 58 IN | TEMPERATURE: 99.7 F | DIASTOLIC BLOOD PRESSURE: 57 MMHG | HEART RATE: 101 BPM | SYSTOLIC BLOOD PRESSURE: 111 MMHG | BODY MASS INDEX: 39.2 KG/M2 | WEIGHT: 186.73 LBS | OXYGEN SATURATION: 94 %

## 2020-02-10 DIAGNOSIS — J10.1 INFLUENZA A: ICD-10-CM

## 2020-02-10 LAB
ALBUMIN SERPL BCP-MCNC: 4 G/DL (ref 3.2–4.9)
ALBUMIN/GLOB SERPL: 1.4 G/DL
ALP SERPL-CCNC: 96 U/L (ref 30–99)
ALT SERPL-CCNC: 67 U/L (ref 2–50)
ANION GAP SERPL CALC-SCNC: 8 MMOL/L (ref 0–11.9)
APPEARANCE UR: CLEAR
AST SERPL-CCNC: 62 U/L (ref 12–45)
BACTERIA #/AREA URNS HPF: NEGATIVE /HPF
BASOPHILS # BLD AUTO: 0.4 % (ref 0–1.8)
BASOPHILS # BLD: 0.03 K/UL (ref 0–0.12)
BILIRUB SERPL-MCNC: 0.6 MG/DL (ref 0.1–1.5)
BILIRUB UR QL STRIP.AUTO: NEGATIVE
BUN SERPL-MCNC: 7 MG/DL (ref 8–22)
CALCIUM SERPL-MCNC: 8.8 MG/DL (ref 8.5–10.5)
CHLORIDE SERPL-SCNC: 111 MMOL/L (ref 96–112)
CO2 SERPL-SCNC: 23 MMOL/L (ref 20–33)
COLOR UR: YELLOW
CREAT SERPL-MCNC: 0.93 MG/DL (ref 0.5–1.4)
EOSINOPHIL # BLD AUTO: 0.06 K/UL (ref 0–0.51)
EOSINOPHIL NFR BLD: 0.8 % (ref 0–6.9)
EPI CELLS #/AREA URNS HPF: NEGATIVE /HPF
ERYTHROCYTE [DISTWIDTH] IN BLOOD BY AUTOMATED COUNT: 48.7 FL (ref 35.9–50)
FLUAV RNA SPEC QL NAA+PROBE: POSITIVE
FLUBV RNA SPEC QL NAA+PROBE: NEGATIVE
GLOBULIN SER CALC-MCNC: 2.9 G/DL (ref 1.9–3.5)
GLUCOSE SERPL-MCNC: 116 MG/DL (ref 65–99)
GLUCOSE UR STRIP.AUTO-MCNC: NEGATIVE MG/DL
HCT VFR BLD AUTO: 45.1 % (ref 37–47)
HGB BLD-MCNC: 15.1 G/DL (ref 12–16)
HYALINE CASTS #/AREA URNS LPF: NORMAL /LPF
IMM GRANULOCYTES # BLD AUTO: 0.02 K/UL (ref 0–0.11)
IMM GRANULOCYTES NFR BLD AUTO: 0.3 % (ref 0–0.9)
KETONES UR STRIP.AUTO-MCNC: NEGATIVE MG/DL
LACTATE BLD-SCNC: 1.6 MMOL/L (ref 0.5–2)
LEUKOCYTE ESTERASE UR QL STRIP.AUTO: ABNORMAL
LYMPHOCYTES # BLD AUTO: 1.56 K/UL (ref 1–4.8)
LYMPHOCYTES NFR BLD: 22 % (ref 22–41)
MCH RBC QN AUTO: 31.6 PG (ref 27–33)
MCHC RBC AUTO-ENTMCNC: 33.5 G/DL (ref 33.6–35)
MCV RBC AUTO: 94.4 FL (ref 81.4–97.8)
MICRO URNS: ABNORMAL
MONOCYTES # BLD AUTO: 0.71 K/UL (ref 0–0.85)
MONOCYTES NFR BLD AUTO: 10 % (ref 0–13.4)
NEUTROPHILS # BLD AUTO: 4.71 K/UL (ref 2–7.15)
NEUTROPHILS NFR BLD: 66.5 % (ref 44–72)
NITRITE UR QL STRIP.AUTO: NEGATIVE
NRBC # BLD AUTO: 0 K/UL
NRBC BLD-RTO: 0 /100 WBC
PH UR STRIP.AUTO: 8 [PH] (ref 5–8)
PLATELET # BLD AUTO: 206 K/UL (ref 164–446)
PMV BLD AUTO: 10.1 FL (ref 9–12.9)
POTASSIUM SERPL-SCNC: 3.9 MMOL/L (ref 3.6–5.5)
PROT SERPL-MCNC: 6.9 G/DL (ref 6–8.2)
PROT UR QL STRIP: NEGATIVE MG/DL
RBC # BLD AUTO: 4.78 M/UL (ref 4.2–5.4)
RBC # URNS HPF: NORMAL /HPF
RBC UR QL AUTO: NEGATIVE
SODIUM SERPL-SCNC: 142 MMOL/L (ref 135–145)
SP GR UR STRIP.AUTO: 1
UROBILINOGEN UR STRIP.AUTO-MCNC: 0.2 MG/DL
WBC # BLD AUTO: 7.1 K/UL (ref 4.8–10.8)
WBC #/AREA URNS HPF: NORMAL /HPF

## 2020-02-10 PROCEDURE — 70450 CT HEAD/BRAIN W/O DYE: CPT

## 2020-02-10 PROCEDURE — 87502 INFLUENZA DNA AMP PROBE: CPT

## 2020-02-10 PROCEDURE — 99284 EMERGENCY DEPT VISIT MOD MDM: CPT

## 2020-02-10 PROCEDURE — 83605 ASSAY OF LACTIC ACID: CPT

## 2020-02-10 PROCEDURE — 87040 BLOOD CULTURE FOR BACTERIA: CPT

## 2020-02-10 PROCEDURE — 700111 HCHG RX REV CODE 636 W/ 250 OVERRIDE (IP): Performed by: EMERGENCY MEDICINE

## 2020-02-10 PROCEDURE — 81001 URINALYSIS AUTO W/SCOPE: CPT

## 2020-02-10 PROCEDURE — 80053 COMPREHEN METABOLIC PANEL: CPT

## 2020-02-10 PROCEDURE — 85025 COMPLETE CBC W/AUTO DIFF WBC: CPT

## 2020-02-10 PROCEDURE — 87086 URINE CULTURE/COLONY COUNT: CPT

## 2020-02-10 PROCEDURE — 96374 THER/PROPH/DIAG INJ IV PUSH: CPT

## 2020-02-10 PROCEDURE — 71045 X-RAY EXAM CHEST 1 VIEW: CPT

## 2020-02-10 RX ORDER — OSELTAMIVIR PHOSPHATE 75 MG/1
75 CAPSULE ORAL 2 TIMES DAILY
Qty: 10 CAP | Refills: 0 | Status: SHIPPED | OUTPATIENT
Start: 2020-02-10 | End: 2020-02-15

## 2020-02-10 RX ADMIN — HYDROCORTISONE SODIUM SUCCINATE 200 MG: 100 INJECTION, POWDER, FOR SOLUTION INTRAMUSCULAR; INTRAVENOUS at 11:11

## 2020-02-10 ASSESSMENT — PAIN SCALES - WONG BAKER: WONGBAKER_NUMERICALRESPONSE: HURTS A LITTLE MORE

## 2020-02-10 NOTE — ED TRIAGE NOTES
Chief Complaint   Patient presents with   • Cough     productive cough x 2 weeks.    • Head Ache     x 2 weeks. concerned d/t had brain surgery-tumor removal 2019. denies taking blood thinner.    • Generalized Body Aches     x 2 weeks     Albanian speaking only, used ipad  #[id number]. No recent fall for the last 48 hrs. AAOX4. Neuro intact.

## 2020-02-10 NOTE — ED NOTES
Lab called with critical result of Flu A  at 1218. Critical lab result read back to.   notified of critical lab result at 1218.

## 2020-02-10 NOTE — ED NOTES
Patient ready for discharge as per provider. Pt has no complaints. Lines/tubes, and monitoring devices disconnected. Pt has no complaints and is in no acute distress. Instructions given to patient, and patient understands instructions. Ambulatory out of department.

## 2020-02-10 NOTE — DISCHARGE INSTRUCTIONS
Gripe en el adulto  (Influenza, Adult)  No tamiflu contra influenza y doble el dosis de hydrocortison (Cortef) 5 rey.  No 2 20mg tabletas cada manana y 2 10mg tabletas cada noche.  No ibuprofen 600 mg cada 6 horas 2-3 rey y la medicina de la receta contra dolor y tos.  Descansa, no muchos liquidos y no trabaja o vaya a la escuela hasta no tiene fiebre 24 horas (sin ayuda de ibuprofen y tylenol).  Regresa para dificultad de respirar o si parece enfermo.  Vaya a maya medico si no mejora despues de 2-3 rey mas enfermedad.    Posiblamiente usted tiene jesus presion de rhina.  Vaya a un medico propio para chequear la presion y el nivel de cholesterol.  BP Readings from Last 3 Encounters:   02/10/20 114/73   06/03/19 101/71   05/23/19 102/87         Le sprague diagnosticado gripe. La gripe es kane infección viral del tracto respiratorio. Causa escalofríos, fiebre, tos seca, cefaleas, sulaiman corporales y dolor de garganta. La gripe lo hará sentir más enfermo que cuando tiene un resfrío común. El peor malestar de la enfermedad dura unos pocos días. La tos y el cansancio pueden durar otros 7 a 10 días. La gripe es muy contagiosa. Se disemina fácilmente a través de las gotas de la tos y el estornudo. Araceli se trata de un virus, no responderá adecuadamente a los antibióticos (medicamentos que destruyen a las bacterias o gérmenes).    INSTRUCCIONES PARA EL CUIDADO DOMICILIARIO  NO ADMINISTRE ASPIRINA A MENORES DE 18 AÑOS QUE PADEZCAN GRIPE. Puede provocar un daño en el cerebro y en el hígado si produce el síndrome de Reye. Radha las etiquetas de los medicamentos de venta sin receta antes de utilizarlos.  Puede franklin Tylenol® o Advil (Motrin)® según lo necesite para los sulaiman, el malestar y la temperatura elevada.  Utilice un humidificador de mayela fría para aumentar la humedad del ambiente.  Geary le permitirá respirar mejor.   Sathish reposo hasta que la temperatura sea normal: 98.6° F (37.0° C). Generalmente esto lleva entre  3 y 4 días. Descanse lo suficiente.  Kim al menos 8 vasos de 250 cc de líquido por día, kira agua, jugo, caldo, gelatina o limonada.   Lávese las alondra con frecuencia para evitar la diseminación del virus. [place] es especialmente importante después de sonarse la nariz y antes de tocar los alimentos.     SOLICITE ATENCIÓN MÉDICA SI:  La temperatura oral se eleva sin motivo por encima de 102°F (38.9°C) o según le indique el profesional que lo asiste.  Si la fiebre dura más de 3 días.  Presenta dificultad respiratoria komal el reposo.  Tiene mucha tos con producción de moco o siente dolor en el pecho.  Si tiene náuseas (ganas de vomitar), vómitos o diarrea.    SOLICITE ATENCIÓN MÉDICA DE INMEDIATO SI:  Si tiene dificultad para respirar, le falta la respiración o la piel o las uñas se tornan azulados.  Presenta dolor o rigidez en el pankaj.   Comienza a sentir un dolor de baldev intenso, dolor en el austin o en los oídos.  Aparece fiebre elevada, que no puede controlar con medicamentos, o que dura más de 3 días.  Presenta náuseas o vómitos que no puede controlar.    Document Released: 09/27/2006  Document Re-Released: 06/18/2007  ExitCare® Patient Information ©2008 [employer].

## 2020-02-10 NOTE — ED PROVIDER NOTES
ED Provider Note    Scribed for Alex Raphael M.D. by Thelma Thomas. 2/10/2020  10:06 AM    Primary care provider: Pcp Pt States None  Means of arrival: Walk-in  History obtained from: Patient  History limited by: None    CHIEF COMPLAINT  Chief Complaint   Patient presents with   • Cough     productive cough x 2 weeks.    • Head Ache     x 2 weeks. concerned d/t had brain surgery-tumor removal 2019. denies taking blood thinner.    • Generalized Body Aches     x 2 weeks       HPI  Johana Weston is a 39 y.o. female who presents to the Emergency Department for a cough and headache onset 2 weeks ago. She states she is concerned because she had a brain surgery-tumor removal in 2019, and states her symptoms today are similar to when she had the tumor. She endorses associated body aches, fever, back pain, constipation, chest pain, and leg numbness, but denies any vomiting, vision changes, or dyspnea. She denies any history of asthma. She denies any alcohol, tobacco, or drugs. She is not up to date on her influenza vaccination.    REVIEW OF SYSTEMS  Pertinent positives include: cough, headache, body aches, fever, back, constipation, chest pain, and leg numbness, .  Pertinent negatives include: vomiting.  10+ systems reviewed and negative.      PAST MEDICAL HISTORY  Past Medical History:   Diagnosis Date   • Craniopharyngioma (HCC)    Hypothyroidism  Adrenal insufficiency  Diabetes insipidus    SOCIAL HISTORY  Social History     Tobacco Use   • Smoking status: Never Smoker   • Smokeless tobacco: Never Used   Substance Use Topics   • Alcohol use: Yes     Comment: occ   • Drug use: No     Social History     Substance and Sexual Activity   Drug Use No       SURGICAL HISTORY  Past Surgical History:   Procedure Laterality Date   • TRANSSPHENOIDAL RESECTION         CURRENT MEDICATIONS  Home Medications     Reviewed by Mariella Ventura R.N. (Registered Nurse) on 02/10/20 at 0950  Med List Status: <None>   Medication Last  "Dose Status   acetaminophen (TYLENOL) 325 MG Tab  Active   desmopressin (DDAVP) 0.1 MG Tab  Active   docusate sodium (COLACE) 100 MG Cap  Active   famotidine (PEPCID) 20 MG Tab  Active   hydrocortisone (CORTEF) 10 MG Tab  Active   hydrocortisone (CORTEF) 20 MG Tab  Active   ibuprofen (MOTRIN) 200 MG Tab  Active   melatonin 3 MG Tab  Active   sodium chloride (OCEAN) 0.65 % Solution  Active                ALLERGIES  Allergies   Allergen Reactions   • Pork Allergy        PHYSICAL EXAM  VITAL SIGNS: /89   Pulse (!) 116   Temp 37.6 °C (99.7 °F) (Oral)   Resp (!) 24 Comment: Appears anxious  Ht 1.473 m (4' 10\")   Wt 84.7 kg (186 lb 11.7 oz)   SpO2 100%   BMI 39.03 kg/m²   Reviewed and tachycardic, high normal blood pressure  Constitutional: Well developed, Well nourished, appears uncomfortable. moderately overweight.   HENT: Normocephalic, atraumatic, bilateral external ears normal, oropharynx moist, No exudates or erythema.   Eyes: PERRLA, conjunctiva pink, no scleral icterus.   Cardiovascular: tachycardic, Regular rhythm. No murmurs, rubs or gallops.   Respiratory: Lungs clear to auscultation bilaterally. No wheezes, rales, or rhonchi. lungs clear.  Abdominal:  Abdomen soft, non-tender, non distended. No rebound, or guarding.    Skin: No erythema, no rash.   Genitourinary: No costovertebral angle tenderness.    Neurologic: Alert & oriented x 3, cranial nerves 2-12 intact, grasp, biceps, extensor hallucis longus and ankle plantarflexion symmetric.  Subjective dysesthesias both legs..  Psychiatric: Anxious .     DIFFERENTIAL DIAGNOSIS:  Pneumonia, sepsis, influenza, or recurrent of mass intracranial hemorrhage.    RADIOLOGY/PROCEDURES  DX-CHEST-PORTABLE (1 VIEW)   Final Result      No focal consolidation or pleural effusions.      CT-HEAD W/O   Final Result      1.  Postsurgical changes related to previous resection of suprasellar mass   2.  No evidence of acute hemorrhage or residual mass by CT    "     Radiologist interpretation have been reviewed by me.     LABORATORY:  Results for orders placed or performed during the hospital encounter of 02/10/20   LACTIC ACID   Result Value Ref Range    Lactic Acid 1.6 0.5 - 2.0 mmol/L   CBC WITH DIFFERENTIAL   Result Value Ref Range    WBC 7.1 4.8 - 10.8 K/uL    RBC 4.78 4.20 - 5.40 M/uL    Hemoglobin 15.1 12.0 - 16.0 g/dL    Hematocrit 45.1 37.0 - 47.0 %    MCV 94.4 81.4 - 97.8 fL    MCH 31.6 27.0 - 33.0 pg    MCHC 33.5 (L) 33.6 - 35.0 g/dL    RDW 48.7 35.9 - 50.0 fL    Platelet Count 206 164 - 446 K/uL    MPV 10.1 9.0 - 12.9 fL    Neutrophils-Polys 66.50 44.00 - 72.00 %    Lymphocytes 22.00 22.00 - 41.00 %    Monocytes 10.00 0.00 - 13.40 %    Eosinophils 0.80 0.00 - 6.90 %    Basophils 0.40 0.00 - 1.80 %    Immature Granulocytes 0.30 0.00 - 0.90 %    Nucleated RBC 0.00 /100 WBC    Neutrophils (Absolute) 4.71 2.00 - 7.15 K/uL    Lymphs (Absolute) 1.56 1.00 - 4.80 K/uL    Monos (Absolute) 0.71 0.00 - 0.85 K/uL    Eos (Absolute) 0.06 0.00 - 0.51 K/uL    Baso (Absolute) 0.03 0.00 - 0.12 K/uL    Immature Granulocytes (abs) 0.02 0.00 - 0.11 K/uL    NRBC (Absolute) 0.00 K/uL   COMP METABOLIC PANEL   Result Value Ref Range    Sodium 142 135 - 145 mmol/L    Potassium 3.9 3.6 - 5.5 mmol/L    Chloride 111 96 - 112 mmol/L    Co2 23 20 - 33 mmol/L    Anion Gap 8.0 0.0 - 11.9    Glucose 116 (H) 65 - 99 mg/dL    Bun 7 (L) 8 - 22 mg/dL    Creatinine 0.93 0.50 - 1.40 mg/dL    Calcium 8.8 8.5 - 10.5 mg/dL    AST(SGOT) 62 (H) 12 - 45 U/L    ALT(SGPT) 67 (H) 2 - 50 U/L    Alkaline Phosphatase 96 30 - 99 U/L    Total Bilirubin 0.6 0.1 - 1.5 mg/dL    Albumin 4.0 3.2 - 4.9 g/dL    Total Protein 6.9 6.0 - 8.2 g/dL    Globulin 2.9 1.9 - 3.5 g/dL    A-G Ratio 1.4 g/dL   URINALYSIS   Result Value Ref Range    Color Yellow     Character Clear     Specific Gravity 1.003 <1.035    Ph 8.0 5.0 - 8.0    Glucose Negative Negative mg/dL    Ketones Negative Negative mg/dL    Protein Negative Negative  "mg/dL    Bilirubin Negative Negative    Urobilinogen, Urine 0.2 Negative    Nitrite Negative Negative    Leukocyte Esterase Moderate (A) Negative    Occult Blood Negative Negative    Micro Urine Req Microscopic    Influenza A/B By PCR (Adult - Flu Only)   Result Value Ref Range    Influenza virus A RNA POSITIVE (A) Negative    Influenza virus B, PCR Negative Negative   URINE MICROSCOPIC (W/UA)   Result Value Ref Range    WBC 0-2 /hpf    RBC 0-2 /hpf    Bacteria Negative None /hpf    Epithelial Cells Negative /hpf    Hyaline Cast 0-2 /lpf     Lab results reviewed by me.     INTERVENTIONS  Medications   HYDROCORTISONE NA SUCCINATE  MG INJ SOLR (has no administration in time range)   hydrocortisone sodium succinate PF (SOLU-CORTEF) 100 MG injection 200 mg (200 mg Intravenous Given 2/10/20 1111)       ED COURSE:  Nursing notes, VS, PMSFHx reviewed in chart.     10:06 AM - Patient seen and examined at bedside. Patient will be treated with Solu-cortef 200 mg and Hydrocortisone for her symptoms. Ordered Ct-head without, Dx-chest, Lactic Acid, UA, CBC with differential, CMP, UA, Blood culture, and Influenza to evaluate.         MEDICAL DECISION MAKING:  Well-appearing patient presents with respiratory infection and has influenza A without evidence of pneumonia or sepsis.  She is immunocompromised with adrenal insufficiency and will require stress dose hydrocortisone.  She has some numbness in both legs but there is no evidence of recurrence of her tumor or any intracranial hemorrhage.    Discharge vital signs reviewed as below  /74   Pulse (!) 103   Temp 37.6 °C (99.7 °F) (Oral)   Resp (!) 24 Comment: Appears anxious  Ht 1.473 m (4' 10\")   Wt 84.7 kg (186 lb 11.7 oz)   SpO2 90%   BMI 39.03 kg/m²     PLAN:  Discharge Medication List as of 2/10/2020  1:21 PM      START taking these medications    Details   oseltamivir (TAMIFLU) 75 MG Cap Take 1 Cap by mouth 2 times a day for 5 days., Disp-10 Cap, R-0, Print " Rx Paper         Ibuprofen rest and fluids  Double hydrocortisone dose for 2 days  Influenza handout given  Return for shortness of breath, dizziness, uncontrolled vomiting, ill appearance    maya medico si no mejora 2 rey            CONDITION: Stable.     FINAL IMPRESSION  1. Influenza A    2.      Adrenal insufficiency  3.      Diabetes insipidus     Thelma BARAJAS (Scribe), am scribing for, and in the presence of, Alex Raphael M.D..    Electronically signed by: Thelma Thomas (Scribe), 2/10/2020    IAlex M.D. personally performed the services described in this documentation, as scribed by Thelma Thomas in my presence, and it is both accurate and complete. C.    The note accurately reflects work and decisions made by me.  Alex Raphael M.D.  2/10/2020  2:18 PM

## 2020-02-12 LAB
BACTERIA UR CULT: NORMAL
SIGNIFICANT IND 70042: NORMAL
SITE SITE: NORMAL
SOURCE SOURCE: NORMAL

## 2020-02-15 LAB
BACTERIA BLD CULT: NORMAL
SIGNIFICANT IND 70042: NORMAL
SITE SITE: NORMAL
SOURCE SOURCE: NORMAL

## 2020-10-09 ENCOUNTER — HOSPITAL ENCOUNTER (OUTPATIENT)
Facility: MEDICAL CENTER | Age: 40
End: 2020-10-10
Attending: EMERGENCY MEDICINE | Admitting: STUDENT IN AN ORGANIZED HEALTH CARE EDUCATION/TRAINING PROGRAM
Payer: COMMERCIAL

## 2020-10-09 ENCOUNTER — APPOINTMENT (OUTPATIENT)
Dept: RADIOLOGY | Facility: MEDICAL CENTER | Age: 40
End: 2020-10-09
Attending: EMERGENCY MEDICINE
Payer: COMMERCIAL

## 2020-10-09 DIAGNOSIS — R42 VERTIGO: ICD-10-CM

## 2020-10-09 DIAGNOSIS — R11.2 NON-INTRACTABLE VOMITING WITH NAUSEA, UNSPECIFIED VOMITING TYPE: ICD-10-CM

## 2020-10-09 DIAGNOSIS — R94.31 ABNORMAL EKG: ICD-10-CM

## 2020-10-09 LAB
ALBUMIN SERPL BCP-MCNC: 4 G/DL (ref 3.2–4.9)
ALBUMIN/GLOB SERPL: 1.5 G/DL
ALP SERPL-CCNC: 108 U/L (ref 30–99)
ALT SERPL-CCNC: 31 U/L (ref 2–50)
ANION GAP SERPL CALC-SCNC: 12 MMOL/L (ref 7–16)
AST SERPL-CCNC: 35 U/L (ref 12–45)
BASOPHILS # BLD AUTO: 0.5 % (ref 0–1.8)
BASOPHILS # BLD: 0.03 K/UL (ref 0–0.12)
BILIRUB SERPL-MCNC: 0.4 MG/DL (ref 0.1–1.5)
BUN SERPL-MCNC: 10 MG/DL (ref 8–22)
CALCIUM SERPL-MCNC: 9.8 MG/DL (ref 8.5–10.5)
CHLORIDE SERPL-SCNC: 105 MMOL/L (ref 96–112)
CO2 SERPL-SCNC: 23 MMOL/L (ref 20–33)
COVID ORDER STATUS COVID19: NORMAL
CREAT SERPL-MCNC: 0.8 MG/DL (ref 0.5–1.4)
EKG IMPRESSION: NORMAL
EOSINOPHIL # BLD AUTO: 0.23 K/UL (ref 0–0.51)
EOSINOPHIL NFR BLD: 3.9 % (ref 0–6.9)
ERYTHROCYTE [DISTWIDTH] IN BLOOD BY AUTOMATED COUNT: 42.5 FL (ref 35.9–50)
GLOBULIN SER CALC-MCNC: 2.6 G/DL (ref 1.9–3.5)
GLUCOSE SERPL-MCNC: 97 MG/DL (ref 65–99)
HCG SERPL QL: NEGATIVE
HCT VFR BLD AUTO: 42.5 % (ref 37–47)
HGB BLD-MCNC: 14.8 G/DL (ref 12–16)
IMM GRANULOCYTES # BLD AUTO: 0.01 K/UL (ref 0–0.11)
IMM GRANULOCYTES NFR BLD AUTO: 0.2 % (ref 0–0.9)
LYMPHOCYTES # BLD AUTO: 3.04 K/UL (ref 1–4.8)
LYMPHOCYTES NFR BLD: 51.1 % (ref 22–41)
MAGNESIUM SERPL-MCNC: 2.1 MG/DL (ref 1.5–2.5)
MCH RBC QN AUTO: 31.8 PG (ref 27–33)
MCHC RBC AUTO-ENTMCNC: 34.8 G/DL (ref 33.6–35)
MCV RBC AUTO: 91.2 FL (ref 81.4–97.8)
MONOCYTES # BLD AUTO: 0.41 K/UL (ref 0–0.85)
MONOCYTES NFR BLD AUTO: 6.9 % (ref 0–13.4)
NEUTROPHILS # BLD AUTO: 2.23 K/UL (ref 2–7.15)
NEUTROPHILS NFR BLD: 37.4 % (ref 44–72)
NRBC # BLD AUTO: 0 K/UL
NRBC BLD-RTO: 0 /100 WBC
PLATELET # BLD AUTO: 283 K/UL (ref 164–446)
PMV BLD AUTO: 9.7 FL (ref 9–12.9)
POTASSIUM SERPL-SCNC: 3.9 MMOL/L (ref 3.6–5.5)
PROT SERPL-MCNC: 6.6 G/DL (ref 6–8.2)
RBC # BLD AUTO: 4.66 M/UL (ref 4.2–5.4)
SODIUM SERPL-SCNC: 140 MMOL/L (ref 135–145)
TROPONIN T SERPL-MCNC: <6 NG/L (ref 6–19)
WBC # BLD AUTO: 6 K/UL (ref 4.8–10.8)

## 2020-10-09 PROCEDURE — G0378 HOSPITAL OBSERVATION PER HR: HCPCS

## 2020-10-09 PROCEDURE — 80053 COMPREHEN METABOLIC PANEL: CPT

## 2020-10-09 PROCEDURE — 84484 ASSAY OF TROPONIN QUANT: CPT

## 2020-10-09 PROCEDURE — 99226 PR SUBSEQUENT OBSERVATION CARE,LEVEL III: CPT | Performed by: STUDENT IN AN ORGANIZED HEALTH CARE EDUCATION/TRAINING PROGRAM

## 2020-10-09 PROCEDURE — 36415 COLL VENOUS BLD VENIPUNCTURE: CPT

## 2020-10-09 PROCEDURE — 85025 COMPLETE CBC W/AUTO DIFF WBC: CPT

## 2020-10-09 PROCEDURE — 99285 EMERGENCY DEPT VISIT HI MDM: CPT

## 2020-10-09 PROCEDURE — 70450 CT HEAD/BRAIN W/O DYE: CPT

## 2020-10-09 PROCEDURE — 84703 CHORIONIC GONADOTROPIN ASSAY: CPT

## 2020-10-09 PROCEDURE — 83735 ASSAY OF MAGNESIUM: CPT

## 2020-10-09 PROCEDURE — C9803 HOPD COVID-19 SPEC COLLECT: HCPCS | Performed by: EMERGENCY MEDICINE

## 2020-10-09 PROCEDURE — 700105 HCHG RX REV CODE 258: Performed by: EMERGENCY MEDICINE

## 2020-10-09 PROCEDURE — 93005 ELECTROCARDIOGRAM TRACING: CPT | Performed by: EMERGENCY MEDICINE

## 2020-10-09 PROCEDURE — U0003 INFECTIOUS AGENT DETECTION BY NUCLEIC ACID (DNA OR RNA); SEVERE ACUTE RESPIRATORY SYNDROME CORONAVIRUS 2 (SARS-COV-2) (CORONAVIRUS DISEASE [COVID-19]), AMPLIFIED PROBE TECHNIQUE, MAKING USE OF HIGH THROUGHPUT TECHNOLOGIES AS DESCRIBED BY CMS-2020-01-R: HCPCS

## 2020-10-09 RX ORDER — PROCHLORPERAZINE EDISYLATE 5 MG/ML
5-10 INJECTION INTRAMUSCULAR; INTRAVENOUS EVERY 4 HOURS PRN
Status: DISCONTINUED | OUTPATIENT
Start: 2020-10-09 | End: 2020-10-10 | Stop reason: HOSPADM

## 2020-10-09 RX ORDER — AMOXICILLIN 250 MG
2 CAPSULE ORAL 2 TIMES DAILY
Status: DISCONTINUED | OUTPATIENT
Start: 2020-10-09 | End: 2020-10-10 | Stop reason: HOSPADM

## 2020-10-09 RX ORDER — PROMETHAZINE HYDROCHLORIDE 25 MG/1
12.5-25 TABLET ORAL EVERY 4 HOURS PRN
Status: DISCONTINUED | OUTPATIENT
Start: 2020-10-09 | End: 2020-10-10 | Stop reason: HOSPADM

## 2020-10-09 RX ORDER — BISACODYL 10 MG
10 SUPPOSITORY, RECTAL RECTAL
Status: DISCONTINUED | OUTPATIENT
Start: 2020-10-09 | End: 2020-10-10 | Stop reason: HOSPADM

## 2020-10-09 RX ORDER — ACETAMINOPHEN 325 MG/1
650 TABLET ORAL EVERY 6 HOURS PRN
Status: DISCONTINUED | OUTPATIENT
Start: 2020-10-09 | End: 2020-10-10 | Stop reason: HOSPADM

## 2020-10-09 RX ORDER — POLYETHYLENE GLYCOL 3350 17 G/17G
1 POWDER, FOR SOLUTION ORAL
Status: DISCONTINUED | OUTPATIENT
Start: 2020-10-09 | End: 2020-10-10 | Stop reason: HOSPADM

## 2020-10-09 RX ORDER — ONDANSETRON 2 MG/ML
4 INJECTION INTRAMUSCULAR; INTRAVENOUS EVERY 4 HOURS PRN
Status: DISCONTINUED | OUTPATIENT
Start: 2020-10-09 | End: 2020-10-10 | Stop reason: HOSPADM

## 2020-10-09 RX ORDER — ONDANSETRON 4 MG/1
4 TABLET, ORALLY DISINTEGRATING ORAL EVERY 4 HOURS PRN
Status: DISCONTINUED | OUTPATIENT
Start: 2020-10-09 | End: 2020-10-10 | Stop reason: HOSPADM

## 2020-10-09 RX ORDER — LEVOTHYROXINE SODIUM 0.05 MG/1
50 TABLET ORAL
Status: DISCONTINUED | OUTPATIENT
Start: 2020-10-10 | End: 2020-10-10 | Stop reason: HOSPADM

## 2020-10-09 RX ORDER — SODIUM CHLORIDE 9 MG/ML
1000 INJECTION, SOLUTION INTRAVENOUS ONCE
Status: COMPLETED | OUTPATIENT
Start: 2020-10-09 | End: 2020-10-09

## 2020-10-09 RX ORDER — LEVOTHYROXINE SODIUM 88 UG/1
88 TABLET ORAL
COMMUNITY
End: 2021-01-29

## 2020-10-09 RX ORDER — PROMETHAZINE HYDROCHLORIDE 12.5 MG/1
12.5-25 SUPPOSITORY RECTAL EVERY 4 HOURS PRN
Status: DISCONTINUED | OUTPATIENT
Start: 2020-10-09 | End: 2020-10-10 | Stop reason: HOSPADM

## 2020-10-09 RX ADMIN — SODIUM CHLORIDE 1000 ML: 9 INJECTION, SOLUTION INTRAVENOUS at 19:25

## 2020-10-09 ASSESSMENT — FIBROSIS 4 INDEX: FIB4 SCORE: 1.36

## 2020-10-09 NOTE — ED TRIAGE NOTES
..  Chief Complaint   Patient presents with   • Vomiting     x'2 days. pt was sent from work to get a covid test.    • Dizziness     x's 2 days        ../62   Pulse 88   Temp 36.7 °C (98 °F) (Temporal)   Resp 16   Ht 1.524 m (5')   Wt 68.1 kg (150 lb 2.1 oz)   SpO2 96%        .Explained triage process, to waiting room. Asked to inform RN if questions or concerns arise.

## 2020-10-10 ENCOUNTER — APPOINTMENT (OUTPATIENT)
Dept: RADIOLOGY | Facility: MEDICAL CENTER | Age: 40
End: 2020-10-10
Attending: NURSE PRACTITIONER
Payer: COMMERCIAL

## 2020-10-10 VITALS
RESPIRATION RATE: 18 BRPM | HEART RATE: 68 BPM | TEMPERATURE: 96.7 F | OXYGEN SATURATION: 100 % | DIASTOLIC BLOOD PRESSURE: 55 MMHG | WEIGHT: 153.44 LBS | BODY MASS INDEX: 30.12 KG/M2 | SYSTOLIC BLOOD PRESSURE: 103 MMHG | HEIGHT: 60 IN

## 2020-10-10 PROBLEM — E03.9 HYPOTHYROIDISM: Status: ACTIVE | Noted: 2020-10-10

## 2020-10-10 PROBLEM — Z98.890 S/P BRAIN SURGERY: Status: ACTIVE | Noted: 2020-10-10

## 2020-10-10 PROBLEM — R42 VERTIGO: Status: ACTIVE | Noted: 2020-10-10

## 2020-10-10 PROBLEM — H04.123 DRY EYES: Status: ACTIVE | Noted: 2020-10-10

## 2020-10-10 LAB
SARS-COV-2 RNA RESP QL NAA+PROBE: NOTDETECTED
SPECIMEN SOURCE: NORMAL

## 2020-10-10 PROCEDURE — 90471 IMMUNIZATION ADMIN: CPT

## 2020-10-10 PROCEDURE — 700111 HCHG RX REV CODE 636 W/ 250 OVERRIDE (IP): Performed by: EMERGENCY MEDICINE

## 2020-10-10 PROCEDURE — G0378 HOSPITAL OBSERVATION PER HR: HCPCS

## 2020-10-10 PROCEDURE — A9270 NON-COVERED ITEM OR SERVICE: HCPCS | Performed by: STUDENT IN AN ORGANIZED HEALTH CARE EDUCATION/TRAINING PROGRAM

## 2020-10-10 PROCEDURE — 700102 HCHG RX REV CODE 250 W/ 637 OVERRIDE(OP): Performed by: STUDENT IN AN ORGANIZED HEALTH CARE EDUCATION/TRAINING PROGRAM

## 2020-10-10 PROCEDURE — 99217 PR OBSERVATION CARE DISCHARGE: CPT | Performed by: INTERNAL MEDICINE

## 2020-10-10 PROCEDURE — 70551 MRI BRAIN STEM W/O DYE: CPT

## 2020-10-10 PROCEDURE — 90686 IIV4 VACC NO PRSV 0.5 ML IM: CPT | Performed by: EMERGENCY MEDICINE

## 2020-10-10 RX ORDER — MECLIZINE HCL 12.5 MG/1
12.5 TABLET ORAL 3 TIMES DAILY PRN
Qty: 30 TAB | Refills: 0 | Status: ON HOLD | OUTPATIENT
Start: 2020-10-10 | End: 2021-02-05

## 2020-10-10 RX ORDER — MECLIZINE HCL 12.5 MG/1
12.5 TABLET ORAL 3 TIMES DAILY PRN
Status: DISCONTINUED | OUTPATIENT
Start: 2020-10-10 | End: 2020-10-10 | Stop reason: HOSPADM

## 2020-10-10 RX ORDER — MECLIZINE HCL 12.5 MG/1
12.5 TABLET ORAL 3 TIMES DAILY PRN
Status: DISCONTINUED | OUTPATIENT
Start: 2020-10-10 | End: 2020-10-10

## 2020-10-10 RX ADMIN — LEVOTHYROXINE SODIUM 50 MCG: 0.05 TABLET ORAL at 04:59

## 2020-10-10 RX ADMIN — MINERAL OIL, PETROLATUM 1 APPLICATION: 425; 573 OINTMENT OPHTHALMIC at 05:50

## 2020-10-10 RX ADMIN — ACETAMINOPHEN 650 MG: 325 TABLET, FILM COATED ORAL at 00:05

## 2020-10-10 RX ADMIN — INFLUENZA A VIRUS A/GUANGDONG-MAONAN/SWL1536/2019 CNIC-1909 (H1N1) ANTIGEN (FORMALDEHYDE INACTIVATED), INFLUENZA A VIRUS A/HONG KONG/2671/2019 (H3N2) ANTIGEN (FORMALDEHYDE INACTIVATED), INFLUENZA B VIRUS B/PHUKET/3073/2013 ANTIGEN (FORMALDEHYDE INACTIVATED), AND INFLUENZA B VIRUS B/WASHINGTON/02/2019 ANTIGEN (FORMALDEHYDE INACTIVATED) 0.5 ML: 15; 15; 15; 15 INJECTION, SUSPENSION INTRAMUSCULAR at 04:57

## 2020-10-10 RX ADMIN — DOCUSATE SODIUM 50 MG AND SENNOSIDES 8.6 MG 2 TABLET: 8.6; 5 TABLET, FILM COATED ORAL at 04:59

## 2020-10-10 RX ADMIN — DOCUSATE SODIUM 50 MG AND SENNOSIDES 8.6 MG 2 TABLET: 8.6; 5 TABLET, FILM COATED ORAL at 00:05

## 2020-10-10 ASSESSMENT — ENCOUNTER SYMPTOMS
EYE REDNESS: 1
MYALGIAS: 0
HALLUCINATIONS: 0
VOMITING: 1
SEIZURES: 0
COUGH: 0
ABDOMINAL PAIN: 0
PHOTOPHOBIA: 0
WEAKNESS: 0
HEMOPTYSIS: 0
BLURRED VISION: 0
SINUS PAIN: 0
NERVOUS/ANXIOUS: 0
SHORTNESS OF BREATH: 0
SPUTUM PRODUCTION: 0
FEVER: 0
NECK PAIN: 0
DIARRHEA: 0
NAUSEA: 1
DOUBLE VISION: 0
EYE PAIN: 0
EYE DISCHARGE: 0
PALPITATIONS: 0
CHILLS: 0
DIZZINESS: 1

## 2020-10-10 ASSESSMENT — LIFESTYLE VARIABLES
HOW MANY TIMES IN THE PAST YEAR HAVE YOU HAD 5 OR MORE DRINKS IN A DAY: 0
TOTAL SCORE: 0
AVERAGE NUMBER OF DAYS PER WEEK YOU HAVE A DRINK CONTAINING ALCOHOL: 0
DOES PATIENT WANT TO STOP DRINKING: NO
ALCOHOL_USE: NO
ON A TYPICAL DAY WHEN YOU DRINK ALCOHOL HOW MANY DRINKS DO YOU HAVE: 0
HAVE PEOPLE ANNOYED YOU BY CRITICIZING YOUR DRINKING: NO
EVER HAD A DRINK FIRST THING IN THE MORNING TO STEADY YOUR NERVES TO GET RID OF A HANGOVER: NO
EVER FELT BAD OR GUILTY ABOUT YOUR DRINKING: NO
CONSUMPTION TOTAL: NEGATIVE
TOTAL SCORE: 0
TOTAL SCORE: 0
HAVE YOU EVER FELT YOU SHOULD CUT DOWN ON YOUR DRINKING: NO

## 2020-10-10 ASSESSMENT — PATIENT HEALTH QUESTIONNAIRE - PHQ9
2. FEELING DOWN, DEPRESSED, IRRITABLE, OR HOPELESS: NOT AT ALL
2. FEELING DOWN, DEPRESSED, IRRITABLE, OR HOPELESS: NOT AT ALL
1. LITTLE INTEREST OR PLEASURE IN DOING THINGS: NOT AT ALL
1. LITTLE INTEREST OR PLEASURE IN DOING THINGS: NOT AT ALL
SUM OF ALL RESPONSES TO PHQ9 QUESTIONS 1 AND 2: 0
SUM OF ALL RESPONSES TO PHQ9 QUESTIONS 1 AND 2: 0

## 2020-10-10 ASSESSMENT — PAIN DESCRIPTION - PAIN TYPE: TYPE: ACUTE PAIN

## 2020-10-10 ASSESSMENT — FIBROSIS 4 INDEX: FIB4 SCORE: 0.82

## 2020-10-10 NOTE — PROGRESS NOTES
IV Dc 'd. Discharge instructions provided to patient in Ivorian. Pt verbalizes understanding. Pt states all questions have been answered. Copy of DC provided to patient. Signed copy in chart. 1 prescription sent to her pharmacy. Pt states all personal belongings are in possession. Pt escorted off unit by this RN without incident. Refused w/c. Friend picking up.

## 2020-10-10 NOTE — PROGRESS NOTES
Assessment completed. Pt A&Ox4. Georgian speaking only, able to communicate with this RN. Respirations are even and unlabored on RA. Pt denies pain at this time. Denies dizziness, walked to rr with steady gait. Monitors applied, VS stable, call light and belongings within reach. POC updated (possible MRI). Pt educated on room and call light, pt verbalized understanding. Communication board updated. Needs met.

## 2020-10-10 NOTE — ASSESSMENT & PLAN NOTE
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

## 2020-10-10 NOTE — ASSESSMENT & PLAN NOTE
Patient mentioned sensation today of dry eyes  Sclerae injected, patient remarks her eyes have been red for 20 years getting redder over the years, however today she had a sensation of dry eyes.  No pain of her eyeball, extraocular movements intact, no blurry vision or changes in vision acuity.  Artificial tears prn

## 2020-10-10 NOTE — ED NOTES
Med rec partial per interview with pt at bedside through ipad  (955855(Mor)). Pt states that she takes a pain medication on occasion that was prescribed from a previous surgery, although she does not known the name of this medication. She says she last took this medication about a week ago. Allergies reviewed. Pt denies antibiotic use in past 14 days

## 2020-10-10 NOTE — ED PROVIDER NOTES
ED Provider Note    CHIEF COMPLAINT  Chief Complaint   Patient presents with   • Vomiting     x'2 days. pt was sent from work to get a covid test.    • Dizziness     x's 2 days        HPI  Johana Weston is a 39 y.o. female who presents to the emergency department for vertigo, nausea, and vomiting.    The patient speaks Nepali and a history is obtained with a  on the iPad.    The patient has a history of a craniopharyngioma and she is status post resection.  She was sent in here by her employer for a COVID test because her concern she had COVID.  The patient has of nausea and vomiting yesterday.  Is mostly yellow emesis.  She has some vague abdominal discomfort with this and no diarrhea.  The symptoms have resolved.  No nausea no vomiting today.    Seen with no symptoms was dizziness.  The dizziness is described as vertigo.  This is worse when she lays down she has a cessation of abnor movement in the bed and she has pretty significant vertigo and spinning station when she looks up and more when she looks up into the right is less when she looks up into the left.  She has no problems of coordination or walking no falls headache or trauma.  No fevers or chills or achiness.    He denies any other aggravating alleviating associated planes.  She reports taking medication for thyroid medication.    REVIEW OF SYSTEMS  See HPI for further details. All other systems are negative.    PAST MEDICAL HISTORY  Past Medical History:   Diagnosis Date   • Craniopharyngioma (HCC)        FAMILY HISTORY  Family History   Family history unknown: Yes       SOCIAL HISTORY  Social History     Socioeconomic History   • Marital status: Single     Spouse name: Not on file   • Number of children: Not on file   • Years of education: Not on file   • Highest education level: Not on file   Occupational History   • Not on file   Social Needs   • Financial resource strain: Not on file   • Food insecurity      Worry: Not on file     Inability: Not on file   • Transportation needs     Medical: Not on file     Non-medical: Not on file   Tobacco Use   • Smoking status: Never Smoker   • Smokeless tobacco: Never Used   Substance and Sexual Activity   • Alcohol use: Yes     Comment: occ   • Drug use: No   • Sexual activity: Not on file   Lifestyle   • Physical activity     Days per week: Not on file     Minutes per session: Not on file   • Stress: Not on file   Relationships   • Social connections     Talks on phone: Not on file     Gets together: Not on file     Attends Nondenominational service: Not on file     Active member of club or organization: Not on file     Attends meetings of clubs or organizations: Not on file     Relationship status: Not on file   • Intimate partner violence     Fear of current or ex partner: Not on file     Emotionally abused: Not on file     Physically abused: Not on file     Forced sexual activity: Not on file   Other Topics Concern   • Not on file   Social History Narrative   • Not on file       SURGICAL HISTORY  Past Surgical History:   Procedure Laterality Date   • TRANSSPHENOIDAL RESECTION         CURRENT MEDICATIONS  Home Medications     Reviewed by Tari Quintero (Pharmacy Tech) on 10/09/20 at 2119  Med List Status: Partial   Medication Last Dose Status   desmopressin (DDAVP) 0.1 MG Tab Not Taking Active   ibuprofen (MOTRIN) 200 MG Tab 10/9/2020 Active   levothyroxine (SYNTHROID) 88 MCG Tab 10/9/2020 Active                ALLERGIES  Allergies   Allergen Reactions   • Pork Allergy        PHYSICAL EXAM  VITAL SIGNS: /67   Pulse 64   Temp 36.7 °C (98 °F) (Temporal)   Resp 16   Ht 1.524 m (5')   Wt 68.1 kg (150 lb 2.1 oz)   SpO2 98%   BMI 29.32 kg/m²    Constitutional: Well developed, Well nourished, No acute distress, Non-toxic appearance.   HENT: Normocephalic, Atraumatic, Bilateral external ears normal, Oropharynx moist, No oral exudates, Nose normal.   Eyes: PERRL, EOMI,  Conjunctiva normal, No discharge.   Neck: Normal range of motion, No tenderness,  Cardiovascular: Normal heart rate, Normal rhythm, No murmurs, No rubs, No gallops.   Thorax & Lungs: Normal breath sounds, No respiratory distress,  Abdomen: Bowel sounds normal, Soft, No tenderness,   Skin: Warm, Dry, No erythema, No rash.   Back: No tenderness, No CVA tenderness. .   Musculoskeletal: Good range of motion in all major joints.  No asymmetric edema good pulses   neurologic: Alert, cranial nerves II through XII are intact no focal deficits noted.  Normal strength normal sensation.  Normal finger-to-nose normal heel-to-shin.  Psychiatric: Affect normal      Results for orders placed or performed during the hospital encounter of 10/09/20   CBC WITH DIFFERENTIAL   Result Value Ref Range    WBC 6.0 4.8 - 10.8 K/uL    RBC 4.66 4.20 - 5.40 M/uL    Hemoglobin 14.8 12.0 - 16.0 g/dL    Hematocrit 42.5 37.0 - 47.0 %    MCV 91.2 81.4 - 97.8 fL    MCH 31.8 27.0 - 33.0 pg    MCHC 34.8 33.6 - 35.0 g/dL    RDW 42.5 35.9 - 50.0 fL    Platelet Count 283 164 - 446 K/uL    MPV 9.7 9.0 - 12.9 fL    Neutrophils-Polys 37.40 (L) 44.00 - 72.00 %    Lymphocytes 51.10 (H) 22.00 - 41.00 %    Monocytes 6.90 0.00 - 13.40 %    Eosinophils 3.90 0.00 - 6.90 %    Basophils 0.50 0.00 - 1.80 %    Immature Granulocytes 0.20 0.00 - 0.90 %    Nucleated RBC 0.00 /100 WBC    Neutrophils (Absolute) 2.23 2.00 - 7.15 K/uL    Lymphs (Absolute) 3.04 1.00 - 4.80 K/uL    Monos (Absolute) 0.41 0.00 - 0.85 K/uL    Eos (Absolute) 0.23 0.00 - 0.51 K/uL    Baso (Absolute) 0.03 0.00 - 0.12 K/uL    Immature Granulocytes (abs) 0.01 0.00 - 0.11 K/uL    NRBC (Absolute) 0.00 K/uL   COMP METABOLIC PANEL   Result Value Ref Range    Sodium 140 135 - 145 mmol/L    Potassium 3.9 3.6 - 5.5 mmol/L    Chloride 105 96 - 112 mmol/L    Co2 23 20 - 33 mmol/L    Anion Gap 12.0 7.0 - 16.0    Glucose 97 65 - 99 mg/dL    Bun 10 8 - 22 mg/dL    Creatinine 0.80 0.50 - 1.40 mg/dL    Calcium 9.8  8.5 - 10.5 mg/dL    AST(SGOT) 35 12 - 45 U/L    ALT(SGPT) 31 2 - 50 U/L    Alkaline Phosphatase 108 (H) 30 - 99 U/L    Total Bilirubin 0.4 0.1 - 1.5 mg/dL    Albumin 4.0 3.2 - 4.9 g/dL    Total Protein 6.6 6.0 - 8.2 g/dL    Globulin 2.6 1.9 - 3.5 g/dL    A-G Ratio 1.5 g/dL   HCG QUAL SERUM   Result Value Ref Range    Beta-Hcg Qualitative Serum Negative Negative   ESTIMATED GFR   Result Value Ref Range    GFR If African American >60 >60 mL/min/1.73 m 2    GFR If Non African American >60 >60 mL/min/1.73 m 2   COVID/SARS CoV-2 PCR    Specimen: Nasopharyngeal; Respirate   Result Value Ref Range    COVID Order Status Received    TROPONIN   Result Value Ref Range    Troponin T <6 6 - 19 ng/L   MAGNESIUM   Result Value Ref Range    Magnesium 2.1 1.5 - 2.5 mg/dL   SARS-CoV-2, PCR (In-House)   Result Value Ref Range    SARS-CoV-2 Source NP Swab    EKG   Result Value Ref Range    Report       [facility] Emergency Dept.    Test Date:  2020-10-09  Pt Name:    COLT CLAYLES       Department: ER  MRN:        [id number]                      Room:       [facility]  Gender:     Female                       Technician: [id number]  :        1980                   Requested By:ER TRIAGE PROTOCOL  Order #:    [id number]                    Reading MD: ANNETTE LAKE. LEATHA    Measurements  Intervals                                Axis  Rate:       73                           P:          95  NJ:         208                          QRS:        22  QRSD:       78                           T:          103  QT:         492  QTc:        543    Interpretive Statements  SINUS RHYTHM  BORDERLINE AV CONDUCTION DELAY  ABNORMAL T, CONSIDER ISCHEMIA, ANTERIOR LEADS  PROLONGED QT INTERVAL  Compared to ECG 2019 05:42:56  T-wave abnormality now present  Possible ischemia now present  Prolonged QT interval now present  Electronically Signed On 10-9-2020 22:17:42 PDT by REKHA CORBIN AMD           RADIOLOGY/PROCEDURES  CT-HEAD W/O   Final Result         1. No acute intracranial abnormality. No evidence of acute intracranial hemorrhage or mass lesion.                     COURSE & MEDICAL DECISION MAKING  Pertinent Labs & Imaging studies reviewed. (See chart for details)  The patient presents the emergency department with some nausea and vomiting now followed by some vertiginous-like symptoms.  A broad differential diagnosis was considered include not limited to complication of previous brain surgery, COVID-19 infection, electrolyte, dehydration, arrhythmia, pregnancy, acute UTI.    Patient is worked up for the blood pressure diagnosed with labs and imaging.  Labs are unremarkable and her CT is unremarkable.    The patient's EKG shows significant anterior T wave changes which are concerning for ischemia.  Troponin and magnesium are added and these are reassuring.    Because her history of unusual vertigo and history of a craniopharyngioma with resection I discussed case with the neurologist Dr. Delgado and he recommends MRI.    Because the patient's abnormal EKG and recommendation for MRI should be hospitalized for continued work-up and treatment.  Discussed case the hospitalist will see the patient.  Patient hospitalized in guarded condition for further work-up and treatment.    FINAL IMPRESSION  1. Non-intractable vomiting with nausea, unspecified vomiting type     2. Vertigo     3. Abnormal EKG         2.   3.         Electronically signed by: Paramjit Verdin M.D., 10/9/2020 10:03 PM

## 2020-10-10 NOTE — PROGRESS NOTES
Pt received from ED Yellow 62. Assumed patient care. Patient Danish speaking only, interpretor services used. Patient A&Ox4. Tele monitor in place, RICU notified. Pt oriented to room. Educated on use of the call light. Pt demonstrated use of the call light. Discussed POC. All questions answered.

## 2020-10-10 NOTE — DISCHARGE INSTRUCTIONS
[facility]estibular Services at [facility], outpatient PT  [address], [address]  [place] for advanced medicine, [address]  Call [phone] for appointment      Discharge Instructions    Discharged to home by car with friend. Discharged via walking, hospital escort: Refused.  Special equipment needed: Not Applicable    Be sure to schedule a follow-up appointment with your primary care doctor or any specialists as instructed.     Discharge Plan:   Diet Plan: Discussed  Activity Level: Discussed  Confirmed Follow up Appointment: Patient to Call and Schedule Appointment  Confirmed Symptoms Management: Discussed  Medication Reconciliation Updated: Yes  Influenza Vaccine Indication: Indicated: 9 to 64 years of age  Influenza Vaccine Given - only chart on this line when given: Influenza Vaccine Given (See MAR)    I understand that a diet low in cholesterol, fat, and sodium is recommended for good health. Unless I have been given specific instructions below for another diet, I accept this instruction as my diet prescription.   Other diet: heart healthy    Special Instructions: None    · Is patient discharged on Warfarin / Coumadin?   No     Depression / Suicide Risk    As you are discharged from this [facility], it is important to learn how to keep safe from harming yourself.    Recognize the warning signs:  · Abrupt changes in personality, positive or negative- including increase in energy   · Giving away possessions  · Change in eating patterns- significant weight changes-  positive or negative  · Change in sleeping patterns- unable to sleep or sleeping all the time   · Unwillingness or inability to communicate  · Depression  · Unusual sadness, discouragement and loneliness  · Talk of wanting to die  · Neglect of personal appearance   · Rebelliousness- reckless behavior  · Withdrawal from people/activities they love  · Confusion- inability to concentrate     If you or a loved one observes any of these behaviors or  has concerns about self-harm, here's what you can do:  · Talk about it- your feelings and reasons for harming yourself  · Remove any means that you might use to hurt yourself (examples: pills, rope, extension cords, firearm)  · Get professional help from the community (Mental Health, Substance Abuse, psychological counseling)  · Do not be alone:Call your Safe Contact- someone whom you trust who will be there for you.  · Call your local CRISIS HOTLINE 835-9743 or 824-903-9618  · Call your local Children's Mobile Crisis Response Team [place] (659) 231-0729 or www.[employer]  · Call the toll free National Suicide Prevention Hotlines   · National Suicide Prevention Lifeline 377-803-ZTGQ (1105)  · National Hope Line Network 800-SUICIDE (137-6238)

## 2020-10-10 NOTE — CARE PLAN
Problem: Communication  Goal: The ability to communicate needs accurately and effectively will improve  Outcome: PROGRESSING AS EXPECTED   Patient is Slovak speaking, is able to communicate appropriately using  services. Patient verbalizes needs, uses call light for assistance.     Problem: Safety  Goal: Will remain free from injury  Outcome: PROGRESSING AS EXPECTED  Goal: Will remain free from falls  Outcome: PROGRESSING AS EXPECTED   Safety/Fall precautions in place. Patient verbalizes understanding of safety education. Bed in lowest/locked position. Bed alarm on.

## 2020-10-10 NOTE — PROGRESS NOTES
Spiritual Care Note    Patient Information     Patient's Name: Johana Weston   MRN: [id number]    YOB: 1980   Age and Gender: 39 y.o. female   Service Area: CDU   Room (and Bed): [place] [address]   Ethnicity or Nationality:     Primary Language: English   Jew/Spiritual preference: None   Place of Residence: [place]   Family/Friends/Others Present: No   Clinical Team Present: No   Medical Diagnosis(-es)/Procedure(s): Intractable vomiting   Code Status: Full Code    Date of Admission: 10/9/2020   Length of Stay: 0 days        Spiritual Care Provider Information:  Name of Spiritual Care Provider: Heather Shipley  Title of Spiritual Care Provider: Associate   Phone Number: [phone]  E-mail: [email or web address]  Total time : 10 minutes    Spiritual Screen Results:    Gen Nursing  Spiritual Screen  Was spiritual care education provided to the patient?: Yes     Palliative Care  PC Jew/Spiritual Screening  Was spiritual care education provided to the patient?: Yes      Encounter/Request Information  Encounter/Request Type   Visited With: Patient, Refused care  Nature of the Visit: Initial, On shift  General Visit: Yes  Referral From/ Origin of Request: Epic nursing    Religous Needs/Values       Spiritual Assessment     Spiritual Care Encounters    Interacton/Conversation: Via Language Line , the pt declined a visit.    Plan: Visit Upon Request    Notes:

## 2020-10-10 NOTE — H&P
Hospital Medicine History & Physical Note    Date of Service  10/09/2020    Primary Care Physician  Pcp Pt States None    Consultants  Neurology    Code Status  Full Code    Chief Complaint  Chief Complaint   Patient presents with   • Vomiting     x'2 days. pt was sent from work to get a covid test.    • Dizziness     x's 2 days        History of Presenting Illness  39 y.o. female history of suprasellar craniopharyngioma status post transsphenoidal resection at [place] on May 24, 2019, hypothyroidism, history of adrenal insufficiency and history of diabetes insipidus, who presented 10/9/2020 with complaints of dizziness and vomiting for 2 days.    Patient has been having bouts of vertigo, dizziness and vomiting that has been getting worse.  Symptoms are worse with head movements looking up or down into the corners of her visual field.  Also reports few episodes of nosebleed in the past few days.  On medication that she takes daily is levothyroxine, she takes half of an 88 mcg pill.  She otherwise has been feeling well, no shortness of breath chest pain constipation or diarrhea, no dysuria.  Neurology evaluated the patient in the ED and discussed the case and requested admission and evaluation with MRI given history of cranial mass.    Review of Systems  Review of Systems   Constitutional: Negative for chills and fever.   HENT: Positive for nosebleeds. Negative for congestion and sinus pain.    Eyes: Positive for redness. Negative for blurred vision, double vision, photophobia, pain and discharge.   Respiratory: Negative for cough, hemoptysis, sputum production and shortness of breath.    Cardiovascular: Negative for chest pain and palpitations.   Gastrointestinal: Positive for nausea and vomiting. Negative for abdominal pain and diarrhea.   Genitourinary: Negative for dysuria and urgency.   Musculoskeletal: Negative for myalgias and neck pain.   Neurological: Positive for dizziness. Negative for seizures and  weakness.   Psychiatric/Behavioral: Negative for hallucinations. The patient is not nervous/anxious.        Past Medical History   has a past medical history of Craniopharyngioma (HCC).    Surgical History   has a past surgical history that includes transsphenoidal resection.     Family History  Family history is unknown by patient.     Social History   reports that she has never smoked. She has never used smokeless tobacco. She reports current alcohol use. She reports that she does not use drugs.    Allergies  Allergies   Allergen Reactions   • Pork Allergy        Medications  Prior to Admission Medications   Prescriptions Last Dose Informant Patient Reported? Taking?   NON SPECIFIED >7 days at unknown  Yes Yes   Sig: Take 1 Tab by mouth 1 time daily as needed (pain). Unspecified pain pill.   desmopressin (DDAVP) 0.1 MG Tab Not Taking at Unknown time Patient No No   Sig: Take 2 Tabs by mouth every 12 hours.   Patient not taking: Reported on 10/9/2020   ibuprofen (MOTRIN) 200 MG Tab 10/9/2020 at AM Patient Yes No   Sig: Take 400 mg by mouth 1 time daily as needed for Headache.   levothyroxine (SYNTHROID) 88 MCG Tab 10/9/2020 at AM Patient Yes Yes   Sig: Take 88 mcg by mouth Every morning on an empty stomach.      Facility-Administered Medications: None       Physical Exam  Temp:  [36.1 °C (97 °F)-36.7 °C (98 °F)] 36.2 °C (97.2 °F)  Pulse:  [64-91] 91  Resp:  [14-16] 15  BP: ()/(62-79) 113/64  SpO2:  [96 %-100 %] 97 %    Physical Exam    Laboratory:  Recent Labs     10/09/20  1925   WBC 6.0   RBC 4.66   HEMOGLOBIN 14.8   HEMATOCRIT 42.5   MCV 91.2   MCH 31.8   MCHC 34.8   RDW 42.5   PLATELETCT 283   MPV 9.7     Recent Labs     10/09/20  1925   SODIUM 140   POTASSIUM 3.9   CHLORIDE 105   CO2 23   GLUCOSE 97   BUN 10   CREATININE 0.80   CALCIUM 9.8     Recent Labs     10/09/20  1925   ALTSGPT 31   ASTSGOT 35   ALKPHOSPHAT 108*   TBILIRUBIN 0.4   GLUCOSE 97         No results for input(s): NTPROBNP in the last 72  hours.      Recent Labs     10/09/20  2100   TROPONINT <6       Imaging:  CT-HEAD W/O   Final Result         1. No acute intracranial abnormality. No evidence of acute intracranial hemorrhage or mass lesion.                     Assessment/Plan:  I anticipate this patient is appropriate for observation status at this time.    * Vertigo- (present on admission)  Assessment & Plan  subacute bouts of intermittent vertigo. She has a hx of craniopharyngioma s/p resection.    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  I have ordered    Dry eyes  Assessment & Plan  Patient mentioned sensation today of dry eyes  Sclerae injected, patient remarks her eyes have been red for 20 years getting redder over the years, however today she had a sensation of dry eyes.  No pain of her eyeball, extraocular movements intact, no blurry vision or changes in vision acuity.  Artificial tears prn

## 2020-10-10 NOTE — ASSESSMENT & PLAN NOTE
subacute bouts of intermittent vertigo. She has a hx of craniopharyngioma s/p resection.    CT head negative    Ddx: peripheral vertigo vs mass effect secondary to mass recurrence   Treat with meclizine

## 2020-10-10 NOTE — DISCHARGE SUMMARY
Discharge Summary    CHIEF COMPLAINT ON ADMISSION  Chief Complaint   Patient presents with   • Vomiting     x'2 days. pt was sent from work to get a covid test.    • Dizziness     x's 2 days        Reason for Admission  Vomiting     Admission Date  10/9/2020    CODE STATUS  Full Code    HPI & HOSPITAL COURSE  This is a 39 y.o. 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  Patient presented to the emergency room on 10/9/2020 with complaints of dizziness and vomiting for 2 days.  Patient had stated she been having bouts of vertigo/dizziness that was accompanied with vomiting and was progressively worsening.  Patient stated symptoms were worse with head movements.  Patient was admitted to CDU for further work-up and monitoring.  CBC and BMP were essentially benign and noncontributory.  Troponin was negative.  CT head was completed which showed no acute intracranial abnormality.  Vital signs remained stable.  EKG shows sinus rhythm with no acute ST changes or T wave abnormalities.  Neurology was contacted and recommended continue with MRI.  MRI was obtained which shows no acute findings, postop findings as described on previous MRI.  Patient's vertigo improved with administration of IV fluids.  Patient was ambulated with staff and is stable and able to ambulate independently.  Vertigo likely from vestibular source, and recommend patient follow-up with PT for vestibular therapy or ENT.  Will give patient information for vestibular clinic.  Patient at this time is cleared for discharge with follow-up with PCP, PT, and ENT as needed.       Therefore, she is discharged in good and stable condition to home with close outpatient follow-up.        Discharge Date  10/10/20    FOLLOW UP ITEMS POST DISCHARGE  Follow up with vestibular PT, ENT referral for Audiology. Primary  provider also can refer to cardiology for EKG findings and consider Holter/event monitor and elective ischemic work-up like outpatient echo or stress.    DISCHARGE DIAGNOSES  Principal Problem:    Vertigo POA: Yes  Active Problems:    S/P brain surgery POA: Yes    Hypothyroidism POA: Unknown    Dry eyes POA: Unknown  Resolved Problems:    * No resolved hospital problems. *      FOLLOW UP    [address]  [place] [id number] [phone]  Schedule an appointment as soon as possible for a visit        MEDICATIONS ON DISCHARGE     Medication List      START taking these medications      Instructions   meclizine 12.5 MG Tabs  Commonly known as: ANTIVERT   Take 1 Tab by mouth 3 times a day as needed for Nausea/Vomiting (vertigo).  Dose: 12.5 mg        CONTINUE taking these medications      Instructions   desmopressin 0.1 MG Tabs  Commonly known as: DDAVP   Take 2 Tabs by mouth every 12 hours.  Dose: 200 mcg     ibuprofen 200 MG Tabs  Commonly known as: MOTRIN   Take 400 mg by mouth 1 time daily as needed for Headache.  Dose: 400 mg     levothyroxine 88 MCG Tabs  Commonly known as: SYNTHROID   Take 88 mcg by mouth Every morning on an empty stomach.  Dose: 88 mcg     NON SPECIFIED   Take 1 Tab by mouth 1 time daily as needed (pain). Unspecified pain pill.  Dose: 1 Tab            Allergies  Allergies   Allergen Reactions   • Pork Allergy        DIET  Orders Placed This Encounter   Procedures   • Diet Order Regular     Standing Status:   Standing     Number of Occurrences:   1     Order Specific Question:   Diet:     Answer:   Regular [1]       ACTIVITY  As tolerated.  Weight bearing as tolerated    CONSULTATIONS  None     PROCEDURES  None     LABORATORY  Lab Results   Component Value Date    SODIUM 140 10/09/2020    POTASSIUM 3.9 10/09/2020    CHLORIDE 105 10/09/2020    CO2 23 10/09/2020    GLUCOSE 97 10/09/2020    BUN 10 10/09/2020    CREATININE 0.80 10/09/2020        Lab Results    Component Value Date    WBC 6.0 10/09/2020    HEMOGLOBIN 14.8 10/09/2020    HEMATOCRIT 42.5 10/09/2020    PLATELETCT 283 10/09/2020        Total time of the discharge process exceeds 35 minutes.

## 2020-10-10 NOTE — PROGRESS NOTES
Brief Neurology Note    Discussed w Dr. Verdin    39 year old woman with subacute bouts of intermittent vertigo. She has a hx of craniopharyngioma s/p resection.     CT head negative     Ddx: peripheral vertigo vs mass effect secondary to mass recurrence     Recs:  Meclizine or valium for vertigo  MRI brain w attn to pituitary     KARLEY Minor MD  Neurology

## 2021-01-29 ENCOUNTER — APPOINTMENT (OUTPATIENT)
Dept: RADIOLOGY | Facility: MEDICAL CENTER | Age: 41
DRG: 871 | End: 2021-01-29
Attending: EMERGENCY MEDICINE
Payer: MEDICAID

## 2021-01-29 ENCOUNTER — HOSPITAL ENCOUNTER (INPATIENT)
Facility: MEDICAL CENTER | Age: 41
LOS: 7 days | DRG: 871 | End: 2021-02-05
Attending: EMERGENCY MEDICINE | Admitting: INTERNAL MEDICINE
Payer: MEDICAID

## 2021-01-29 DIAGNOSIS — N12 PYELONEPHRITIS: ICD-10-CM

## 2021-01-29 DIAGNOSIS — E03.8 SECONDARY HYPOTHYROIDISM: ICD-10-CM

## 2021-01-29 DIAGNOSIS — A41.9 SEPSIS SECONDARY TO UTI (HCC): ICD-10-CM

## 2021-01-29 DIAGNOSIS — E23.2 DIABETES INSIPIDUS (HCC): ICD-10-CM

## 2021-01-29 DIAGNOSIS — U07.1 COVID-19 VIRUS INFECTION: ICD-10-CM

## 2021-01-29 DIAGNOSIS — A41.9 SEPSIS, DUE TO UNSPECIFIED ORGANISM, UNSPECIFIED WHETHER ACUTE ORGAN DYSFUNCTION PRESENT (HCC): ICD-10-CM

## 2021-01-29 DIAGNOSIS — N39.0 SEPSIS SECONDARY TO UTI (HCC): ICD-10-CM

## 2021-01-29 DIAGNOSIS — E27.49 SECONDARY ADRENAL INSUFFICIENCY (HCC): ICD-10-CM

## 2021-01-29 PROBLEM — I95.9 HYPOTENSION: Status: ACTIVE | Noted: 2021-01-29

## 2021-01-29 LAB
ALBUMIN SERPL BCP-MCNC: 4.4 G/DL (ref 3.2–4.9)
ALBUMIN/GLOB SERPL: 1.4 G/DL
ALP SERPL-CCNC: 117 U/L (ref 30–99)
ALT SERPL-CCNC: 18 U/L (ref 2–50)
ANION GAP SERPL CALC-SCNC: 10 MMOL/L (ref 7–16)
APPEARANCE UR: CLEAR
AST SERPL-CCNC: 33 U/L (ref 12–45)
BACTERIA #/AREA URNS HPF: NEGATIVE /HPF
BASOPHILS # BLD AUTO: 0.3 % (ref 0–1.8)
BASOPHILS # BLD: 0.05 K/UL (ref 0–0.12)
BILIRUB SERPL-MCNC: 0.6 MG/DL (ref 0.1–1.5)
BILIRUB UR QL STRIP.AUTO: NEGATIVE
BUN SERPL-MCNC: 6 MG/DL (ref 8–22)
CALCIUM SERPL-MCNC: 9.5 MG/DL (ref 8.5–10.5)
CHLORIDE SERPL-SCNC: 103 MMOL/L (ref 96–112)
CO2 SERPL-SCNC: 25 MMOL/L (ref 20–33)
COLOR UR: YELLOW
CORTIS SERPL-MCNC: <0.2 UG/DL (ref 0–23)
CREAT SERPL-MCNC: 0.92 MG/DL (ref 0.5–1.4)
CRP SERPL HS-MCNC: 3.06 MG/DL (ref 0–0.75)
D DIMER PPP IA.FEU-MCNC: 0.9 UG/ML (FEU) (ref 0–0.5)
EOSINOPHIL # BLD AUTO: 0.11 K/UL (ref 0–0.51)
EOSINOPHIL NFR BLD: 0.8 % (ref 0–6.9)
EPI CELLS #/AREA URNS HPF: ABNORMAL /HPF
ERYTHROCYTE [DISTWIDTH] IN BLOOD BY AUTOMATED COUNT: 46.1 FL (ref 35.9–50)
GLOBULIN SER CALC-MCNC: 3.2 G/DL (ref 1.9–3.5)
GLUCOSE SERPL-MCNC: 80 MG/DL (ref 65–99)
GLUCOSE UR STRIP.AUTO-MCNC: NEGATIVE MG/DL
HCG UR QL: NEGATIVE
HCT VFR BLD AUTO: 44.3 % (ref 37–47)
HGB BLD-MCNC: 15.2 G/DL (ref 12–16)
HYALINE CASTS #/AREA URNS LPF: ABNORMAL /LPF
IMM GRANULOCYTES # BLD AUTO: 0.07 K/UL (ref 0–0.11)
IMM GRANULOCYTES NFR BLD AUTO: 0.5 % (ref 0–0.9)
KETONES UR STRIP.AUTO-MCNC: NEGATIVE MG/DL
LACTATE BLD-SCNC: 1.3 MMOL/L (ref 0.5–2)
LACTATE BLD-SCNC: 1.9 MMOL/L (ref 0.5–2)
LEUKOCYTE ESTERASE UR QL STRIP.AUTO: ABNORMAL
LIPASE SERPL-CCNC: 21 U/L (ref 11–82)
LYMPHOCYTES # BLD AUTO: 3.8 K/UL (ref 1–4.8)
LYMPHOCYTES NFR BLD: 26 % (ref 22–41)
MCH RBC QN AUTO: 30.4 PG (ref 27–33)
MCHC RBC AUTO-ENTMCNC: 34.3 G/DL (ref 33.6–35)
MCV RBC AUTO: 88.6 FL (ref 81.4–97.8)
MICRO URNS: ABNORMAL
MONOCYTES # BLD AUTO: 0.62 K/UL (ref 0–0.85)
MONOCYTES NFR BLD AUTO: 4.2 % (ref 0–13.4)
NEUTROPHILS # BLD AUTO: 9.99 K/UL (ref 2–7.15)
NEUTROPHILS NFR BLD: 68.2 % (ref 44–72)
NITRITE UR QL STRIP.AUTO: NEGATIVE
NRBC # BLD AUTO: 0 K/UL
NRBC BLD-RTO: 0 /100 WBC
PH UR STRIP.AUTO: 7 [PH] (ref 5–8)
PLATELET # BLD AUTO: 337 K/UL (ref 164–446)
PMV BLD AUTO: 10.3 FL (ref 9–12.9)
POTASSIUM SERPL-SCNC: 3.6 MMOL/L (ref 3.6–5.5)
PROCALCITONIN SERPL-MCNC: <0.05 NG/ML
PROT SERPL-MCNC: 7.6 G/DL (ref 6–8.2)
PROT UR QL STRIP: NEGATIVE MG/DL
RBC # BLD AUTO: 5 M/UL (ref 4.2–5.4)
RBC # URNS HPF: ABNORMAL /HPF
RBC UR QL AUTO: ABNORMAL
SARS-COV-2 RNA RESP QL NAA+PROBE: DETECTED
SODIUM SERPL-SCNC: 138 MMOL/L (ref 135–145)
SP GR UR STRIP.AUTO: 1
SPECIMEN SOURCE: ABNORMAL
T4 FREE SERPL-MCNC: 0.24 NG/DL (ref 0.93–1.7)
TRANS CELLS #/AREA URNS HPF: ABNORMAL /HPF
TSH SERPL DL<=0.005 MIU/L-ACNC: 0.7 UIU/ML (ref 0.38–5.33)
UROBILINOGEN UR STRIP.AUTO-MCNC: 0.2 MG/DL
WBC # BLD AUTO: 14.6 K/UL (ref 4.8–10.8)
WBC #/AREA URNS HPF: ABNORMAL /HPF

## 2021-01-29 PROCEDURE — 96375 TX/PRO/DX INJ NEW DRUG ADDON: CPT

## 2021-01-29 PROCEDURE — 82533 TOTAL CORTISOL: CPT

## 2021-01-29 PROCEDURE — 770021 HCHG ROOM/CARE - ISO PRIVATE

## 2021-01-29 PROCEDURE — 85025 COMPLETE CBC W/AUTO DIFF WBC: CPT

## 2021-01-29 PROCEDURE — 86140 C-REACTIVE PROTEIN: CPT

## 2021-01-29 PROCEDURE — 700102 HCHG RX REV CODE 250 W/ 637 OVERRIDE(OP): Performed by: INTERNAL MEDICINE

## 2021-01-29 PROCEDURE — 83605 ASSAY OF LACTIC ACID: CPT

## 2021-01-29 PROCEDURE — 700105 HCHG RX REV CODE 258: Performed by: INTERNAL MEDICINE

## 2021-01-29 PROCEDURE — 99285 EMERGENCY DEPT VISIT HI MDM: CPT

## 2021-01-29 PROCEDURE — U0003 INFECTIOUS AGENT DETECTION BY NUCLEIC ACID (DNA OR RNA); SEVERE ACUTE RESPIRATORY SYNDROME CORONAVIRUS 2 (SARS-COV-2) (CORONAVIRUS DISEASE [COVID-19]), AMPLIFIED PROBE TECHNIQUE, MAKING USE OF HIGH THROUGHPUT TECHNOLOGIES AS DESCRIBED BY CMS-2020-01-R: HCPCS

## 2021-01-29 PROCEDURE — 80053 COMPREHEN METABOLIC PANEL: CPT

## 2021-01-29 PROCEDURE — 83690 ASSAY OF LIPASE: CPT

## 2021-01-29 PROCEDURE — U0005 INFEC AGEN DETEC AMPLI PROBE: HCPCS

## 2021-01-29 PROCEDURE — 700111 HCHG RX REV CODE 636 W/ 250 OVERRIDE (IP): Performed by: INTERNAL MEDICINE

## 2021-01-29 PROCEDURE — 81025 URINE PREGNANCY TEST: CPT

## 2021-01-29 PROCEDURE — A9270 NON-COVERED ITEM OR SERVICE: HCPCS | Performed by: EMERGENCY MEDICINE

## 2021-01-29 PROCEDURE — 700111 HCHG RX REV CODE 636 W/ 250 OVERRIDE (IP): Performed by: EMERGENCY MEDICINE

## 2021-01-29 PROCEDURE — 84439 ASSAY OF FREE THYROXINE: CPT

## 2021-01-29 PROCEDURE — 84443 ASSAY THYROID STIM HORMONE: CPT

## 2021-01-29 PROCEDURE — A9270 NON-COVERED ITEM OR SERVICE: HCPCS | Performed by: INTERNAL MEDICINE

## 2021-01-29 PROCEDURE — 99223 1ST HOSP IP/OBS HIGH 75: CPT | Performed by: INTERNAL MEDICINE

## 2021-01-29 PROCEDURE — 81001 URINALYSIS AUTO W/SCOPE: CPT

## 2021-01-29 PROCEDURE — 87040 BLOOD CULTURE FOR BACTERIA: CPT

## 2021-01-29 PROCEDURE — 96365 THER/PROPH/DIAG IV INF INIT: CPT

## 2021-01-29 PROCEDURE — 700102 HCHG RX REV CODE 250 W/ 637 OVERRIDE(OP): Performed by: EMERGENCY MEDICINE

## 2021-01-29 PROCEDURE — 84145 PROCALCITONIN (PCT): CPT

## 2021-01-29 PROCEDURE — 85379 FIBRIN DEGRADATION QUANT: CPT

## 2021-01-29 PROCEDURE — 87086 URINE CULTURE/COLONY COUNT: CPT

## 2021-01-29 PROCEDURE — 36415 COLL VENOUS BLD VENIPUNCTURE: CPT

## 2021-01-29 PROCEDURE — 700105 HCHG RX REV CODE 258: Performed by: EMERGENCY MEDICINE

## 2021-01-29 PROCEDURE — 74176 CT ABD & PELVIS W/O CONTRAST: CPT

## 2021-01-29 RX ORDER — OXYCODONE HYDROCHLORIDE 5 MG/1
2.5 TABLET ORAL
Status: DISCONTINUED | OUTPATIENT
Start: 2021-01-29 | End: 2021-01-29

## 2021-01-29 RX ORDER — BISACODYL 10 MG
10 SUPPOSITORY, RECTAL RECTAL
Status: DISCONTINUED | OUTPATIENT
Start: 2021-01-29 | End: 2021-02-05 | Stop reason: HOSPADM

## 2021-01-29 RX ORDER — AMOXICILLIN 250 MG
2 CAPSULE ORAL 2 TIMES DAILY
Status: DISCONTINUED | OUTPATIENT
Start: 2021-01-29 | End: 2021-02-05 | Stop reason: HOSPADM

## 2021-01-29 RX ORDER — POLYETHYLENE GLYCOL 3350 17 G/17G
1 POWDER, FOR SOLUTION ORAL
Status: DISCONTINUED | OUTPATIENT
Start: 2021-01-29 | End: 2021-02-05 | Stop reason: HOSPADM

## 2021-01-29 RX ORDER — PROCHLORPERAZINE EDISYLATE 5 MG/ML
5-10 INJECTION INTRAMUSCULAR; INTRAVENOUS EVERY 4 HOURS PRN
Status: DISCONTINUED | OUTPATIENT
Start: 2021-01-29 | End: 2021-01-29

## 2021-01-29 RX ORDER — SODIUM CHLORIDE, SODIUM LACTATE, POTASSIUM CHLORIDE, AND CALCIUM CHLORIDE .6; .31; .03; .02 G/100ML; G/100ML; G/100ML; G/100ML
1000 INJECTION, SOLUTION INTRAVENOUS ONCE
Status: COMPLETED | OUTPATIENT
Start: 2021-01-29 | End: 2021-01-29

## 2021-01-29 RX ORDER — KETOROLAC TROMETHAMINE 30 MG/ML
15 INJECTION, SOLUTION INTRAMUSCULAR; INTRAVENOUS ONCE
Status: COMPLETED | OUTPATIENT
Start: 2021-01-29 | End: 2021-01-29

## 2021-01-29 RX ORDER — ONDANSETRON 4 MG/1
4 TABLET, ORALLY DISINTEGRATING ORAL EVERY 4 HOURS PRN
Status: DISCONTINUED | OUTPATIENT
Start: 2021-01-29 | End: 2021-01-29

## 2021-01-29 RX ORDER — DESMOPRESSIN ACETATE 0.1 MG/1
200 TABLET ORAL EVERY 12 HOURS
Status: DISCONTINUED | OUTPATIENT
Start: 2021-01-29 | End: 2021-01-29

## 2021-01-29 RX ORDER — ONDANSETRON 2 MG/ML
4 INJECTION INTRAMUSCULAR; INTRAVENOUS EVERY 4 HOURS PRN
Status: DISCONTINUED | OUTPATIENT
Start: 2021-01-29 | End: 2021-01-29

## 2021-01-29 RX ORDER — OXYCODONE HYDROCHLORIDE 5 MG/1
2.5-5 TABLET ORAL
Status: DISCONTINUED | OUTPATIENT
Start: 2021-01-29 | End: 2021-02-04

## 2021-01-29 RX ORDER — MORPHINE SULFATE 4 MG/ML
2 INJECTION, SOLUTION INTRAMUSCULAR; INTRAVENOUS
Status: DISCONTINUED | OUTPATIENT
Start: 2021-01-29 | End: 2021-02-04

## 2021-01-29 RX ORDER — ACETAMINOPHEN 500 MG
1000 TABLET ORAL ONCE
Status: COMPLETED | OUTPATIENT
Start: 2021-01-29 | End: 2021-01-29

## 2021-01-29 RX ORDER — OXYCODONE HYDROCHLORIDE 5 MG/1
5 TABLET ORAL
Status: DISCONTINUED | OUTPATIENT
Start: 2021-01-29 | End: 2021-01-29

## 2021-01-29 RX ORDER — MORPHINE SULFATE 4 MG/ML
2 INJECTION, SOLUTION INTRAMUSCULAR; INTRAVENOUS
Status: DISCONTINUED | OUTPATIENT
Start: 2021-01-29 | End: 2021-01-29

## 2021-01-29 RX ORDER — SODIUM CHLORIDE 9 MG/ML
INJECTION, SOLUTION INTRAVENOUS CONTINUOUS
Status: DISCONTINUED | OUTPATIENT
Start: 2021-01-29 | End: 2021-02-01

## 2021-01-29 RX ORDER — PROMETHAZINE HYDROCHLORIDE 12.5 MG/1
12.5-25 SUPPOSITORY RECTAL EVERY 4 HOURS PRN
Status: DISCONTINUED | OUTPATIENT
Start: 2021-01-29 | End: 2021-01-29

## 2021-01-29 RX ORDER — MECLIZINE HYDROCHLORIDE 25 MG/1
12.5 TABLET ORAL 3 TIMES DAILY PRN
Status: DISCONTINUED | OUTPATIENT
Start: 2021-01-29 | End: 2021-01-29

## 2021-01-29 RX ORDER — SODIUM CHLORIDE, SODIUM LACTATE, POTASSIUM CHLORIDE, AND CALCIUM CHLORIDE .6; .31; .03; .02 G/100ML; G/100ML; G/100ML; G/100ML
30 INJECTION, SOLUTION INTRAVENOUS
Status: DISCONTINUED | OUTPATIENT
Start: 2021-01-29 | End: 2021-02-05 | Stop reason: HOSPADM

## 2021-01-29 RX ORDER — KETOROLAC TROMETHAMINE 30 MG/ML
30 INJECTION, SOLUTION INTRAMUSCULAR; INTRAVENOUS EVERY 6 HOURS PRN
Status: DISPENSED | OUTPATIENT
Start: 2021-01-29 | End: 2021-02-03

## 2021-01-29 RX ORDER — ACETAMINOPHEN 325 MG/1
650 TABLET ORAL EVERY 6 HOURS PRN
Status: DISCONTINUED | OUTPATIENT
Start: 2021-01-29 | End: 2021-02-05 | Stop reason: HOSPADM

## 2021-01-29 RX ORDER — SODIUM CHLORIDE, SODIUM LACTATE, POTASSIUM CHLORIDE, AND CALCIUM CHLORIDE .6; .31; .03; .02 G/100ML; G/100ML; G/100ML; G/100ML
30 INJECTION, SOLUTION INTRAVENOUS
Status: DISCONTINUED | OUTPATIENT
Start: 2021-01-29 | End: 2021-01-29

## 2021-01-29 RX ORDER — PROMETHAZINE HYDROCHLORIDE 25 MG/1
12.5-25 TABLET ORAL EVERY 4 HOURS PRN
Status: DISCONTINUED | OUTPATIENT
Start: 2021-01-29 | End: 2021-01-29

## 2021-01-29 RX ADMIN — CEFTRIAXONE SODIUM 2 G: 2 INJECTION, POWDER, FOR SOLUTION INTRAMUSCULAR; INTRAVENOUS at 12:00

## 2021-01-29 RX ADMIN — DOCUSATE SODIUM 50 MG AND SENNOSIDES 8.6 MG 2 TABLET: 8.6; 5 TABLET, FILM COATED ORAL at 18:33

## 2021-01-29 RX ADMIN — KETOROLAC TROMETHAMINE 15 MG: 30 INJECTION, SOLUTION INTRAMUSCULAR at 13:26

## 2021-01-29 RX ADMIN — SODIUM CHLORIDE: 9 INJECTION, SOLUTION INTRAVENOUS at 18:34

## 2021-01-29 RX ADMIN — ACETAMINOPHEN 1000 MG: 500 TABLET ORAL at 12:04

## 2021-01-29 RX ADMIN — HYDROCORTISONE SODIUM SUCCINATE 75 MG: 100 INJECTION, POWDER, FOR SOLUTION INTRAMUSCULAR; INTRAVENOUS at 22:35

## 2021-01-29 RX ADMIN — SODIUM CHLORIDE, POTASSIUM CHLORIDE, SODIUM LACTATE AND CALCIUM CHLORIDE 1000 ML: 600; 310; 30; 20 INJECTION, SOLUTION INTRAVENOUS at 12:05

## 2021-01-29 RX ADMIN — HYDROCORTISONE SODIUM SUCCINATE 100 MG: 100 INJECTION, POWDER, FOR SOLUTION INTRAMUSCULAR; INTRAVENOUS at 18:33

## 2021-01-29 ASSESSMENT — PATIENT HEALTH QUESTIONNAIRE - PHQ9
2. FEELING DOWN, DEPRESSED, IRRITABLE, OR HOPELESS: NOT AT ALL
SUM OF ALL RESPONSES TO PHQ9 QUESTIONS 1 AND 2: 0
1. LITTLE INTEREST OR PLEASURE IN DOING THINGS: NOT AT ALL

## 2021-01-29 ASSESSMENT — LIFESTYLE VARIABLES
ON A TYPICAL DAY WHEN YOU DRINK ALCOHOL HOW MANY DRINKS DO YOU HAVE: 0
EVER FELT BAD OR GUILTY ABOUT YOUR DRINKING: NO
HAVE PEOPLE ANNOYED YOU BY CRITICIZING YOUR DRINKING: NO
TOTAL SCORE: 0
SUBSTANCE_ABUSE: 0
TOTAL SCORE: 0
HAVE YOU EVER FELT YOU SHOULD CUT DOWN ON YOUR DRINKING: NO
AVERAGE NUMBER OF DAYS PER WEEK YOU HAVE A DRINK CONTAINING ALCOHOL: 0
EVER HAD A DRINK FIRST THING IN THE MORNING TO STEADY YOUR NERVES TO GET RID OF A HANGOVER: NO
HOW MANY TIMES IN THE PAST YEAR HAVE YOU HAD 5 OR MORE DRINKS IN A DAY: 0
ALCOHOL_USE: NO
TOTAL SCORE: 0
CONSUMPTION TOTAL: NEGATIVE

## 2021-01-29 ASSESSMENT — ENCOUNTER SYMPTOMS
PHOTOPHOBIA: 0
FLANK PAIN: 1
BRUISES/BLEEDS EASILY: 0
SPEECH CHANGE: 0
POLYDIPSIA: 0
TREMORS: 0
HEADACHES: 0
SPUTUM PRODUCTION: 0
PALPITATIONS: 0
DOUBLE VISION: 0
FEVER: 1
CHILLS: 1
NERVOUS/ANXIOUS: 0
HEARTBURN: 0
FOCAL WEAKNESS: 0
HALLUCINATIONS: 0
NECK PAIN: 0
BACK PAIN: 0
ORTHOPNEA: 0
WEIGHT LOSS: 0
NAUSEA: 0
ABDOMINAL PAIN: 1
HEMOPTYSIS: 0
BLURRED VISION: 0
VOMITING: 0
COUGH: 0

## 2021-01-29 ASSESSMENT — COGNITIVE AND FUNCTIONAL STATUS - GENERAL
SUGGESTED CMS G CODE MODIFIER DAILY ACTIVITY: CH
DAILY ACTIVITIY SCORE: 24
SUGGESTED CMS G CODE MODIFIER MOBILITY: CH
MOBILITY SCORE: 24

## 2021-01-29 ASSESSMENT — FIBROSIS 4 INDEX: FIB4 SCORE: 0.84

## 2021-01-29 NOTE — ASSESSMENT & PLAN NOTE
"Unclear if related to hypovolemia, sepsis or endocrine dysfunction (history of hypothyroidism, adrenal insufficiency, diabetes insipidus)  Continue IV hydration  Stress dose of hydrocortisone 100 mg  Check cortisol level, TSH  Monitor blood pressure\"    2/2: As per previous endocrinology recommendations we will continue with IV hydrocortisone and switch to oral in three days.    2/4: We will start oral therapy tonight.  "

## 2021-01-29 NOTE — ED TRIAGE NOTES
[facility]  Chief Complaint   Patient presents with   • Abdominal Pain     since last night; middle-lower; burning/aching   • Blood in Urine     buring with urination since last night   • Body Aches     genearlized; mainly lower back     Patient ambulated to triage for above complaints. Pt Occitan speaking, video  used for translation. Pt appears uncomfortable. Hx kidney stone. Denies hx UTI.     Patient informed of triage process and to inform staff of any changes/worsening symptoms. Pt verbalized understanding. Pt denies concerns. Pt back to waiting room.     /68   Pulse 93   Temp 37.3 °C (99.2 °F) (Temporal)   Resp 16   Ht 1.524 m (5')   Wt 61.6 kg (135 lb 12.9 oz)   SpO2 99%

## 2021-01-29 NOTE — ED NOTES
Med rec complete via interview with pt at bedside (interperter ipad used). Pt denies taking any prescription medications. Allergies reviewed. Pt denies antibiotic use in past 14 days.

## 2021-01-29 NOTE — ASSESSMENT & PLAN NOTE
Patient started on empiric IV antibiotics until symptoms improved  Urine culture ordered  Pain control  Improved    2/2: Patient to finish ceftriaxone today.

## 2021-01-29 NOTE — ED PROVIDER NOTES
ED Provider Note    ER PROVIDER NOTE          CHIEF COMPLAINT  Chief Complaint   Patient presents with   • Abdominal Pain     since last night; middle-lower; burning/aching   • Blood in Urine     buring with urination since last night   • Body Aches     genearlized; mainly lower back       HPI  Johana Weston is a 40 y.o. female who presents to the emergency department complaining of dysuria and body aches.  Patient reports that she has had some intermittent lower abdominal pain for few weeks although began having burning with urination yesterday and also noticed blood in her urine, as well as some body aches overnight.  She reports the pain is primarily right-sided with some radiation to her back and seems to be worse with movement.  Subjective fever beginning this morning as well.  She reports no nausea or vomiting but states that she feels very thirsty.  No cough or congestion or shortness of breath.  No rashes.    REVIEW OF SYSTEMS  Pertinent positives include flank pain, body aches. Pertinent negatives include no chest pain or cough. See HPI for details. All other systems reviewed and are negative.    PAST MEDICAL HISTORY   has a past medical history of Craniopharyngioma (HCC).    SURGICAL HISTORY   has a past surgical history that includes transsphenoidal resection.    FAMILY HISTORY  Family History   Family history unknown: Yes       SOCIAL HISTORY  Social History     Socioeconomic History   • Marital status: Single     Spouse name: Not on file   • Number of children: Not on file   • Years of education: Not on file   • Highest education level: Not on file   Occupational History   • Not on file   Social Needs   • Financial resource strain: Not on file   • Food insecurity     Worry: Not on file     Inability: Not on file   • Transportation needs     Medical: Not on file     Non-medical: Not on file   Tobacco Use   • Smoking status: Never Smoker   • Smokeless tobacco: Never Used   Substance and  Sexual Activity   • Alcohol use: Yes     Comment: occ   • Drug use: No   • Sexual activity: Not on file   Lifestyle   • Physical activity     Days per week: Not on file     Minutes per session: Not on file   • Stress: Not on file   Relationships   • Social connections     Talks on phone: Not on file     Gets together: Not on file     Attends [employer] service: Not on file     Active member of club or organization: Not on file     Attends meetings of clubs or organizations: Not on file     Relationship status: Not on file   • Intimate partner violence     Fear of current or ex partner: Not on file     Emotionally abused: Not on file     Physically abused: Not on file     Forced sexual activity: Not on file   Other Topics Concern   • Not on file   Social History Narrative   • Not on file      Social History     Substance and Sexual Activity   Drug Use No       CURRENT MEDICATIONS  Home Medications    **Home medications have not yet been reviewed for this encounter**         ALLERGIES  Allergies   Allergen Reactions   • Pork Allergy        PHYSICAL EXAM  VITAL SIGNS: BP (!) 96/61   Pulse 69   Temp 37.4 °C (99.4 °F) (Oral)   Resp 14   Ht 1.524 m (5')   Wt 61.6 kg (135 lb 12.9 oz)   SpO2 97%   BMI 26.52 kg/m²   Pulse ox interpretation: I interpret this pulse ox as normal.    Constitutional: Alert uncomfortable appearing  HENT: No signs of trauma, Bilateral external ears normal, Nose normal.   Eyes: Pupils are equal and reactive, Conjunctiva normal, Non-icteric.   Neck: Normal range of motion, No tenderness, Supple, No stridor.   Lymphatic: No lymphadenopathy noted.   Cardiovascular: Tachycardic, no murmurs.   Thorax & Lungs: Normal breath sounds, No respiratory distress, No wheezing, No chest tenderness.   Abdomen: Bowel sounds normal, Soft, right suprapubic tenderness, No masses, No pulsatile masses. No peritoneal signs.  Skin: Warm, Dry, No erythema, No rash.   Back: No bony tenderness, right CVA tenderness.  "  Extremities: Intact distal pulses, No edema, No tenderness, No cyanosis, Negative Peterson's sign.  Musculoskeletal: Good range of motion in all major joints. No tenderness to palpation or major deformities noted.   Neurologic: Alert , Normal motor function, Normal sensory function, No focal deficits noted.   Psychiatric: Affect normal, Judgment normal, Mood normal.     DIAGNOSTIC STUDIES / PROCEDURES      LABS  Labs Reviewed   CBC WITH DIFFERENTIAL - Abnormal; Notable for the following components:       Result Value    WBC 14.6 (*)     Neutrophils (Absolute) 9.99 (*)     All other components within normal limits   COMP METABOLIC PANEL - Abnormal; Notable for the following components:    Bun 6 (*)     Alkaline Phosphatase 117 (*)     All other components within normal limits   URINALYSIS - Abnormal; Notable for the following components:    Leukocyte Esterase Large (*)     Occult Blood Large (*)     All other components within normal limits   URINE MICROSCOPIC (W/UA) - Abnormal; Notable for the following components:    WBC 20-50 (*)     Hyaline Cast 3-5 (*)     All other components within normal limits   LIPASE   HCG QUALITATIVE UR   BLOOD CULTURE    Narrative:     1 of 2 for Blood Culture x 2 sites order. Per Hospital  Policy: Only change Specimen Src: to \"Line\" if specified by  physician order.   BLOOD CULTURE    Narrative:     2 of 2 blood culture x2  Sites order. Per Hospital Policy:  Only change Specimen Src: to \"Line\" if specified by physician  order.   LACTIC ACID   ESTIMATED GFR   LACTIC ACID   LACTIC ACID   SARS-COV-2, PCR (IN-HOUSE)       All labs reviewed by me.    RADIOLOGY  CT-RENAL COLIC EVALUATION(A/P W/O)   Final Result      1.  No renal stone or hydronephrosis.   2.  Normal appendix.   3.  No focal mesenteric inflammatory process.        The radiologist's interpretation of all radiological studies have been reviewed and images independently viewed by me.    COURSE & MEDICAL DECISION MAKING  Nursing " notes, VS, PMSFHx reviewed in chart.    11:34 AM Patient seen and examined at bedside. Patient will be treated with IV fluid, Rocephin and ketorolac given her presumed urinary source at this point. Ordered for sepsis bundle to evaluate her symptoms.     1:44 PM  Patient is reevaluated, updated on all results.  We will plan for hospitalization    Discussed case with hospitalist    This patient was cared for during the COVID-19 pandemic.  History and physical exam may be limited/truncated by the inherent challenges of PPE and the need to decrease staff exposure to novel coronavirus.  Some aspects of disease management may be different to protect staff and help slow the spread of disease. I verified that, if possible, the patient was wearing a mask and I was wearing appropriate PPE every time I encountered the patient.     Current [facility] COVID19 protocol followed      HYDRATION: Based on the patient's presentation of Tachycardia the patient was given IV fluids. IV Hydration was used because oral hydration was not as rapid as required. Upon recheck following hydration, the patient was improved.    Decision Making:  This is a 40 y.o. female presenting with dysuria, fever and body aches.  If she is found to have pyelonephritis and with her tachycardia and leukocytosis meets sepsis criteria as well.  She was started on Rocephin early, as well as IV fluids.  Toradol for pain has improved her symptoms.  She has no other focal complaints to suggest other infectious etiology.  I did obtain a CT given her pain and hematuria and there is no evidence of ureteral stone or other acute intra-abdominal process    Patient is admitted in stable condition    Video  used throughout patient visit    FINAL IMPRESSION  1. Pyelonephritis    2. Sepsis, due to unspecified organism, unspecified whether acute organ dysfunction present (HCC)         The note accurately reflects work and decisions made by me.  Willian Cintron M.D.   1/29/2021  1:45 PM

## 2021-01-29 NOTE — ASSESSMENT & PLAN NOTE
"This is Sepsis Present on admission  SIRS criteria identified on my evaluation include: Fever, with temperature greater than 101 deg F and Leukocytosis, with WBC greater than 12,000  Source is UTI  Sepsis protocol initiated  Fluid resuscitation ordered per protocol  IV antibiotics as appropriate for source of sepsis  While organ dysfunction may be noted elsewhere in this problem list or in the chart, degree of organ dysfunction does not meet CMS criteria for severe sepsis\"  Continue antibiotics follow cultures  Complete CoVID work-up with CXR  Give antispasmodics for back and medications for constipation.  Improved from an infection and sepsis standpoint but will have to contend with her adrenal insufficiency and hypotension (see below)    On 2/1,previous hospitalist talked to with [facility].  Will do IV hydrocort 50mg IV BID for 3d then taper to oral Hydrocort 20mg AM 10mg PM  Desmopressin was not started on admisison; restarted desmopressin. Do desmopressin do 400mg at night to avoid nighttime diuresis however if she cannot detect thirst, then go back to 200mg BID. On my experience with her she can detect thirst=, but will confirm.  She was bradycardic. While TSH is normal, secondary hypothyroidism can have normal TSHs. We reviewed chart and fT4 low. Start levothyroxine 50mcg. Echo pending eval EF.  Patient can follow up with [facility] or [facility] depending on insurance.  Patient will need to have primary care appointment done before she leaves as she will need home health care and transport to her doctors otherwise she will be lost to follow up.    2/2: Last dose of ceftriaxone will be given today.    2/3: Patient with borderline hypotension, not symptomatic. We will monitor and make changes accordingly. I contacted Dr Solorio and he recommended to restart desmopressin tonight at a dose of 200 mcg. Continue hydrocortisone as previously discussed.    2/4: " We will start po steroid therapy today. Monitor closely and planning to discharge in the next 24-48 hours with close follow up as outpatient.

## 2021-01-29 NOTE — ED NOTES
Report received from Manjula Angeles RN. Assumed care of patient. Pt assessed, AAO x 4. Patient's concerns addressed.  Fall precautions in place.  Pt repositioned and comfortable.  Call light within reach. Will continue to monitor. This RN masked and in appropriate PPE during encounter.

## 2021-01-29 NOTE — H&P
Hospital Medicine History & Physical Note    Date of Service  1/29/2021    Primary Care Physician  Pcp Pt States None    Consultants  None    Code Status  Full Code    Chief Complaint  Chief Complaint   Patient presents with   • Abdominal Pain     since last night; middle-lower; burning/aching   • Blood in Urine     buring with urination since last night   • Body Aches     genearlized; mainly lower back       History of Presenting Illness  40 y.o. female with past medical history of craniopharyngioma, craniotomy, history of  diabetes insipidus and adrenal insufficiency, who presented 1/29/2021 with complaints of lower abdominal pain, bilateral flank pain, dysuria, blood in urine, fever, chills, body aches, malaise.  Onset of symptoms 1 day ago  She met sepsis criteria with white blood cells 14.6, temperature 101.8.  She had mild hypotension 85/54 in the emergency department, which has been improving.  UA showed white blood cells 20-50, large leukocyte esterase.  CT renal colic negative for renal stones or hydronephrosis, there is normal appendix.  Review of Systems  Review of Systems   Constitutional: Positive for chills, fever and malaise/fatigue. Negative for weight loss.   HENT: Negative for ear pain, hearing loss and tinnitus.    Eyes: Negative for blurred vision, double vision and photophobia.   Respiratory: Negative for cough, hemoptysis and sputum production.    Cardiovascular: Negative for chest pain, palpitations and orthopnea.   Gastrointestinal: Positive for abdominal pain. Negative for heartburn, nausea and vomiting.   Genitourinary: Positive for dysuria and flank pain. Negative for frequency and hematuria.   Musculoskeletal: Negative for back pain, joint pain and neck pain.   Skin: Negative for itching and rash.   Neurological: Negative for tremors, speech change, focal weakness and headaches.   Endo/Heme/Allergies: Negative for environmental allergies and polydipsia. Does not bruise/bleed easily.    Psychiatric/Behavioral: Negative for hallucinations and substance abuse. The patient is not nervous/anxious.        Past Medical History   has a past medical history of Craniopharyngioma (HCC).    Surgical History   has a past surgical history that includes transsphenoidal resection.     Family History  Family history is unknown by patient.     Social History   reports that she has never smoked. She has never used smokeless tobacco. She reports current alcohol use. She reports that she does not use drugs.    Allergies  Allergies   Allergen Reactions   • Pork Allergy        Medications  Prior to Admission Medications   Prescriptions Last Dose Informant Patient Reported? Taking?   desmopressin (DDAVP) 0.1 MG Tab Not Taking at Unknown time Patient No No   Sig: Take 2 Tabs by mouth every 12 hours.   Patient not taking: Reported on 1/29/2021   ibuprofen (MOTRIN) 200 MG Tab 1/28/2021 at 1100 Patient Yes No   Sig: Take 400 mg by mouth one time as needed for Headache.   meclizine (ANTIVERT) 12.5 MG Tab Not Taking at Unknown time Patient No No   Sig: Take 1 Tab by mouth 3 times a day as needed for Nausea/Vomiting (vertigo).   Patient not taking: Reported on 1/29/2021      Facility-Administered Medications: None       Physical Exam  Temp:  [37.3 °C (99.2 °F)-38.8 °C (101.8 °F)] 37.4 °C (99.4 °F)  Pulse:  [67-93] 69  Resp:  [14-16] 14  BP: ()/(54-76) 96/61  SpO2:  [96 %-100 %] 97 %    Physical Exam  Vitals signs and nursing note reviewed.   Constitutional:       General: She is not in acute distress.     Appearance: Normal appearance.   HENT:      Head: Normocephalic and atraumatic.      Nose: Nose normal.      Mouth/Throat:      Mouth: Mucous membranes are moist.   Eyes:      Extraocular Movements: Extraocular movements intact.      Pupils: Pupils are equal, round, and reactive to light.   Neck:      Musculoskeletal: Normal range of motion and neck supple.   Cardiovascular:      Rate and Rhythm: Normal rate and  regular rhythm.   Pulmonary:      Effort: Pulmonary effort is normal.      Breath sounds: Normal breath sounds.   Abdominal:      General: Abdomen is flat. There is no distension.      Tenderness: There is abdominal tenderness in the right lower quadrant and left lower quadrant. There is no guarding or rebound.   Musculoskeletal: Normal range of motion.         General: No swelling or deformity.   Skin:     General: Skin is warm and dry.   Neurological:      General: No focal deficit present.      Mental Status: She is alert and oriented to person, place, and time.   Psychiatric:         Mood and Affect: Mood normal.         Behavior: Behavior normal.         Laboratory:  Recent Labs     01/29/21  1134   WBC 14.6*   RBC 5.00   HEMOGLOBIN 15.2   HEMATOCRIT 44.3   MCV 88.6   MCH 30.4   MCHC 34.3   RDW 46.1   PLATELETCT 337   MPV 10.3     Recent Labs     01/29/21  1134   SODIUM 138   POTASSIUM 3.6   CHLORIDE 103   CO2 25   GLUCOSE 80   BUN 6*   CREATININE 0.92   CALCIUM 9.5     Recent Labs     01/29/21  1134   ALTSGPT 18   ASTSGOT 33   ALKPHOSPHAT 117*   TBILIRUBIN 0.6   LIPASE 21   GLUCOSE 80         No results for input(s): NTPROBNP in the last 72 hours.      No results for input(s): TROPONINT in the last 72 hours.    Imaging:  CT-RENAL COLIC EVALUATION(A/P W/O)   Final Result      1.  No renal stone or hydronephrosis.   2.  Normal appendix.   3.  No focal mesenteric inflammatory process.            Assessment/Plan:  I anticipate this patient will require at least two midnights for appropriate medical management, necessitating inpatient admission.    Pyelonephritis  Assessment & Plan  Patient started on empiric IV antibiotics until symptoms improved  Urine culture ordered  Pain control    Sepsis (HCC)  Assessment & Plan  This is Sepsis Present on admission  SIRS criteria identified on my evaluation include: Fever, with temperature greater than 101 deg F and Leukocytosis, with WBC greater than 12,000  Source is  UTI  Sepsis protocol initiated  Fluid resuscitation ordered per protocol  IV antibiotics as appropriate for source of sepsis  While organ dysfunction may be noted elsewhere in this problem list or in the chart, degree of organ dysfunction does not meet CMS criteria for severe sepsis          Hypotension  Assessment & Plan  Unclear if related to hypovolemia, sepsis or endocrine dysfunction (history of hypothyroidism, adrenal insufficiency, diabetes insipidus)  Continue IV hydration  Stress dose of hydrocortisone 100 mg  Check cortisol level, TSH  Monitor blood pressure

## 2021-01-30 ENCOUNTER — APPOINTMENT (OUTPATIENT)
Dept: RADIOLOGY | Facility: MEDICAL CENTER | Age: 41
DRG: 871 | End: 2021-01-30
Attending: INTERNAL MEDICINE
Payer: MEDICAID

## 2021-01-30 PROBLEM — N39.0 SEPSIS SECONDARY TO UTI (HCC): Status: ACTIVE | Noted: 2019-05-14

## 2021-01-30 LAB
ALBUMIN SERPL BCP-MCNC: 4.1 G/DL (ref 3.2–4.9)
ALBUMIN/GLOB SERPL: 1.3 G/DL
ALP SERPL-CCNC: 115 U/L (ref 30–99)
ALT SERPL-CCNC: 15 U/L (ref 2–50)
ANION GAP SERPL CALC-SCNC: 9 MMOL/L (ref 7–16)
AST SERPL-CCNC: 25 U/L (ref 12–45)
BASOPHILS # BLD AUTO: 0.2 % (ref 0–1.8)
BASOPHILS # BLD: 0.02 K/UL (ref 0–0.12)
BILIRUB SERPL-MCNC: 0.4 MG/DL (ref 0.1–1.5)
BUN SERPL-MCNC: 10 MG/DL (ref 8–22)
CALCIUM SERPL-MCNC: 9.3 MG/DL (ref 8.5–10.5)
CHLORIDE SERPL-SCNC: 111 MMOL/L (ref 96–112)
CO2 SERPL-SCNC: 22 MMOL/L (ref 20–33)
CORTIS SERPL-MCNC: 66.6 UG/DL (ref 0–23)
CREAT SERPL-MCNC: 0.72 MG/DL (ref 0.5–1.4)
EOSINOPHIL # BLD AUTO: 0 K/UL (ref 0–0.51)
EOSINOPHIL NFR BLD: 0 % (ref 0–6.9)
ERYTHROCYTE [DISTWIDTH] IN BLOOD BY AUTOMATED COUNT: 47.3 FL (ref 35.9–50)
GLOBULIN SER CALC-MCNC: 3.1 G/DL (ref 1.9–3.5)
GLUCOSE SERPL-MCNC: 150 MG/DL (ref 65–99)
HCT VFR BLD AUTO: 40.5 % (ref 37–47)
HGB BLD-MCNC: 14 G/DL (ref 12–16)
IMM GRANULOCYTES # BLD AUTO: 0.03 K/UL (ref 0–0.11)
IMM GRANULOCYTES NFR BLD AUTO: 0.4 % (ref 0–0.9)
LACTATE BLD-SCNC: 1.1 MMOL/L (ref 0.5–2)
LYMPHOCYTES # BLD AUTO: 1.2 K/UL (ref 1–4.8)
LYMPHOCYTES NFR BLD: 14.7 % (ref 22–41)
MCH RBC QN AUTO: 30.9 PG (ref 27–33)
MCHC RBC AUTO-ENTMCNC: 34.6 G/DL (ref 33.6–35)
MCV RBC AUTO: 89.4 FL (ref 81.4–97.8)
MONOCYTES # BLD AUTO: 0.08 K/UL (ref 0–0.85)
MONOCYTES NFR BLD AUTO: 1 % (ref 0–13.4)
NEUTROPHILS # BLD AUTO: 6.85 K/UL (ref 2–7.15)
NEUTROPHILS NFR BLD: 83.7 % (ref 44–72)
NRBC # BLD AUTO: 0 K/UL
NRBC BLD-RTO: 0 /100 WBC
PLATELET # BLD AUTO: 301 K/UL (ref 164–446)
PMV BLD AUTO: 10.5 FL (ref 9–12.9)
POTASSIUM SERPL-SCNC: 3.5 MMOL/L (ref 3.6–5.5)
PROT SERPL-MCNC: 7.2 G/DL (ref 6–8.2)
RBC # BLD AUTO: 4.53 M/UL (ref 4.2–5.4)
SODIUM SERPL-SCNC: 142 MMOL/L (ref 135–145)
WBC # BLD AUTO: 8.2 K/UL (ref 4.8–10.8)

## 2021-01-30 PROCEDURE — 700111 HCHG RX REV CODE 636 W/ 250 OVERRIDE (IP): Performed by: STUDENT IN AN ORGANIZED HEALTH CARE EDUCATION/TRAINING PROGRAM

## 2021-01-30 PROCEDURE — 36415 COLL VENOUS BLD VENIPUNCTURE: CPT

## 2021-01-30 PROCEDURE — 71045 X-RAY EXAM CHEST 1 VIEW: CPT

## 2021-01-30 PROCEDURE — 82533 TOTAL CORTISOL: CPT

## 2021-01-30 PROCEDURE — 700101 HCHG RX REV CODE 250: Performed by: INTERNAL MEDICINE

## 2021-01-30 PROCEDURE — 700102 HCHG RX REV CODE 250 W/ 637 OVERRIDE(OP): Performed by: INTERNAL MEDICINE

## 2021-01-30 PROCEDURE — 700111 HCHG RX REV CODE 636 W/ 250 OVERRIDE (IP): Performed by: INTERNAL MEDICINE

## 2021-01-30 PROCEDURE — 83605 ASSAY OF LACTIC ACID: CPT

## 2021-01-30 PROCEDURE — A9270 NON-COVERED ITEM OR SERVICE: HCPCS | Performed by: INTERNAL MEDICINE

## 2021-01-30 PROCEDURE — 80053 COMPREHEN METABOLIC PANEL: CPT

## 2021-01-30 PROCEDURE — 99233 SBSQ HOSP IP/OBS HIGH 50: CPT | Performed by: INTERNAL MEDICINE

## 2021-01-30 PROCEDURE — 700105 HCHG RX REV CODE 258: Performed by: INTERNAL MEDICINE

## 2021-01-30 PROCEDURE — 85025 COMPLETE CBC W/AUTO DIFF WBC: CPT

## 2021-01-30 PROCEDURE — 770021 HCHG ROOM/CARE - ISO PRIVATE

## 2021-01-30 RX ORDER — FAMOTIDINE 20 MG/1
20 TABLET, FILM COATED ORAL DAILY
Status: DISCONTINUED | OUTPATIENT
Start: 2021-01-30 | End: 2021-02-04

## 2021-01-30 RX ORDER — DIAZEPAM 5 MG/1
2.5 TABLET ORAL EVERY 6 HOURS PRN
Status: DISCONTINUED | OUTPATIENT
Start: 2021-01-30 | End: 2021-02-04

## 2021-01-30 RX ADMIN — POLYETHYLENE GLYCOL 3350 1 PACKET: 17 POWDER, FOR SOLUTION ORAL at 09:35

## 2021-01-30 RX ADMIN — HYDROCORTISONE SODIUM SUCCINATE 75 MG: 100 INJECTION, POWDER, FOR SOLUTION INTRAMUSCULAR; INTRAVENOUS at 05:28

## 2021-01-30 RX ADMIN — HYDROCORTISONE SODIUM SUCCINATE 75 MG: 100 INJECTION, POWDER, FOR SOLUTION INTRAMUSCULAR; INTRAVENOUS at 20:23

## 2021-01-30 RX ADMIN — SODIUM CHLORIDE: 9 INJECTION, SOLUTION INTRAVENOUS at 23:29

## 2021-01-30 RX ADMIN — HYDROCORTISONE SODIUM SUCCINATE 75 MG: 100 INJECTION, POWDER, FOR SOLUTION INTRAMUSCULAR; INTRAVENOUS at 14:46

## 2021-01-30 RX ADMIN — DOCUSATE SODIUM 50 MG AND SENNOSIDES 8.6 MG 2 TABLET: 8.6; 5 TABLET, FILM COATED ORAL at 05:28

## 2021-01-30 RX ADMIN — SODIUM CHLORIDE: 9 INJECTION, SOLUTION INTRAVENOUS at 09:35

## 2021-01-30 RX ADMIN — SODIUM CHLORIDE: 9 INJECTION, SOLUTION INTRAVENOUS at 16:48

## 2021-01-30 RX ADMIN — MAGNESIUM HYDROXIDE 30 ML: 400 SUSPENSION ORAL at 05:28

## 2021-01-30 RX ADMIN — FAMOTIDINE 20 MG: 20 TABLET ORAL at 11:11

## 2021-01-30 RX ADMIN — ACETAMINOPHEN 650 MG: 325 TABLET, FILM COATED ORAL at 11:11

## 2021-01-30 RX ADMIN — CEFTRIAXONE SODIUM 1 G: 1 INJECTION, POWDER, FOR SOLUTION INTRAMUSCULAR; INTRAVENOUS at 11:11

## 2021-01-30 RX ADMIN — ENOXAPARIN SODIUM 40 MG: 40 INJECTION SUBCUTANEOUS at 05:28

## 2021-01-30 RX ADMIN — KETOROLAC TROMETHAMINE 30 MG: 30 INJECTION, SOLUTION INTRAMUSCULAR at 20:28

## 2021-01-30 ASSESSMENT — PAIN DESCRIPTION - PAIN TYPE: TYPE: ACUTE PAIN

## 2021-01-30 ASSESSMENT — ENCOUNTER SYMPTOMS
DIARRHEA: 0
NAUSEA: 0
NERVOUS/ANXIOUS: 0
CONSTIPATION: 0
WEAKNESS: 0
ABDOMINAL PAIN: 1
BLOOD IN STOOL: 0
EYE PAIN: 0
FOCAL WEAKNESS: 0
DIZZINESS: 0
PALPITATIONS: 0
WHEEZING: 0
LOSS OF CONSCIOUSNESS: 0
FEVER: 0
SHORTNESS OF BREATH: 0
FALLS: 0
EYE REDNESS: 0
HEMOPTYSIS: 0
HEADACHES: 0
SEIZURES: 0
VOMITING: 0
COUGH: 0
MYALGIAS: 1
TREMORS: 0
INSOMNIA: 0
CHILLS: 0

## 2021-01-30 NOTE — PROGRESS NOTES
[facility] Medicine Daily Progress Note    Date of Service  1/30/2021    Chief Complaint  40 y.o. female admitted 1/29/2021 with Abdominal Pain (since last night; middle-lower; burning/aching), Blood in Urine (buring with urination since last night), and Body Aches (genearlized; mainly lower back)        Hospital Course  History of craniopharyngioma, craniotomy, diabetes insipidus and adrenal insufficiency on desmopressin and antivert  Presents with Abdominal Pain (since last night; middle-lower; burning/aching), Blood in Urine (buring with urination since last night), and Body Aches (genearlized; mainly lower back)  Complains of lower abdominal pain, bilateral flank pain, dysuria, blood in urine, fever, chills, body aches, malaise, 1d PTA.  At the ED, FEBRILE, low normal BPs, NOT hypoxic.  CT:  1.  No renal stone or hydronephrosis.  2.  Normal appendix.  3.  No focal mesenteric inflammatory process.  Leukocytosis.    1/29 CoVID POSITIVE  Procalcitonin normal  Ddimer<3  Started on antibiotics for UTI/pyelonephritis., Patient not hypoxic currently so not starting dexamethasone however patient on stress dose steroids for adrenal insufficiency.    Interval Problem Update  She is complaining of back and R abdominal pain. I don;t see a rash that would suggest dermatomal distribution but seems to have myalgia R back. Also constipated.    Consultants/Specialty      Code Status  Full Code    Disposition  Likely home    Review of Systems  Review of Systems   Constitutional: Negative for chills and fever.   HENT: Negative for congestion, hearing loss and nosebleeds.    Eyes: Negative for pain and redness.   Respiratory: Negative for cough, hemoptysis, shortness of breath and wheezing.    Cardiovascular: Negative for chest pain and palpitations.   Gastrointestinal: Positive for abdominal pain. Negative for blood in stool, constipation, diarrhea, nausea and vomiting.   Genitourinary: Negative for dysuria, frequency and hematuria.    Musculoskeletal: Positive for myalgias. Negative for falls and joint pain.   Skin: Negative for rash.   Neurological: Negative for dizziness, tremors, focal weakness, seizures, loss of consciousness, weakness and headaches.   Psychiatric/Behavioral: The patient is not nervous/anxious and does not have insomnia.    All other systems reviewed and are negative.       Physical Exam  Temp:  [35.9 °C (96.6 °F)-38.8 °C (101.8 °F)] 36.2 °C (97.1 °F)  Pulse:  [46-93] 62  Resp:  [14-18] 18  BP: ()/(51-76) 104/63  SpO2:  [92 %-100 %] 92 %    Physical Exam  Vitals signs and nursing note reviewed.   Constitutional:       General: She is not in acute distress.  HENT:      Head: Normocephalic and atraumatic.      Right Ear: External ear normal.      Left Ear: External ear normal.      Nose: Nose normal.      Mouth/Throat:      Mouth: Mucous membranes are moist.   Eyes:      General: No scleral icterus.     Conjunctiva/sclera: Conjunctivae normal.   Neck:      Musculoskeletal: Normal range of motion and neck supple. No neck rigidity.   Cardiovascular:      Rate and Rhythm: Normal rate and regular rhythm.      Pulses: Normal pulses.      Heart sounds: No murmur. No friction rub. No gallop.    Pulmonary:      Effort: Pulmonary effort is normal. No respiratory distress.      Breath sounds: Normal breath sounds. No stridor. No wheezing, rhonchi or rales.   Chest:      Chest wall: No tenderness.   Abdominal:      General: Abdomen is flat. Bowel sounds are normal. There is no distension.      Palpations: Abdomen is soft.      Tenderness: There is abdominal tenderness (Mild RLQ). There is no guarding or rebound.   Musculoskeletal: Normal range of motion.         General: Tenderness (R back) present. No swelling or deformity.   Skin:     General: Skin is warm.      Coloration: Skin is not jaundiced.   Neurological:      General: No focal deficit present.      Mental Status: She is alert and oriented to person, place, and time.  "Mental status is at baseline.   Psychiatric:         Mood and Affect: Mood normal.         Behavior: Behavior normal.         Thought Content: Thought content normal.         Judgment: Judgment normal.         Fluids    Intake/Output Summary (Last 24 hours) at 1/30/2021 0932  Last data filed at 1/29/2021 2000  Gross per 24 hour   Intake 1000 ml   Output 1600 ml   Net -600 ml       Laboratory  Recent Labs     01/29/21  1134 01/30/21  0239   WBC 14.6* 8.2   RBC 5.00 4.53   HEMOGLOBIN 15.2 14.0   HEMATOCRIT 44.3 40.5   MCV 88.6 89.4   MCH 30.4 30.9   MCHC 34.3 34.6   RDW 46.1 47.3   PLATELETCT 337 301   MPV 10.3 10.5     Recent Labs     01/29/21  1134 01/30/21  0239   SODIUM 138 142   POTASSIUM 3.6 3.5*   CHLORIDE 103 111   CO2 25 22   GLUCOSE 80 150*   BUN 6* 10   CREATININE 0.92 0.72   CALCIUM 9.5 9.3                   Imaging  DX-CHEST-PORTABLE (1 VIEW)   Final Result      Questionable mild hazy opacity in the left lung base as detailed. Otherwise negative.      CT-RENAL COLIC EVALUATION(A/P W/O)   Final Result      1.  No renal stone or hydronephrosis.   2.  Normal appendix.   3.  No focal mesenteric inflammatory process.           Assessment/Plan  * Sepsis secondary to UTI, pyelonephritis, CoVID+ ([facility])  Assessment & Plan  This is Sepsis Present on admission  SIRS criteria identified on my evaluation include: Fever, with temperature greater than 101 deg F and Leukocytosis, with WBC greater than 12,000  Source is UTI  Sepsis protocol initiated  Fluid resuscitation ordered per protocol  IV antibiotics as appropriate for source of sepsis  While organ dysfunction may be noted elsewhere in this problem list or in the chart, degree of organ dysfunction does not meet CMS criteria for severe sepsis\"    Continue antibiotics follow cultures  Complete CoVID work-up with CXR  Give antispasmodics for back and medications for constipation.          Pyelonephritis  Assessment & Plan  Patient started on empiric IV antibiotics " until symptoms improved  Urine culture ordered  Pain control    Hypotension  Assessment & Plan  Unclear if related to hypovolemia, sepsis or endocrine dysfunction (history of hypothyroidism, adrenal insufficiency, diabetes insipidus)  Continue IV hydration  Stress dose of hydrocortisone 100 mg  Check cortisol level, TSH  Monitor blood pressure       VTE prophylaxis: Lovenox SQ  Gastrointestinal prophylaxis: Added famotidine.  Antibiotics:   Ordered Anti-infectives (9999h ago, onward)     Ordered     Start    01/29/21 1428  cefTRIAXone (ROCEPHIN) 1 g in  mL IVPB  DAILY AT NOON     Note to Pharmacy: Per P&T Antimicrobial Dose Adjustment Protocol    01/30/21 1200    01/29/21 1137  cefTRIAXone (ROCEPHIN) 2 g in  mL IVPB  ONCE      01/29/21 1200              Diet:   Orders Placed This Encounter   Procedures   • Diet Order Diet: Regular     Standing Status:   Standing     Number of Occurrences:   1     Order Specific Question:   Diet:     Answer:   Regular [1]      Prognosis: Yes  Risk: The Patient is at HIGH risk for inpatient complications and decompensation secondary to his multiple cormorbidities  listed above, especially   Problem   Sepsis secondary to UTI, pyelonephritis, CoVID+ (HCC)      I have personally reviewed notes, labs, vitals, imaging.  I discussed the plan of care with bedside RN as well as on multidisciplinary rounds  I have performed a physical exam and reviewed and updated ROS and Plan today 1/30/2021. In review of yesterday's note 1/29/2021   there are no changes except as documented above.   This patient was seen under COVID 19 pandemic onditions.  During a disaster, the provisions of care is subject to the Crisis Standard of Care

## 2021-01-30 NOTE — PROGRESS NOTES
Naida from lab called with a + COVID result. Dr. Marinelli paged and updated. New transfer orders received for S1.

## 2021-01-30 NOTE — PROGRESS NOTES
Oriented to room call light and smoking policy.  Reviewed plan of care (equipment, medications, activity, diet, fall precautions, skin care, and pain) with patient.  Dima [id number] utilized for education and completion of admission profile. All questions and concerns addressed. Expressed to patient that plan for this time is IV hydration, anti-biotics, and to monitor her renal function.

## 2021-01-30 NOTE — CARE PLAN
Problem: Communication  Goal: The ability to communicate needs accurately and effectively will improve  Outcome: PROGRESSING AS EXPECTED     Problem: Infection  Goal: Will remain free from infection  Outcome: PROGRESSING AS EXPECTED     Problem: Bowel/Gastric:  Goal: Normal bowel function is maintained or improved  Outcome: PROGRESSING AS EXPECTED     Problem: Pain Management  Goal: Pain level will decrease to patient's comfort goal  Outcome: PROGRESSING AS EXPECTED       Pt able to communicate all needs with . Pt maintains IV ABX for infection, no fever present. Pt c/o constipation. PRN miralax given in prune juice. Pt able to have BM x2. Pt c/o right flank pain radiating to RLQ, grimacing on palpation. Abdominal pain relieved with BM. Headache stated resolved with tylenol. Continues to have flank pain.

## 2021-01-30 NOTE — PROGRESS NOTES
Patient taken down to S152 in a wheelchair. All belongings taken with the patient.    BP (!) 93/63   Pulse 60   Temp 36.1 °C (97 °F) (Temporal)   Resp 16   Ht 1.524 m (5')   Wt 61.6 kg (135 lb 12.9 oz)   SpO2 98%   BMI 26.52 kg/m²     Report given to AGNES Dorsey.

## 2021-01-30 NOTE — DIETARY
"Nutrition services: Johana Weston is a 40 y.o. female with admitting DX of pyelonephritis.      Consult received for wt loss PTA, Malnutrition Screening Tool score of 3.     RD used the  line to call pt, Patty  #[phone].    RD called pt to discuss her recent wt loss.  Pt was very tearful.  She states she weighed 220# in October of 2020.  She lost her job and has not had enough money for food.  She has been sleeping a lot and if she wakes up hungry she will eat an egg or some fruit or will drink water \"to fill up my belly\".  She is renting a room from someone \"that is very worried about COVID-19\" and pt is very concerned that if her landlord finds out she has COVID they will kick her out and she will have nowhere to live.  She does not have any family to help her.  She has a friend that helps her but she is unable to live with this friend.  Pt tearfully stated \"I am hoping God can get me out of this\".  RD asked pt if we can have the  see her to help her with resources and she said \"yes, thank you\". RD called case management, spoke to Pat who will have someone see pt.      Assessment:  Height: 152.4 cm (5')  Weight: 61.6 kg (135 lb 12.9 oz)  Body mass index is 26.52 kg/m².  Pertinent medical history: craniopharyngioma, DI  Diet/Intake: regular diet with % intake breakfast and lunch today    Evaluation:   1. Unable to visit pt for nutrition focused physical exam; however, strongly suspect pt with severe malnutrition in the context of social/environmental circumstances as evidenced by 39% wt loss in the past ~4 months with presumed muscle/fat wasting + <50% of estimated energy needs for >1 month.    2. Labs: K+ 3.5, glu 150   3. BM: abd pain relieved by BM today  4. Pertinent meds/fluids: solucortef  5. Expect pt to eat well while here d/t food insecurity PTA.  She could be at risk for refeeding syndrome especially if recent po was quite poor but now " she is eating a lot more.  Sent message to MD asking for refeeding labs daily for at least the next 3 days to monitor.       Recommendations/Plan:  1. Continue with regular diet.  Will have Chinese speaking dietary staff call pt to offer snacks.     2. Document intake of all meals/snacks as % taken in ADL's to provide interdisciplinary communication across all shifts.   3. Monitor weight.  4. Recommend BMP or CMP + mg, phos daily for the next few days to monitor for refeeding syndrome.  5. Nutrition rep will attempt to contact pt for ongoing meal and snack preferences.   6. RD following.

## 2021-01-31 LAB
BACTERIA UR CULT: NORMAL
MAGNESIUM SERPL-MCNC: 2.4 MG/DL (ref 1.5–2.5)
PHOSPHATE SERPL-MCNC: 4 MG/DL (ref 2.5–4.5)
SIGNIFICANT IND 70042: NORMAL
SITE SITE: NORMAL
SOURCE SOURCE: NORMAL

## 2021-01-31 PROCEDURE — 99233 SBSQ HOSP IP/OBS HIGH 50: CPT | Performed by: INTERNAL MEDICINE

## 2021-01-31 PROCEDURE — 36415 COLL VENOUS BLD VENIPUNCTURE: CPT

## 2021-01-31 PROCEDURE — 700102 HCHG RX REV CODE 250 W/ 637 OVERRIDE(OP): Performed by: INTERNAL MEDICINE

## 2021-01-31 PROCEDURE — 83735 ASSAY OF MAGNESIUM: CPT

## 2021-01-31 PROCEDURE — 84100 ASSAY OF PHOSPHORUS: CPT

## 2021-01-31 PROCEDURE — 770021 HCHG ROOM/CARE - ISO PRIVATE

## 2021-01-31 PROCEDURE — 700111 HCHG RX REV CODE 636 W/ 250 OVERRIDE (IP): Performed by: STUDENT IN AN ORGANIZED HEALTH CARE EDUCATION/TRAINING PROGRAM

## 2021-01-31 PROCEDURE — A9270 NON-COVERED ITEM OR SERVICE: HCPCS | Performed by: INTERNAL MEDICINE

## 2021-01-31 PROCEDURE — 700105 HCHG RX REV CODE 258: Performed by: INTERNAL MEDICINE

## 2021-01-31 PROCEDURE — 700111 HCHG RX REV CODE 636 W/ 250 OVERRIDE (IP): Performed by: INTERNAL MEDICINE

## 2021-01-31 RX ADMIN — ENOXAPARIN SODIUM 40 MG: 40 INJECTION SUBCUTANEOUS at 04:54

## 2021-01-31 RX ADMIN — HYDROCORTISONE SODIUM SUCCINATE 75 MG: 100 INJECTION, POWDER, FOR SOLUTION INTRAMUSCULAR; INTRAVENOUS at 21:05

## 2021-01-31 RX ADMIN — SODIUM CHLORIDE: 9 INJECTION, SOLUTION INTRAVENOUS at 12:02

## 2021-01-31 RX ADMIN — SODIUM CHLORIDE: 9 INJECTION, SOLUTION INTRAVENOUS at 04:54

## 2021-01-31 RX ADMIN — CEFTRIAXONE SODIUM 1 G: 1 INJECTION, POWDER, FOR SOLUTION INTRAMUSCULAR; INTRAVENOUS at 14:27

## 2021-01-31 RX ADMIN — HYDROCORTISONE SODIUM SUCCINATE 75 MG: 100 INJECTION, POWDER, FOR SOLUTION INTRAMUSCULAR; INTRAVENOUS at 04:54

## 2021-01-31 RX ADMIN — SODIUM CHLORIDE: 9 INJECTION, SOLUTION INTRAVENOUS at 21:06

## 2021-01-31 RX ADMIN — FAMOTIDINE 20 MG: 20 TABLET ORAL at 04:55

## 2021-01-31 RX ADMIN — HYDROCORTISONE SODIUM SUCCINATE 75 MG: 100 INJECTION, POWDER, FOR SOLUTION INTRAMUSCULAR; INTRAVENOUS at 14:27

## 2021-01-31 ASSESSMENT — ENCOUNTER SYMPTOMS
CONSTIPATION: 0
INSOMNIA: 0
HEMOPTYSIS: 0
HEADACHES: 0
CHILLS: 0
TREMORS: 0
SEIZURES: 0
DIARRHEA: 0
NERVOUS/ANXIOUS: 0
FALLS: 0
BLOOD IN STOOL: 0
LOSS OF CONSCIOUSNESS: 0
EYE REDNESS: 0
WEAKNESS: 0
FOCAL WEAKNESS: 0
PALPITATIONS: 0
EYE PAIN: 0
SHORTNESS OF BREATH: 0
ABDOMINAL PAIN: 1
VOMITING: 0
MYALGIAS: 1
NAUSEA: 0
WHEEZING: 0
DIZZINESS: 0
FEVER: 0
COUGH: 0

## 2021-01-31 ASSESSMENT — PAIN DESCRIPTION - PAIN TYPE: TYPE: ACUTE PAIN

## 2021-01-31 NOTE — PROGRESS NOTES
Pt. Reporting ha 6/10; medicated as per mar; will reassess. Pt. Provided with warm water in basin and wash cloths and provided herself with tayla care and gown change. Fresh water and coffee provided. Encouraged to call for any  Further needs. spo2 94% on ra. Will continue to monitor.

## 2021-01-31 NOTE — PROGRESS NOTES
[facility] Medicine Daily Progress Note    Date of Service  1/31/2021    Chief Complaint  40 y.o. female admitted 1/29/2021 with Abdominal Pain (since last night; middle-lower; burning/aching), Blood in Urine (buring with urination since last night), and Body Aches (genearlized; mainly lower back)        Hospital Course  History of craniopharyngioma, craniotomy, diabetes insipidus and adrenal insufficiency on desmopressin and antivert  Presents with Abdominal Pain (since last night; middle-lower; burning/aching), Blood in Urine (buring with urination since last night), and Body Aches (genearlized; mainly lower back)  Complains of lower abdominal pain, bilateral flank pain, dysuria, blood in urine, fever, chills, body aches, malaise, 1d PTA.  At the ED, FEBRILE, low normal BPs, NOT hypoxic.  CT:  1.  No renal stone or hydronephrosis.  2.  Normal appendix.  3.  No focal mesenteric inflammatory process.  Leukocytosis.    1/29 CoVID POSITIVE  Procalcitonin normal  Ddimer<3  Started on antibiotics for UTI/pyelonephritis., Patient not hypoxic currently so not starting dexamethasone however patient on stress dose steroids for adrenal insufficiency.    Interval Problem Update  She is complaining of back and R abdominal pain. I don't see a rash that would suggest dermatomal distribution but seems to have myalgia R back. Also constipated.  This improved now complaining of R thigh pain. Blood pressure slightly improving    Consultants/Specialty      Code Status  Full Code    Disposition  Likely home tomorrow    Review of Systems  Review of Systems   Constitutional: Negative for chills and fever.   HENT: Negative for congestion, hearing loss and nosebleeds.    Eyes: Negative for pain and redness.   Respiratory: Negative for cough, hemoptysis, shortness of breath and wheezing.    Cardiovascular: Negative for chest pain and palpitations.   Gastrointestinal: Positive for abdominal pain. Negative for blood in stool, constipation,  diarrhea, nausea and vomiting.   Genitourinary: Negative for dysuria, frequency and hematuria.   Musculoskeletal: Positive for myalgias. Negative for falls and joint pain.   Skin: Negative for rash.   Neurological: Negative for dizziness, tremors, focal weakness, seizures, loss of consciousness, weakness and headaches.   Psychiatric/Behavioral: The patient is not nervous/anxious and does not have insomnia.    All other systems reviewed and are negative.       Physical Exam  Temp:  [36.1 °C (97 °F)-36.7 °C (98.1 °F)] 36.7 °C (98.1 °F)  Pulse:  [60-99] 69  Resp:  [16-18] 16  BP: ()/(45-70) 104/48  SpO2:  [94 %-100 %] 98 %    Physical Exam  Vitals signs and nursing note reviewed.   Constitutional:       General: She is not in acute distress.  HENT:      Head: Normocephalic and atraumatic.      Right Ear: External ear normal.      Left Ear: External ear normal.      Nose: Nose normal.      Mouth/Throat:      Mouth: Mucous membranes are moist.   Eyes:      General: No scleral icterus.     Conjunctiva/sclera: Conjunctivae normal.   Neck:      Musculoskeletal: Normal range of motion and neck supple. No neck rigidity.   Cardiovascular:      Rate and Rhythm: Normal rate and regular rhythm.      Pulses: Normal pulses.      Heart sounds: No murmur. No friction rub. No gallop.    Pulmonary:      Effort: Pulmonary effort is normal. No respiratory distress.      Breath sounds: Normal breath sounds. No stridor. No wheezing, rhonchi or rales.   Chest:      Chest wall: No tenderness.   Abdominal:      General: Abdomen is flat. Bowel sounds are normal. There is no distension.      Palpations: Abdomen is soft.      Tenderness: There is abdominal tenderness (Mild RLQ). There is no guarding or rebound.   Musculoskeletal: Normal range of motion.         General: Tenderness (R back) present. No swelling or deformity.   Skin:     General: Skin is warm.      Coloration: Skin is not jaundiced.   Neurological:      General: No focal  "deficit present.      Mental Status: She is alert and oriented to person, place, and time. Mental status is at baseline.   Psychiatric:         Mood and Affect: Mood normal.         Behavior: Behavior normal.         Thought Content: Thought content normal.         Judgment: Judgment normal.         Fluids    Intake/Output Summary (Last 24 hours) at 1/31/2021 0821  Last data filed at 1/30/2021 1648  Gross per 24 hour   Intake 1040 ml   Output --   Net 1040 ml       Laboratory  Recent Labs     01/29/21  1134 01/30/21  0239   WBC 14.6* 8.2   RBC 5.00 4.53   HEMOGLOBIN 15.2 14.0   HEMATOCRIT 44.3 40.5   MCV 88.6 89.4   MCH 30.4 30.9   MCHC 34.3 34.6   RDW 46.1 47.3   PLATELETCT 337 301   MPV 10.3 10.5     Recent Labs     01/29/21  1134 01/30/21  0239   SODIUM 138 142   POTASSIUM 3.6 3.5*   CHLORIDE 103 111   CO2 25 22   GLUCOSE 80 150*   BUN 6* 10   CREATININE 0.92 0.72   CALCIUM 9.5 9.3                   Imaging  DX-CHEST-PORTABLE (1 VIEW)   Final Result      Questionable mild hazy opacity in the left lung base as detailed. Otherwise negative.      CT-RENAL COLIC EVALUATION(A/P W/O)   Final Result      1.  No renal stone or hydronephrosis.   2.  Normal appendix.   3.  No focal mesenteric inflammatory process.           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)  Assessment & Plan  This is Sepsis Present on admission  SIRS criteria identified on my evaluation include: Fever, with temperature greater than 101 deg F and Leukocytosis, with WBC greater than 12,000  Source is UTI  Sepsis protocol initiated  Fluid resuscitation ordered per protocol  IV antibiotics as appropriate for source of sepsis  While organ dysfunction may be noted elsewhere in this problem list or in the chart, degree of organ dysfunction does not meet CMS criteria for severe sepsis\"  Continue antibiotics follow cultures  Complete CoVID work-up with CXR  Give " "antispasmodics for back and medications for constipation.  Improved from an infection and sepsis standpoint but will have to contend with her adrenal insufficiency and hypotension (see below)          Pyelonephritis  Assessment & Plan  Patient started on empiric IV antibiotics until symptoms improved  Urine culture ordered  Pain control    Hypotension  Assessment & Plan  Unclear if related to hypovolemia, sepsis or endocrine dysfunction (history of hypothyroidism, adrenal insufficiency, diabetes insipidus)  Continue IV hydration  Stress dose of hydrocortisone 100 mg  Check cortisol level, TSH  Monitor blood pressure\"    Slight improvement.  Switch to PO tomorrow and devise a taper.  Will see if Endocrinology is available for curbside tomorrow to assist with management and disposition.        VTE prophylaxis: Lovenox SQ  Gastrointestinal prophylaxis: Added famotidine.  Antibiotics:   Ordered Anti-infectives (9999h ago, onward)     Ordered     Start    01/29/21 1428  cefTRIAXone (ROCEPHIN) 1 g in  mL IVPB  DAILY AT NOON     Note to Pharmacy: Per P&T Antimicrobial Dose Adjustment Protocol    01/30/21 1200    01/29/21 1137  cefTRIAXone (ROCEPHIN) 2 g in  mL IVPB  ONCE      01/29/21 1200              Diet:   Orders Placed This Encounter   Procedures   • Diet Order Diet: Regular     Standing Status:   Standing     Number of Occurrences:   1     Order Specific Question:   Diet:     Answer:   Regular [1]      Prognosis: Yes  Risk: The Patient is at HIGH risk for inpatient complications and decompensation secondary to his multiple cormorbidities  listed above, especially   Problem   Sepsis secondary to UTI, pyelonephritis, CoVID+ (HCC)      I have personally reviewed notes, labs, vitals, imaging.  I discussed the plan of care with bedside RN as well as on multidisciplinary rounds  I have performed a physical exam and reviewed and updated ROS and Plan today 1/31/2021. In review of yesterday's note 1/30/2021 "   there are no changes except as documented above.   This patient was seen under COVID 19 pandemic onditions.  During a disaster, the provisions of care is subject to the Crisis Standard of Care

## 2021-01-31 NOTE — CARE PLAN
Problem: Infection  Goal: Will remain free from infection  Outcome: PROGRESSING AS EXPECTED     Problem: Pain Management  Goal: Pain level will decrease to patient's comfort goal  Outcome: PROGRESSING AS EXPECTED       Pt remains on IV ABX, tolerating well. Pt afebrile at this time. Pt reports no pain today and that everything is okay.

## 2021-02-01 ENCOUNTER — APPOINTMENT (OUTPATIENT)
Dept: CARDIOLOGY | Facility: MEDICAL CENTER | Age: 41
DRG: 871 | End: 2021-02-01
Attending: INTERNAL MEDICINE
Payer: MEDICAID

## 2021-02-01 ENCOUNTER — APPOINTMENT (OUTPATIENT)
Dept: RADIOLOGY | Facility: MEDICAL CENTER | Age: 41
DRG: 871 | End: 2021-02-01
Attending: INTERNAL MEDICINE
Payer: MEDICAID

## 2021-02-01 ENCOUNTER — PATIENT OUTREACH (OUTPATIENT)
Dept: HEALTH INFORMATION MANAGEMENT | Facility: OTHER | Age: 41
End: 2021-02-01

## 2021-02-01 PROBLEM — U07.1 COVID-19 VIRUS INFECTION: Status: ACTIVE | Noted: 2021-02-01

## 2021-02-01 PROBLEM — E27.49 SECONDARY ADRENAL INSUFFICIENCY (HCC): Status: ACTIVE | Noted: 2021-01-29

## 2021-02-01 PROBLEM — E03.8 SECONDARY HYPOTHYROIDISM: Status: ACTIVE | Noted: 2020-10-10

## 2021-02-01 LAB
ALBUMIN SERPL BCP-MCNC: 4.1 G/DL (ref 3.2–4.9)
BUN SERPL-MCNC: 6 MG/DL (ref 8–22)
CALCIUM SERPL-MCNC: 8.5 MG/DL (ref 8.5–10.5)
CHLORIDE SERPL-SCNC: 112 MMOL/L (ref 96–112)
CO2 SERPL-SCNC: 23 MMOL/L (ref 20–33)
CREAT SERPL-MCNC: 0.62 MG/DL (ref 0.5–1.4)
EKG IMPRESSION: NORMAL
GLUCOSE SERPL-MCNC: 117 MG/DL (ref 65–99)
LV EJECT FRACT  99904: 65
LV EJECT FRACT MOD 2C 99903: 79.35
LV EJECT FRACT MOD 4C 99902: 57.73
LV EJECT FRACT MOD BP 99901: 70.65
MAGNESIUM SERPL-MCNC: 2.2 MG/DL (ref 1.5–2.5)
PHOSPHATE SERPL-MCNC: 2.4 MG/DL (ref 2.5–4.5)
PHOSPHATE SERPL-MCNC: 2.4 MG/DL (ref 2.5–4.5)
POTASSIUM SERPL-SCNC: 2.7 MMOL/L (ref 3.6–5.5)
SODIUM SERPL-SCNC: 147 MMOL/L (ref 135–145)

## 2021-02-01 PROCEDURE — 700111 HCHG RX REV CODE 636 W/ 250 OVERRIDE (IP): Performed by: STUDENT IN AN ORGANIZED HEALTH CARE EDUCATION/TRAINING PROGRAM

## 2021-02-01 PROCEDURE — 700111 HCHG RX REV CODE 636 W/ 250 OVERRIDE (IP): Performed by: INTERNAL MEDICINE

## 2021-02-01 PROCEDURE — 93971 EXTREMITY STUDY: CPT | Mod: 26 | Performed by: INTERNAL MEDICINE

## 2021-02-01 PROCEDURE — 93325 DOPPLER ECHO COLOR FLOW MAPG: CPT

## 2021-02-01 PROCEDURE — 700102 HCHG RX REV CODE 250 W/ 637 OVERRIDE(OP): Performed by: INTERNAL MEDICINE

## 2021-02-01 PROCEDURE — 93971 EXTREMITY STUDY: CPT | Mod: RT

## 2021-02-01 PROCEDURE — 80069 RENAL FUNCTION PANEL: CPT | Mod: 91

## 2021-02-01 PROCEDURE — 770021 HCHG ROOM/CARE - ISO PRIVATE

## 2021-02-01 PROCEDURE — 83735 ASSAY OF MAGNESIUM: CPT

## 2021-02-01 PROCEDURE — 93010 ELECTROCARDIOGRAM REPORT: CPT | Performed by: INTERNAL MEDICINE

## 2021-02-01 PROCEDURE — 84100 ASSAY OF PHOSPHORUS: CPT

## 2021-02-01 PROCEDURE — 99233 SBSQ HOSP IP/OBS HIGH 50: CPT | Performed by: INTERNAL MEDICINE

## 2021-02-01 PROCEDURE — 93005 ELECTROCARDIOGRAM TRACING: CPT | Performed by: INTERNAL MEDICINE

## 2021-02-01 PROCEDURE — 93325 DOPPLER ECHO COLOR FLOW MAPG: CPT | Mod: 26 | Performed by: INTERNAL MEDICINE

## 2021-02-01 PROCEDURE — A9270 NON-COVERED ITEM OR SERVICE: HCPCS | Performed by: INTERNAL MEDICINE

## 2021-02-01 PROCEDURE — 700105 HCHG RX REV CODE 258: Performed by: INTERNAL MEDICINE

## 2021-02-01 PROCEDURE — 93308 TTE F-UP OR LMTD: CPT | Mod: 26 | Performed by: INTERNAL MEDICINE

## 2021-02-01 PROCEDURE — 36415 COLL VENOUS BLD VENIPUNCTURE: CPT

## 2021-02-01 PROCEDURE — 93321 DOPPLER ECHO F-UP/LMTD STD: CPT | Mod: 26 | Performed by: INTERNAL MEDICINE

## 2021-02-01 RX ORDER — MECLIZINE HYDROCHLORIDE 25 MG/1
12.5 TABLET ORAL 3 TIMES DAILY PRN
Status: DISCONTINUED | OUTPATIENT
Start: 2021-02-01 | End: 2021-02-04

## 2021-02-01 RX ORDER — SODIUM CHLORIDE 450 MG/100ML
INJECTION, SOLUTION INTRAVENOUS CONTINUOUS
Status: DISCONTINUED | OUTPATIENT
Start: 2021-02-01 | End: 2021-02-02

## 2021-02-01 RX ORDER — LEVOTHYROXINE SODIUM 0.05 MG/1
50 TABLET ORAL
Status: DISCONTINUED | OUTPATIENT
Start: 2021-02-01 | End: 2021-02-05 | Stop reason: HOSPADM

## 2021-02-01 RX ORDER — DESMOPRESSIN ACETATE 0.1 MG/1
200 TABLET ORAL EVERY 12 HOURS
Status: DISCONTINUED | OUTPATIENT
Start: 2021-02-01 | End: 2021-02-01

## 2021-02-01 RX ORDER — POTASSIUM CHLORIDE 20 MEQ/1
40 TABLET, EXTENDED RELEASE ORAL ONCE
Status: COMPLETED | OUTPATIENT
Start: 2021-02-01 | End: 2021-02-01

## 2021-02-01 RX ORDER — DESMOPRESSIN ACETATE 0.1 MG/1
400 TABLET ORAL EVERY EVENING
Status: DISCONTINUED | OUTPATIENT
Start: 2021-02-01 | End: 2021-02-03

## 2021-02-01 RX ADMIN — POTASSIUM CHLORIDE 40 MEQ: 1500 TABLET, EXTENDED RELEASE ORAL at 14:21

## 2021-02-01 RX ADMIN — DESMOPRESSIN ACETATE 400 MCG: 0.1 TABLET ORAL at 17:41

## 2021-02-01 RX ADMIN — FAMOTIDINE 20 MG: 20 TABLET ORAL at 05:19

## 2021-02-01 RX ADMIN — HYDROCORTISONE SODIUM SUCCINATE 75 MG: 100 INJECTION, POWDER, FOR SOLUTION INTRAMUSCULAR; INTRAVENOUS at 05:19

## 2021-02-01 RX ADMIN — ENOXAPARIN SODIUM 40 MG: 40 INJECTION SUBCUTANEOUS at 05:19

## 2021-02-01 RX ADMIN — DOCUSATE SODIUM 50 MG AND SENNOSIDES 8.6 MG 2 TABLET: 8.6; 5 TABLET, FILM COATED ORAL at 17:41

## 2021-02-01 RX ADMIN — SODIUM CHLORIDE: 9 INJECTION, SOLUTION INTRAVENOUS at 05:19

## 2021-02-01 RX ADMIN — CEFTRIAXONE SODIUM 1 G: 1 INJECTION, POWDER, FOR SOLUTION INTRAMUSCULAR; INTRAVENOUS at 11:49

## 2021-02-01 RX ADMIN — HYDROCORTISONE SODIUM SUCCINATE 50 MG: 100 INJECTION, POWDER, FOR SOLUTION INTRAMUSCULAR; INTRAVENOUS at 17:42

## 2021-02-01 RX ADMIN — LEVOTHYROXINE SODIUM 50 MCG: 0.05 TABLET ORAL at 14:21

## 2021-02-01 RX ADMIN — SODIUM CHLORIDE: 4.5 INJECTION, SOLUTION INTRAVENOUS at 14:22

## 2021-02-01 RX ADMIN — SODIUM CHLORIDE: 9 INJECTION, SOLUTION INTRAVENOUS at 10:43

## 2021-02-01 ASSESSMENT — ENCOUNTER SYMPTOMS
FOCAL WEAKNESS: 0
ABDOMINAL PAIN: 1
LOSS OF CONSCIOUSNESS: 0
FEVER: 0
WEAKNESS: 0
DIARRHEA: 0
NAUSEA: 0
SHORTNESS OF BREATH: 0
BLOOD IN STOOL: 0
VOMITING: 0
HEMOPTYSIS: 0
FALLS: 0
CONSTIPATION: 0
EYE PAIN: 0
SEIZURES: 0
PALPITATIONS: 0
DIZZINESS: 0
WHEEZING: 0
CHILLS: 0
NERVOUS/ANXIOUS: 0
MYALGIAS: 1
COUGH: 0
INSOMNIA: 0
EYE REDNESS: 0
HEADACHES: 0
TREMORS: 0

## 2021-02-01 NOTE — PROGRESS NOTES
[facility] Medicine Daily Progress Note    Date of Service  2/1/2021    Chief Complaint  40 y.o. female admitted 1/29/2021 with Abdominal Pain (since last night; middle-lower; burning/aching), Blood in Urine (buring with urination since last night), and Body Aches (genearlized; mainly lower back)        Hospital Course  History of craniopharyngioma, craniotomy, diabetes insipidus and adrenal insufficiency on desmopressin and antivert  Presents with Abdominal Pain (since last night; middle-lower; burning/aching), Blood in Urine (buring with urination since last night), and Body Aches (genearlized; mainly lower back)  Complains of lower abdominal pain, bilateral flank pain, dysuria, blood in urine, fever, chills, body aches, malaise, 1d PTA.  At the ED, FEBRILE, low normal BPs, NOT hypoxic.  CT:  1.  No renal stone or hydronephrosis.  2.  Normal appendix.  3.  No focal mesenteric inflammatory process.  Leukocytosis.    1/29 CoVID POSITIVE  Procalcitonin normal  Ddimer<3  Started on antibiotics for UTI/pyelonephritis., Patient not hypoxic currently so not starting dexamethasone however patient on stress dose steroids for adrenal insufficiency.    Interval Problem Update  She is complaining of back and R abdominal pain. I don't see a rash that would suggest dermatomal distribution but seems to have myalgia R back. Also constipated.  This improved now complaining of R thigh pain. Blood pressure slightly improving  She still drinks a lot of water and has excessive thirst  Labs came back hypernatremic.  BP on 21 still low normal on high dose stress steroids  I spoke with Dr. Solorio and patient at length with .    Consultants/Specialty      Code Status  Full Code    Disposition  Will need 3d of IV hydrocort before transitioning to PO taper  Will need to address her home situation. CoVID housing an option but SW/CM to try to have her keep her apartment/home as landlord should not evict her.  Will need [facility] to  improve her complaince and transport to her clinics  Primary provider and [facility] or [facility] appointments MUST be made.    Review of Systems  Review of Systems   Constitutional: Negative for chills and fever.   HENT: Negative for congestion, hearing loss and nosebleeds.    Eyes: Negative for pain and redness.   Respiratory: Negative for cough, hemoptysis, shortness of breath and wheezing.    Cardiovascular: Negative for chest pain and palpitations.   Gastrointestinal: Positive for abdominal pain. Negative for blood in stool, constipation, diarrhea, nausea and vomiting.   Genitourinary: Negative for dysuria, frequency and hematuria.   Musculoskeletal: Positive for myalgias. Negative for falls and joint pain.   Skin: Negative for rash.   Neurological: Negative for dizziness, tremors, focal weakness, seizures, loss of consciousness, weakness and headaches.   Psychiatric/Behavioral: The patient is not nervous/anxious and does not have insomnia.    All other systems reviewed and are negative.       Physical Exam  Temp:  [36.1 °C (97 °F)-37.3 °C (99.2 °F)] 37 °C (98.6 °F)  Pulse:  [53-62] 54  Resp:  [17-18] 18  BP: (102-112)/(51-75) 108/57  SpO2:  [99 %-100 %] 99 %    Physical Exam  Vitals signs and nursing note reviewed.   Constitutional:       General: She is not in acute distress.  HENT:      Head: Normocephalic and atraumatic.      Right Ear: External ear normal.      Left Ear: External ear normal.      Nose: Nose normal.      Mouth/Throat:      Mouth: Mucous membranes are moist.   Eyes:      General: No scleral icterus.     Conjunctiva/sclera: Conjunctivae normal.   Neck:      Musculoskeletal: Normal range of motion and neck supple. No neck rigidity.   Cardiovascular:      Rate and Rhythm: Normal rate and regular rhythm.      Pulses: Normal pulses.      Heart sounds: No murmur. No friction rub. No gallop.    Pulmonary:      Effort: Pulmonary effort is normal. No respiratory distress.      Breath sounds:  Normal breath sounds. No stridor. No wheezing, rhonchi or rales.   Chest:      Chest wall: No tenderness.   Abdominal:      General: Abdomen is flat. Bowel sounds are normal. There is no distension.      Palpations: Abdomen is soft.      Tenderness: There is abdominal tenderness (Mild RLQ, resolved). There is no guarding or rebound.   Musculoskeletal: Normal range of motion.         General: Tenderness (R back, resolved) present. No swelling or deformity.   Skin:     General: Skin is warm.      Coloration: Skin is not jaundiced.   Neurological:      General: No focal deficit present.      Mental Status: She is alert and oriented to person, place, and time. Mental status is at baseline.   Psychiatric:         Mood and Affect: Mood normal.         Behavior: Behavior normal.         Thought Content: Thought content normal.         Judgment: Judgment normal.      Comments: Can be tearful from her biopsychosocial situation         Fluids    Intake/Output Summary (Last 24 hours) at 2/1/2021 1527  Last data filed at 1/31/2021 2000  Gross per 24 hour   Intake 2000 ml   Output --   Net 2000 ml       Laboratory  Recent Labs     01/30/21  0239   WBC 8.2   RBC 4.53   HEMOGLOBIN 14.0   HEMATOCRIT 40.5   MCV 89.4   MCH 30.9   MCHC 34.6   RDW 47.3   PLATELETCT 301   MPV 10.5     Recent Labs     01/30/21  0239 02/01/21  0908   SODIUM 142 147*   POTASSIUM 3.5* 2.7*   CHLORIDE 111 112   CO2 22 23   GLUCOSE 150* 117*   BUN 10 6*   CREATININE 0.72 0.62   CALCIUM 9.3 8.5                   Imaging  EC-ECHOCARDIOGRAM LTD W/O CONT   Final Result      US-EXTREMITY VENOUS LOWER UNILAT RIGHT   Final Result      DX-CHEST-PORTABLE (1 VIEW)   Final Result      Questionable mild hazy opacity in the left lung base as detailed. Otherwise negative.      CT-RENAL COLIC EVALUATION(A/P W/O)   Final Result      1.  No renal stone or hydronephrosis.   2.  Normal appendix.   3.  No focal mesenteric inflammatory process.           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)  Assessment & Plan  This is Sepsis Present on admission  SIRS criteria identified on my evaluation include: Fever, with temperature greater than 101 deg F and Leukocytosis, with WBC greater than 12,000  Source is UTI  Sepsis protocol initiated  Fluid resuscitation ordered per protocol  IV antibiotics as appropriate for source of sepsis  While organ dysfunction may be noted elsewhere in this problem list or in the chart, degree of organ dysfunction does not meet CMS criteria for severe sepsis\"  Continue antibiotics follow cultures  Complete CoVID work-up with CXR  Give antispasmodics for back and medications for constipation.  Improved from an infection and sepsis standpoint but will have to contend with her adrenal insufficiency and hypotension (see below)  On 2/1, I spoke with [facility].  Will do IV hydrocort 50mg IV BID for 3d then taper to oral Hydrocort 20mg AM 10mg PM  Desmopressin was not started on admisison; restarted desmopressin. Do desmopressin do 400mg at night to avoid nighttime diuresis however if she cannot detect thirst, then go back to 200mg BID. On my experience with her she can detect thirst=, but will confirm.  She was bradycardic. While TSH is normal, secondary hypothyroidism can have normal TSHs. We reviewed chart and fT4 low. Start levothyroxine 50mcg. Echo pending eval EF.  Patient can follow up with [facility] or [facility] depending on insurance.  Patient will need to have primary care appointment done before she leaves as she will need home health care and transport to her doctors otherwise she will be lost to follow up.          Pyelonephritis  Assessment & Plan  Patient started on empiric IV antibiotics until symptoms improved  Urine culture ordered  Pain control  Improved      Diabetes insipidus (HCC)- (present on " "admission)  Assessment & Plan  Thirsty  Hypernatremic on 2/1  Started ddAVP myself  Switch to half normal saline and encourage PO pure water.  See above    COVID-19 virus infection  Assessment & Plan  Not hypoxic  See above    Secondary adrenal insufficiency (HCC)  Assessment & Plan  Unclear if related to hypovolemia, sepsis or endocrine dysfunction (history of hypothyroidism, adrenal insufficiency, diabetes insipidus)  Continue IV hydration  Stress dose of hydrocortisone 100 mg  Check cortisol level, TSH  Monitor blood pressure\"    Slight improvement.  Switch to PO tomorrow and devise a taper.  Will see if Endocrinology is available for curbside tomorrow to assist with management and disposition.   See above    Secondary hypothyroidism  Assessment & Plan  See above       VTE prophylaxis: Lovenox SQ  Gastrointestinal prophylaxis: Added famotidine.  Antibiotics:   Ordered Anti-infectives (9999h ago, onward)     Ordered     Start    01/29/21 1428  cefTRIAXone (ROCEPHIN) 1 g in  mL IVPB  DAILY AT NOON     Note to Pharmacy: Per P&T Antimicrobial Dose Adjustment Protocol    01/30/21 1200    01/29/21 1137  cefTRIAXone (ROCEPHIN) 2 g in  mL IVPB  ONCE      01/29/21 1200              Diet:   Orders Placed This Encounter   Procedures   • Diet Order Diet: Regular     Standing Status:   Standing     Number of Occurrences:   1     Order Specific Question:   Diet:     Answer:   Regular [1]      Prognosis: Yes  Risk: The Patient is at HIGH risk for inpatient complications and decompensation secondary to his multiple cormorbidities  listed above, especially   Problem   Sepsis secondary to UTI, pyelonephritis, CoVID+, 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  I discussed the plan of care with bedside RN as well as on multidisciplinary rounds  I have performed a physical exam and reviewed and updated ROS and Plan today " 2/1/2021. In review of yesterday's note 1/31/2021   there are no changes except as documented above.   This patient was seen under COVID 19 pandemic onditions.  During a disaster, the provisions of care is subject to the Crisis Standard of Care

## 2021-02-01 NOTE — DISCHARGE PLANNING
Anticipated Discharge Disposition:   Home    Action:   Discussed discharge planning. Per MD, pending discussion with Endocrinology.    Barriers to Discharge:   Medical clearance    Plan:   Hospital Care Management will continue to follow and assist with discharge planning needs.

## 2021-02-01 NOTE — PROGRESS NOTES
Pt. Reports iv site is hurting, no redness or swelling noted; iv site appears healthy; removed as per pt's request. New iv started to lac x1 attempt ; tolerated well. Po fluids offered. Denies further needs. Manual b/p obtained and wnl. Encouraged to call for any needs. Call light Is in reach.

## 2021-02-01 NOTE — ASSESSMENT & PLAN NOTE
Thirsty  Hypernatremic on 2/1  Started ddAVP  See above    2/2: We have stopped 1/2 IVF. Due to risk of worsening hyponatremia we will hold desmopressin today. We will repeat BMP at 8 pm and midnight, and re-evaluate. Depending of repeat BMP values, nephrology should be contacted.    2/4: We will continue desmopression at 200 mcg at night as per Dr Solorio phone recommendation.

## 2021-02-02 PROBLEM — R07.9 CHEST PAIN: Status: ACTIVE | Noted: 2021-02-02

## 2021-02-02 LAB
ALBUMIN SERPL BCP-MCNC: 3.7 G/DL (ref 3.2–4.9)
ALBUMIN SERPL BCP-MCNC: 4 G/DL (ref 3.2–4.9)
ANION GAP SERPL CALC-SCNC: 11 MMOL/L (ref 7–16)
ANION GAP SERPL CALC-SCNC: 12 MMOL/L (ref 7–16)
BUN SERPL-MCNC: 10 MG/DL (ref 8–22)
BUN SERPL-MCNC: 7 MG/DL (ref 8–22)
BUN SERPL-MCNC: 7 MG/DL (ref 8–22)
BUN SERPL-MCNC: 8 MG/DL (ref 8–22)
CALCIUM SERPL-MCNC: 8.1 MG/DL (ref 8.5–10.5)
CALCIUM SERPL-MCNC: 8.3 MG/DL (ref 8.5–10.5)
CALCIUM SERPL-MCNC: 8.4 MG/DL (ref 8.5–10.5)
CALCIUM SERPL-MCNC: 8.7 MG/DL (ref 8.5–10.5)
CHLORIDE SERPL-SCNC: 100 MMOL/L (ref 96–112)
CHLORIDE SERPL-SCNC: 106 MMOL/L (ref 96–112)
CHLORIDE SERPL-SCNC: 94 MMOL/L (ref 96–112)
CHLORIDE SERPL-SCNC: 97 MMOL/L (ref 96–112)
CO2 SERPL-SCNC: 23 MMOL/L (ref 20–33)
CO2 SERPL-SCNC: 24 MMOL/L (ref 20–33)
CO2 SERPL-SCNC: 25 MMOL/L (ref 20–33)
CO2 SERPL-SCNC: 27 MMOL/L (ref 20–33)
CREAT SERPL-MCNC: 0.68 MG/DL (ref 0.5–1.4)
CREAT SERPL-MCNC: 0.71 MG/DL (ref 0.5–1.4)
CREAT SERPL-MCNC: 0.73 MG/DL (ref 0.5–1.4)
CREAT SERPL-MCNC: 0.82 MG/DL (ref 0.5–1.4)
EKG IMPRESSION: NORMAL
ERYTHROCYTE [DISTWIDTH] IN BLOOD BY AUTOMATED COUNT: 50.2 FL (ref 35.9–50)
GLUCOSE SERPL-MCNC: 101 MG/DL (ref 65–99)
GLUCOSE SERPL-MCNC: 134 MG/DL (ref 65–99)
GLUCOSE SERPL-MCNC: 139 MG/DL (ref 65–99)
GLUCOSE SERPL-MCNC: 145 MG/DL (ref 65–99)
HCT VFR BLD AUTO: 32.4 % (ref 37–47)
HGB BLD-MCNC: 11.4 G/DL (ref 12–16)
MAGNESIUM SERPL-MCNC: 1.9 MG/DL (ref 1.5–2.5)
MCH RBC QN AUTO: 31.4 PG (ref 27–33)
MCHC RBC AUTO-ENTMCNC: 35.2 G/DL (ref 33.6–35)
MCV RBC AUTO: 89.3 FL (ref 81.4–97.8)
PHOSPHATE SERPL-MCNC: 1.5 MG/DL (ref 2.5–4.5)
PHOSPHATE SERPL-MCNC: 2.6 MG/DL (ref 2.5–4.5)
PHOSPHATE SERPL-MCNC: 2.8 MG/DL (ref 2.5–4.5)
PLATELET # BLD AUTO: 269 K/UL (ref 164–446)
PMV BLD AUTO: 10.9 FL (ref 9–12.9)
POTASSIUM SERPL-SCNC: 2.2 MMOL/L (ref 3.6–5.5)
POTASSIUM SERPL-SCNC: 2.5 MMOL/L (ref 3.6–5.5)
POTASSIUM SERPL-SCNC: 2.7 MMOL/L (ref 3.6–5.5)
POTASSIUM SERPL-SCNC: 2.9 MMOL/L (ref 3.6–5.5)
RBC # BLD AUTO: 3.63 M/UL (ref 4.2–5.4)
SODIUM SERPL-SCNC: 129 MMOL/L (ref 135–145)
SODIUM SERPL-SCNC: 132 MMOL/L (ref 135–145)
SODIUM SERPL-SCNC: 136 MMOL/L (ref 135–145)
SODIUM SERPL-SCNC: 141 MMOL/L (ref 135–145)
TROPONIN T SERPL-MCNC: 7 NG/L (ref 6–19)
TROPONIN T SERPL-MCNC: <6 NG/L (ref 6–19)
WBC # BLD AUTO: 10.1 K/UL (ref 4.8–10.8)

## 2021-02-02 PROCEDURE — 700102 HCHG RX REV CODE 250 W/ 637 OVERRIDE(OP): Performed by: INTERNAL MEDICINE

## 2021-02-02 PROCEDURE — 83735 ASSAY OF MAGNESIUM: CPT

## 2021-02-02 PROCEDURE — A9270 NON-COVERED ITEM OR SERVICE: HCPCS | Performed by: INTERNAL MEDICINE

## 2021-02-02 PROCEDURE — 85027 COMPLETE CBC AUTOMATED: CPT

## 2021-02-02 PROCEDURE — A9270 NON-COVERED ITEM OR SERVICE: HCPCS | Performed by: STUDENT IN AN ORGANIZED HEALTH CARE EDUCATION/TRAINING PROGRAM

## 2021-02-02 PROCEDURE — 770020 HCHG ROOM/CARE - TELE (206)

## 2021-02-02 PROCEDURE — 700105 HCHG RX REV CODE 258: Performed by: INTERNAL MEDICINE

## 2021-02-02 PROCEDURE — 80048 BASIC METABOLIC PNL TOTAL CA: CPT

## 2021-02-02 PROCEDURE — 700111 HCHG RX REV CODE 636 W/ 250 OVERRIDE (IP): Performed by: STUDENT IN AN ORGANIZED HEALTH CARE EDUCATION/TRAINING PROGRAM

## 2021-02-02 PROCEDURE — 700111 HCHG RX REV CODE 636 W/ 250 OVERRIDE (IP): Performed by: INTERNAL MEDICINE

## 2021-02-02 PROCEDURE — 99233 SBSQ HOSP IP/OBS HIGH 50: CPT | Performed by: INTERNAL MEDICINE

## 2021-02-02 PROCEDURE — 84484 ASSAY OF TROPONIN QUANT: CPT | Mod: 91

## 2021-02-02 PROCEDURE — 84100 ASSAY OF PHOSPHORUS: CPT

## 2021-02-02 PROCEDURE — 93005 ELECTROCARDIOGRAM TRACING: CPT | Performed by: INTERNAL MEDICINE

## 2021-02-02 PROCEDURE — 93010 ELECTROCARDIOGRAM REPORT: CPT | Performed by: INTERNAL MEDICINE

## 2021-02-02 PROCEDURE — 700102 HCHG RX REV CODE 250 W/ 637 OVERRIDE(OP): Performed by: STUDENT IN AN ORGANIZED HEALTH CARE EDUCATION/TRAINING PROGRAM

## 2021-02-02 PROCEDURE — 36415 COLL VENOUS BLD VENIPUNCTURE: CPT

## 2021-02-02 PROCEDURE — 80069 RENAL FUNCTION PANEL: CPT

## 2021-02-02 RX ORDER — POTASSIUM CHLORIDE 20 MEQ/1
40 TABLET, EXTENDED RELEASE ORAL ONCE
Status: COMPLETED | OUTPATIENT
Start: 2021-02-02 | End: 2021-02-02

## 2021-02-02 RX ORDER — POTASSIUM CHLORIDE 7.45 MG/ML
10 INJECTION INTRAVENOUS
Status: COMPLETED | OUTPATIENT
Start: 2021-02-02 | End: 2021-02-02

## 2021-02-02 RX ORDER — MAGNESIUM SULFATE 1 G/100ML
1 INJECTION INTRAVENOUS ONCE
Status: COMPLETED | OUTPATIENT
Start: 2021-02-02 | End: 2021-02-02

## 2021-02-02 RX ORDER — POTASSIUM CHLORIDE 7.45 MG/ML
10 INJECTION INTRAVENOUS
Status: COMPLETED | OUTPATIENT
Start: 2021-02-02 | End: 2021-02-03

## 2021-02-02 RX ADMIN — DOCUSATE SODIUM 50 MG AND SENNOSIDES 8.6 MG 2 TABLET: 8.6; 5 TABLET, FILM COATED ORAL at 04:42

## 2021-02-02 RX ADMIN — POTASSIUM CHLORIDE 10 MEQ: 7.46 INJECTION, SOLUTION INTRAVENOUS at 22:34

## 2021-02-02 RX ADMIN — POTASSIUM CHLORIDE 10 MEQ: 7.46 INJECTION, SOLUTION INTRAVENOUS at 05:59

## 2021-02-02 RX ADMIN — HYDROCORTISONE SODIUM SUCCINATE 50 MG: 100 INJECTION, POWDER, FOR SOLUTION INTRAMUSCULAR; INTRAVENOUS at 04:42

## 2021-02-02 RX ADMIN — ENOXAPARIN SODIUM 40 MG: 40 INJECTION SUBCUTANEOUS at 04:43

## 2021-02-02 RX ADMIN — CEFTRIAXONE SODIUM 1 G: 1 INJECTION, POWDER, FOR SOLUTION INTRAMUSCULAR; INTRAVENOUS at 14:08

## 2021-02-02 RX ADMIN — POTASSIUM CHLORIDE 40 MEQ: 1500 TABLET, EXTENDED RELEASE ORAL at 05:58

## 2021-02-02 RX ADMIN — POTASSIUM CHLORIDE 10 MEQ: 7.46 INJECTION, SOLUTION INTRAVENOUS at 08:31

## 2021-02-02 RX ADMIN — POTASSIUM CHLORIDE 10 MEQ: 7.46 INJECTION, SOLUTION INTRAVENOUS at 23:42

## 2021-02-02 RX ADMIN — MAGNESIUM SULFATE 1 G: 1 INJECTION INTRAVENOUS at 22:35

## 2021-02-02 RX ADMIN — FAMOTIDINE 20 MG: 20 TABLET ORAL at 04:42

## 2021-02-02 RX ADMIN — SODIUM CHLORIDE: 4.5 INJECTION, SOLUTION INTRAVENOUS at 10:03

## 2021-02-02 RX ADMIN — POTASSIUM CHLORIDE 40 MEQ: 1500 TABLET, EXTENDED RELEASE ORAL at 16:12

## 2021-02-02 RX ADMIN — DOCUSATE SODIUM 50 MG AND SENNOSIDES 8.6 MG 2 TABLET: 8.6; 5 TABLET, FILM COATED ORAL at 18:17

## 2021-02-02 RX ADMIN — HYDROCORTISONE SODIUM SUCCINATE 50 MG: 100 INJECTION, POWDER, FOR SOLUTION INTRAMUSCULAR; INTRAVENOUS at 18:17

## 2021-02-02 RX ADMIN — POTASSIUM CHLORIDE 10 MEQ: 7.46 INJECTION, SOLUTION INTRAVENOUS at 07:21

## 2021-02-02 RX ADMIN — POTASSIUM CHLORIDE 10 MEQ: 7.46 INJECTION, SOLUTION INTRAVENOUS at 10:11

## 2021-02-02 RX ADMIN — POTASSIUM CHLORIDE 40 MEQ: 1500 TABLET, EXTENDED RELEASE ORAL at 18:17

## 2021-02-02 RX ADMIN — LEVOTHYROXINE SODIUM 50 MCG: 0.05 TABLET ORAL at 04:42

## 2021-02-02 ASSESSMENT — ENCOUNTER SYMPTOMS
FOCAL WEAKNESS: 0
HEADACHES: 0
WEAKNESS: 0
PALPITATIONS: 0
CHILLS: 0
ABDOMINAL PAIN: 1
SHORTNESS OF BREATH: 0
CONSTIPATION: 0
EYE PAIN: 0
DIARRHEA: 0
MYALGIAS: 1
INSOMNIA: 0
NAUSEA: 0
BLOOD IN STOOL: 0
SEIZURES: 0
NERVOUS/ANXIOUS: 0
FALLS: 0
DIZZINESS: 0
VOMITING: 0
WHEEZING: 0
EYE REDNESS: 0
TREMORS: 0
HEMOPTYSIS: 0
FEVER: 0
COUGH: 0
LOSS OF CONSCIOUSNESS: 0

## 2021-02-02 NOTE — PROGRESS NOTES
Spoke with pharmacy and verified that krider could infuse ylined in 1/2 ns; per pharmacy okay. Hung first bag and ice pack applied to iv insertion site to prevent burning while administration. Spoke with md on call and if protocol place on telemetry; spoke with charge nurse; applied telemetry at this time; will confirm with next shift in regards whether telemetry needs to stay in place.

## 2021-02-02 NOTE — DISCHARGE PLANNING
Anticipated Discharge Disposition:   TBD    Action:   RN CM called pt's mobile number. Pt requested for Citizen of [place] speaking. RN CM called back with language Line  number [phone].    RN CM spoke to pt via phone, with . Pt denies use of home oxygen or other DMEs. Pt said her address on file is her friend's ([place]) address. Pt does not want to provide her own address because she is just renting a room at a house and she is afraid that the owner will find out that she is positive for COVID and will have her leave. Pt does not have a SSN. Pt does not have family in the area, her family is in [place]. Pt said she lost her job since she got sick.     Pt's PCP is DANTE Tai at [facility]. Pt goes to [facility].     RN CM called [facility] to confirm insurance. [facility]FA said pt only has Emergency Medicaid.    Barriers to Discharge:   Medical clearance  PT/OT order, eval and recommendation  Home health set up, as needed  PCP and Endocrinology follow up  Emergency Medicaid    Plan:   Hospital Care Management will continue to follow and assist with discharge planning needs.              Care Transition Team Assessment    Information Source  Information Given By: Patient  Informant's Name: Johana Paul    Readmission Evaluation  Is this a readmission?: No    Elopement Risk  Legal Hold: No  Ambulatory or Self Mobile in Wheelchair: No-Not an Elopement Risk  Disoriented: No  Psychiatric Symptoms: None  History of Wandering: No  Elopement this Admit: No  Vocalizing Wanting to Leave: No  Displays Behaviors, Body Language Wanting to Leave: No-Not at Risk for Elopement  Elopement Risk: Not at Risk for Elopement    Interdisciplinary Discharge Planning  Primary Care Physician: EVELYN ROWAN  Lives with - Patient's Self Care Capacity: Alone and Able to Care For Self  Patient or legal guardian wants to designate a caregiver: No  Support Systems: Friends / Neighbors  Housing  / Facility: 1 Story House(pt rents a room)  Assistance Needed: Unknown at this Time  Durable Medical Equipment: Not Applicable    Discharge Preparedness  What is your plan after discharge?: Uncertain - pending medical team collaboration  What are your discharge supports?: Other (comment)(friend)  Prior Functional Level: Independent with Activities of Daily Living    Functional Assesment  Prior Functional Level: Independent with Activities of Daily Living    Finances  Financial Barriers to Discharge: Yes(emergency medicaid)  Average Monthly Income: (not working currently)  Source of Income: (none)  Prescription Coverage: No(emergency medicaid)    Vision / Hearing Impairment  Vision Impairment : No  Hearing Impairment : No         Advance Directive  Advance Directive?: None  Advance Directive offered?: AD Booklet refused    Domestic Abuse  Have you ever been the victim of abuse or violence?: No  Physical Abuse or Sexual Abuse: No  Verbal Abuse or Emotional Abuse: No  Possible Abuse/Neglect Reported to:: Not Applicable         Discharge Risks or Barriers  Discharge risks or barriers?: Complex medical needs, Uninsured / underinsured  Patient risk factors: Complex medical needs, Uninsured or underinsured    Anticipated Discharge Information  Discharge Disposition: Still a Patient (30)

## 2021-02-02 NOTE — PATHOLOGY
RECEIVED PONE CALL @0500 WITH CRITICAL POTASSIUM AND SPOKE WITH DR. SWANSON AND NOTIFIED HIM OF CRITICAL POTASSIUM BEING AT 2.2 DROPPING FROM 2.5 YESTERDAY. PER MD HE WILL PLACE ORDERS IN MAR; WILL AWAIT TO ADMINISTER.

## 2021-02-02 NOTE — PROGRESS NOTES
Assessment completed; denies needs/pain. Iv site patent and ordered ivf infusing well. Water and coffee provided per her request along with a snack; encouraged to call for any needs. Call light is in reach.

## 2021-02-02 NOTE — FACE TO FACE
Face to Face Supporting Documentation - Home Health    The encounter with this patient was in whole or in part the primary reason for home health admission.    Date of encounter:   Patient:                    MRN:                       YOB: 2019  Johana Weston  [id number]  1980     Home health to see patient for:  Skilled Nursing care for assessment, interventions & education, Physical Therapy evaluation and treatment and Occupational therapy evaluation and treatment    Skilled need for:  Comment: med mgmt check pressures for ttration of steroids for adrenal insufficiency, secondary hypothyroidism and diabetes insipidus    Skilled nursing interventions to include:  Comment: as above need transport to clinics    Homebound status evidenced by:  Needs the assistance of another person in order to leave the home. Leaving home requires a considerable and taxing effort. There is a normal inability to leave the home.    Community Physician to provide follow up care: Pcp Pt States None     Optional Interventions? No      I certify the face to face encounter for this home health care referral meets the CMS requirements and the encounter/clinical assessment with the patient was, in whole, or in part, for the medical condition(s) listed above, which is the primary reason for home health care. Based on my clinical findings: the service(s) are medically necessary, support the need for home health care, and the homebound criteria are met.  I certify that this patient has had a face to face encounter by myself.  Fly Valerio M.D. - NPI: [id number]

## 2021-02-02 NOTE — DISCHARGE PLANNING
Anticipated Discharge Disposition:   TBD    Action:   Discussed discharge planning needs during rounds. Per MD, pt is not medically cleared. Pt will need PCP, endocrinology appoitment and transportation assistance to appointments.     RN CM checked insurance, file shows pt has emergency Medicaid only. Email sent to [facility] to verify.     Barriers to Discharge:   Emergency Medicaid only.  Medical clearance  Need PCP and Endocrinology follow up.  Home health set up    Plan:   Follow up with [facility].  Hospital Care Management will continue to follow and assist with discharge planning needs.

## 2021-02-02 NOTE — PROGRESS NOTES
Report received  From ariadne ALARCON that Patient's  potasium is 2.7, DR Valerio at bedside , notified of critical results.Pottasium replacement ordered.

## 2021-02-02 NOTE — DISCHARGE PLANNING
Anticipated Discharge Disposition:   TBD    Action:   Discussed discharge planning needs during rounds. Per MD, pt complained of chest pain today. Per MD, plan for EKG, troponin, monitor labs, pt on IV antibiotics.     RN CM informed MD that pt has HH referral in place but pt only has Emergency Medicaid per file. Per MD, will order PT/OT.    RN CM called , unable to schedule with [facility] provider due to insurance. [facility] information on AVS for pt to schedule PCP.     Information and contact number for [facility] also placed on AVS. Pt needs to call and apply the program and can get discounts for PCP and specialists.  [facility] also have free non-emergent medical transportation pending eligibility.    Barriers to Discharge:   Medical clearance  PT/OT order, eval and recommendation  Home health set up, as needed  Need PCP and Endocrinology follow up  Emergency Medicaid    Plan:   Hospital Care Management will continue to follow and assist with discharge planning needs.

## 2021-02-02 NOTE — CARE PLAN
Problem: Safety  Goal: Will remain free from injury  Outcome: PROGRESSING AS EXPECTED  Goal: Will remain free from falls  Outcome: PROGRESSING AS EXPECTED     Problem: Safety  Goal: Will remain free from injury  Outcome: PROGRESSING AS EXPECTED     Problem: Safety  Goal: Will remain free from falls  Outcome: PROGRESSING AS EXPECTED     Problem: Discharge Barriers/Planning  Goal: Patient's continuum of care needs will be met  Outcome: PROGRESSING SLOWER THAN EXPECTED

## 2021-02-03 LAB
ALBUMIN SERPL BCP-MCNC: 3.6 G/DL (ref 3.2–4.9)
ALBUMIN/GLOB SERPL: 1.3 G/DL
ALP SERPL-CCNC: 96 U/L (ref 30–99)
ALT SERPL-CCNC: 12 U/L (ref 2–50)
ANION GAP SERPL CALC-SCNC: 8 MMOL/L (ref 7–16)
ANION GAP SERPL CALC-SCNC: 9 MMOL/L (ref 7–16)
ANION GAP SERPL CALC-SCNC: 9 MMOL/L (ref 7–16)
AST SERPL-CCNC: 16 U/L (ref 12–45)
BACTERIA BLD CULT: NORMAL
BACTERIA BLD CULT: NORMAL
BASOPHILS # BLD AUTO: 0.3 % (ref 0–1.8)
BASOPHILS # BLD: 0.03 K/UL (ref 0–0.12)
BILIRUB SERPL-MCNC: 0.2 MG/DL (ref 0.1–1.5)
BUN SERPL-MCNC: 10 MG/DL (ref 8–22)
BUN SERPL-MCNC: 11 MG/DL (ref 8–22)
BUN SERPL-MCNC: 7 MG/DL (ref 8–22)
CALCIUM SERPL-MCNC: 8.4 MG/DL (ref 8.5–10.5)
CALCIUM SERPL-MCNC: 8.5 MG/DL (ref 8.5–10.5)
CALCIUM SERPL-MCNC: 8.6 MG/DL (ref 8.5–10.5)
CHLORIDE SERPL-SCNC: 104 MMOL/L (ref 96–112)
CHLORIDE SERPL-SCNC: 107 MMOL/L (ref 96–112)
CHLORIDE SERPL-SCNC: 108 MMOL/L (ref 96–112)
CO2 SERPL-SCNC: 23 MMOL/L (ref 20–33)
CO2 SERPL-SCNC: 24 MMOL/L (ref 20–33)
CO2 SERPL-SCNC: 25 MMOL/L (ref 20–33)
CREAT SERPL-MCNC: 0.67 MG/DL (ref 0.5–1.4)
CREAT SERPL-MCNC: 0.69 MG/DL (ref 0.5–1.4)
CREAT SERPL-MCNC: 0.71 MG/DL (ref 0.5–1.4)
EKG IMPRESSION: NORMAL
EOSINOPHIL # BLD AUTO: 0.08 K/UL (ref 0–0.51)
EOSINOPHIL NFR BLD: 0.8 % (ref 0–6.9)
ERYTHROCYTE [DISTWIDTH] IN BLOOD BY AUTOMATED COUNT: 48 FL (ref 35.9–50)
GLOBULIN SER CALC-MCNC: 2.7 G/DL (ref 1.9–3.5)
GLUCOSE SERPL-MCNC: 102 MG/DL (ref 65–99)
GLUCOSE SERPL-MCNC: 119 MG/DL (ref 65–99)
GLUCOSE SERPL-MCNC: 131 MG/DL (ref 65–99)
HCT VFR BLD AUTO: 36.4 % (ref 37–47)
HGB BLD-MCNC: 12.9 G/DL (ref 12–16)
IMM GRANULOCYTES # BLD AUTO: 0.14 K/UL (ref 0–0.11)
IMM GRANULOCYTES NFR BLD AUTO: 1.5 % (ref 0–0.9)
LYMPHOCYTES # BLD AUTO: 3.54 K/UL (ref 1–4.8)
LYMPHOCYTES NFR BLD: 37.3 % (ref 22–41)
MAGNESIUM SERPL-MCNC: 2.6 MG/DL (ref 1.5–2.5)
MCH RBC QN AUTO: 31.1 PG (ref 27–33)
MCHC RBC AUTO-ENTMCNC: 35.4 G/DL (ref 33.6–35)
MCV RBC AUTO: 87.7 FL (ref 81.4–97.8)
MONOCYTES # BLD AUTO: 0.57 K/UL (ref 0–0.85)
MONOCYTES NFR BLD AUTO: 6 % (ref 0–13.4)
NEUTROPHILS # BLD AUTO: 5.13 K/UL (ref 2–7.15)
NEUTROPHILS NFR BLD: 54.1 % (ref 44–72)
NRBC # BLD AUTO: 0 K/UL
NRBC BLD-RTO: 0 /100 WBC
PLATELET # BLD AUTO: 309 K/UL (ref 164–446)
PMV BLD AUTO: 10.8 FL (ref 9–12.9)
POTASSIUM SERPL-SCNC: 3.3 MMOL/L (ref 3.6–5.5)
POTASSIUM SERPL-SCNC: 3.3 MMOL/L (ref 3.6–5.5)
POTASSIUM SERPL-SCNC: 3.9 MMOL/L (ref 3.6–5.5)
PROT SERPL-MCNC: 6.3 G/DL (ref 6–8.2)
RBC # BLD AUTO: 4.15 M/UL (ref 4.2–5.4)
SIGNIFICANT IND 70042: NORMAL
SIGNIFICANT IND 70042: NORMAL
SITE SITE: NORMAL
SITE SITE: NORMAL
SODIUM SERPL-SCNC: 135 MMOL/L (ref 135–145)
SODIUM SERPL-SCNC: 140 MMOL/L (ref 135–145)
SODIUM SERPL-SCNC: 142 MMOL/L (ref 135–145)
SOURCE SOURCE: NORMAL
SOURCE SOURCE: NORMAL
WBC # BLD AUTO: 9.5 K/UL (ref 4.8–10.8)

## 2021-02-03 PROCEDURE — 80048 BASIC METABOLIC PNL TOTAL CA: CPT

## 2021-02-03 PROCEDURE — 700111 HCHG RX REV CODE 636 W/ 250 OVERRIDE (IP): Performed by: STUDENT IN AN ORGANIZED HEALTH CARE EDUCATION/TRAINING PROGRAM

## 2021-02-03 PROCEDURE — 80053 COMPREHEN METABOLIC PANEL: CPT

## 2021-02-03 PROCEDURE — 93005 ELECTROCARDIOGRAM TRACING: CPT | Performed by: INTERNAL MEDICINE

## 2021-02-03 PROCEDURE — A9270 NON-COVERED ITEM OR SERVICE: HCPCS | Performed by: INTERNAL MEDICINE

## 2021-02-03 PROCEDURE — 700102 HCHG RX REV CODE 250 W/ 637 OVERRIDE(OP): Performed by: INTERNAL MEDICINE

## 2021-02-03 PROCEDURE — 83735 ASSAY OF MAGNESIUM: CPT

## 2021-02-03 PROCEDURE — 99232 SBSQ HOSP IP/OBS MODERATE 35: CPT | Performed by: INTERNAL MEDICINE

## 2021-02-03 PROCEDURE — 93010 ELECTROCARDIOGRAM REPORT: CPT | Performed by: INTERNAL MEDICINE

## 2021-02-03 PROCEDURE — 85025 COMPLETE CBC W/AUTO DIFF WBC: CPT

## 2021-02-03 PROCEDURE — 770020 HCHG ROOM/CARE - TELE (206)

## 2021-02-03 PROCEDURE — 36415 COLL VENOUS BLD VENIPUNCTURE: CPT

## 2021-02-03 PROCEDURE — 700111 HCHG RX REV CODE 636 W/ 250 OVERRIDE (IP): Performed by: INTERNAL MEDICINE

## 2021-02-03 RX ORDER — DESMOPRESSIN ACETATE 0.1 MG/1
200 TABLET ORAL EVERY EVENING
Status: DISCONTINUED | OUTPATIENT
Start: 2021-02-03 | End: 2021-02-05 | Stop reason: HOSPADM

## 2021-02-03 RX ORDER — POTASSIUM CHLORIDE 20 MEQ/1
40 TABLET, EXTENDED RELEASE ORAL ONCE
Status: COMPLETED | OUTPATIENT
Start: 2021-02-03 | End: 2021-02-03

## 2021-02-03 RX ADMIN — HYDROCORTISONE SODIUM SUCCINATE 50 MG: 100 INJECTION, POWDER, FOR SOLUTION INTRAMUSCULAR; INTRAVENOUS at 05:30

## 2021-02-03 RX ADMIN — LEVOTHYROXINE SODIUM 50 MCG: 0.05 TABLET ORAL at 05:30

## 2021-02-03 RX ADMIN — FAMOTIDINE 20 MG: 20 TABLET ORAL at 05:30

## 2021-02-03 RX ADMIN — POTASSIUM CHLORIDE 40 MEQ: 1500 TABLET, EXTENDED RELEASE ORAL at 17:57

## 2021-02-03 RX ADMIN — DOCUSATE SODIUM 50 MG AND SENNOSIDES 8.6 MG 2 TABLET: 8.6; 5 TABLET, FILM COATED ORAL at 05:30

## 2021-02-03 RX ADMIN — POTASSIUM CHLORIDE 10 MEQ: 7.46 INJECTION, SOLUTION INTRAVENOUS at 02:03

## 2021-02-03 RX ADMIN — DOCUSATE SODIUM 50 MG AND SENNOSIDES 8.6 MG 2 TABLET: 8.6; 5 TABLET, FILM COATED ORAL at 17:57

## 2021-02-03 RX ADMIN — DESMOPRESSIN ACETATE 200 MCG: 0.1 TABLET ORAL at 17:57

## 2021-02-03 RX ADMIN — POTASSIUM CHLORIDE 10 MEQ: 7.46 INJECTION, SOLUTION INTRAVENOUS at 00:52

## 2021-02-03 RX ADMIN — HYDROCORTISONE SODIUM SUCCINATE 50 MG: 100 INJECTION, POWDER, FOR SOLUTION INTRAMUSCULAR; INTRAVENOUS at 17:57

## 2021-02-03 RX ADMIN — ENOXAPARIN SODIUM 40 MG: 40 INJECTION SUBCUTANEOUS at 05:30

## 2021-02-03 ASSESSMENT — ENCOUNTER SYMPTOMS
NAUSEA: 0
PALPITATIONS: 0
LOSS OF CONSCIOUSNESS: 0
DIARRHEA: 0
ABDOMINAL PAIN: 1
SHORTNESS OF BREATH: 0
SEIZURES: 0
FEVER: 0
EYE PAIN: 0
HEADACHES: 0
TREMORS: 0
INSOMNIA: 0
BLOOD IN STOOL: 0
CHILLS: 0
WEAKNESS: 0
COUGH: 0
NERVOUS/ANXIOUS: 0
CONSTIPATION: 0
VOMITING: 0
FOCAL WEAKNESS: 0
WHEEZING: 0
HEMOPTYSIS: 0
FALLS: 0
EYE REDNESS: 0
DIZZINESS: 0
MYALGIAS: 1

## 2021-02-03 NOTE — PROGRESS NOTES
Telemetry Shift Summary     Rhythm:  SB  HR Range: 45-60  Ectopy:   Measurements: 0.16/0.10/0.44              Normal Values  Rhythm SR  HR Range   Measurements 0.12 / 0.20 / 0.06-0.10 / 0.30-0.52

## 2021-02-03 NOTE — PROGRESS NOTES
2205- Updated hospitalist, dr. perea of latest sodium and potassium as well as all potassium replacements pt. Has received today. New orders received for potassium iv and mag iv;md to place orders; will carry them out. Per md cancel midnight bmp as pt. Will still be receiving kriders and make sure lab draws 0300 bmp on time. This writer called lab and notified them of this.    [id number]. Updated with POC.  iv site to RAC is out; catheter tip intact; new iv's x2 started to RFA #20 gauge and #22 gauge first attempts. Pt tolerated well. krider hung to #20 gauge and ice pack applied; mag hung to infuse in #22 gauge.

## 2021-02-03 NOTE — PROGRESS NOTES
Assessment completed. Denies needs/pain. Vss. Telemetry in place. Spoke with md on floor and he updated pt. And this writer with poc; encouraged to call for any needs. Call light is in reach.

## 2021-02-03 NOTE — ASSESSMENT & PLAN NOTE
No new EKG changes  Troponins negative  Most likely non-cardiac in nature.    2/4: Patient denies any chest pain today.

## 2021-02-03 NOTE — PROGRESS NOTES
[facility] Medicine Daily Progress Note    Date of Service  2/2/2021    Chief Complaint  40 y.o. female admitted 1/29/2021 with Abdominal Pain (since last night; middle-lower; burning/aching), Blood in Urine (buring with urination since last night), and Body Aches (genearlized; mainly lower back)        Hospital Course  History of craniopharyngioma, craniotomy, diabetes insipidus and adrenal insufficiency on desmopressin and antivert  Presents with Abdominal Pain (since last night; middle-lower; burning/aching), Blood in Urine (buring with urination since last night), and Body Aches (genearlized; mainly lower back)  Complains of lower abdominal pain, bilateral flank pain, dysuria, blood in urine, fever, chills, body aches, malaise, 1d PTA.  At the ED, FEBRILE, low normal BPs, NOT hypoxic.  CT:  1.  No renal stone or hydronephrosis.  2.  Normal appendix.  3.  No focal mesenteric inflammatory process.  Leukocytosis.    1/29 CoVID POSITIVE  Procalcitonin normal  Ddimer<3  Started on antibiotics for UTI/pyelonephritis., Patient not hypoxic currently so not starting dexamethasone however patient on stress dose steroids for adrenal insufficiency.    Interval Problem Update  2/1: (AS PER PREVIOUS HOSPITALIST) She is complaining of back and R abdominal pain. I don't see a rash that would suggest dermatomal distribution but seems to have myalgia R back. Also constipated.  This improved now complaining of R thigh pain. Blood pressure slightly improving  She still drinks a lot of water and has excessive thirst  Labs came back hypernatremic.  BP on 21 still low normal on high dose stress steroids  I spoke with Dr. Solorio and patient at length with .    2/2: Due to worsening hyponatremia we have stopped 1/2 NS IVF and desmopressin. Nephrology to be consulted if Sodium continues to drop. Patient currently not with AMS. We will monitor closely. Patient was complaining of chest pain, however EKG no new changes and  troponins were negative. As per nursing no other over night events reported. Patient hypokalemic and currently replacing potassium in the setting of adrenal insufficiency and high dose steroids.    I called Dr Hui about the patient, and he mentioned to call him back depending on sodium levels at 8 pm and midnight.        Consultants/Specialty  None    Code Status  Full Code    Disposition  Will need 3d of IV hydrocort before transitioning to PO taper  Will need to address her home situation. Co[facility] housing an option but SW/CM to try to have her keep her apartment/home as landlord should not evict her.  Will need [facility] to improve her complaince and transport to her clinics  Primary provider and [facility] or [facility] appointments MUST be made.    Review of Systems  Review of Systems   Constitutional: Negative for chills and fever.   HENT: Negative for congestion, hearing loss and nosebleeds.    Eyes: Negative for pain and redness.   Respiratory: Negative for cough, hemoptysis, shortness of breath and wheezing.    Cardiovascular: Negative for chest pain and palpitations.   Gastrointestinal: Positive for abdominal pain. Negative for blood in stool, constipation, diarrhea, nausea and vomiting.   Genitourinary: Negative for dysuria, frequency and hematuria.   Musculoskeletal: Positive for myalgias. Negative for falls and joint pain.   Skin: Negative for rash.   Neurological: Negative for dizziness, tremors, focal weakness, seizures, loss of consciousness, weakness and headaches.   Psychiatric/Behavioral: The patient is not nervous/anxious and does not have insomnia.    All other systems reviewed and are negative.       Physical Exam  Temp:  [36.7 °C (98.1 °F)-37.3 °C (99.2 °F)] 37.2 °C (99 °F)  Pulse:  [48-61] 50  Resp:  [17-19] 17  BP: ()/(50-67) 107/67  SpO2:  [96 %-100 %] 96 %    Physical Exam  Vitals signs and nursing note reviewed.   Constitutional:       General: She is not in acute distress.  HENT:       Head: Normocephalic and atraumatic.      Right Ear: External ear normal.      Left Ear: External ear normal.      Nose: Nose normal.      Mouth/Throat:      Mouth: Mucous membranes are moist.   Eyes:      General: No scleral icterus.     Conjunctiva/sclera: Conjunctivae normal.   Neck:      Musculoskeletal: Normal range of motion and neck supple. No neck rigidity.   Cardiovascular:      Rate and Rhythm: Normal rate and regular rhythm.      Pulses: Normal pulses.      Heart sounds: No murmur. No friction rub. No gallop.    Pulmonary:      Effort: Pulmonary effort is normal. No respiratory distress.      Breath sounds: Normal breath sounds. No stridor. No wheezing, rhonchi or rales.   Chest:      Chest wall: No tenderness.   Abdominal:      General: Abdomen is flat. Bowel sounds are normal. There is no distension.      Palpations: Abdomen is soft.      Tenderness: There is abdominal tenderness (Mild RLQ, resolved). There is no guarding or rebound.   Musculoskeletal: Normal range of motion.         General: Tenderness (R back, resolved) present. No swelling or deformity.   Skin:     General: Skin is warm.      Coloration: Skin is not jaundiced.   Neurological:      General: No focal deficit present.      Mental Status: She is alert and oriented to person, place, and time. Mental status is at baseline.   Psychiatric:         Mood and Affect: Mood normal.         Behavior: Behavior normal.         Thought Content: Thought content normal.         Judgment: Judgment normal.      Comments: Can be tearful from her biopsychosocial situation         Fluids    Intake/Output Summary (Last 24 hours) at 2/2/2021 1923  Last data filed at 2/2/2021 1205  Gross per 24 hour   Intake 2440 ml   Output --   Net 2440 ml       Laboratory  Recent Labs     02/02/21  0256   WBC 10.1   RBC 3.63*   HEMOGLOBIN 11.4*   HEMATOCRIT 32.4*   MCV 89.3   MCH 31.4   MCHC 35.2*   RDW 50.2*   PLATELETCT 269   MPV 10.9     Recent Labs     02/01/21 2020  "02/02/21  0256 02/02/21  1127   SODIUM 141 136 129*   POTASSIUM 2.5* 2.2* 2.7*   CHLORIDE 106 100 94*   CO2 25 27 23   GLUCOSE 145* 101* 139*   BUN 8 7* 7*   CREATININE 0.82 0.71 0.73   CALCIUM 8.7 8.3* 8.1*                   Imaging  EC-ECHOCARDIOGRAM LTD W/O CONT   Final Result      US-EXTREMITY VENOUS LOWER UNILAT RIGHT   Final Result      DX-CHEST-PORTABLE (1 VIEW)   Final Result      Questionable mild hazy opacity in the left lung base as detailed. Otherwise negative.      CT-RENAL COLIC EVALUATION(A/P W/O)   Final Result      1.  No renal stone or hydronephrosis.   2.  Normal appendix.   3.  No focal mesenteric inflammatory process.           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)  Assessment & Plan  This is Sepsis Present on admission  SIRS criteria identified on my evaluation include: Fever, with temperature greater than 101 deg F and Leukocytosis, with WBC greater than 12,000  Source is UTI  Sepsis protocol initiated  Fluid resuscitation ordered per protocol  IV antibiotics as appropriate for source of sepsis  While organ dysfunction may be noted elsewhere in this problem list or in the chart, degree of organ dysfunction does not meet CMS criteria for severe sepsis\"  Continue antibiotics follow cultures  Complete CoVID work-up with CXR  Give antispasmodics for back and medications for constipation.  Improved from an infection and sepsis standpoint but will have to contend with her adrenal insufficiency and hypotension (see below)    On 2/1,previous hospitalist talked to with Dr. Solorio, [facility].  Will do IV hydrocort 50mg IV BID for 3d then taper to oral Hydrocort 20mg AM 10mg PM  Desmopressin was not started on admisison; restarted desmopressin. Do desmopressin do 400mg at night to avoid nighttime diuresis however if she cannot detect thirst, then go back to 200mg BID. On my experience with her she can " "detect thirst=, but will confirm.  She was bradycardic. While TSH is normal, secondary hypothyroidism can have normal TSHs. We reviewed chart and fT4 low. Start levothyroxine 50mcg. Echo pending eval EF.  Patient can follow up with Dr. Solorio, [facility] or [facility] depending on insurance.  Patient will need to have primary care appointment done before she leaves as she will need home health care and transport to her doctors otherwise she will be lost to follow up.    2/2: Last dose of ceftriaxone will be given today.        Chest pain  Assessment & Plan  No new EKG changes  Troponins negative  Most likely non-cardiac in nature.    Pyelonephritis  Assessment & Plan  Patient started on empiric IV antibiotics until symptoms improved  Urine culture ordered  Pain control  Improved    2/2: Patient to finish ceftriaxone today.      Diabetes insipidus (HCC)- (present on admission)  Assessment & Plan  Thirsty  Hypernatremic on 2/1  Started ddAVP  See above    2/2: We have stopped 1/2 IVF. Due to risk of worsening hyponatremia we will hold desmopressin today. We will repeat BMP at 8 pm and midnight, and re-evaluate. Depending of repeat BMP values, nephrology should be contacted.    Hypokalemia  Assessment & Plan  In the setting of adrenal insufficiency and high dose steroids  Replaces as needed  Repeat BMP    COVID-19 virus infection  Assessment & Plan  Not hypoxic  See above    Secondary adrenal insufficiency (HCC)  Assessment & Plan  Unclear if related to hypovolemia, sepsis or endocrine dysfunction (history of hypothyroidism, adrenal insufficiency, diabetes insipidus)  Continue IV hydration  Stress dose of hydrocortisone 100 mg  Check cortisol level, TSH  Monitor blood pressure\"    2/2: As per previous endocrinology recommendations we will continue with IV hydrocortisone and switch to oral in three days.    Secondary hypothyroidism  Assessment & Plan  See above       VTE prophylaxis: Lovenox " SQ  Gastrointestinal prophylaxis: Added famotidine.  Antibiotics:   Ordered Anti-infectives (9999h ago, onward)     Ordered     Start    01/29/21 1428  cefTRIAXone (ROCEPHIN) 1 g in  mL IVPB  DAILY AT NOON     Note to Pharmacy: Per P&T Antimicrobial Dose Adjustment Protocol    01/30/21 1200    01/29/21 1137  cefTRIAXone (ROCEPHIN) 2 g in  mL IVPB  ONCE      01/29/21 1200              Diet:   Orders Placed This Encounter   Procedures   • Diet Order Diet: Regular     Standing Status:   Standing     Number of Occurrences:   1     Order Specific Question:   Diet:     Answer:   Regular [1]      Prognosis: Yes  Risk: The Patient is at HIGH risk for inpatient complications and decompensation secondary to his multiple cormorbidities  listed above, especially   Problem   Chest Pain   Pyelonephritis   Diabetes Insipidus (Hcc)   Sepsis secondary to UTI, pyelonephritis, CoVID+, 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  I discussed the plan of care with bedside RN as well as on multidisciplinary rounds  I have performed a physical exam and reviewed and updated ROS and Plan today 2/2/2021. In review of yesterday's note 2/1/2021   there are no changes except as documented above.   This patient was seen under COVID 19 pandemic onditions.  During a disaster, the provisions of care is subject to the Crisis Standard of Care

## 2021-02-03 NOTE — PROGRESS NOTES
Patient alert and oriented, vss- BP low. See previous note for BP.  used to assess patient. Patient denies pain and all needs met at this time. SB on tele. Will recheck BP. will monitor.      Per MD will hold on bolus as concern for bringing down patient sodium. Will monitor VS Q4 and monitor for dizziness when patient stands.

## 2021-02-04 PROBLEM — I95.9 HYPOTENSION: Status: ACTIVE | Noted: 2021-02-04

## 2021-02-04 PROBLEM — R00.1 BRADYCARDIA: Status: ACTIVE | Noted: 2021-02-04

## 2021-02-04 LAB
ALBUMIN SERPL BCP-MCNC: 3.4 G/DL (ref 3.2–4.9)
ALBUMIN/GLOB SERPL: 1.4 G/DL
ALP SERPL-CCNC: 91 U/L (ref 30–99)
ALT SERPL-CCNC: 18 U/L (ref 2–50)
ANION GAP SERPL CALC-SCNC: 7 MMOL/L (ref 7–16)
AST SERPL-CCNC: 19 U/L (ref 12–45)
BASOPHILS # BLD AUTO: 0.3 % (ref 0–1.8)
BASOPHILS # BLD: 0.04 K/UL (ref 0–0.12)
BILIRUB SERPL-MCNC: <0.2 MG/DL (ref 0.1–1.5)
BUN SERPL-MCNC: 10 MG/DL (ref 8–22)
CALCIUM SERPL-MCNC: 8.2 MG/DL (ref 8.5–10.5)
CHLORIDE SERPL-SCNC: 104 MMOL/L (ref 96–112)
CO2 SERPL-SCNC: 24 MMOL/L (ref 20–33)
CREAT SERPL-MCNC: 0.71 MG/DL (ref 0.5–1.4)
EOSINOPHIL # BLD AUTO: 0.13 K/UL (ref 0–0.51)
EOSINOPHIL NFR BLD: 1 % (ref 0–6.9)
ERYTHROCYTE [DISTWIDTH] IN BLOOD BY AUTOMATED COUNT: 49.7 FL (ref 35.9–50)
GLOBULIN SER CALC-MCNC: 2.4 G/DL (ref 1.9–3.5)
GLUCOSE SERPL-MCNC: 84 MG/DL (ref 65–99)
HCT VFR BLD AUTO: 37.9 % (ref 37–47)
HGB BLD-MCNC: 12.9 G/DL (ref 12–16)
IMM GRANULOCYTES # BLD AUTO: 0.32 K/UL (ref 0–0.11)
IMM GRANULOCYTES NFR BLD AUTO: 2.5 % (ref 0–0.9)
LYMPHOCYTES # BLD AUTO: 4.23 K/UL (ref 1–4.8)
LYMPHOCYTES NFR BLD: 33.3 % (ref 22–41)
MAGNESIUM SERPL-MCNC: 2.4 MG/DL (ref 1.5–2.5)
MCH RBC QN AUTO: 30.4 PG (ref 27–33)
MCHC RBC AUTO-ENTMCNC: 34 G/DL (ref 33.6–35)
MCV RBC AUTO: 89.2 FL (ref 81.4–97.8)
MONOCYTES # BLD AUTO: 0.72 K/UL (ref 0–0.85)
MONOCYTES NFR BLD AUTO: 5.7 % (ref 0–13.4)
NEUTROPHILS # BLD AUTO: 7.26 K/UL (ref 2–7.15)
NEUTROPHILS NFR BLD: 57.2 % (ref 44–72)
NRBC # BLD AUTO: 0 K/UL
NRBC BLD-RTO: 0 /100 WBC
PLATELET # BLD AUTO: 305 K/UL (ref 164–446)
PMV BLD AUTO: 10.4 FL (ref 9–12.9)
POTASSIUM SERPL-SCNC: 3.5 MMOL/L (ref 3.6–5.5)
PROT SERPL-MCNC: 5.8 G/DL (ref 6–8.2)
RBC # BLD AUTO: 4.25 M/UL (ref 4.2–5.4)
SODIUM SERPL-SCNC: 135 MMOL/L (ref 135–145)
WBC # BLD AUTO: 12.7 K/UL (ref 4.8–10.8)

## 2021-02-04 PROCEDURE — 99232 SBSQ HOSP IP/OBS MODERATE 35: CPT | Performed by: INTERNAL MEDICINE

## 2021-02-04 PROCEDURE — 700102 HCHG RX REV CODE 250 W/ 637 OVERRIDE(OP): Performed by: INTERNAL MEDICINE

## 2021-02-04 PROCEDURE — 85025 COMPLETE CBC W/AUTO DIFF WBC: CPT

## 2021-02-04 PROCEDURE — 770020 HCHG ROOM/CARE - TELE (206)

## 2021-02-04 PROCEDURE — 36415 COLL VENOUS BLD VENIPUNCTURE: CPT

## 2021-02-04 PROCEDURE — 80053 COMPREHEN METABOLIC PANEL: CPT

## 2021-02-04 PROCEDURE — A9270 NON-COVERED ITEM OR SERVICE: HCPCS | Performed by: INTERNAL MEDICINE

## 2021-02-04 PROCEDURE — 83735 ASSAY OF MAGNESIUM: CPT

## 2021-02-04 PROCEDURE — 700111 HCHG RX REV CODE 636 W/ 250 OVERRIDE (IP): Performed by: STUDENT IN AN ORGANIZED HEALTH CARE EDUCATION/TRAINING PROGRAM

## 2021-02-04 PROCEDURE — 700111 HCHG RX REV CODE 636 W/ 250 OVERRIDE (IP): Performed by: INTERNAL MEDICINE

## 2021-02-04 RX ORDER — FAMOTIDINE 20 MG/1
20 TABLET, FILM COATED ORAL 2 TIMES DAILY
Status: DISCONTINUED | OUTPATIENT
Start: 2021-02-04 | End: 2021-02-05 | Stop reason: HOSPADM

## 2021-02-04 RX ORDER — HYDROCORTISONE 20 MG/1
20 TABLET ORAL EVERY MORNING
Status: DISCONTINUED | OUTPATIENT
Start: 2021-02-05 | End: 2021-02-05 | Stop reason: HOSPADM

## 2021-02-04 RX ORDER — HYDROCORTISONE 10 MG/1
10 TABLET ORAL EVERY EVENING
Status: DISCONTINUED | OUTPATIENT
Start: 2021-02-04 | End: 2021-02-05 | Stop reason: HOSPADM

## 2021-02-04 RX ORDER — POTASSIUM CHLORIDE 20 MEQ/1
20 TABLET, EXTENDED RELEASE ORAL ONCE
Status: COMPLETED | OUTPATIENT
Start: 2021-02-04 | End: 2021-02-04

## 2021-02-04 RX ADMIN — LEVOTHYROXINE SODIUM 50 MCG: 0.05 TABLET ORAL at 05:15

## 2021-02-04 RX ADMIN — ACETAMINOPHEN 650 MG: 325 TABLET, FILM COATED ORAL at 11:36

## 2021-02-04 RX ADMIN — HYDROCORTISONE SODIUM SUCCINATE 50 MG: 100 INJECTION, POWDER, FOR SOLUTION INTRAMUSCULAR; INTRAVENOUS at 05:15

## 2021-02-04 RX ADMIN — DESMOPRESSIN ACETATE 200 MCG: 0.1 TABLET ORAL at 17:41

## 2021-02-04 RX ADMIN — ENOXAPARIN SODIUM 40 MG: 40 INJECTION SUBCUTANEOUS at 05:15

## 2021-02-04 RX ADMIN — DOCUSATE SODIUM 50 MG AND SENNOSIDES 8.6 MG 2 TABLET: 8.6; 5 TABLET, FILM COATED ORAL at 05:15

## 2021-02-04 RX ADMIN — POTASSIUM CHLORIDE 20 MEQ: 1500 TABLET, EXTENDED RELEASE ORAL at 08:53

## 2021-02-04 RX ADMIN — DOCUSATE SODIUM 50 MG AND SENNOSIDES 8.6 MG 2 TABLET: 8.6; 5 TABLET, FILM COATED ORAL at 20:38

## 2021-02-04 RX ADMIN — FAMOTIDINE 20 MG: 20 TABLET ORAL at 17:41

## 2021-02-04 RX ADMIN — HYDROCORTISONE 10 MG: 10 TABLET ORAL at 17:42

## 2021-02-04 RX ADMIN — FAMOTIDINE 20 MG: 20 TABLET ORAL at 05:15

## 2021-02-04 ASSESSMENT — PAIN DESCRIPTION - PAIN TYPE: TYPE: ACUTE PAIN

## 2021-02-04 ASSESSMENT — ENCOUNTER SYMPTOMS
HEMOPTYSIS: 0
WEAKNESS: 0
INSOMNIA: 0
CONSTIPATION: 0
NERVOUS/ANXIOUS: 0
FOCAL WEAKNESS: 0
MYALGIAS: 0
COUGH: 0
TREMORS: 0
ABDOMINAL PAIN: 0
EYE REDNESS: 0
FEVER: 0
EYE PAIN: 0
VOMITING: 0
BLOOD IN STOOL: 0
PALPITATIONS: 0
NAUSEA: 0
CHILLS: 0
SEIZURES: 0
DIZZINESS: 0
WHEEZING: 0
HEADACHES: 0
DIARRHEA: 0
LOSS OF CONSCIOUSNESS: 0
SHORTNESS OF BREATH: 0
FALLS: 0

## 2021-02-04 NOTE — PROGRESS NOTES
Pt rested well during the night. Tele-monitoring in progress, pt Sinus cristo, asymptomatic.  OOB to BR as needed, independently, without difficulty. Pt denies pain, offers no complaints. AM meds given as ordered. Bed at low level, call bell within reach, Droplet, contact and safety precautions maintained. Will continue to monitor

## 2021-02-04 NOTE — PROGRESS NOTES
Received bedside shift change report and assumed care of pt from day shift RN. Pt awake alert and verbally responsive, in no acute distress. Respirations are even and unlabored. Pt denies pain, offers no complaints. Ambulates to BR independently, voiding freely QS.  Bed at low level, call bell within reach. Droplet, contact and safety precautions maintained. Will continue to monitor

## 2021-02-04 NOTE — DISCHARGE PLANNING
Anticipated Discharge Disposition:   Home vs COVID Housing    Action:   Discussed discharge planning needs during rounds. Per MD, possible discharge tomorrow. RN CM informed MD about PCP follow up on February 10, 2021 with Aminta Hanna RN at [facility]. MD requested for contact number for pt's PCP.    Home health order in place. Pt has emergency Medicaid only, willnot cover any outpatient services. Per bedside RN and flowsheet, pt able to mobilize with no assistance required. Per MD, home health order can be cancelled.     RN CM called [facility], received PCP DANTE Ford's cellphone number: [phone]. MD informed via Voalte.    PCP Appointment on: (updated AVS)  DANTE Ford  February 10, 2021  10:30 AM  Telephone appointment     APRN will call patient. Informed  about need for .  APRN to schedule Endocrinology follow up after PCP appointment.    Barriers to Discharge:   Medical clearance    Plan:   Hospital Care Management will continue to follow and assist with discharge planning needs.

## 2021-02-04 NOTE — PROGRESS NOTES
HR 35-45 on monitor, patient asymptomatic and MD texted via voalte to be made aware.     EKG ordered.

## 2021-02-04 NOTE — PROGRESS NOTES
BP 85 systolic and MD aware, no intervention as patient asymptomatic and this is potentially baseline.

## 2021-02-04 NOTE — PROGRESS NOTES
[facility] Medicine Daily Progress Note    Date of Service  2/3/2021    Chief Complaint  40 y.o. female admitted 1/29/2021 with Abdominal Pain (since last night; middle-lower; burning/aching), Blood in Urine (buring with urination since last night), and Body Aches (genearlized; mainly lower back)        Hospital Course  History of craniopharyngioma, craniotomy, diabetes insipidus and adrenal insufficiency on desmopressin and antivert  Presents with Abdominal Pain (since last night; middle-lower; burning/aching), Blood in Urine (buring with urination since last night), and Body Aches (genearlized; mainly lower back)  Complains of lower abdominal pain, bilateral flank pain, dysuria, blood in urine, fever, chills, body aches, malaise, 1d PTA.  At the ED, FEBRILE, low normal BPs, NOT hypoxic.  CT:  1.  No renal stone or hydronephrosis.  2.  Normal appendix.  3.  No focal mesenteric inflammatory process.  Leukocytosis.    1/29 CoVID POSITIVE  Procalcitonin normal  Ddimer<3  Started on antibiotics for UTI/pyelonephritis., Patient not hypoxic currently so not starting dexamethasone however patient on stress dose steroids for adrenal insufficiency.    Interval Problem Update  2/1: (AS PER PREVIOUS HOSPITALIST) She is complaining of back and R abdominal pain. I don't see a rash that would suggest dermatomal distribution but seems to have myalgia R back. Also constipated.  This improved now complaining of R thigh pain. Blood pressure slightly improving  She still drinks a lot of water and has excessive thirst  Labs came back hypernatremic.  BP on 21 still low normal on high dose stress steroids  I spoke with Dr. Solorio and patient at length with .    2/2: Due to worsening hyponatremia we have stopped 1/2 NS IVF and desmopressin. Nephrology to be consulted if Sodium continues to drop. Patient currently not with AMS. We will monitor closely. Patient was complaining of chest pain, however EKG no new changes and  troponins were negative. As per nursing no other over night events reported. Patient hypokalemic and currently replacing potassium in the setting of adrenal insufficiency and high dose steroids.    I called Dr Hui about the patient, and he mentioned to call him back depending on sodium levels at 8 pm and midnight.    2/3: Patient mentions she feels much better. We will restart desmopressin tonight at a lower dose. Patient with borderline hypotension but asymptomatic. We will continue to monitor. As per nursing no other over night events reported.        Consultants/Specialty  None    Code Status  Full Code    Disposition  Will need 3d of IV hydrocort before transitioning to PO taper  Will need to address her home situation. CoV[facility] housing an option but SW/CM to try to have her keep her apartment/home as landlord should not evict her.  Will need HH to improve her complaince and transport to her clinics  Primary provider and [facility] or [facility] appointments MUST be made.    Review of Systems  Review of Systems   Constitutional: Negative for chills and fever.   HENT: Negative for congestion, hearing loss and nosebleeds.    Eyes: Negative for pain and redness.   Respiratory: Negative for cough, hemoptysis, shortness of breath and wheezing.    Cardiovascular: Negative for chest pain and palpitations.   Gastrointestinal: Positive for abdominal pain. Negative for blood in stool, constipation, diarrhea, nausea and vomiting.   Genitourinary: Negative for dysuria, frequency and hematuria.   Musculoskeletal: Positive for myalgias. Negative for falls and joint pain.   Skin: Negative for rash.   Neurological: Negative for dizziness, tremors, focal weakness, seizures, loss of consciousness, weakness and headaches.   Psychiatric/Behavioral: The patient is not nervous/anxious and does not have insomnia.    All other systems reviewed and are negative.       Physical Exam  Temp:  [37 °C (98.6 °F)-37.7 °C (99.9 °F)] 37.1  °C (98.8 °F)  Pulse:  [45-53] 45  Resp:  [17-18] 18  BP: ()/(44-67) 93/58  SpO2:  [95 %-99 %] 99 %    Physical Exam  Vitals signs and nursing note reviewed.   Constitutional:       General: She is not in acute distress.  HENT:      Head: Normocephalic and atraumatic.      Right Ear: External ear normal.      Left Ear: External ear normal.      Nose: Nose normal.      Mouth/Throat:      Mouth: Mucous membranes are moist.   Eyes:      General: No scleral icterus.     Conjunctiva/sclera: Conjunctivae normal.   Neck:      Musculoskeletal: Normal range of motion and neck supple. No neck rigidity.   Cardiovascular:      Rate and Rhythm: Normal rate and regular rhythm.      Pulses: Normal pulses.      Heart sounds: No murmur. No friction rub. No gallop.    Pulmonary:      Effort: Pulmonary effort is normal. No respiratory distress.      Breath sounds: Normal breath sounds. No stridor. No wheezing, rhonchi or rales.   Chest:      Chest wall: No tenderness.   Abdominal:      General: Abdomen is flat. Bowel sounds are normal. There is no distension.      Palpations: Abdomen is soft.      Tenderness: There is abdominal tenderness (Mild RLQ, resolved). There is no guarding or rebound.   Musculoskeletal: Normal range of motion.         General: Tenderness (R back, resolved) present. No swelling or deformity.   Skin:     General: Skin is warm.      Coloration: Skin is not jaundiced.   Neurological:      General: No focal deficit present.      Mental Status: She is alert and oriented to person, place, and time. Mental status is at baseline.   Psychiatric:         Mood and Affect: Mood normal.         Behavior: Behavior normal.         Thought Content: Thought content normal.         Judgment: Judgment normal.      Comments: Can be tearful from her biopsychosocial situation         Fluids    Intake/Output Summary (Last 24 hours) at 2/3/2021 1709  Last data filed at 2/3/2021 1006  Gross per 24 hour   Intake 540 ml   Output --  "  Net 540 ml       Laboratory  Recent Labs     02/02/21  0256 02/03/21  0355   WBC 10.1 9.5   RBC 3.63* 4.15*   HEMOGLOBIN 11.4* 12.9   HEMATOCRIT 32.4* 36.4*   MCV 89.3 87.7   MCH 31.4 31.1   MCHC 35.2* 35.4*   RDW 50.2* 48.0   PLATELETCT 269 309   MPV 10.9 10.8     Recent Labs     02/02/21 2001 02/03/21  0355 02/03/21  1244   SODIUM 132* 135 142   POTASSIUM 2.9* 3.9 3.3*   CHLORIDE 97 104 108   CO2 24 23 25   GLUCOSE 134* 119* 102*   BUN 10 10 7*   CREATININE 0.68 0.67 0.71   CALCIUM 8.4* 8.5 8.6                   Imaging  EC-ECHOCARDIOGRAM LTD W/O CONT   Final Result      US-EXTREMITY VENOUS LOWER UNILAT RIGHT   Final Result      DX-CHEST-PORTABLE (1 VIEW)   Final Result      Questionable mild hazy opacity in the left lung base as detailed. Otherwise negative.      CT-RENAL COLIC EVALUATION(A/P W/O)   Final Result      1.  No renal stone or hydronephrosis.   2.  Normal appendix.   3.  No focal mesenteric inflammatory process.           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)  Assessment & Plan  This is Sepsis Present on admission  SIRS criteria identified on my evaluation include: Fever, with temperature greater than 101 deg F and Leukocytosis, with WBC greater than 12,000  Source is UTI  Sepsis protocol initiated  Fluid resuscitation ordered per protocol  IV antibiotics as appropriate for source of sepsis  While organ dysfunction may be noted elsewhere in this problem list or in the chart, degree of organ dysfunction does not meet CMS criteria for severe sepsis\"  Continue antibiotics follow cultures  Complete CoVID work-up with CXR  Give antispasmodics for back and medications for constipation.  Improved from an infection and sepsis standpoint but will have to contend with her adrenal insufficiency and hypotension (see below)    On 2/1,previous hospitalist talked to with Dr. Solorio, [facility].  Will do IV " hydrocort 50mg IV BID for 3d then taper to oral Hydrocort 20mg AM 10mg PM  Desmopressin was not started on admisison; restarted desmopressin. Do desmopressin do 400mg at night to avoid nighttime diuresis however if she cannot detect thirst, then go back to 200mg BID. On my experience with her she can detect thirst=, but will confirm.  She was bradycardic. While TSH is normal, secondary hypothyroidism can have normal TSHs. We reviewed chart and fT4 low. Start levothyroxine 50mcg. Echo pending eval EF.  Patient can follow up with Dr. Solorio, [facility] or [facility] depending on insurance.  Patient will need to have primary care appointment done before she leaves as she will need home health care and transport to her doctors otherwise she will be lost to follow up.    2/2: Last dose of ceftriaxone will be given today.    2/3: Patient with borderline hypotension, not symptomatic. We will monitor and make changes accordingly. I contacted Dr Solorio and he recommended to restart desmopressin tonight at a dose of 200 mcg. Continue hydrocortisone as previously discussed.        Chest pain  Assessment & Plan  No new EKG changes  Troponins negative  Most likely non-cardiac in nature.    2/3: Patient denies any chest pain today.    Pyelonephritis  Assessment & Plan  Patient started on empiric IV antibiotics until symptoms improved  Urine culture ordered  Pain control  Improved    2/2: Patient to finish ceftriaxone today.      Diabetes insipidus (HCC)- (present on admission)  Assessment & Plan  Thirsty  Hypernatremic on 2/1  Started ddAVP  See above    2/2: We have stopped 1/2 IVF. Due to risk of worsening hyponatremia we will hold desmopressin today. We will repeat BMP at 8 pm and midnight, and re-evaluate. Depending of repeat BMP values, nephrology should be contacted.    2/3: We will restart desmopression at 200 mcg tonight as per Dr Solorio phone recommendation.    Hypokalemia  Assessment & Plan  In  "the setting of adrenal insufficiency and high dose steroids  Replaced as needed  Repeat BMP    COVID-19 virus infection  Assessment & Plan  Not hypoxic  See above    Secondary adrenal insufficiency (HCC)  Assessment & Plan  Unclear if related to hypovolemia, sepsis or endocrine dysfunction (history of hypothyroidism, adrenal insufficiency, diabetes insipidus)  Continue IV hydration  Stress dose of hydrocortisone 100 mg  Check cortisol level, TSH  Monitor blood pressure\"    2/2: As per previous endocrinology recommendations we will continue with IV hydrocortisone and switch to oral in three days.    Secondary hypothyroidism  Assessment & Plan  See above       VTE prophylaxis: Lovenox SQ  Gastrointestinal prophylaxis: Added famotidine.  Antibiotics:   Ordered Anti-infectives (9999h ago, onward)     Ordered     Start    01/29/21 1428  cefTRIAXone (ROCEPHIN) 1 g in  mL IVPB  DAILY AT NOON     Note to Pharmacy: Per [facility] Antimicrobial Dose Adjustment Protocol    01/30/21 1200    01/29/21 1137  cefTRIAXone (ROCEPHIN) 2 g in  mL IVPB  ONCE      01/29/21 1200              Diet:   Orders Placed This Encounter   Procedures   • Diet Order Diet: Regular     Standing Status:   Standing     Number of Occurrences:   1     Order Specific Question:   Diet:     Answer:   Regular [1]      Prognosis: Yes  Risk: The Patient is at HIGH risk for inpatient complications and decompensation secondary to his multiple cormorbidities  listed above, especially   Problem   Chest Pain   Pyelonephritis   Diabetes Insipidus (Hcc)   Sepsis secondary to UTI, pyelonephritis, CoVID+, 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  I discussed the plan of care with bedside RN as well as on multidisciplinary rounds  I have performed a physical exam and reviewed and updated ROS and Plan today 2/3/2021. In review of " yesterday's note 2/2/2021   there are no changes except as documented above.   This patient was seen under COVID 19 pandemic onditions.  During a disaster, the provisions of care is subject to the Crisis Standard of Care

## 2021-02-04 NOTE — PROGRESS NOTES
Patient alert and oriented, vss- BP remains in low 90's patient denies symptoms and MD previously aware. Patient up self in room and ambulating well. Patient plan to stay one more day and monitor potassium level. Transitioned to PO steroids. Will need follow up with PCP and endocrine. Will monitor.

## 2021-02-04 NOTE — ASSESSMENT & PLAN NOTE
Patient with borderline hypotension, however asymptomatic  We will perform orthostatic vital signs today  If needed we would start midodrine

## 2021-02-04 NOTE — PROGRESS NOTES
[facility] Medicine Daily Progress Note    Date of Service  2/4/2021    Chief Complaint  40 y.o. female admitted 1/29/2021 with Abdominal Pain (since last night; middle-lower; burning/aching), Blood in Urine (buring with urination since last night), and Body Aches (genearlized; mainly lower back)        Hospital Course  History of craniopharyngioma, craniotomy, diabetes insipidus and adrenal insufficiency on desmopressin and antivert  Presents with Abdominal Pain (since last night; middle-lower; burning/aching), Blood in Urine (buring with urination since last night), and Body Aches (genearlized; mainly lower back)  Complains of lower abdominal pain, bilateral flank pain, dysuria, blood in urine, fever, chills, body aches, malaise, 1d PTA.  At the ED, FEBRILE, low normal BPs, NOT hypoxic.  CT:  1.  No renal stone or hydronephrosis.  2.  Normal appendix.  3.  No focal mesenteric inflammatory process.  Leukocytosis.    1/29 CoVID POSITIVE  Procalcitonin normal  Ddimer<3  Started on antibiotics for UTI/pyelonephritis., Patient not hypoxic currently so not starting dexamethasone however patient on stress dose steroids for adrenal insufficiency.    Interval Problem Update  2/1: (AS PER PREVIOUS HOSPITALIST) She is complaining of back and R abdominal pain. I don't see a rash that would suggest dermatomal distribution but seems to have myalgia R back. Also constipated.  This improved now complaining of R thigh pain. Blood pressure slightly improving  She still drinks a lot of water and has excessive thirst  Labs came back hypernatremic.  BP on 21 still low normal on high dose stress steroids  I spoke with Dr. Solorio and patient at length with .    2/2: Due to worsening hyponatremia we have stopped 1/2 NS IVF and desmopressin. Nephrology to be consulted if Sodium continues to drop. Patient currently not with AMS. We will monitor closely. Patient was complaining of chest pain, however EKG no new changes and  troponins were negative. As per nursing no other over night events reported. Patient hypokalemic and currently replacing potassium in the setting of adrenal insufficiency and high dose steroids.    I called Dr Hui about the patient, and he mentioned to call him back depending on sodium levels at 8 pm and midnight.    2/3: Patient mentions she feels much better. We will restart desmopressin tonight at a lower dose. Patient with borderline hypotension but asymptomatic. We will continue to monitor. As per nursing no other over night events reported.    2/4: Patient seen at bedside, does not complain of any symptom. Patient has been borderline hypotensive however asymptomatic. We will do orthostatic vital signs and if needed we would start midodrine. As per nursing no other over night events reported.        Consultants/Specialty  None    Code Status  Full Code    Disposition  Will need 3d of IV hydrocort before transitioning to PO taper  Will need to address her home situation. CoVID housing an option but SW/ to try to have her keep her apartment/home as landlord should not evict her.  Will need C to improve her complaince and transport to her clinics  Primary provider and [facility] or [facility] appointments MUST be made.    Review of Systems  Review of Systems   Constitutional: Negative for chills and fever.   HENT: Negative for congestion, hearing loss and nosebleeds.    Eyes: Negative for pain and redness.   Respiratory: Negative for cough, hemoptysis, shortness of breath and wheezing.    Cardiovascular: Negative for chest pain and palpitations.   Gastrointestinal: Negative for abdominal pain, blood in stool, constipation, diarrhea, nausea and vomiting.   Genitourinary: Negative for dysuria, frequency and hematuria.   Musculoskeletal: Negative for falls, joint pain and myalgias.   Skin: Negative for rash.   Neurological: Negative for dizziness, tremors, focal weakness, seizures, loss of consciousness,  weakness and headaches.   Psychiatric/Behavioral: The patient is not nervous/anxious and does not have insomnia.    All other systems reviewed and are negative.       Physical Exam  Temp:  [36.2 °C (97.2 °F)-36.9 °C (98.5 °F)] 36.6 °C (97.8 °F)  Pulse:  [47-56] 53  Resp:  [17-18] 18  BP: ()/(31-80) 88/60  SpO2:  [97 %-100 %] 97 %    Physical Exam  Vitals signs and nursing note reviewed.   Constitutional:       General: She is not in acute distress.  HENT:      Head: Normocephalic and atraumatic.      Right Ear: External ear normal.      Left Ear: External ear normal.      Nose: Nose normal.      Mouth/Throat:      Mouth: Mucous membranes are moist.   Eyes:      General: No scleral icterus.     Conjunctiva/sclera: Conjunctivae normal.   Neck:      Musculoskeletal: Normal range of motion and neck supple. No neck rigidity.   Cardiovascular:      Rate and Rhythm: Normal rate and regular rhythm.      Pulses: Normal pulses.      Heart sounds: No murmur. No friction rub. No gallop.    Pulmonary:      Effort: Pulmonary effort is normal. No respiratory distress.      Breath sounds: Normal breath sounds. No stridor. No wheezing, rhonchi or rales.   Chest:      Chest wall: No tenderness.   Abdominal:      General: Abdomen is flat. Bowel sounds are normal. There is no distension.      Palpations: Abdomen is soft.      Tenderness: There is no abdominal tenderness (Mild RLQ, resolved). There is no guarding or rebound.   Musculoskeletal: Normal range of motion.         General: No swelling, tenderness (R back, resolved) or deformity.   Skin:     General: Skin is warm.      Coloration: Skin is not jaundiced.   Neurological:      General: No focal deficit present.      Mental Status: She is alert and oriented to person, place, and time. Mental status is at baseline.   Psychiatric:         Mood and Affect: Mood normal.         Behavior: Behavior normal.         Thought Content: Thought content normal.         Judgment: Judgment  "normal.      Comments: Can be tearful from her biopsychosocial situation         Fluids    Intake/Output Summary (Last 24 hours) at 2/4/2021 1550  Last data filed at 2/4/2021 0900  Gross per 24 hour   Intake 360 ml   Output --   Net 360 ml       Laboratory  Recent Labs     02/02/21  0256 02/03/21  0355 02/04/21  0348   WBC 10.1 9.5 12.7*   RBC 3.63* 4.15* 4.25   HEMOGLOBIN 11.4* 12.9 12.9   HEMATOCRIT 32.4* 36.4* 37.9   MCV 89.3 87.7 89.2   MCH 31.4 31.1 30.4   MCHC 35.2* 35.4* 34.0   RDW 50.2* 48.0 49.7   PLATELETCT 269 309 305   MPV 10.9 10.8 10.4     Recent Labs     02/03/21  1244 02/03/21 2006 02/04/21  0348   SODIUM 142 140 135   POTASSIUM 3.3* 3.3* 3.5*   CHLORIDE 108 107 104   CO2 25 24 24   GLUCOSE 102* 131* 84   BUN 7* 11 10   CREATININE 0.71 0.69 0.71   CALCIUM 8.6 8.4* 8.2*                   Imaging  EC-ECHOCARDIOGRAM LTD W/O CONT   Final Result      US-EXTREMITY VENOUS LOWER UNILAT RIGHT   Final Result      DX-CHEST-PORTABLE (1 VIEW)   Final Result      Questionable mild hazy opacity in the left lung base as detailed. Otherwise negative.      CT-RENAL COLIC EVALUATION(A/P W/O)   Final Result      1.  No renal stone or hydronephrosis.   2.  Normal appendix.   3.  No focal mesenteric inflammatory process.           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)  Assessment & Plan  This is Sepsis Present on admission  SIRS criteria identified on my evaluation include: Fever, with temperature greater than 101 deg F and Leukocytosis, with WBC greater than 12,000  Source is UTI  Sepsis protocol initiated  Fluid resuscitation ordered per protocol  IV antibiotics as appropriate for source of sepsis  While organ dysfunction may be noted elsewhere in this problem list or in the chart, degree of organ dysfunction does not meet CMS criteria for severe sepsis\"  Continue antibiotics follow cultures  Complete CoVID work-up with CXR  Give " antispasmodics for back and medications for constipation.  Improved from an infection and sepsis standpoint but will have to contend with her adrenal insufficiency and hypotension (see below)    On 2/1,previous hospitalist talked to with [facility].  Will do IV hydrocort 50mg IV BID for 3d then taper to oral Hydrocort 20mg AM 10mg PM  Desmopressin was not started on admisison; restarted desmopressin. Do desmopressin do 400mg at night to avoid nighttime diuresis however if she cannot detect thirst, then go back to 200mg BID. On my experience with her she can detect thirst=, but will confirm.  She was bradycardic. While TSH is normal, secondary hypothyroidism can have normal TSHs. We reviewed chart and fT4 low. Start levothyroxine 50mcg. Echo pending eval EF.  Patient can follow up with [facility] or [facility] depending on insurance.  Patient will need to have primary care appointment done before she leaves as she will need home health care and transport to her doctors otherwise she will be lost to follow up.    2/2: Last dose of ceftriaxone will be given today.    2/3: Patient with borderline hypotension, not symptomatic. We will monitor and make changes accordingly. I contacted Dr Solorio and he recommended to restart desmopressin tonight at a dose of 200 mcg. Continue hydrocortisone as previously discussed.    2/4: We will start po steroid therapy today. Monitor closely and planning to discharge in the next 24-48 hours with close follow up as outpatient.        Chest pain  Assessment & Plan  No new EKG changes  Troponins negative  Most likely non-cardiac in nature.    2/4: Patient denies any chest pain today.    Pyelonephritis  Assessment & Plan  Patient started on empiric IV antibiotics until symptoms improved  Urine culture ordered  Pain control  Improved    2/2: Patient to finish ceftriaxone today.      Diabetes insipidus (HCC)- (present on  "admission)  Assessment & Plan  Thirsty  Hypernatremic on 2/1  Started ddAVP  See above    2/2: We have stopped 1/2 IVF. Due to risk of worsening hyponatremia we will hold desmopressin today. We will repeat BMP at 8 pm and midnight, and re-evaluate. Depending of repeat BMP values, nephrology should be contacted.    2/4: We will continue desmopression at 200 mcg at night as per Dr Solorio phone recommendation.    Bradycardia  Assessment & Plan  Patient has been bradycardic however asymptomatic  EKG showed sinus bradycardia    Hypotension  Assessment & Plan  Patient with borderline hypotension, however asymptomatic  We will perform orthostatic vital signs today  If needed we would start midodrine      Hypokalemia  Assessment & Plan  In the setting of adrenal insufficiency and high dose steroids  Replaced as needed  Repeat BMP    COVID-19 virus infection  Assessment & Plan  Not hypoxic  See above    Secondary adrenal insufficiency (HCC)  Assessment & Plan  Unclear if related to hypovolemia, sepsis or endocrine dysfunction (history of hypothyroidism, adrenal insufficiency, diabetes insipidus)  Continue IV hydration  Stress dose of hydrocortisone 100 mg  Check cortisol level, TSH  Monitor blood pressure\"    2/2: As per previous endocrinology recommendations we will continue with IV hydrocortisone and switch to oral in three days.    2/4: We will start oral therapy tonight.    Secondary hypothyroidism  Assessment & Plan  See above       VTE prophylaxis: Lovenox SQ  Gastrointestinal prophylaxis: Added famotidine.    I have personally reviewed notes, labs, vitals, imaging.  I discussed the plan of care with bedside RN as well as on multidisciplinary rounds  I have performed a physical exam and reviewed and updated ROS and Plan today 2/4/2021. In review of yesterday's note 2/3/2021   there are no changes except as documented above.   This patient was seen under COVID 19 pandemic onditions.  During a disaster, the provisions " of care is subject to the Crisis Standard of Care

## 2021-02-04 NOTE — DISCHARGE PLANNING
HCM note:    PCP Appointment on:    DANTE Ford  February 10, 2021  10:30 AM  Telephone appointment    APRN will call patient. Informed  about need for .  APRN to schedule Endocrinology follow up after PCP appointment.

## 2021-02-05 ENCOUNTER — PHARMACY VISIT (OUTPATIENT)
Dept: PHARMACY | Facility: MEDICAL CENTER | Age: 41
End: 2021-02-05
Payer: COMMERCIAL

## 2021-02-05 VITALS
WEIGHT: 135.8 LBS | OXYGEN SATURATION: 97 % | HEART RATE: 49 BPM | SYSTOLIC BLOOD PRESSURE: 84 MMHG | RESPIRATION RATE: 16 BRPM | BODY MASS INDEX: 26.66 KG/M2 | TEMPERATURE: 98.6 F | DIASTOLIC BLOOD PRESSURE: 43 MMHG | HEIGHT: 60 IN

## 2021-02-05 LAB
ALBUMIN SERPL BCP-MCNC: 3.3 G/DL (ref 3.2–4.9)
ALBUMIN/GLOB SERPL: 1.4 G/DL
ALP SERPL-CCNC: 94 U/L (ref 30–99)
ALT SERPL-CCNC: 17 U/L (ref 2–50)
ANION GAP SERPL CALC-SCNC: 8 MMOL/L (ref 7–16)
ANISOCYTOSIS BLD QL SMEAR: ABNORMAL
AST SERPL-CCNC: 11 U/L (ref 12–45)
BASOPHILS # BLD AUTO: 0 % (ref 0–1.8)
BASOPHILS # BLD: 0 K/UL (ref 0–0.12)
BILIRUB SERPL-MCNC: 0.2 MG/DL (ref 0.1–1.5)
BUN SERPL-MCNC: 11 MG/DL (ref 8–22)
CALCIUM SERPL-MCNC: 8.3 MG/DL (ref 8.5–10.5)
CHLORIDE SERPL-SCNC: 104 MMOL/L (ref 96–112)
CO2 SERPL-SCNC: 24 MMOL/L (ref 20–33)
CREAT SERPL-MCNC: 0.9 MG/DL (ref 0.5–1.4)
EOSINOPHIL # BLD AUTO: 0.27 K/UL (ref 0–0.51)
EOSINOPHIL NFR BLD: 1.7 % (ref 0–6.9)
ERYTHROCYTE [DISTWIDTH] IN BLOOD BY AUTOMATED COUNT: 51.8 FL (ref 35.9–50)
GLOBULIN SER CALC-MCNC: 2.3 G/DL (ref 1.9–3.5)
GLUCOSE SERPL-MCNC: 85 MG/DL (ref 65–99)
HCT VFR BLD AUTO: 37.9 % (ref 37–47)
HGB BLD-MCNC: 12.8 G/DL (ref 12–16)
LYMPHOCYTES # BLD AUTO: 4.98 K/UL (ref 1–4.8)
LYMPHOCYTES NFR BLD: 31.9 % (ref 22–41)
MAGNESIUM SERPL-MCNC: 2.2 MG/DL (ref 1.5–2.5)
MANUAL DIFF BLD: NORMAL
MCH RBC QN AUTO: 31.1 PG (ref 27–33)
MCHC RBC AUTO-ENTMCNC: 33.8 G/DL (ref 33.6–35)
MCV RBC AUTO: 92.2 FL (ref 81.4–97.8)
MONOCYTES # BLD AUTO: 0.81 K/UL (ref 0–0.85)
MONOCYTES NFR BLD AUTO: 5.2 % (ref 0–13.4)
MORPHOLOGY BLD-IMP: NORMAL
NEUTROPHILS # BLD AUTO: 9.55 K/UL (ref 2–7.15)
NEUTROPHILS NFR BLD: 61.2 % (ref 44–72)
NRBC # BLD AUTO: 0 K/UL
NRBC BLD-RTO: 0 /100 WBC
PLATELET # BLD AUTO: 297 K/UL (ref 164–446)
PLATELET BLD QL SMEAR: NORMAL
PMV BLD AUTO: 10.7 FL (ref 9–12.9)
POTASSIUM SERPL-SCNC: 3.4 MMOL/L (ref 3.6–5.5)
PROT SERPL-MCNC: 5.6 G/DL (ref 6–8.2)
RBC # BLD AUTO: 4.11 M/UL (ref 4.2–5.4)
RBC BLD AUTO: PRESENT
SODIUM SERPL-SCNC: 136 MMOL/L (ref 135–145)
WBC # BLD AUTO: 15.6 K/UL (ref 4.8–10.8)

## 2021-02-05 PROCEDURE — A9270 NON-COVERED ITEM OR SERVICE: HCPCS | Performed by: INTERNAL MEDICINE

## 2021-02-05 PROCEDURE — 700111 HCHG RX REV CODE 636 W/ 250 OVERRIDE (IP): Performed by: STUDENT IN AN ORGANIZED HEALTH CARE EDUCATION/TRAINING PROGRAM

## 2021-02-05 PROCEDURE — 85027 COMPLETE CBC AUTOMATED: CPT

## 2021-02-05 PROCEDURE — 49082 ABD PARACENTESIS: CPT

## 2021-02-05 PROCEDURE — 99239 HOSP IP/OBS DSCHRG MGMT >30: CPT | Performed by: INTERNAL MEDICINE

## 2021-02-05 PROCEDURE — 700102 HCHG RX REV CODE 250 W/ 637 OVERRIDE(OP): Performed by: INTERNAL MEDICINE

## 2021-02-05 PROCEDURE — 83735 ASSAY OF MAGNESIUM: CPT

## 2021-02-05 PROCEDURE — 80053 COMPREHEN METABOLIC PANEL: CPT

## 2021-02-05 PROCEDURE — RXMED WILLOW AMBULATORY MEDICATION CHARGE: Performed by: INTERNAL MEDICINE

## 2021-02-05 PROCEDURE — 36415 COLL VENOUS BLD VENIPUNCTURE: CPT

## 2021-02-05 PROCEDURE — 85007 BL SMEAR W/DIFF WBC COUNT: CPT

## 2021-02-05 RX ORDER — HYDROCORTISONE 20 MG/1
20 TABLET ORAL EVERY MORNING
Qty: 30 TAB | Refills: 0 | Status: SHIPPED | OUTPATIENT
Start: 2021-02-06 | End: 2022-03-13

## 2021-02-05 RX ORDER — LEVOTHYROXINE SODIUM 0.05 MG/1
50 TABLET ORAL
Qty: 30 TAB | Refills: 0 | Status: SHIPPED | OUTPATIENT
Start: 2021-02-06 | End: 2022-03-13

## 2021-02-05 RX ORDER — HYDROCORTISONE 10 MG/1
10 TABLET ORAL EVERY EVENING
Qty: 30 TAB | Refills: 0 | Status: SHIPPED | OUTPATIENT
Start: 2021-02-05 | End: 2022-03-13

## 2021-02-05 RX ORDER — POTASSIUM CHLORIDE 20 MEQ/1
40 TABLET, EXTENDED RELEASE ORAL ONCE
Status: COMPLETED | OUTPATIENT
Start: 2021-02-05 | End: 2021-02-05

## 2021-02-05 RX ORDER — AMOXICILLIN 250 MG
2 CAPSULE ORAL 2 TIMES DAILY
Qty: 30 TAB | Refills: 0 | Status: SHIPPED | OUTPATIENT
Start: 2021-02-05 | End: 2022-03-13

## 2021-02-05 RX ORDER — DESMOPRESSIN ACETATE 0.2 MG/1
0.2 TABLET ORAL EVERY EVENING
Qty: 30 TAB | Refills: 0 | Status: SHIPPED | OUTPATIENT
Start: 2021-02-05 | End: 2021-03-07

## 2021-02-05 RX ORDER — FAMOTIDINE 20 MG/1
20 TABLET, FILM COATED ORAL 2 TIMES DAILY
Qty: 60 TAB | Refills: 0 | Status: SHIPPED | OUTPATIENT
Start: 2021-02-05 | End: 2022-03-13

## 2021-02-05 RX ADMIN — LEVOTHYROXINE SODIUM 50 MCG: 0.05 TABLET ORAL at 05:25

## 2021-02-05 RX ADMIN — DOCUSATE SODIUM 50 MG AND SENNOSIDES 8.6 MG 2 TABLET: 8.6; 5 TABLET, FILM COATED ORAL at 05:25

## 2021-02-05 RX ADMIN — ENOXAPARIN SODIUM 40 MG: 40 INJECTION SUBCUTANEOUS at 05:25

## 2021-02-05 RX ADMIN — HYDROCORTISONE 20 MG: 20 TABLET ORAL at 05:25

## 2021-02-05 RX ADMIN — FAMOTIDINE 20 MG: 20 TABLET ORAL at 05:25

## 2021-02-05 RX ADMIN — POTASSIUM CHLORIDE 40 MEQ: 1500 TABLET, EXTENDED RELEASE ORAL at 08:18

## 2021-02-05 NOTE — PROGRESS NOTES
Patient discharged home escorted via wheelchair by staff. All belongings gathered and taken with patient upon discharge. Discharge paperwork reviewed and patient verbalized understanding. Signed copy placed in chart. IV removed.     Resources given to patient by case management.     Medications delivered meds to beds, pharmacy reviewed medication with patient over phone in Serbian.

## 2021-02-05 NOTE — CARE PLAN
Problem: Communication  Goal: The ability to communicate needs accurately and effectively will improve  Outcome: PROGRESSING AS EXPECTED  Note: Patient able to communicate needs, Danish speaking only-needs an . Call light in reach. Calls appropriately. All questions answered at this time.      Problem: Safety  Goal: Will remain free from falls  Outcome: PROGRESSING AS EXPECTED  Note: Verbalized understanding of fall precautions, will call if needs assistance.

## 2021-02-05 NOTE — DISCHARGE SUMMARY
Discharge Summary    CHIEF COMPLAINT ON ADMISSION  Chief Complaint   Patient presents with   • Abdominal Pain     since last night; middle-lower; burning/aching   • Blood in Urine     buring with urination since last night   • Body Aches     genearlized; mainly lower back       Reason for Admission  blood in urine; back pain     Admission Date  1/29/2021    CODE STATUS  Full Code    HPI & HOSPITAL COURSE  40 y.o. female with past medical history of craniopharyngioma, craniotomy, history of  diabetes insipidus and adrenal insufficiency, who presented 1/29/2021 with complaints of lower abdominal pain, bilateral flank pain, dysuria, blood in urine, fever, chills, body aches, malaise.  Onset of symptoms 1 day prior to admission. She met sepsis criteria with white blood cells 14.6, temperature 101.8.  She had mild hypotension 85/54 in the emergency department, which has been improving.  UA showed white blood cells 20-50, large leukocyte esterase.  CT renal colic negative for renal stones or hydronephrosis, there is normal appendix. The patient was also found to be COVID-19 positive on 1/29/2021.    The patient was admitted and was diagnosed with pyelonephritis for which she finished a course of IV antibiotics. Her pain subsided and felt better.    The patient was also found to have adrenal insufficiency, and there was a concern for panhypopituitarism. Dr Solorio ([facility]) was consulted over the phone for recommendations. The patient was started on IV hydroticortisone and transitioned to oral. The patient was also started on desmopressin, firstly at 400 mcg at night, but was switch to 200 mcg at night due worsening hyponatremia that eventually corrected. The patient was also started on levothyroxine.The patient also developed hypokalemia but was successfully replenished.     The patient complained of chest pain, however EKG did not show acute changes and troponins were negative.    The patient was found to  be hypotensive and bradycardic, however asymptomatic. EKG showed sinus bradycardia. Orthostatic vital signs were negative, and patient was able to walk without symptoms. Therefore, the patient was discharged today with close follow up with PCP next week, and she will need referral to Endocrinology.    The patient is being discharged with desmopressin 200 mcg at night, Hydrocortisone PO 20 mg in AM, and 10 mg in PM, as well as levothyroxine.    The patient currently has leukocytosis in the setting of steroid therapy. No other sign of infection at this time, and patient will be followed closely as outpatient and will need repeat CBC and assessment.    Even though the patient tested positive for COVID-19, the patient did not become symptomatic and remained on room air throughout her hospitalization. She was advised to come back to the hospital or contact her PCP in the event she develops symptoms.    The patient is currently not complaining of any symptom, and agrees to follow up with PCP as outpatient and is ready to go home.        Therefore, she is discharged in fair and stable condition to home with close outpatient follow-up.    The patient met 2-midnight criteria for an inpatient stay at the time of discharge.    Discharge Date  2/5/2021    FOLLOW UP ITEMS POST DISCHARGE  Follow up closely with PCP.    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 *      FOLLOW UP  [facility]  [address]  [place] [id number] [phone]  Call      EVELYN Ford  [address]  [address]  [place] [id number] [phone]    On 2/10/2021  10:30 AM Telephone appointment for follow up. PCP will call you. You will need Endocrinology referral from PCP.       MEDICATIONS ON DISCHARGE     Medication List      START taking these medications      Instructions   famotidine 20 MG Tabs  Commonly known as: PEPCID   [place] 1 tableta por vía oral 2 veces al día.  (Take 1 Tab by mouth 2 Times a Day.)  Dose: 20 mg     * hydrocortisone 10 MG Tabs  Commonly known as: CORTEF   Take 1 Tab by mouth every evening.  Dose: 10 mg     * hydrocortisone 20 MG Tabs  Start taking on: February 6, 2021  Commonly known as: CORTEF   [place] 1 tableta por vía oral cada mañana.  (Take 1 Tab by mouth every morning.)  Dose: 20 mg     levothyroxine 50 MCG Tabs  Start taking on: February 6, 2021  Commonly known as: SYNTHROID   Take 1 Tab by mouth Every morning on an empty stomach.  Dose: 50 mcg     senna-docusate 8.6-50 MG Tabs  Commonly known as: PERICOLACE or SENOKOT S   Doctor's comments: Hold for loose stool  Take 2 Tabs by mouth 2 Times a Day.  Dose: 2 Tab         * This list has 2 medication(s) that are the same as other medications prescribed for you. Read the directions carefully, and ask your doctor or other care provider to review them with you.            CHANGE how you take these medications      Instructions   desmopressin 0.2 MG tablet  What changed:   · medication strength  · when to take this  Commonly known as: DDAVP   Take 1 Tab by mouth every evening for 30 days.  Dose: 0.2 mg        STOP taking these medications    ibuprofen 200 MG Tabs  Commonly known as: MOTRIN     meclizine 12.5 MG Tabs  Commonly known as: ANTIVERT            Allergies  Allergies   Allergen Reactions   • Pork Allergy        DIET  Orders Placed This Encounter   Procedures   • Diet Order Diet:  Regular     Standing Status:   Standing     Number of Occurrences:   1     Order Specific Question:   Diet:     Answer:   Regular [1]       ACTIVITY  As tolerated.  Weight bearing as tolerated    CONSULTATIONS  None    PROCEDURES  None    LABORATORY  Lab Results   Component Value Date    SODIUM 136 02/05/2021    POTASSIUM 3.4 (L) 02/05/2021    CHLORIDE 104 02/05/2021    CO2 24 02/05/2021    GLUCOSE 85 02/05/2021    BUN 11 02/05/2021    CREATININE 0.90 02/05/2021        Lab Results   Component Value Date    WBC 15.6 (H) 02/05/2021    HEMOGLOBIN 12.8 02/05/2021    HEMATOCRIT 37.9 02/05/2021    PLATELETCT 297 02/05/2021        Total time of the discharge process exceeds 30 minutes.

## 2021-02-05 NOTE — DISCHARGE PLANNING
Anticipated Discharge Disposition:   Home    Action:   Discussed discharge planning needs. Per MD, plan for discharge today, no need for home health. PCP appointment arranged.     Received call from [facility], pt has no medication coverage due to emergency Medicaid only. Approved Services faxed to pharmacy for medications.     Addendum:  RN CM spoke to pt via phone with an , asked pt if she is interested in COVID housing. Pt said she does not want to go to [facility] and she just wants to go home.     Resources for contact number for [facility] and Gigi Tidwell given to bedside RN to give to pt. [facility] information for free transportation also provided. Per bedside RN, pt's ride will be here at 1030.    RN CM called [facility] to follow up. Informed that they are working on pt's medications. Informed of pt  time.     Barriers to Discharge:   [facility] delivery    Plan:   Hospital Care Management will continue to follow and assist with discharge planning needs.

## 2021-02-05 NOTE — DISCHARGE PLANNING
[facility]: Discharge prescription orders listed below delivered to patient's bedside AGNES Howard. Patient counseled via phone to room in Bengali with assistance of pharmacy technician Mateus.     Partial fill of 10 tablets of desmopressin dispensed. Patient agreeable to have completion mailed to address on file. Pharmacy notified.       Morin Aviles [place]   Home Medication Instructions GERALDINE:[id number]    Printed on:02/05/21 1411   Medication Information                      desmopressin (DDAVP) 0.2 MG tablet  Take 1 Tab by mouth every evening for 30 days.             famotidine (PEPCID) 20 MG Tab  Take 1 Tab by mouth 2 Times a Day.             hydrocortisone (CORTEF) 10 MG Tab  Take 1 Tab by mouth every evening.             hydrocortisone (CORTEF) 20 MG Tab  Take 1 Tab by mouth every morning.             levothyroxine (SYNTHROID) 50 MCG Tab  Take 1 Tab by mouth Every morning on an empty stomach.             senna-docusate (PERICOLACE OR SENOKOT S) 8.6-50 MG Tab  Take 2 Tabs by mouth 2 Times a Day.               Raquel Shultz, PharmD

## 2021-02-05 NOTE — CARE PLAN
Patients vitals stable over night. Heart rate remains bradycardic in the 40-50's.  BP systolic in 90's still; patient non symptomatic. Patient on room air. Ambulates in room independently. Independent in ADL's.

## 2021-02-05 NOTE — DISCHARGE INSTRUCTIONS
Porfavor acuda a maya ana maria con maya Dr hanley el 2 de febrero del 2021  [place] las medicinas kira se las sprague recetado  Si paola sintomas regrasan porfavor acuda a maya medico o a la jewel de emergencias        - Discharge Instructions to Pt:  · Follow up with your Primary Care Physician on 2/10/21  · Be compliant with your follow up appointments.  · Please be compliant with your medications, including new ones.  · A new medication has been given please take as advised.  · Keep blood pressure controlled and be compliant to keep systolic blood pressure <140.  · Please adhere to a healthy diet, that consist of low fat, low salt, low calorie.  · Weight loss and physical activity as tolerated is encouraged.   · Ambulate with assistance.  ·  If there any signs or symptoms of worsening or symptoms recurring, please call your Primary Care Physician or return to Emergency Department for further assessment.  · Avoid sudden changes in position, like standing up quickly.  · Do not drive until cleared by PCP.  · Refrain from drinking alcohol and smoking.     Recommendation to PCP:  · Would recommend a CBC, BMP in 3-5 days.  · Your pt will need close monitoring.  -The patient would benefit from a referral to an Endocrinologist  · If  failure to respond to therapy, we suggest having patient return for further care, or if cannot be treated as an outpatient, return to ED if worsening of symptoms.  · Please make certain your pt follows up with specialist appointments  · Ensure patient adheres to diet and lifestyle modifications and reconcile.  · Please note the change to medication and reconcile.  · Patient without progression of symptoms. Prognosis and risk factors discussed in detail with patient and she understands.    Discharge Instructions    Discharged to home by car with relative. Discharged via wheelchair, hospital escort: Yes.  Special equipment needed: Not Applicable    Be sure to schedule a follow-up appointment with your primary  care doctor or any specialists as instructed.     Discharge Plan:   Influenza Vaccine Indication: Not indicated: Previously immunized this influenza season and > 8 years of age(September 2020)    I understand that a diet low in cholesterol, fat, and sodium is recommended for good health. Unless I have been given specific instructions below for another diet, I accept this instruction as my diet prescription.   Other diet: regular    Special Instructions:   Diagnóstico de sepsis en adultos  Sepsis, Diagnosis, Adult  La sepsis es kane reacción grave del cuerpo ante kane infección. La infección que da lugar a la sepsis podría ser causada por bacterias, virus u hongos. La sepsis puede ser consecuencia de kane infección en cualquier lugar del cuerpo. Las infecciones que frecuentemente provocan sepsis incluyen las infecciones de la piel, los pulmones y las vías urinarias.  La sepsis es kane emergencia médica que debe tratarse de inmediato en un hospital. En casos graves, puede causar un choque séptico. El choque séptico puede debilitarle el corazón y hacer que maya presión arterial disminuya. En consecuencia, el sistema nervioso central y los órganos del cuerpo pueden dejar de funcionar.  ¿Cuáles son las causas?  Esta afección es causada por kane reacción grave a infecciones por bacterias, virus u hongos. Algunos de los microbios que más a menudo provocan sepsis son los siguientes:  · Bacteria Escherichia coli (E. coli).  · Bacteria Staphylococcus aureus (estafilococo).  · Algunos tipos de bacterias estreptococos.  Las infecciones más comunes afectan estos órganos:  · Los pulmones (neumonía).  · Los riñones o la vejiga (infección de las vías urinarias).  · La piel (celulitis).  · El intestino, la vesícula biliar o el páncreas.  ¿Qué incrementa el riesgo?  Es más probable que usted sufra esta afección si:  · Tiene debilitado el sistema del organismo que combate las enfermedades (sistema inmunitario).  · Tiene 65 años o más.  · Es  hombre.  · Le sprague realizado kane cirugía o ha estado hospitalizado.  · Tiene estos dispositivos en el cuerpo:  ? Un tubo welch y pequeño (catéter).  ? Vía intravenosa (IV).  ? Tubo de respiración.  ? Tubo de drenaje.  · No obtiene suficientes nutrientes de los alimentos (está desnutrido).  · Tiene kane enfermedad a hammad plazo (crónica), kira cáncer, kane enfermedad pulmonar, kane enfermedad renal o diabetes.  · Es afroamericano.  ¿Cuáles son los signos o síntomas?  Los síntomas de esta afección pueden incluir los siguientes:  · Fiebre.  · Escalofríos o mucho frío.  · Confusión o ansiedad.  · Fatiga.  · Freya musculares.  · Falta de aire.  · Náuseas y vómitos.  · Orinar mucho menos que lo habitual.  · Frecuencia cardíaca acelerada (taquicardia).  · Respiración rápida (hiperventilación).  · Cambios en el color de la piel. La piel podría verse manchada, pálida o azulada.  · Piel fría, pegajosa o sudorosa.  · Erupción cutánea.  Otros síntomas dependen del origen de la infección.  ¿Cómo se diagnostica?  Esta afección se diagnostica en función de lo siguiente:  · Charline síntomas.  · Charline antecedentes médicos.  · Un examen físico.  También se podrían realizar otras pruebas para determinar la causa de la infección y la gravedad de la sepsis. Estas pruebas pueden incluir lo siguiente:  · Análisis de rhina.  · Análisis de orina.  · Hisopados de otras zonas del cuerpo que podrían tener kane infección. Estas muestras podrían evaluarse (cultivarse) para determinar el tipo de bacteria que causa la infección.  · Radiografías de tórax para detectar si padece neumonía. También podrían realizarse otros estudios de diagnóstico por imágenes, kira kane exploración por tomografía computarizada (TC).  · Punción lumbar. En esta, se extrae kane pequeña cantidad del líquido que está alrededor de maya cerebro y médula sanders. Luego, se examina el líquido para determinar si hay infección.  ¿Cómo se trata?  Chiqui trastorno debe tratarse en un  hospital. Según la causa de la infección, pueden administrarle un antibiótico o medicamentos antivirales o antimicóticos.  También puede recibir lo siguiente:  · Líquidos a través de kane vía intravenosa (IV).  · Oxígeno y asistencia respiratoria.  · Medicamentos para aumentar la presión arterial.  · Diálisis renal. Chiqui proceso le purifica la rhina si ha tenido un fallo renal.  · Kane cirugía para remover los tejidos infectados.  · Kane transfusión de rhina, de ser necesario.  · Un medicamento para prevenir la formación de coágulos de rhina.  · Nutrientes para corregir desequilibrios de la función corporal básica (metabolismo). Es posible que:  ? Le administren sales y minerales importantes (electrolitos) a través de kane vía intravenosa.  ? Le ajusten el nivel de azúcar en la rhina.  Siga estas indicaciones en maya casa:  Medicamentos    · [place] los medicamentos de venta melinda y los recetados solamente kira se lo haya indicado el médico.  · Si le recetaron un antibiótico o un medicamento antiviral o antimicótico, tómelo kira se lo haya indicado el médico. No deje de franklin los medicamentos aunque comience a sentirse mejor.  Indicaciones generales  · Si tiene un catéter u otro dispositivo permanente, pida que se lo retiren lo antes posible.  · Concurra a todas las visitas de seguimiento kira se lo haya indicado el médico. [place] es importante.  Comuníquese con un médico si:  · No siente que está mejorando o recuperando la fuerza.  · Tiene dificultad para enfrentar la recuperación.  · Con frecuencia, se siente cansado.  · Se siente peor o no parece mejorar tras la cirugía.  · Piensa que podría tener kane infección tras kane cirugía.  Solicite ayuda inmediatamente si:  · Tiene alguno de los síntomas de la sepsis.  · Tiene dificultad para respirar.  · Tiene latidos cardíacos rápidos o irregulares.  · Está confundido o desorientado.  · Tiene fiebre jesus.  · Tiene la piel manchada, pálida o azulada.  · Tiene kane infección que  empeora o que no mejora.  Estos síntomas pueden representar un problema grave que constituye kane emergencia. No espere a jayashree si los síntomas desaparecen. Solicite atención médica de inmediato. Comuníquese con el servicio de emergencias de maya localidad (911 en los [place]). No conduzca por paola propios medios hasta el hospital.  Resumen  · La sepsis es un homa de emergencia médica que requiere tratamiento inmediato en un hospital.  · Esta afección es causada por kane reacción grave a infecciones por bacterias, virus u hongos.  · Según la causa de la infección, pueden administrarle un antibiótico o medicamentos antivirales o antimicóticos.  · El tratamiento también podría incluir líquidos intravenosos (i.v.), asistencia respiratoria y diálisis renal.  Esta información no tiene kira fin reemplazar el consejo del médico. Asegúrese de hacerle al médico cualquier pregunta que tenga.  Document Released: 08/29/2012 Document Revised: 08/22/2019 Document Reviewed: 08/22/2019  [email or web address] Patient Education © 2020 [email or web address]      · Is patient discharged on Warfarin / Coumadin?   No     Depression / Suicide Risk    As you are discharged from this [facility] facility, it is important to learn how to keep safe from harming yourself.    Recognize the warning signs:  · Abrupt changes in personality, positive or negative- including increase in energy   · Giving away possessions  · Change in eating patterns- significant weight changes-  positive or negative  · Change in sleeping patterns- unable to sleep or sleeping all the time   · Unwillingness or inability to communicate  · Depression  · Unusual sadness, discouragement and loneliness  · Talk of wanting to die  · Neglect of personal appearance   · Rebelliousness- reckless behavior  · Withdrawal from people/activities they love  · Confusion- inability to concentrate     If you or a loved one observes any of these behaviors or has concerns about self-harm, here's what you can  do:  · Talk about it- your feelings and reasons for harming yourself  · Remove any means that you might use to hurt yourself (examples: pills, rope, extension cords, firearm)  · Get professional help from the community (Mental Health, Substance Abuse, psychological counseling)  · Do not be alone:Call your Safe Contact- someone whom you trust who will be there for you.  · Call your local CRISIS HOTLINE 017-9272 or 141-411-9651  · Call your local Children's Mobile Crisis Response Team [place] (853) 956-8114 or wwwKonnektid  · Call the toll free National Suicide Prevention Hotlines   · National Suicide Prevention Lifeline 902-878-CLIY (9339)  · "[employer]" Network 800-SUICIDE (942-0579)      Retención urinaria aguda en las mujeres  Acute Urinary Retention, Female    La retención urinaria aguda sucede cuando no puede hacer pis (orinar), o cuando orina muy poco y maya vejiga no está del todo vacía. Si no se trata, puede desencadenar en daño renal u otros problemas graves.  Siga estas indicaciones en maya casa:  · [place] los medicamentos de venta melinda y los recetados solamente kira se lo haya indicado el médico. Pregúntele a maya médico qué medicamentos no debe ingerir. No use ningún medicamento excepto que el médico lo apruebe.  · Si volvió a maya hogar con un tubo que drena la orina de la vejiga (catéter), cuídelo ric kira se lo haya indicado maya médico.  · Kim suficiente líquido para mantener el pis jeimy o de color amarillo pálido.  · Si le recetaron un antibiótico, tómelo o aplíquelo kira se lo haya indicado el médico. No deje de franklin los antibióticos aunque comience a sentirse mejor.  · No consuma ningún producto que contenga nicotina o tabaco, kira cigarrillos y cigarrillos electrónicos. Si necesita ayuda para dejar de fumar, consulte al médico.  · Controle los cambios en paola síntomas. Dígale a maya médico sobre ellos.  · Sathish un seguimiento de los cambios en la presión arterial en maya hogar, si así se le  indicó. Dígale a maya médico sobre ellos.  · Concurra a todas las visitas de control kira se lo haya indicado el médico. [place] es importante.  Comuníquese con un médico si:  · Tiene espasmos o pierde orina cuando tiene espasmos.  Solicite ayuda de inmediato si:  · Tiene escalofríos o fiebre.  · Observa rhina en la orina.  · Tiene un tubo que drena la vejiga, y:  ? El tubo no drena la orina.  ? El tubo se sale.  Resumen  · La retención urinaria aguda sucede cuando no puede orinar, o cuando orina muy poco y maya vejiga no está del todo vacía. Si no se trata, puede resultar en daño renal u otros problemas graves.  · Si volvió a maya hogar con un tubo que drena la orina de la vejiga, cuídelo kira se lo haya indicado maya médico.  · Esté atento a cualquier cambio en los síntomas. Dígale a maya médico sobre ellos.  Esta información no tiene kira fin reemplazar el consejo del médico. Asegúrese de hacerle al médico cualquier pregunta que tenga.  Document Released: 08/20/2014 Document Revised: 10/24/2018 Document Reviewed: 06/12/2018  Elsevier Patient Education © 2020 Elsevier Inc.      Pielonefritis en los adultos  Pyelonephritis, Adult    La pielonefritis es kane infección que se produce en el riñón. Los riñones son los órganos que ayudan a limpiar la rhina al eliminar los residuos a través de la orina. Esta infección puede curarse rápidamente o durar mucho tiempo. En la mayoría de los casos, desaparece con el tratamiento y no causa otros problemas.  ¿Cuáles son las causas?  Esta afección puede ser causada por lo siguiente:  · Gérmenes (bacterias) que se trasladan de la vejiga al riñón. [place] puede suceder después de tener kane infección en la vejiga.  · Gérmenes que se trasladan de la rhina al riñón.  ¿Qué incrementa el riesgo?  Es más probable que esta afección se manifieste en:  · Mujeres embarazadas.  · Personas de edad avanzada.  · Personas que tienen alguna de estas afecciones:  ? Diabetes.  ? Inflamación de la próstata  (prostatitis) en los hombres.  ? Cálculos renales o en la vejiga.  ? Otros problemas en el riñón o en las partes del cuerpo que transportan la orina desde los riñones hasta la vejiga (uréteres).  ? Cáncer.  · Las personas que tienen un tubo welch y pequeño (catéter) colocado en la vejiga.  · Las personas que son sexualmente activas.  · Las mujeres que usan un medicamento que destruye los espermatozoides (espermicida) para evitar el embarazo.  · Las personas que sprague tenido kane infección urinaria (IU) previa.  ¿Cuáles son los signos o los síntomas?  Los síntomas de esta afección incluyen:  · Hacer pis con frecuencia.  · Necesidad intensa de orinar de inmediato.  · Sensación de ardor o escozor al orinar.  · Dolor abdominal.  · Dolor de espalda.  · Dolor al costado del cuerpo (fosa lumbar).  · Fiebre o escalofríos.  · Silver en la orina u orina oscura.  · Malestar estomacal (náuseas) o ganas de devolver (vómitos).  ¿Cómo se trata?  El tratamiento para esta afección puede incluir lo siguiente:  · [place] antibióticos por boca (vía oral).  · Beber la cantidad suficiente de líquido.  Si la infección es grave, es posible que deba permanecer en el hospital. Pueden darle antibióticos y líquidos que se colocan directamente en kane vena a través de un tubo (catéter) intravenoso.  En algunos casos, pueden necesitarse otros tratamientos.  Siga estas instrucciones en maya casa:  Medicamentos  · [place] los antibióticos kira se lo haya indicado el médico. No deje de [place] el antibiótico aunque comience a sentirse mejor.  · [place] los medicamentos de venta melinda y los recetados solamente kira se lo haya indicado el médico.  Instrucciones generales    · Kim suficiente líquido kira para mantener la orina de color amarillo pálido.  · Evite la cafeína, el té y las bebidas gaseosas.  · Orine con frecuencia. Evite retener la orina komal largos períodos.  · Orine antes y después de las relaciones sexuales.  · Después de las deposiciones, las  mujeres deben higienizarse desde adelante hacia atrás. Use sólo un papel tissue por vez.  · Concurra a todas las visitas de seguimiento kira se lo haya indicado el médico. [place] es importante.  Comuníquese con un médico si:  · No mejora luego de 2 marzena.  · Charline síntomas empeoran.  · Tiene fiebre.  Solicite ayuda inmediatamente si:  · No puede franklin los medicamentos o beber líquidos según las indicaciones.  · Siente escalofríos o comienza a tiritar.  · Vomita.  · Tiene un dolor muy intenso en el costado o en la espalda.  · Se siente muy débil o se desvanece (se desmaya).  Resumen  · La pielonefritis es kane infección que se produce en el riñón.  · En la mayoría de los casos, esta infección desaparece con el tratamiento y no causa otros problemas.  · [place] los antibióticos kira se lo haya indicado el médico. No deje de franklin el antibiótico aunque comience a sentirse mejor.  · Kim suficiente líquido kira para mantener la orina de color amarillo pálido.  Esta información no tiene kira fin reemplazar el consejo del médico. Asegúrese de hacerle al médico cualquier pregunta que tenga.  Document Released: 12/18/2006 Document Revised: 11/29/2019 Document Reviewed: 11/29/2019  Elsevier Patient Education © 2020 Elsevier Inc.

## 2021-02-09 ENCOUNTER — PATIENT OUTREACH (OUTPATIENT)
Dept: HEALTH INFORMATION MANAGEMENT | Facility: OTHER | Age: 41
End: 2021-02-09

## 2021-02-09 NOTE — PROGRESS NOTES
02/09/21  CHW George called patient via mobile phone and no answer and patient has restrictions on phone and unable to leave message.     03/03/21:    CHW George called patient via mobile phone and no answer and patient has restrictions on phone and unable to leave message.   CHW George will mail CCM pamphlet and provide all contact information.     03/04/21:  CHW George will discharge patient from CCM services due to lack of contact.  CHW George will remove patient from list and master list

## 2021-08-18 NOTE — DIETARY
"Nutrition services: Day 8 of admit.  Johana Weston is a 38 y.o. female with admitting DX of suprasellar mass    RD met with pt d/t variable/poor intake per meals charted per ADLs. This interview was completed in Polish and English. Pt states has some appetite and is a little hunger but has a lot of stomach pain. Pt wincing and hunching during interview, showing physical signs of pain. Says hasn't been able to eat for awhile. Pt denies any recent changes in weight. Pt family member says nausea has been going on for 3 months on and off, though difficult to assess PO intake trend. Says pt told by a provider that she will transfer to another hospital soon. Pt says does like fresh fruit and vegetables, requesting cucumber and tomatoes. Pt family member occasionally brings in soup.     Assessment:  Height: 149.9 cm (4' 11\")  Weight: 83.6 kg (184 lb 4.9 oz)-via Bed Scale  Body mass index is 37.22 kg/m²., BMI classification: using Stand-up scale weight of 81.8kg, BMI is 36.4, indicating Obesity Class II  Diet/Intake: Regular    Evaluation:   1. Hx of SIRS, hyperglycemia, sepsis  2. Pt endorses nausea/abdominal pain. Family member unable to provide intake history but says nausea has been going on sporadically for 3 months. Per ADLs for meals charted since 5/18, pt averaging 48% PO intake. Pt does request fresh vegetables and fruit as snack. Will add. Family member also brings in soup, though denies need for soup from hospital kitchen.   3. Pt denies recent changes in weight, no weight loss indicated per nutrition admit screen. Pt appears adequately nourished at this time.   4. Noted per RN, pt pending transfer to [place]   5. Labs from 5/20: reviewed  6. Meds: senokot, piperacillin, morphine and zofran PRN, though noted per RN pt is refusing medication.     Malnutrition Risk: Pt does not meet criteria at this time.     Recommendations/Plan:  1. Add snacks per pt request  2. Encourage intake of " Received fax from [facility] to be signed and faxed to [phone]    Phone # [phone]   meals/snacks  3. Document intake of all snacks  as % taken in ADL's to provide interdisciplinary communication across all shifts.   4. Monitor weight.  5. Nutrition rep will continue to see patient for ongoing meal and snack preferences.     RD following

## 2022-03-12 ENCOUNTER — APPOINTMENT (OUTPATIENT)
Dept: RADIOLOGY | Facility: MEDICAL CENTER | Age: 42
DRG: 871 | End: 2022-03-12
Attending: EMERGENCY MEDICINE
Payer: MEDICAID

## 2022-03-12 ENCOUNTER — HOSPITAL ENCOUNTER (INPATIENT)
Facility: MEDICAL CENTER | Age: 42
LOS: 2 days | DRG: 871 | End: 2022-03-15
Attending: EMERGENCY MEDICINE | Admitting: INTERNAL MEDICINE
Payer: MEDICAID

## 2022-03-12 DIAGNOSIS — R50.9 FEVER OF UNKNOWN ORIGIN: ICD-10-CM

## 2022-03-12 DIAGNOSIS — G06.1 ABSCESS IN EPIDURAL SPACE OF THORACIC SPINE: ICD-10-CM

## 2022-03-12 DIAGNOSIS — R65.10 SIRS (SYSTEMIC INFLAMMATORY RESPONSE SYNDROME) (HCC): ICD-10-CM

## 2022-03-12 DIAGNOSIS — D72.828 OTHER ELEVATED WHITE BLOOD CELL (WBC) COUNT: Primary | ICD-10-CM

## 2022-03-12 DIAGNOSIS — M54.50 LUMBAR PAIN: ICD-10-CM

## 2022-03-12 DIAGNOSIS — R50.81 FEVER IN OTHER DISEASES: ICD-10-CM

## 2022-03-12 LAB
ALBUMIN SERPL BCP-MCNC: 4.7 G/DL (ref 3.2–4.9)
ALBUMIN/GLOB SERPL: 1.7 G/DL
ALP SERPL-CCNC: 117 U/L (ref 30–99)
ALT SERPL-CCNC: 16 U/L (ref 2–50)
ANION GAP SERPL CALC-SCNC: 12 MMOL/L (ref 7–16)
APPEARANCE UR: CLEAR
AST SERPL-CCNC: 18 U/L (ref 12–45)
BACTERIA #/AREA URNS HPF: NEGATIVE /HPF
BASOPHILS # BLD AUTO: 0.2 % (ref 0–1.8)
BASOPHILS # BLD: 0.02 K/UL (ref 0–0.12)
BILIRUB SERPL-MCNC: 0.3 MG/DL (ref 0.1–1.5)
BILIRUB UR QL STRIP.AUTO: NEGATIVE
BUN SERPL-MCNC: 13 MG/DL (ref 8–22)
CALCIUM SERPL-MCNC: 10 MG/DL (ref 8.5–10.5)
CHLORIDE SERPL-SCNC: 104 MMOL/L (ref 96–112)
CO2 SERPL-SCNC: 23 MMOL/L (ref 20–33)
COLOR UR: YELLOW
CREAT SERPL-MCNC: 0.83 MG/DL (ref 0.5–1.4)
EOSINOPHIL # BLD AUTO: 0.09 K/UL (ref 0–0.51)
EOSINOPHIL NFR BLD: 0.7 % (ref 0–6.9)
EPI CELLS #/AREA URNS HPF: NEGATIVE /HPF
ERYTHROCYTE [DISTWIDTH] IN BLOOD BY AUTOMATED COUNT: 44.3 FL (ref 35.9–50)
GLOBULIN SER CALC-MCNC: 2.7 G/DL (ref 1.9–3.5)
GLUCOSE SERPL-MCNC: 99 MG/DL (ref 65–99)
GLUCOSE UR STRIP.AUTO-MCNC: NEGATIVE MG/DL
HCG SERPL QL: NEGATIVE
HCT VFR BLD AUTO: 40 % (ref 37–47)
HGB BLD-MCNC: 13.6 G/DL (ref 12–16)
HYALINE CASTS #/AREA URNS LPF: ABNORMAL /LPF
IMM GRANULOCYTES # BLD AUTO: 0.06 K/UL (ref 0–0.11)
IMM GRANULOCYTES NFR BLD AUTO: 0.5 % (ref 0–0.9)
KETONES UR STRIP.AUTO-MCNC: NEGATIVE MG/DL
LACTATE BLD-SCNC: 1.8 MMOL/L (ref 0.5–2)
LEUKOCYTE ESTERASE UR QL STRIP.AUTO: NEGATIVE
LYMPHOCYTES # BLD AUTO: 1.78 K/UL (ref 1–4.8)
LYMPHOCYTES NFR BLD: 13.6 % (ref 22–41)
MCH RBC QN AUTO: 30.6 PG (ref 27–33)
MCHC RBC AUTO-ENTMCNC: 34 G/DL (ref 33.6–35)
MCV RBC AUTO: 90.1 FL (ref 81.4–97.8)
MICRO URNS: ABNORMAL
MONOCYTES # BLD AUTO: 0.51 K/UL (ref 0–0.85)
MONOCYTES NFR BLD AUTO: 3.9 % (ref 0–13.4)
NEUTROPHILS # BLD AUTO: 10.67 K/UL (ref 2–7.15)
NEUTROPHILS NFR BLD: 81.1 % (ref 44–72)
NITRITE UR QL STRIP.AUTO: NEGATIVE
NRBC # BLD AUTO: 0 K/UL
NRBC BLD-RTO: 0 /100 WBC
PH UR STRIP.AUTO: 7.5 [PH] (ref 5–8)
PLATELET # BLD AUTO: 250 K/UL (ref 164–446)
PMV BLD AUTO: 9.7 FL (ref 9–12.9)
POTASSIUM SERPL-SCNC: 3.8 MMOL/L (ref 3.6–5.5)
PROT SERPL-MCNC: 7.4 G/DL (ref 6–8.2)
PROT UR QL STRIP: NEGATIVE MG/DL
RBC # BLD AUTO: 4.44 M/UL (ref 4.2–5.4)
RBC # URNS HPF: ABNORMAL /HPF
RBC UR QL AUTO: ABNORMAL
SODIUM SERPL-SCNC: 139 MMOL/L (ref 135–145)
SP GR UR STRIP.AUTO: 1
UROBILINOGEN UR STRIP.AUTO-MCNC: 0.2 MG/DL
WBC # BLD AUTO: 13.1 K/UL (ref 4.8–10.8)
WBC #/AREA URNS HPF: ABNORMAL /HPF

## 2022-03-12 PROCEDURE — 99285 EMERGENCY DEPT VISIT HI MDM: CPT

## 2022-03-12 PROCEDURE — 83735 ASSAY OF MAGNESIUM: CPT

## 2022-03-12 PROCEDURE — 96374 THER/PROPH/DIAG INJ IV PUSH: CPT

## 2022-03-12 PROCEDURE — 87040 BLOOD CULTURE FOR BACTERIA: CPT | Mod: 91

## 2022-03-12 PROCEDURE — A9270 NON-COVERED ITEM OR SERVICE: HCPCS

## 2022-03-12 PROCEDURE — 71045 X-RAY EXAM CHEST 1 VIEW: CPT

## 2022-03-12 PROCEDURE — 82533 TOTAL CORTISOL: CPT

## 2022-03-12 PROCEDURE — 307738 PARACENTESIS ABDOMINAL KIT: Performed by: EMERGENCY MEDICINE

## 2022-03-12 PROCEDURE — 83605 ASSAY OF LACTIC ACID: CPT

## 2022-03-12 PROCEDURE — 700111 HCHG RX REV CODE 636 W/ 250 OVERRIDE (IP): Performed by: EMERGENCY MEDICINE

## 2022-03-12 PROCEDURE — 74177 CT ABD & PELVIS W/CONTRAST: CPT

## 2022-03-12 PROCEDURE — 36415 COLL VENOUS BLD VENIPUNCTURE: CPT

## 2022-03-12 PROCEDURE — 99223 1ST HOSP IP/OBS HIGH 75: CPT | Performed by: INTERNAL MEDICINE

## 2022-03-12 PROCEDURE — 81001 URINALYSIS AUTO W/SCOPE: CPT

## 2022-03-12 PROCEDURE — 80053 COMPREHEN METABOLIC PANEL: CPT

## 2022-03-12 PROCEDURE — 700102 HCHG RX REV CODE 250 W/ 637 OVERRIDE(OP)

## 2022-03-12 PROCEDURE — 84703 CHORIONIC GONADOTROPIN ASSAY: CPT

## 2022-03-12 PROCEDURE — 70450 CT HEAD/BRAIN W/O DYE: CPT

## 2022-03-12 PROCEDURE — 700105 HCHG RX REV CODE 258: Performed by: EMERGENCY MEDICINE

## 2022-03-12 PROCEDURE — 87086 URINE CULTURE/COLONY COUNT: CPT

## 2022-03-12 PROCEDURE — 85025 COMPLETE CBC W/AUTO DIFF WBC: CPT

## 2022-03-12 PROCEDURE — 96375 TX/PRO/DX INJ NEW DRUG ADDON: CPT

## 2022-03-12 RX ORDER — CEFTRIAXONE 2 G/1
2 INJECTION, POWDER, FOR SOLUTION INTRAMUSCULAR; INTRAVENOUS ONCE
Status: COMPLETED | OUTPATIENT
Start: 2022-03-12 | End: 2022-03-12

## 2022-03-12 RX ORDER — ACETAMINOPHEN 325 MG/1
650 TABLET ORAL ONCE
Status: COMPLETED | OUTPATIENT
Start: 2022-03-12 | End: 2022-03-12

## 2022-03-12 RX ORDER — SODIUM CHLORIDE 9 MG/ML
1000 INJECTION, SOLUTION INTRAVENOUS ONCE
Status: COMPLETED | OUTPATIENT
Start: 2022-03-12 | End: 2022-03-12

## 2022-03-12 RX ORDER — SODIUM CHLORIDE 9 MG/ML
1000 INJECTION, SOLUTION INTRAVENOUS ONCE
Status: COMPLETED | OUTPATIENT
Start: 2022-03-12 | End: 2022-03-13

## 2022-03-12 RX ORDER — KETOROLAC TROMETHAMINE 30 MG/ML
15 INJECTION, SOLUTION INTRAMUSCULAR; INTRAVENOUS ONCE
Status: COMPLETED | OUTPATIENT
Start: 2022-03-12 | End: 2022-03-12

## 2022-03-12 RX ADMIN — SODIUM CHLORIDE 1000 ML: 9 INJECTION, SOLUTION INTRAVENOUS at 21:02

## 2022-03-12 RX ADMIN — ACETAMINOPHEN 650 MG: 325 TABLET, FILM COATED ORAL at 22:50

## 2022-03-12 RX ADMIN — KETOROLAC TROMETHAMINE 15 MG: 30 INJECTION, SOLUTION INTRAMUSCULAR; INTRAVENOUS at 21:01

## 2022-03-12 RX ADMIN — CEFTRIAXONE SODIUM 2 G: 2 INJECTION, POWDER, FOR SOLUTION INTRAMUSCULAR; INTRAVENOUS at 22:22

## 2022-03-12 RX ADMIN — VANCOMYCIN HYDROCHLORIDE 1500 MG: 500 INJECTION, POWDER, LYOPHILIZED, FOR SOLUTION INTRAVENOUS at 22:50

## 2022-03-12 RX ADMIN — ACETAMINOPHEN 650 MG: 325 TABLET, FILM COATED ORAL at 20:33

## 2022-03-12 RX ADMIN — SODIUM CHLORIDE 1000 ML: 9 INJECTION, SOLUTION INTRAVENOUS at 22:15

## 2022-03-12 ASSESSMENT — FIBROSIS 4 INDEX: FIB4 SCORE: 0.35

## 2022-03-13 ENCOUNTER — APPOINTMENT (OUTPATIENT)
Dept: RADIOLOGY | Facility: MEDICAL CENTER | Age: 42
DRG: 871 | End: 2022-03-13
Attending: NURSE PRACTITIONER
Payer: MEDICAID

## 2022-03-13 PROBLEM — Z86.16 HISTORY OF COVID-19: Status: ACTIVE | Noted: 2022-03-13

## 2022-03-13 PROBLEM — R50.9 FEVER OF UNKNOWN ORIGIN: Status: ACTIVE | Noted: 2022-03-13

## 2022-03-13 PROBLEM — A41.9 SEPSIS (HCC): Status: ACTIVE | Noted: 2022-03-13

## 2022-03-13 LAB
BURR CELLS/RBC NFR CSF MANUAL: 0 %
C GATTII+NEOFOR DNA CSF QL NAA+NON-PROBE: NOT DETECTED
CLARITY CSF: CLEAR
CMV DNA CSF QL NAA+NON-PROBE: NOT DETECTED
COLOR CSF: COLORLESS
COLOR SPUN CSF: COLORLESS
CORTIS SERPL-MCNC: 2.4 UG/DL (ref 0–23)
CRP SERPL HS-MCNC: 12.1 MG/DL (ref 0–0.75)
E COLI K1 DNA CSF QL NAA+NON-PROBE: NOT DETECTED
ERYTHROCYTE [SEDIMENTATION RATE] IN BLOOD BY WESTERGREN METHOD: 13 MM/HOUR (ref 0–25)
EV RNA CSF QL NAA+NON-PROBE: NOT DETECTED
GLUCOSE CSF-MCNC: 65 MG/DL (ref 40–80)
GP B STREP DNA CSF QL NAA+NON-PROBE: NOT DETECTED
GRAM STN SPEC: NORMAL
HAEM INFLU DNA CSF QL NAA+NON-PROBE: NOT DETECTED
HHV6 DNA CSF QL NAA+NON-PROBE: NOT DETECTED
HSV1 DNA CSF QL NAA+NON-PROBE: NOT DETECTED
HSV2 DNA CSF QL NAA+NON-PROBE: NOT DETECTED
INR PPP: 1.33 (ref 0.87–1.13)
L MONOCYTOG DNA CSF QL NAA+NON-PROBE: NOT DETECTED
LACTATE BLD-SCNC: 1 MMOL/L (ref 0.5–2)
LACTATE BLD-SCNC: 2.5 MMOL/L (ref 0.5–2)
LACTATE BLD-SCNC: 3.7 MMOL/L (ref 0.5–2)
LYMPHOCYTES NFR CSF: 82 %
MAGNESIUM SERPL-MCNC: 1.9 MG/DL (ref 1.5–2.5)
MONONUC CELLS NFR CSF: 18 %
N MEN DNA CSF QL NAA+NON-PROBE: NOT DETECTED
PARECHOVIRUS A RNA CSF QL NAA+NON-PROBE: NOT DETECTED
PROCALCITONIN SERPL-MCNC: 0.31 NG/ML
PROT CSF-MCNC: 19 MG/DL (ref 15–45)
PROTHROMBIN TIME: 16.1 SEC (ref 12–14.6)
RBC # CSF: 1 CELLS/UL
RHEUMATOID FACT SER IA-ACNC: 13 IU/ML (ref 0–14)
S PNEUM DNA CSF QL NAA+NON-PROBE: NOT DETECTED
SIGNIFICANT IND 70042: NORMAL
SITE SITE: NORMAL
SOURCE SOURCE: NORMAL
SPECIMEN VOL CSF: 11 ML
T4 FREE SERPL-MCNC: 1.91 NG/DL (ref 0.93–1.7)
TSH SERPL DL<=0.005 MIU/L-ACNC: <0.005 UIU/ML (ref 0.38–5.33)
TUBE # CSF: 3
TUBE # CSF: 4
VZV DNA CSF QL NAA+NON-PROBE: NOT DETECTED
WBC # CSF: 3 CELLS/UL (ref 0–10)

## 2022-03-13 PROCEDURE — 36415 COLL VENOUS BLD VENIPUNCTURE: CPT

## 2022-03-13 PROCEDURE — 770001 HCHG ROOM/CARE - MED/SURG/GYN PRIV*

## 2022-03-13 PROCEDURE — 700105 HCHG RX REV CODE 258: Performed by: NURSE PRACTITIONER

## 2022-03-13 PROCEDURE — 700101 HCHG RX REV CODE 250: Performed by: INTERNAL MEDICINE

## 2022-03-13 PROCEDURE — 72158 MRI LUMBAR SPINE W/O & W/DYE: CPT

## 2022-03-13 PROCEDURE — 87798 DETECT AGENT NOS DNA AMP: CPT | Mod: 91

## 2022-03-13 PROCEDURE — 84439 ASSAY OF FREE THYROXINE: CPT

## 2022-03-13 PROCEDURE — 85610 PROTHROMBIN TIME: CPT

## 2022-03-13 PROCEDURE — 700111 HCHG RX REV CODE 636 W/ 250 OVERRIDE (IP): Performed by: INTERNAL MEDICINE

## 2022-03-13 PROCEDURE — 87633 RESP VIRUS 12-25 TARGETS: CPT

## 2022-03-13 PROCEDURE — 86431 RHEUMATOID FACTOR QUANT: CPT

## 2022-03-13 PROCEDURE — 700117 HCHG RX CONTRAST REV CODE 255: Performed by: EMERGENCY MEDICINE

## 2022-03-13 PROCEDURE — C1729 CATH, DRAINAGE: HCPCS

## 2022-03-13 PROCEDURE — 87581 M.PNEUMON DNA AMP PROBE: CPT

## 2022-03-13 PROCEDURE — 87483 CNS DNA AMP PROBE TYPE 12-25: CPT

## 2022-03-13 PROCEDURE — 99233 SBSQ HOSP IP/OBS HIGH 50: CPT | Performed by: NURSE PRACTITIONER

## 2022-03-13 PROCEDURE — 700102 HCHG RX REV CODE 250 W/ 637 OVERRIDE(OP): Performed by: NURSE PRACTITIONER

## 2022-03-13 PROCEDURE — 62270 DX LMBR SPI PNXR: CPT | Performed by: PSYCHIATRY & NEUROLOGY

## 2022-03-13 PROCEDURE — 62270 DX LMBR SPI PNXR: CPT

## 2022-03-13 PROCEDURE — 84157 ASSAY OF PROTEIN OTHER: CPT

## 2022-03-13 PROCEDURE — A9270 NON-COVERED ITEM OR SERVICE: HCPCS | Performed by: NURSE PRACTITIONER

## 2022-03-13 PROCEDURE — 700111 HCHG RX REV CODE 636 W/ 250 OVERRIDE (IP): Performed by: NURSE PRACTITIONER

## 2022-03-13 PROCEDURE — 009U3ZX DRAINAGE OF SPINAL CANAL, PERCUTANEOUS APPROACH, DIAGNOSTIC: ICD-10-PCS | Performed by: PSYCHIATRY & NEUROLOGY

## 2022-03-13 PROCEDURE — A9576 INJ PROHANCE MULTIPACK: HCPCS | Performed by: EMERGENCY MEDICINE

## 2022-03-13 PROCEDURE — 87486 CHLMYD PNEUM DNA AMP PROBE: CPT

## 2022-03-13 PROCEDURE — 89051 BODY FLUID CELL COUNT: CPT

## 2022-03-13 PROCEDURE — 87070 CULTURE OTHR SPECIMN AEROBIC: CPT

## 2022-03-13 PROCEDURE — 84443 ASSAY THYROID STIM HORMONE: CPT

## 2022-03-13 PROCEDURE — 84145 PROCALCITONIN (PCT): CPT

## 2022-03-13 PROCEDURE — 86140 C-REACTIVE PROTEIN: CPT

## 2022-03-13 PROCEDURE — 85652 RBC SED RATE AUTOMATED: CPT

## 2022-03-13 PROCEDURE — 87205 SMEAR GRAM STAIN: CPT

## 2022-03-13 PROCEDURE — 700102 HCHG RX REV CODE 250 W/ 637 OVERRIDE(OP): Performed by: INTERNAL MEDICINE

## 2022-03-13 PROCEDURE — 82945 GLUCOSE OTHER FLUID: CPT

## 2022-03-13 PROCEDURE — 83605 ASSAY OF LACTIC ACID: CPT

## 2022-03-13 PROCEDURE — 86038 ANTINUCLEAR ANTIBODIES: CPT

## 2022-03-13 PROCEDURE — 700105 HCHG RX REV CODE 258: Performed by: INTERNAL MEDICINE

## 2022-03-13 PROCEDURE — A9270 NON-COVERED ITEM OR SERVICE: HCPCS | Performed by: INTERNAL MEDICINE

## 2022-03-13 RX ORDER — PROMETHAZINE HYDROCHLORIDE 12.5 MG/1
12.5-25 SUPPOSITORY RECTAL EVERY 4 HOURS PRN
Status: DISCONTINUED | OUTPATIENT
Start: 2022-03-13 | End: 2022-03-15 | Stop reason: HOSPADM

## 2022-03-13 RX ORDER — AMOXICILLIN 250 MG
2 CAPSULE ORAL 2 TIMES DAILY
Status: DISCONTINUED | OUTPATIENT
Start: 2022-03-13 | End: 2022-03-13

## 2022-03-13 RX ORDER — LIDOCAINE HYDROCHLORIDE 20 MG/ML
10 INJECTION, SOLUTION EPIDURAL; INFILTRATION; INTRACAUDAL; PERINEURAL ONCE
Status: DISCONTINUED | OUTPATIENT
Start: 2022-03-13 | End: 2022-03-13

## 2022-03-13 RX ORDER — SODIUM CHLORIDE 9 MG/ML
INJECTION, SOLUTION INTRAVENOUS CONTINUOUS
Status: ACTIVE | OUTPATIENT
Start: 2022-03-13 | End: 2022-03-13

## 2022-03-13 RX ORDER — ONDANSETRON 2 MG/ML
4 INJECTION INTRAMUSCULAR; INTRAVENOUS EVERY 4 HOURS PRN
Status: DISCONTINUED | OUTPATIENT
Start: 2022-03-13 | End: 2022-03-15 | Stop reason: HOSPADM

## 2022-03-13 RX ORDER — ONDANSETRON 4 MG/1
4 TABLET, ORALLY DISINTEGRATING ORAL EVERY 4 HOURS PRN
Status: DISCONTINUED | OUTPATIENT
Start: 2022-03-13 | End: 2022-03-15 | Stop reason: HOSPADM

## 2022-03-13 RX ORDER — POLYETHYLENE GLYCOL 3350 17 G/17G
1 POWDER, FOR SOLUTION ORAL
Status: DISCONTINUED | OUTPATIENT
Start: 2022-03-13 | End: 2022-03-15 | Stop reason: HOSPADM

## 2022-03-13 RX ORDER — ACETAMINOPHEN 325 MG/1
650 TABLET ORAL EVERY 6 HOURS PRN
Status: DISCONTINUED | OUTPATIENT
Start: 2022-03-13 | End: 2022-03-15 | Stop reason: HOSPADM

## 2022-03-13 RX ORDER — PROCHLORPERAZINE EDISYLATE 5 MG/ML
5-10 INJECTION INTRAMUSCULAR; INTRAVENOUS EVERY 4 HOURS PRN
Status: DISCONTINUED | OUTPATIENT
Start: 2022-03-13 | End: 2022-03-15 | Stop reason: HOSPADM

## 2022-03-13 RX ORDER — BISACODYL 10 MG
10 SUPPOSITORY, RECTAL RECTAL
Status: DISCONTINUED | OUTPATIENT
Start: 2022-03-13 | End: 2022-03-15 | Stop reason: HOSPADM

## 2022-03-13 RX ORDER — SODIUM CHLORIDE, SODIUM LACTATE, POTASSIUM CHLORIDE, AND CALCIUM CHLORIDE .6; .31; .03; .02 G/100ML; G/100ML; G/100ML; G/100ML
1500 INJECTION, SOLUTION INTRAVENOUS ONCE
Status: COMPLETED | OUTPATIENT
Start: 2022-03-13 | End: 2022-03-13

## 2022-03-13 RX ORDER — MORPHINE SULFATE 4 MG/ML
1 INJECTION INTRAVENOUS ONCE
Status: COMPLETED | OUTPATIENT
Start: 2022-03-13 | End: 2022-03-13

## 2022-03-13 RX ORDER — OXYCODONE HYDROCHLORIDE 5 MG/1
2.5 TABLET ORAL
Status: DISCONTINUED | OUTPATIENT
Start: 2022-03-13 | End: 2022-03-15 | Stop reason: HOSPADM

## 2022-03-13 RX ORDER — AMOXICILLIN 250 MG
2 CAPSULE ORAL 2 TIMES DAILY
Status: DISCONTINUED | OUTPATIENT
Start: 2022-03-13 | End: 2022-03-15 | Stop reason: HOSPADM

## 2022-03-13 RX ORDER — LABETALOL HYDROCHLORIDE 5 MG/ML
10 INJECTION, SOLUTION INTRAVENOUS EVERY 4 HOURS PRN
Status: DISCONTINUED | OUTPATIENT
Start: 2022-03-13 | End: 2022-03-15 | Stop reason: HOSPADM

## 2022-03-13 RX ORDER — CARBOXYMETHYLCELLULOSE SODIUM 5 MG/ML
1 SOLUTION/ DROPS OPHTHALMIC PRN
Status: DISCONTINUED | OUTPATIENT
Start: 2022-03-13 | End: 2022-03-15 | Stop reason: HOSPADM

## 2022-03-13 RX ORDER — LEVOTHYROXINE SODIUM 0.1 MG/1
100 TABLET ORAL
COMMUNITY

## 2022-03-13 RX ORDER — LEVOTHYROXINE SODIUM 0.1 MG/1
100 TABLET ORAL
Status: DISCONTINUED | OUTPATIENT
Start: 2022-03-13 | End: 2022-03-15 | Stop reason: HOSPADM

## 2022-03-13 RX ORDER — PROMETHAZINE HYDROCHLORIDE 25 MG/1
12.5-25 TABLET ORAL EVERY 4 HOURS PRN
Status: DISCONTINUED | OUTPATIENT
Start: 2022-03-13 | End: 2022-03-15 | Stop reason: HOSPADM

## 2022-03-13 RX ORDER — FAMOTIDINE 20 MG/1
20 TABLET, FILM COATED ORAL 2 TIMES DAILY
Status: DISCONTINUED | OUTPATIENT
Start: 2022-03-13 | End: 2022-03-13

## 2022-03-13 RX ORDER — IBUPROFEN 200 MG
600 TABLET ORAL EVERY 6 HOURS PRN
Status: ON HOLD | COMMUNITY
End: 2022-03-15

## 2022-03-13 RX ORDER — SODIUM CHLORIDE 9 MG/ML
500 INJECTION, SOLUTION INTRAVENOUS ONCE
Status: COMPLETED | OUTPATIENT
Start: 2022-03-13 | End: 2022-03-13

## 2022-03-13 RX ORDER — HYDROMORPHONE HYDROCHLORIDE 1 MG/ML
0.25 INJECTION, SOLUTION INTRAMUSCULAR; INTRAVENOUS; SUBCUTANEOUS
Status: DISCONTINUED | OUTPATIENT
Start: 2022-03-13 | End: 2022-03-15 | Stop reason: HOSPADM

## 2022-03-13 RX ORDER — OXYCODONE HYDROCHLORIDE 5 MG/1
5 TABLET ORAL
Status: DISCONTINUED | OUTPATIENT
Start: 2022-03-13 | End: 2022-03-15 | Stop reason: HOSPADM

## 2022-03-13 RX ORDER — HYDROCORTISONE 10 MG/1
10 TABLET ORAL 2 TIMES DAILY
COMMUNITY
End: 2022-03-28

## 2022-03-13 RX ADMIN — ENOXAPARIN SODIUM 40 MG: 40 INJECTION SUBCUTANEOUS at 06:06

## 2022-03-13 RX ADMIN — CARBOXYMETHYLCELLULOSE SODIUM 1 DROP: 5 SOLUTION/ DROPS OPHTHALMIC at 18:40

## 2022-03-13 RX ADMIN — SENNOSIDES AND DOCUSATE SODIUM 2 TABLET: 50; 8.6 TABLET ORAL at 06:05

## 2022-03-13 RX ADMIN — GADOTERIDOL 10 ML: 279.3 INJECTION, SOLUTION INTRAVENOUS at 00:07

## 2022-03-13 RX ADMIN — HYDROCORTISONE SODIUM SUCCINATE 100 MG: 100 INJECTION, POWDER, FOR SOLUTION INTRAMUSCULAR; INTRAVENOUS at 01:50

## 2022-03-13 RX ADMIN — OXYCODONE 2.5 MG: 5 TABLET ORAL at 18:47

## 2022-03-13 RX ADMIN — CEFTRIAXONE SODIUM 1 G: 10 INJECTION, POWDER, FOR SOLUTION INTRAVENOUS at 21:01

## 2022-03-13 RX ADMIN — HYDROCORTISONE SODIUM SUCCINATE 100 MG: 100 INJECTION, POWDER, FOR SOLUTION INTRAMUSCULAR; INTRAVENOUS at 18:35

## 2022-03-13 RX ADMIN — SODIUM CHLORIDE 500 ML: 9 INJECTION, SOLUTION INTRAVENOUS at 15:06

## 2022-03-13 RX ADMIN — ACETAMINOPHEN 650 MG: 325 TABLET, FILM COATED ORAL at 06:05

## 2022-03-13 RX ADMIN — IOHEXOL 100 ML: 350 INJECTION, SOLUTION INTRAVENOUS at 00:15

## 2022-03-13 RX ADMIN — LEVOTHYROXINE SODIUM 100 MCG: 0.1 TABLET ORAL at 06:05

## 2022-03-13 RX ADMIN — HYDROCORTISONE SODIUM SUCCINATE 100 MG: 100 INJECTION, POWDER, FOR SOLUTION INTRAMUSCULAR; INTRAVENOUS at 10:08

## 2022-03-13 RX ADMIN — MORPHINE SULFATE 1 MG: 4 INJECTION INTRAVENOUS at 13:33

## 2022-03-13 RX ADMIN — SODIUM CHLORIDE: 9 INJECTION, SOLUTION INTRAVENOUS at 03:00

## 2022-03-13 RX ADMIN — SODIUM CHLORIDE, POTASSIUM CHLORIDE, SODIUM LACTATE AND CALCIUM CHLORIDE 1500 ML: 600; 310; 30; 20 INJECTION, SOLUTION INTRAVENOUS at 00:35

## 2022-03-13 ASSESSMENT — ENCOUNTER SYMPTOMS
FOCAL WEAKNESS: 0
DEPRESSION: 0
EYE REDNESS: 1
MEMORY LOSS: 0
MYALGIAS: 0
SHORTNESS OF BREATH: 0
HEADACHES: 1
EYE DISCHARGE: 1
WEIGHT LOSS: 0
INSOMNIA: 0
DIZZINESS: 0
PHOTOPHOBIA: 0
HEADACHES: 0
BACK PAIN: 1
BRUISES/BLEEDS EASILY: 0
SORE THROAT: 0
WHEEZING: 0
SEIZURES: 0
VOMITING: 0
ABDOMINAL PAIN: 0
HEARTBURN: 0
POLYDIPSIA: 0
DIAPHORESIS: 0
CHILLS: 1
BLURRED VISION: 0
FLANK PAIN: 0
SENSORY CHANGE: 0
HEMOPTYSIS: 0
DIARRHEA: 0
NAUSEA: 0
WEAKNESS: 0
EYE PAIN: 0
TREMORS: 0
FEVER: 1
SPEECH CHANGE: 0
TINGLING: 0
MYALGIAS: 1
FEVER: 0
NECK PAIN: 0
DOUBLE VISION: 0
STRIDOR: 0
COUGH: 0
PALPITATIONS: 0

## 2022-03-13 ASSESSMENT — PATIENT HEALTH QUESTIONNAIRE - PHQ9
2. FEELING DOWN, DEPRESSED, IRRITABLE, OR HOPELESS: NOT AT ALL
1. LITTLE INTEREST OR PLEASURE IN DOING THINGS: NOT AT ALL
SUM OF ALL RESPONSES TO PHQ9 QUESTIONS 1 AND 2: 0

## 2022-03-13 ASSESSMENT — COGNITIVE AND FUNCTIONAL STATUS - GENERAL
DAILY ACTIVITIY SCORE: 24
SUGGESTED CMS G CODE MODIFIER DAILY ACTIVITY: CH
MOBILITY SCORE: 24
SUGGESTED CMS G CODE MODIFIER MOBILITY: CH

## 2022-03-13 ASSESSMENT — LIFESTYLE VARIABLES
HAVE PEOPLE ANNOYED YOU BY CRITICIZING YOUR DRINKING: NO
TOTAL SCORE: 0
ALCOHOL_USE: NO
AVERAGE NUMBER OF DAYS PER WEEK YOU HAVE A DRINK CONTAINING ALCOHOL: 0
EVER FELT BAD OR GUILTY ABOUT YOUR DRINKING: NO
HAVE YOU EVER FELT YOU SHOULD CUT DOWN ON YOUR DRINKING: NO
ON A TYPICAL DAY WHEN YOU DRINK ALCOHOL HOW MANY DRINKS DO YOU HAVE: 0
TOTAL SCORE: 0
HOW MANY TIMES IN THE PAST YEAR HAVE YOU HAD 5 OR MORE DRINKS IN A DAY: 0
DOES PATIENT WANT TO STOP DRINKING: NO
CONSUMPTION TOTAL: NEGATIVE
EVER HAD A DRINK FIRST THING IN THE MORNING TO STEADY YOUR NERVES TO GET RID OF A HANGOVER: NO
TOTAL SCORE: 0

## 2022-03-13 ASSESSMENT — PAIN DESCRIPTION - PAIN TYPE
TYPE: ACUTE PAIN

## 2022-03-13 NOTE — DIETARY
"Nutrition services: Day 0 of admit.  Johana Weston is a 41 y.o. female with admitting DX of sepsis.     Consult received for unintentional wt loss of 34 lbs or more over the past year. RD visited pt at bedside. Pt is Moroccan speaking, spoke with pt via [employer] ([id number]) at Language line. Per pt she has been losing weight ever since her \"thyroid levels were adjusted\". She states her usual body weight was 170 lbs and she was unsure of how tall she is. She reports her intake and appetite has not changed. She eats 3 meals a day and likes fruit especially melon and strawberries. RD to update preferences and monitor for adequate intake while in acute care.       Assessment:  Height: 137.2 cm (4' 6\") Pt is unsure of current height. Per pt she is \"small\"  Weight: 58.8 kg (129 lb 10.1 oz) via stand up scale   Body mass index is 31.26 kg/m²., BMI classification: Obese. However question accuracy of BMI as pt is unsure of height, does not appear obese.   Diet/Intake: clear liquid diet % x 1 meal.     Evaluation:   1. Pt with reported 41 lb (24%) wt loss. This is significant wt loss. Per chart review pt is down 6 lbs 4% in 1 month. Pt under multiple blankets, unable to preform nutrition focused physical exam.   2. Labs and Meds reviewed   3. Skin: No staged wounds or pressure injuries noted  4. +BM 3/12 per pt     Malnutrition Risk: Pt with significant weight loss, however unable to meet criteria per ASPEN guidelines.     Recommendations/Plan:  1. Diet advanced as tolerated per MD   2. Encourage intake of all meals  3. Document intake of all meals as % taken in ADL's to provide interdisciplinary communication across all shifts.   4. Monitor weight.  5. Nutrition rep will continue to see patient for ongoing meal and snack preferences.     RD to monitor per department policy        "

## 2022-03-13 NOTE — H&P
Hospital Medicine History & Physical Note    Date of Service  3/12/22    Primary Care Physician  RAYNE Ford.      Code Status  Full Code    Chief Complaint  Chief Complaint   Patient presents with   • Fever     Fever and chills started around noon today   • Back Pain     Pt also reporting pain in her nose    • Headache     Pain 9/10 started around noon today, pt took advil 600mg but had no effects on pain       History of Presenting Illness  Johana Weston is a 41 y.o. Amharic-speaking female with a past medical history of craniopharyngioma with resection 2019 and subsequent panhypopituitarism who presented 3/12/2022 with  6 hours of headache, fever, pelvic and low back pain.  In the emergency department she is found to have hypotension, tachycardia, fever of 101.7.  And leukocytosis.  She receives empiric Rocephin.  CT head, abdomen and pelvis are unremarkable.  She is referred to the hospitalist for admission.  At bedside She denies chest pain, shortness of breath, nausea, vomiting, diarrhea, dysuria or frequency.  She denies suspect meals, recent antibiotic use or change of her medication regiment.     She denies any IV drug use. She is vaccinated for COVID-19, but has not gotten her booster.     I discussed the plan of care with patient.    Review of Systems  Review of Systems   Constitutional: Negative for fever, malaise/fatigue and weight loss.   HENT: Negative for sore throat and tinnitus.    Eyes: Negative for blurred vision and double vision.   Respiratory: Negative for cough, hemoptysis and stridor.    Cardiovascular: Negative for chest pain and palpitations.   Gastrointestinal: Negative for nausea and vomiting.   Genitourinary: Negative for dysuria and urgency.   Musculoskeletal: Negative for myalgias and neck pain.   Skin: Negative for itching and rash.   Neurological: Negative for dizziness and headaches.   Endo/Heme/Allergies: Does not bruise/bleed easily.    Psychiatric/Behavioral: Negative for depression. The patient does not have insomnia.        Past Medical History   has a past medical history of Craniopharyngioma (HCC) and Thyroid disease.    Surgical History   has a past surgical history that includes transsphenoidal resection.     Family History  Family history is unknown by patient.   Family history reviewed with patient. There is no family history that is pertinent to the chief complaint.     Social History   reports that she has never smoked. She has never used smokeless tobacco. She reports previous alcohol use. She reports that she does not use drugs.    Allergies  Allergies   Allergen Reactions   • Pork Allergy Unspecified     Unspecified       Medications  Prior to Admission Medications   Prescriptions Last Dose Informant Patient Reported? Taking?   hydrocortisone (CORTEF) 10 MG Tab 3/12/2022 at PM Patient Yes Yes   Sig: Take 10 mg by mouth 2 times a day. 1 tablet in the morning  1 tablet in the evening   ibuprofen (MOTRIN) 200 MG Tab 3/12/2022 at PM Patient Yes Yes   Sig: Take 600 mg by mouth every 6 hours as needed for Mild Pain. 3 tablets = 600 mg   levothyroxine (SYNTHROID) 100 MCG Tab 3/12/2022 at AM Patient Yes Yes   Sig: Take 100 mcg by mouth every morning on an empty stomach.      Facility-Administered Medications: None       Physical Exam  Temp:  [36.1 °C (96.9 °F)-39.4 °C (103 °F)] 36.9 °C (98.5 °F)  Pulse:  [] 95  Resp:  [17-23] 17  BP: ()/() 104/69  SpO2:  [87 %-99 %] 99 %  Blood Pressure: 104/69   Temperature: 36.9 °C (98.5 °F)   Pulse: 95   Respiration: 17   Pulse Oximetry: 99 %       Physical Exam  Vitals and nursing note reviewed.   Constitutional:       General: She is in acute distress.      Appearance: Normal appearance. She is normal weight. She is ill-appearing. She is not toxic-appearing or diaphoretic.   HENT:      Head: Normocephalic and atraumatic.      Nose: Nose normal. No congestion or rhinorrhea.       Mouth/Throat:      Mouth: Mucous membranes are moist.      Pharynx: Oropharynx is clear.   Eyes:      Extraocular Movements: Extraocular movements intact.      Conjunctiva/sclera: Conjunctivae normal.      Pupils: Pupils are equal, round, and reactive to light.   Neck:      Vascular: No carotid bruit.   Cardiovascular:      Rate and Rhythm: Normal rate and regular rhythm.      Pulses: Normal pulses.      Heart sounds: Normal heart sounds. No murmur heard.    No gallop.   Pulmonary:      Effort: No respiratory distress.      Breath sounds: Normal breath sounds. No wheezing or rales.   Abdominal:      General: Abdomen is flat. Bowel sounds are normal. There is no distension.      Palpations: Abdomen is soft. There is no mass.      Tenderness: There is abdominal tenderness. There is no right CVA tenderness, left CVA tenderness, guarding or rebound.      Hernia: No hernia is present.      Comments: Suprapubic tenderness   Musculoskeletal:         General: No swelling, tenderness, deformity or signs of injury.      Cervical back: Normal range of motion and neck supple. No muscular tenderness.      Right lower leg: No edema.      Left lower leg: No edema.   Lymphadenopathy:      Cervical: No cervical adenopathy.   Skin:     Capillary Refill: Capillary refill takes less than 2 seconds.      Coloration: Skin is not jaundiced or pale.      Findings: No bruising.   Neurological:      General: No focal deficit present.      Mental Status: She is alert and oriented to person, place, and time. Mental status is at baseline.      Cranial Nerves: No cranial nerve deficit.      Motor: No weakness.      Coordination: Coordination normal.   Psychiatric:         Mood and Affect: Mood normal.         Thought Content: Thought content normal.         Judgment: Judgment normal.         Laboratory:  Recent Labs     03/12/22 2012   WBC 13.1*   RBC 4.44   HEMOGLOBIN 13.6   HEMATOCRIT 40.0   MCV 90.1   MCH 30.6   MCHC 34.0   RDW 44.3    PLATELETCT 250   MPV 9.7     Recent Labs     03/12/22 2012   SODIUM 139   POTASSIUM 3.8   CHLORIDE 104   CO2 23   GLUCOSE 99   BUN 13   CREATININE 0.83   CALCIUM 10.0     Recent Labs     03/12/22 2012   ALTSGPT 16   ASTSGOT 18   ALKPHOSPHAT 117*   TBILIRUBIN 0.3   GLUCOSE 99         No results for input(s): NTPROBNP in the last 72 hours.      No results for input(s): TROPONINT in the last 72 hours.    Imaging:  CT-ABDOMEN-PELVIS WITH   Final Result      No evidence of bowel obstruction or inflammatory change within the abdomen or pelvis.      CT-HEAD W/O   Final Result      No evidence of acute intracranial process.         DX-CHEST-PORTABLE (1 VIEW)   Final Result      No evidence of acute cardiopulmonary process.      MR-LUMBAR SPINE-WITH & W/O    (Results Pending)       X-Ray:  I have personally reviewed the images and compared with prior images.    Assessment/Plan:  I anticipate this patient will require at least two midnights for appropriate medical management, necessitating inpatient admission.    * Sepsis (HCC)- (present on admission)  Assessment & Plan  This is Sepsis Present on admission  SIRS criteria identified on my evaluation include: Fever, with temperature greater than 101 deg F  Source is unknown  Sepsis protocol initiated  Fluid resuscitation ordered per protocol  Crystalloid Fluid Administration: Fluid resuscitation ordered per standard protocol - 30 mL/kg per current or ideal body weight  IV antibiotics as appropriate for source of sepsis  Reassessment: I have reassessed the patient's hemodynamic status          Secondary adrenal insufficiency (HCC)- (present on admission)  Assessment & Plan  Random cortisol pending, empiric Cortef ordered      VTE prophylaxis: SCDs/TEDs

## 2022-03-13 NOTE — ASSESSMENT & PLAN NOTE
Admitted for fevers and acute onset back pain   Started on empiric Rocephin on admission   S/p LP on 3/13. CSF analysis not suggestive of menengitis/encephalitis . Will follow cultures   MRI thoracic spine revealed thin crescentic dorsal epidural fluid collection extending from T1 down to T10 concerning for early developing abscess   Infectious Disease consulted for abx recommendations -- recommended 6 weeks IV abx . PICC line to be placed 48hr after negative blood cx   I discussed case with [facility]surgery who recommended against surgical drainage, since patient is clinically improving with abx alone and has no neuro deficits on exam.   Continue pain control

## 2022-03-13 NOTE — ASSESSMENT & PLAN NOTE
S/p COVID infection in 1/2021  She has received her vaccinations but not her booster  Repeat COVID-19 on 3/13 negative

## 2022-03-13 NOTE — PROCEDURES
Procedure Lumbar Puncture    Date/Time: 3/13/2022 1:17 PM  Performed by: Abrahma Britton M.D.  Authorized by: Abraham Britton M.D.     Consent:     Consent obtained:  Written    Consent given by:  Patient    Risks discussed:  Bleeding, infection, repeat procedure, pain, nerve damage and headache    Alternatives discussed:  No treatment  Pre-procedure details:     Procedure purpose:  Diagnostic    Preparation: Patient was prepped and draped in usual sterile fashion    Anesthesia:     Anesthesia method:  Local infiltration    Local anesthetic:  Lidocaine 1% w/o epi  Procedure details:     Lumbar space:  L3-L4 interspace    Patient position:  R lateral decubitus    Needle gauge:  20    Needle type:  Spinal needle - Quincke tip    Needle length (in):  2.5    Number of attempts:  2    Opening pressure (cm H2O):  19    Fluid appearance:  Clear    Tubes of fluid:  4    Total volume (ml):  13  Post-procedure:     Puncture site:  Adhesive bandage applied and direct pressure applied    Patient tolerance of procedure:  Tolerated well, no immediate complications

## 2022-03-13 NOTE — ED NOTES
Med rec completed per patient  With  via iPad, Aubrey/[id number]  Allergies reviewed  No PO Antibiotics in the last 30 days

## 2022-03-13 NOTE — PROGRESS NOTES
"Pt is primarily Nepali speaking.  used for assessments, education and medication administration.   Assumed care of patient at 0645. Bedside report received. Assessment complete.  AA&Ox4. Denies CP/SOB.  Reporting 2/10 pain. Declined intervention at this time.  Educated patient regarding pharmacologic and non pharmacologic modalities for pain management.  Skin per flowsheet.  Tolerating clear liquid diet. Denies N/V.  + void. + BM. Last BM 3/13.  Pt ambulates independently.  All needs met at this time. Call light within reach. Pt calls appropriately. Bed low and locked, non skid socks in place. Hourly rounding in place.    /69   Pulse 78   Temp 36.4 °C (97.6 °F) (Temporal)   Resp 18   Ht 1.372 m (4' 6\")   Wt 58.8 kg (129 lb 10.1 oz)   SpO2 99%   BMI 31.26 kg/m²     "

## 2022-03-13 NOTE — HOSPITAL COURSE
Johana Weston is a 41 y.o. female with a history of craniopharyngioma and thyroid disease who presented for evaluation of headache, fever, and low back pain that started the day prior to admission. She was started on empiric Rocephin and admitted to Hospitalist service for further management and work-up . LP was done 3/13 and fluid analysis not suggestive of meningitis. MRI Lumbar/cervical spine unremarkable. MRI thoracic spine with thin crescentic dorsal epidural fluid collection extending from T1 down to T10 measuring about 1.5 mm in thickness. ID and Neurosurgery consulted.

## 2022-03-13 NOTE — PROGRESS NOTES
Patient arrived to floor.  used for admission profile. AOx4. Denies pain, N, V. 2RN skin check completed. POC discussed with patient. Call light within reach. No needs at this time.

## 2022-03-13 NOTE — ASSESSMENT & PLAN NOTE
This is Sepsis Present on admission  Source is epidural abscess   Sepsis protocol initiated on admission and she received sepsis bolus     - Continue empiric abx and follow ID recommendations   - VSS on room air this morning. Last fever was 3/12 @ 2214  - Lactic acidosis resolved   - Preliminary blood cultures NGTD . Continue to follow   - CSF cultures in process

## 2022-03-13 NOTE — ED PROVIDER NOTES
ED Provider Note    Scribed for Tyson Higgins by Jessica Granados. 3/12/2022  9:09 PM    Primary care provider: EVELYN Ford  Means of arrival: Walk in  History obtained from: Patient  History limited by: None    CHIEF COMPLAINT  Chief Complaint   Patient presents with   • Fever     Fever and chills started around noon today   • Back Pain     Pt also reporting pain in her nose    • Headache     Pain 9/10 started around noon today, pt took advil 600mg but had no effects on pain       HPI  Johana Weston is a 41 y.o. female with a history of craniopharyngioma and thyroid disease who presents to the Emergency Department for evaluation of headache, fever, and low back pain onset today. The patient has a history of craniopharyngioma and had a craniotomy in 2019. She currently experiences a headache that she localizes to the top of her head as well as low back pain. She describes the pain as a throbbing sensation. She denies any trauma to her head or back. She states that she experienced this pain when she had the tumor in her head, but has not experienced it again since her surgery. She experiences associated right ear tinnitus, blurry vision, and dizziness when looking up. She denies associated nausea, vomiting, dysuria, neck stiffness, bladder incontinence, or bowel incontinence. She denies any IV drug use. She is vaccinated for COVID-19, but has not gotten her booster.     Quality: Aching back pain  Duration: One day  Severity: Moderate  Associated sx: tinnitus, blurry vision, and dizziness    REVIEW OF SYSTEMS  As above, all other systems reviewed and are negative.   See HPI for further details.     PAST MEDICAL HISTORY   has a past medical history of Craniopharyngioma (HCC) and Thyroid disease.     SURGICAL HISTORY   has a past surgical history that includes transsphenoidal resection.     SOCIAL HISTORY  Social History     Tobacco Use   • Smoking status: Never Smoker   • Smokeless  tobacco: Never Used   Vaping Use   • Vaping Use: Never used   Substance Use Topics   • Alcohol use: Not Currently     Comment: occ   • Drug use: No      Social History     Substance and Sexual Activity   Drug Use No     FAMILY HISTORY  Family History   Family history unknown: Yes     CURRENT MEDICATIONS  Home Medications     Reviewed by Elías Lema R.N. (Registered Nurse) on 03/12/22 at 1959  Med List Status: <None>   Medication Last Dose Status   famotidine (PEPCID) 20 MG Tab  Active   hydrocortisone (CORTEF) 10 MG Tab  Active   hydrocortisone (CORTEF) 20 MG Tab  Active   levothyroxine (SYNTHROID) 50 MCG Tab  Active   senna-docusate (PERICOLACE OR SENOKOT S) 8.6-50 MG Tab  Active              ALLERGIES  Allergies   Allergen Reactions   • Pork Allergy        PHYSICAL EXAM    VITAL SIGNS:   Vitals:    03/12/22 2250 03/12/22 2257 03/12/22 2314 03/13/22 0019   BP: 112/57 108/64  (!) 91/57   Pulse: (!) 116 (!) 117 (!) 113    Resp: 17  18    Temp:    36.1 °C (96.9 °F)   TempSrc:    Temporal   SpO2: 92% 92% 95%    Weight:       Height:           Vitals: My interpretation: normotensive, tachycardic, febrile, not hypoxic    Reinterpretation of vitals: Improving but still persistently tachycardic    PE:   Constitutional: Well developed, Well nourished, No acute distress, Non-toxic appearance.   HENT: Normocephalic, Atraumatic, Bilateral external ears normal, Oropharynx is clear mucous membranes are moist. No oral exudates or nasal discharge.   Eyes: Pupils are equal round and reactive, EOMI, Conjunctiva normal, No discharge.   Neck: Normal range of motion, No tenderness, Supple, No stridor. No meningismus.  Lymphatic: No lymphadenopathy noted.   Cardiovascular: Regular rate and rhythm without murmur rub or gallop.  Thorax & Lungs: Clear breath sounds bilaterally without wheezes, rhonchi or rales. There is no chest wall tenderness.   Abdomen: Suprapubic tenderness with voluntary guarding, no rigidity or rebound. Soft,   non-distended. No organomegaly is appreciated. Bowel sounds are normal.  Skin: Normal without rash.   Back: No CVA.  Bilateral lumbar paraspinal tenderness, midline tenderness in the lumbar without signs of trauma, step-off or deformity  Extremities: Intact distal pulses, No edema, No tenderness, No cyanosis, No clubbing. Capillary refill is less than 2 seconds.  Musculoskeletal: Good range of motion in all major joints. No tenderness to palpation or major deformities noted.   Neurologic: Alert & oriented x 3, Normal motor function, Normal sensory function, No focal deficits noted. Reflexes are normal.  Psychiatric: Affect normal, Judgment normal, Mood normal. There is no suicidal ideation or patient reported hallucinations.     DIAGNOSTIC STUDIES / PROCEDURES    LABS  Results for orders placed or performed during the hospital encounter of 03/12/22   Lactic acid (lactate)   Result Value Ref Range    Lactic Acid 1.8 0.5 - 2.0 mmol/L   CBC WITH DIFFERENTIAL   Result Value Ref Range    WBC 13.1 (H) 4.8 - 10.8 K/uL    RBC 4.44 4.20 - 5.40 M/uL    Hemoglobin 13.6 12.0 - 16.0 g/dL    Hematocrit 40.0 37.0 - 47.0 %    MCV 90.1 81.4 - 97.8 fL    MCH 30.6 27.0 - 33.0 pg    MCHC 34.0 33.6 - 35.0 g/dL    RDW 44.3 35.9 - 50.0 fL    Platelet Count 250 164 - 446 K/uL    MPV 9.7 9.0 - 12.9 fL    Neutrophils-Polys 81.10 (H) 44.00 - 72.00 %    Lymphocytes 13.60 (L) 22.00 - 41.00 %    Monocytes 3.90 0.00 - 13.40 %    Eosinophils 0.70 0.00 - 6.90 %    Basophils 0.20 0.00 - 1.80 %    Immature Granulocytes 0.50 0.00 - 0.90 %    Nucleated RBC 0.00 /100 WBC    Neutrophils (Absolute) 10.67 (H) 2.00 - 7.15 K/uL    Lymphs (Absolute) 1.78 1.00 - 4.80 K/uL    Monos (Absolute) 0.51 0.00 - 0.85 K/uL    Eos (Absolute) 0.09 0.00 - 0.51 K/uL    Baso (Absolute) 0.02 0.00 - 0.12 K/uL    Immature Granulocytes (abs) 0.06 0.00 - 0.11 K/uL    NRBC (Absolute) 0.00 K/uL   COMP METABOLIC PANEL   Result Value Ref Range    Sodium 139 135 - 145 mmol/L     Potassium 3.8 3.6 - 5.5 mmol/L    Chloride 104 96 - 112 mmol/L    Co2 23 20 - 33 mmol/L    Anion Gap 12.0 7.0 - 16.0    Glucose 99 65 - 99 mg/dL    Bun 13 8 - 22 mg/dL    Creatinine 0.83 0.50 - 1.40 mg/dL    Calcium 10.0 8.5 - 10.5 mg/dL    AST(SGOT) 18 12 - 45 U/L    ALT(SGPT) 16 2 - 50 U/L    Alkaline Phosphatase 117 (H) 30 - 99 U/L    Total Bilirubin 0.3 0.1 - 1.5 mg/dL    Albumin 4.7 3.2 - 4.9 g/dL    Total Protein 7.4 6.0 - 8.2 g/dL    Globulin 2.7 1.9 - 3.5 g/dL    A-G Ratio 1.7 g/dL   URINALYSIS    Specimen: Urine   Result Value Ref Range    Color Yellow     Character Clear     Specific Gravity 1.003 <1.035    Ph 7.5 5.0 - 8.0    Glucose Negative Negative mg/dL    Ketones Negative Negative mg/dL    Protein Negative Negative mg/dL    Bilirubin Negative Negative    Urobilinogen, Urine 0.2 Negative    Nitrite Negative Negative    Leukocyte Esterase Negative Negative    Occult Blood Small (A) Negative    Micro Urine Req Microscopic    URINE MICROSCOPIC (W/UA)   Result Value Ref Range    WBC 0-2 /hpf    RBC 2-5 (A) /hpf    Bacteria Negative None /hpf    Epithelial Cells Negative /hpf    Hyaline Cast 0-2 /lpf   ESTIMATED GFR   Result Value Ref Range    GFR If African American >60 >60 mL/min/1.73 m 2    GFR If Non African American >60 >60 mL/min/1.73 m 2   HCG QUAL SERUM   Result Value Ref Range    Beta-Hcg Qualitative Serum Negative Negative      All labs reviewed by me. Significant for lactic acid normal, leukocytosis of 13, no anemia, normal electrolytes, normal renal function, normal liver enzymes, normal bilirubin, urinalysis negative for ketones, infection, pregnancy negative    RADIOLOGY  CT-ABDOMEN-PELVIS WITH   Final Result      No evidence of bowel obstruction or inflammatory change within the abdomen or pelvis.      CT-HEAD W/O   Final Result      No evidence of acute intracranial process.         DX-CHEST-PORTABLE (1 VIEW)   Final Result      No evidence of acute cardiopulmonary process.      MR-LUMBAR  SPINE-WITH & W/O    (Results Pending)     The radiologist's interpretation of all radiological studies have been reviewed by me.    COURSE & MEDICAL DECISION MAKING  Nursing notes, VS, PMSFHx, labs, imaging, EKG reviewed in chart.    MDM: 9:09 PM Johana Weston is a 41 y.o. female who presented with feeling fevers, chills, back pain, headache all started today.  She is vaccinated for Covid.  No shortness of breath or chest pain.  She does have midline lumbar spinal tenderness as well as paraspinal tenderness, no history of IV drug use.  Labs showed normal lactic acid.  Leukocytosis of 13.  Of concern she does have a fever of 103 Fahrenheit upon arrival, tachycardia of 130.  She was treated with 2 boluses of IV fluids, broad-spectrum antibiotic coverage, Tylenol, Toradol.  Nonfocal neurological exam, no lower extremity weakness, no loss of bladder or bowel continence.  Fever improved with treatment, tachycardia moderately improved.  CT abdomen was done she did have some mild suprapubic tenderness, however CT was negative.  CT head negative as well. she has no meningeal signs, no neck stiffness and I think at this time meningitis is consistent, no rash.  Possible epidural abscess?,  Will order MRI.  Patient admitted to the hospitalist for continued and further evaluation.  She is resting comfortably, no acute distress and verbalized understand plan for admission.  Overall she is very well-appearing at reevaluation.     HYDRATION: Based on the patient's presentation of Acute Vomiting, Dehydration and Inability to take oral fluids the patient was given IV fluids. IV Hydration was used because oral hydration was not adequate alone. Upon recheck following hydration, the patient was imroved.    CRITICAL CARE TIME 38 minutes  There was a very real possibility of deterioration of the patient's condition.  This patient required the highest level of care.  I provided critical care services which included:  review of the medical record, treatment orders, ordering and reviewing test results, frequent reevaluation of the patient's condition and response to treatment, as well as discussing the case with appropriate personnel and various consultants. The critical care time associated with the care of this patient is exclusive of any procedures or specific interventions.    FINAL IMPRESSION  1. Other elevated white blood cell (WBC) count Acute   2. Lumbar pain Acute   3. Fever in other diseases Acute   4. SIRS (systemic inflammatory response syndrome) (HCC) Acute       Jessica BARAJAS (Farrah), am scribing for, and in the presence of, Tyson Higgins.    Electronically signed by: Jessica Granados (Farrah), 3/12/2022    ITyson personally performed the services described in this documentation, as scribed by Jessica Granados in my presence, and it is both accurate and complete. C.    The note accurately reflects work and decisions made by me.  Tyson Higgins  3/13/2022  12:32 AM

## 2022-03-13 NOTE — ASSESSMENT & PLAN NOTE
Random cortisol 2.4   She was started on IV hydrocortisone on admission. Will continue x 3 days and then can be transitioned back to orals

## 2022-03-13 NOTE — CARE PLAN
The patient is Stable - Low risk of patient condition declining or worsening    Shift Goals  Clinical Goals: Pain mgmt; Rest  Patient Goals: Rest    Progress made toward(s) clinical / shift goals:  Pt reports no pain at this time.      Problem: Pain - Standard  Goal: Alleviation of pain or a reduction in pain to the patient’s comfort goal  Outcome: Progressing     Problem: Knowledge Deficit - Standard  Goal: Patient and family/care givers will demonstrate understanding of plan of care, disease process/condition, diagnostic tests and medications  Outcome: Progressing       Patient is not progressing towards the following goals:

## 2022-03-13 NOTE — PROGRESS NOTES
[facility] Medicine Daily Progress Note    Date of Service  3/13/2022    Chief Complaint  Johana Weston is a 41 y.o. female admitted 3/12/2022 with headache, fever and back pain.    Hospital Course  Johana Weston is a 41 y.o. female with a history of craniopharyngioma and thyroid disease who presented for evaluation of headache, fever, and low back pain that started the day prior to admission.       Interval Problem Update  3/13: T.max 103. Tachycardia improving. Soft blood pressures. 500mL IVFB ordered. MR lumbar spine unremarkable. MRI cervical and thoracic spine with contrast ordered. S/p LP and fluid sent for cultures and analysis. Patient says her back pain and headache are improving. Neuro exam is intact. No point tenderness. Preliminary cultures negative.     I have personally seen and examined the patient at bedside. I discussed the plan of care with patient, bedside RN and charge RN.    Consultants/Specialty  n/a    Code Status  Full Code    Disposition  Patient is not medically cleared for discharge.   Anticipate discharge to to home with close outpatient follow-up.  I have placed the appropriate orders for post-discharge needs.    Review of Systems  Review of Systems   Constitutional: Positive for chills, fever and malaise/fatigue. Negative for diaphoresis and weight loss.   HENT: Negative for sore throat.    Eyes: Positive for discharge and redness. Negative for blurred vision, double vision, photophobia and pain.   Respiratory: Negative for cough, shortness of breath, wheezing and stridor.    Cardiovascular: Negative for chest pain, palpitations and leg swelling.   Gastrointestinal: Negative for abdominal pain, diarrhea, heartburn, nausea and vomiting.   Genitourinary: Negative for dysuria, flank pain, frequency, hematuria and urgency.   Musculoskeletal: Positive for back pain and myalgias. Negative for neck pain.   Skin: Negative for itching and rash.   Neurological:  Positive for headaches. Negative for dizziness, tingling, tremors, sensory change, speech change, focal weakness, seizures and weakness.   Endo/Heme/Allergies: Positive for environmental allergies. Negative for polydipsia. Does not bruise/bleed easily.   Psychiatric/Behavioral: Negative for memory loss. The patient does not have insomnia.          services were used in the patient's primary language of [place].   Name or Number: Brooks ([id number])  Mode of interpretation: iPad  Content of Interpretation:  ROS, physical exam, plan of care, labs     Physical Exam  Temp:  [36.1 °C (96.9 °F)-39.4 °C (103 °F)] 36.4 °C (97.6 °F)  Pulse:  [] 78  Resp:  [17-23] 18  BP: ()/() 115/69  SpO2:  [87 %-99 %] 99 %    Physical Exam  Vitals and nursing note reviewed.   Constitutional:       General: She is not in acute distress.     Appearance: She is obese. She is not toxic-appearing.   HENT:      Head: Normocephalic.      Right Ear: External ear normal.      Left Ear: External ear normal.      Mouth/Throat:      Mouth: Mucous membranes are moist.      Pharynx: Oropharynx is clear.   Eyes:      General: No visual field deficit or scleral icterus.     Extraocular Movements: Extraocular movements intact.      Conjunctiva/sclera:      Right eye: Right conjunctiva is injected. No exudate.     Pupils: Pupils are equal, round, and reactive to light.   Cardiovascular:      Rate and Rhythm: Regular rhythm. Tachycardia present.      Pulses: Normal pulses.   Pulmonary:      Effort: Pulmonary effort is normal.      Breath sounds: Normal breath sounds.   Abdominal:      General: There is distension.      Palpations: Abdomen is soft.      Tenderness: There is no abdominal tenderness. There is no right CVA tenderness, left CVA tenderness or guarding.   Musculoskeletal:         General: No signs of injury.      Cervical back: Normal range of motion and neck supple. No rigidity or tenderness.      Right lower  leg: No edema.      Left lower leg: No edema.      Comments: Significant paraspinal tenderness to thoracic and lumbar region    Skin:     General: Skin is warm and dry.      Findings: No bruising, lesion or rash.   Neurological:      Mental Status: She is alert and oriented to person, place, and time.      Cranial Nerves: No dysarthria or facial asymmetry.      Comments: Negative Brudzinski and Kernig   Equal strength bilaterally    Psychiatric:         Mood and Affect: Mood normal.         Thought Content: Thought content normal.         Judgment: Judgment normal.         Fluids    Intake/Output Summary (Last 24 hours) at 3/13/2022 1344  Last data filed at 3/13/2022 0940  Gross per 24 hour   Intake 120 ml   Output --   Net 120 ml       Laboratory  Recent Labs     03/12/22 2012   WBC 13.1*   RBC 4.44   HEMOGLOBIN 13.6   HEMATOCRIT 40.0   MCV 90.1   MCH 30.6   MCHC 34.0   RDW 44.3   PLATELETCT 250   MPV 9.7     Recent Labs     03/12/22 2012   SODIUM 139   POTASSIUM 3.8   CHLORIDE 104   CO2 23   GLUCOSE 99   BUN 13   CREATININE 0.83   CALCIUM 10.0                   Imaging  MR-LUMBAR SPINE-WITH & W/O   Final Result         Mild lumbar spine degenerative changes without canal stenosis or foraminal narrowing. No abnormal enhancement is identified.      CT-ABDOMEN-PELVIS WITH   Final Result      No evidence of bowel obstruction or inflammatory change within the abdomen or pelvis.      CT-HEAD W/O   Final Result      No evidence of acute intracranial process.         DX-CHEST-PORTABLE (1 VIEW)   Final Result      No evidence of acute cardiopulmonary process.      MR-CERVICAL SPINE-WITH & W/O    (Results Pending)   MR-THORACIC SPINE-WITH & W/O    (Results Pending)        Assessment/Plan  * Fever of unknown origin- (present on admission)  Assessment & Plan  Etiological work-up unrevealing thus far:   - UA unremarkable. She denies urinary symptoms. No CVA tenderness.  - Blood cultures x 2 negative   - Normal respiratory  exam. She denies respiratory complaints . CXR unremarkable.   - CT abdomen pelvis negative for intraabdominal process   - MR lumbar negative for abscess or infectious process. MR cervical and lumbar spine pending given she has more pronounced paraspinal tenderness to upper back compared to lower.   - S/p LP. Fluid analysis and cultures pending   - TSH ordered   - ? Autoimmune process. Per my chart review, patient had these same complaints (back pain, headache, fevers) during her last hospitalization back on 1/29/21. DACIA, CRP, ESR, RA factor ordered        ID consult if persistent fevers and the above work-up is negative       History of COVID-19- (present on admission)  Assessment & Plan  S/p COVID infection in 1/2021  She received her vaccinations but not her booster  No respiratory symptoms and doing well on room air   COVID-19 PCR pending     Sepsis (HCC)- (present on admission)  Assessment & Plan  This is Sepsis Present on admission  SIRS criteria identified on my evaluation include: Fever, with temperature greater than 101 deg F  Source is unknown  Sepsis protocol initiated  Fluid resuscitation ordered per protocol  Crystalloid Fluid Administration: Fluid resuscitation ordered per standard protocol - 30 mL/kg per current or ideal body weight  IV antibiotics as appropriate for source of sepsis  Reassessment: I have reassessed the patient's hemodynamic status    - Continue empiric abx pending work-up   - Tachycardia improving. Soft blood pressures, but asymptomatic   - LA 3.7 - continue to trend   - Procal 0.31  - Preliminary blood cultures negative - continue to follow         Secondary adrenal insufficiency (HCC)- (present on admission)  Assessment & Plan  Random cortisol 2.4   She was started on IV hydrocortisone on admission. Will continue x 3 days and then can be transitioned back to orals        VTE prophylaxis: enoxaparin ppx    I have performed a physical exam and reviewed and updated ROS and Plan today  (3/13/2022). In review of yesterday's note (3/12/2022), there are no changes except as documented above.

## 2022-03-13 NOTE — PROGRESS NOTES
4 Eyes Skin Assessment Completed by AGNES Anton and AGNES García.    Head: eye sclera red  Ears WDL  Nose WDL  Mouth WDL  Neck WDL  Breast/Chest WDL  Shoulder Blades WDL  Spine WDL  (R) Arm/Elbow/Hand WDL  (L) Arm/Elbow/Hand WDL  Abdomen WDL  Groin WDL  Scrotum/Coccyx/Buttocks WDL  (R) Leg: Scar on hip  (L) Leg WDL  (R) Heel/Foot/Toe WDL  (L) Heel/Foot/Toe WDL          Devices In Places Blood Pressure Cuff and Pulse Ox      Interventions In Place N/A    Possible Skin Injury No    Pictures Uploaded Into Epic N/A  Wound Consult Placed N/A  RN Wound Prevention Protocol Ordered No

## 2022-03-14 DIAGNOSIS — G06.1 ABSCESS IN EPIDURAL SPACE OF THORACIC SPINE: ICD-10-CM

## 2022-03-14 PROBLEM — D44.4 CRANIOPHARYNGIOMA (HCC): Status: RESOLVED | Noted: 2019-05-30 | Resolved: 2022-03-14

## 2022-03-14 PROBLEM — E03.9 ACQUIRED HYPOTHYROIDISM: Status: ACTIVE | Noted: 2022-03-14

## 2022-03-14 LAB
ALBUMIN SERPL BCP-MCNC: 3.9 G/DL (ref 3.2–4.9)
ALBUMIN/GLOB SERPL: 1.5 G/DL
ALP SERPL-CCNC: 86 U/L (ref 30–99)
ALT SERPL-CCNC: 15 U/L (ref 2–50)
ANION GAP SERPL CALC-SCNC: 15 MMOL/L (ref 7–16)
APTT PPP: 36.2 SEC (ref 24.7–36)
AST SERPL-CCNC: 13 U/L (ref 12–45)
B PARAP IS1001 DNA NPH QL NAA+NON-PROBE: NOT DETECTED
B PERT.PT PRMT NPH QL NAA+NON-PROBE: NOT DETECTED
BILIRUB SERPL-MCNC: 0.3 MG/DL (ref 0.1–1.5)
BUN SERPL-MCNC: 7 MG/DL (ref 8–22)
C PNEUM DNA NPH QL NAA+NON-PROBE: NOT DETECTED
CALCIUM SERPL-MCNC: 8.6 MG/DL (ref 8.5–10.5)
CHLORIDE SERPL-SCNC: 108 MMOL/L (ref 96–112)
CK SERPL-CCNC: 133 U/L (ref 0–154)
CO2 SERPL-SCNC: 19 MMOL/L (ref 20–33)
CREAT SERPL-MCNC: 0.62 MG/DL (ref 0.5–1.4)
ERYTHROCYTE [DISTWIDTH] IN BLOOD BY AUTOMATED COUNT: 44.6 FL (ref 35.9–50)
FLUAV RNA NPH QL NAA+NON-PROBE: NOT DETECTED
FLUBV RNA NPH QL NAA+NON-PROBE: NOT DETECTED
GLOBULIN SER CALC-MCNC: 2.6 G/DL (ref 1.9–3.5)
GLUCOSE SERPL-MCNC: 125 MG/DL (ref 65–99)
HADV DNA NPH QL NAA+NON-PROBE: NOT DETECTED
HCOV 229E RNA NPH QL NAA+NON-PROBE: NOT DETECTED
HCOV HKU1 RNA NPH QL NAA+NON-PROBE: NOT DETECTED
HCOV NL63 RNA NPH QL NAA+NON-PROBE: NOT DETECTED
HCOV OC43 RNA NPH QL NAA+NON-PROBE: NOT DETECTED
HCT VFR BLD AUTO: 32.9 % (ref 37–47)
HGB BLD-MCNC: 11.3 G/DL (ref 12–16)
HMPV RNA NPH QL NAA+NON-PROBE: NOT DETECTED
HPIV1 RNA NPH QL NAA+NON-PROBE: NOT DETECTED
HPIV2 RNA NPH QL NAA+NON-PROBE: NOT DETECTED
HPIV3 RNA NPH QL NAA+NON-PROBE: NOT DETECTED
HPIV4 RNA NPH QL NAA+NON-PROBE: NOT DETECTED
INR PPP: 1.17 (ref 0.87–1.13)
LACTATE BLD-SCNC: 2 MMOL/L (ref 0.5–2)
M PNEUMO DNA NPH QL NAA+NON-PROBE: NOT DETECTED
MCH RBC QN AUTO: 30.7 PG (ref 27–33)
MCHC RBC AUTO-ENTMCNC: 34.3 G/DL (ref 33.6–35)
MCV RBC AUTO: 89.4 FL (ref 81.4–97.8)
PLATELET # BLD AUTO: 247 K/UL (ref 164–446)
PMV BLD AUTO: 10 FL (ref 9–12.9)
POTASSIUM SERPL-SCNC: 3.1 MMOL/L (ref 3.6–5.5)
PROT SERPL-MCNC: 6.5 G/DL (ref 6–8.2)
PROTHROMBIN TIME: 14.6 SEC (ref 12–14.6)
RBC # BLD AUTO: 3.68 M/UL (ref 4.2–5.4)
RSV RNA NPH QL NAA+NON-PROBE: NOT DETECTED
RV+EV RNA NPH QL NAA+NON-PROBE: NOT DETECTED
SARS-COV-2 RNA NPH QL NAA+NON-PROBE: NOTDETECTED
SODIUM SERPL-SCNC: 142 MMOL/L (ref 135–145)
WBC # BLD AUTO: 15 K/UL (ref 4.8–10.8)

## 2022-03-14 PROCEDURE — 700102 HCHG RX REV CODE 250 W/ 637 OVERRIDE(OP): Performed by: INTERNAL MEDICINE

## 2022-03-14 PROCEDURE — 700117 HCHG RX CONTRAST REV CODE 255: Performed by: NURSE PRACTITIONER

## 2022-03-14 PROCEDURE — 99233 SBSQ HOSP IP/OBS HIGH 50: CPT | Performed by: NURSE PRACTITIONER

## 2022-03-14 PROCEDURE — 72156 MRI NECK SPINE W/O & W/DYE: CPT

## 2022-03-14 PROCEDURE — 85027 COMPLETE CBC AUTOMATED: CPT

## 2022-03-14 PROCEDURE — 99255 IP/OBS CONSLTJ NEW/EST HI 80: CPT | Performed by: INTERNAL MEDICINE

## 2022-03-14 PROCEDURE — 80053 COMPREHEN METABOLIC PANEL: CPT

## 2022-03-14 PROCEDURE — 83605 ASSAY OF LACTIC ACID: CPT

## 2022-03-14 PROCEDURE — 36415 COLL VENOUS BLD VENIPUNCTURE: CPT

## 2022-03-14 PROCEDURE — 85730 THROMBOPLASTIN TIME PARTIAL: CPT

## 2022-03-14 PROCEDURE — A9576 INJ PROHANCE MULTIPACK: HCPCS | Performed by: NURSE PRACTITIONER

## 2022-03-14 PROCEDURE — A9270 NON-COVERED ITEM OR SERVICE: HCPCS | Performed by: INTERNAL MEDICINE

## 2022-03-14 PROCEDURE — A9270 NON-COVERED ITEM OR SERVICE: HCPCS | Performed by: NURSE PRACTITIONER

## 2022-03-14 PROCEDURE — 700111 HCHG RX REV CODE 636 W/ 250 OVERRIDE (IP): Performed by: INTERNAL MEDICINE

## 2022-03-14 PROCEDURE — 72157 MRI CHEST SPINE W/O & W/DYE: CPT

## 2022-03-14 PROCEDURE — 85610 PROTHROMBIN TIME: CPT

## 2022-03-14 PROCEDURE — 770001 HCHG ROOM/CARE - MED/SURG/GYN PRIV*

## 2022-03-14 PROCEDURE — 700105 HCHG RX REV CODE 258: Performed by: NURSE PRACTITIONER

## 2022-03-14 PROCEDURE — 700102 HCHG RX REV CODE 250 W/ 637 OVERRIDE(OP): Performed by: NURSE PRACTITIONER

## 2022-03-14 PROCEDURE — 82550 ASSAY OF CK (CPK): CPT

## 2022-03-14 RX ORDER — 0.9 % SODIUM CHLORIDE 0.9 %
10 VIAL (ML) INJECTION PRN
Status: CANCELLED | OUTPATIENT
Start: 2022-03-14

## 2022-03-14 RX ORDER — 0.9 % SODIUM CHLORIDE 0.9 %
VIAL (ML) INJECTION PRN
Status: CANCELLED | OUTPATIENT
Start: 2022-03-14

## 2022-03-14 RX ORDER — HEPARIN SODIUM (PORCINE) LOCK FLUSH IV SOLN 100 UNIT/ML 100 UNIT/ML
500 SOLUTION INTRAVENOUS PRN
Status: CANCELLED | OUTPATIENT
Start: 2022-03-14

## 2022-03-14 RX ORDER — POTASSIUM CHLORIDE 20 MEQ/1
20 TABLET, EXTENDED RELEASE ORAL ONCE
Status: COMPLETED | OUTPATIENT
Start: 2022-03-14 | End: 2022-03-14

## 2022-03-14 RX ORDER — SODIUM CHLORIDE, SODIUM LACTATE, POTASSIUM CHLORIDE, CALCIUM CHLORIDE 600; 310; 30; 20 MG/100ML; MG/100ML; MG/100ML; MG/100ML
INJECTION, SOLUTION INTRAVENOUS CONTINUOUS
Status: DISCONTINUED | OUTPATIENT
Start: 2022-03-14 | End: 2022-03-14

## 2022-03-14 RX ORDER — 0.9 % SODIUM CHLORIDE 0.9 %
3 VIAL (ML) INJECTION PRN
Status: CANCELLED | OUTPATIENT
Start: 2022-03-14

## 2022-03-14 RX ADMIN — HYDROCORTISONE SODIUM SUCCINATE 100 MG: 100 INJECTION, POWDER, FOR SOLUTION INTRAMUSCULAR; INTRAVENOUS at 01:08

## 2022-03-14 RX ADMIN — ENOXAPARIN SODIUM 40 MG: 40 INJECTION SUBCUTANEOUS at 04:45

## 2022-03-14 RX ADMIN — SODIUM CHLORIDE, POTASSIUM CHLORIDE, SODIUM LACTATE AND CALCIUM CHLORIDE: 600; 310; 30; 20 INJECTION, SOLUTION INTRAVENOUS at 08:28

## 2022-03-14 RX ADMIN — HYDROCORTISONE SODIUM SUCCINATE 100 MG: 100 INJECTION, POWDER, FOR SOLUTION INTRAMUSCULAR; INTRAVENOUS at 17:23

## 2022-03-14 RX ADMIN — GADOTERIDOL 10 ML: 279.3 INJECTION, SOLUTION INTRAVENOUS at 00:44

## 2022-03-14 RX ADMIN — OXYCODONE 5 MG: 5 TABLET ORAL at 01:15

## 2022-03-14 RX ADMIN — HYDROCORTISONE SODIUM SUCCINATE 100 MG: 100 INJECTION, POWDER, FOR SOLUTION INTRAMUSCULAR; INTRAVENOUS at 10:35

## 2022-03-14 RX ADMIN — ACETAMINOPHEN 650 MG: 325 TABLET, FILM COATED ORAL at 10:35

## 2022-03-14 RX ADMIN — CARBOXYMETHYLCELLULOSE SODIUM 1 DROP: 5 SOLUTION/ DROPS OPHTHALMIC at 01:15

## 2022-03-14 RX ADMIN — POTASSIUM CHLORIDE 20 MEQ: 1500 TABLET, EXTENDED RELEASE ORAL at 08:27

## 2022-03-14 RX ADMIN — OXYCODONE 5 MG: 5 TABLET ORAL at 22:20

## 2022-03-14 RX ADMIN — CEFTRIAXONE SODIUM 2 G: 10 INJECTION, POWDER, FOR SOLUTION INTRAVENOUS at 15:15

## 2022-03-14 RX ADMIN — SENNOSIDES AND DOCUSATE SODIUM 2 TABLET: 50; 8.6 TABLET ORAL at 17:30

## 2022-03-14 RX ADMIN — LEVOTHYROXINE SODIUM 100 MCG: 0.1 TABLET ORAL at 04:45

## 2022-03-14 RX ADMIN — CARBOXYMETHYLCELLULOSE SODIUM 1 DROP: 5 SOLUTION/ DROPS OPHTHALMIC at 08:37

## 2022-03-14 RX ADMIN — OXYCODONE 5 MG: 5 TABLET ORAL at 13:07

## 2022-03-14 RX ADMIN — OXYCODONE 5 MG: 5 TABLET ORAL at 08:27

## 2022-03-14 ASSESSMENT — PAIN DESCRIPTION - PAIN TYPE
TYPE: ACUTE PAIN

## 2022-03-14 ASSESSMENT — ENCOUNTER SYMPTOMS
VOMITING: 0
DIAPHORESIS: 0
NAUSEA: 0
WEIGHT LOSS: 0
TINGLING: 0
COUGH: 0
SEIZURES: 0
ABDOMINAL PAIN: 0
SENSORY CHANGE: 0
WEAKNESS: 0
DIARRHEA: 0
HEARTBURN: 0
FEVER: 0
BACK PAIN: 1
DIZZINESS: 0
EYE DISCHARGE: 0
SPEECH CHANGE: 0
HEADACHES: 0
SORE THROAT: 0
BRUISES/BLEEDS EASILY: 0
STRIDOR: 0
MEMORY LOSS: 0
CHILLS: 0
SHORTNESS OF BREATH: 0
PHOTOPHOBIA: 0
DOUBLE VISION: 0
INSOMNIA: 0
POLYDIPSIA: 0
EYE PAIN: 0
WHEEZING: 0
MYALGIAS: 1
TREMORS: 0
FOCAL WEAKNESS: 0
PALPITATIONS: 0
NECK PAIN: 0
EYE REDNESS: 0
FLANK PAIN: 0
BLURRED VISION: 0

## 2022-03-14 NOTE — CARE PLAN
Problem: Pain - Standard  Goal: Alleviation of pain or a reduction in pain to the patient’s comfort goal  Outcome: Progressing     Problem: Knowledge Deficit - Standard  Goal: Patient and family/care givers will demonstrate understanding of plan of care, disease process/condition, diagnostic tests and medications  Outcome: Progressing     Problem: Fluid Volume  Goal: Fluid volume balance will be maintained  Outcome: Progressing     The patient is Watcher - Medium risk of patient condition declining or worsening    Shift Goals  Clinical Goals: pain managment  Patient Goals: rest, pain management    Progress made toward(s) clinical / shift goals:  Patient reported pain to be well managed. Pain managed with PRN medications and OOB activity. Patient was able to rest throughout shift.

## 2022-03-14 NOTE — CONSULTS
INFECTIOUS DISEASES INPATIENT CONSULT NOTE     Date of Service: 3/14/2022    Consult Requested By: EVELYN Pardo    Reason for Consultation: Thoracic epidural abscess    History of Present Illness:   Johana Weston is a 41 y.o. 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  Patient is primarily Burundian-speaking so interpretation services were utilized.  Patient was in her usual state of health until Saturday prior to admission when she developed the symptoms above.  Denies any nausea, vomiting or abdominal pain.  No dysuria or constipation.  No recent trauma or injury.  No prior respiratory infections.  On presentation, she was febrile with a T-max of 103 and a leukocytosis of 13.1.  CT head was negative.  CT abdomen and pelvis was also unremarkable.  MRI of the thoracic spine revealed a thin crescent epidural fluid collection extending from T1 down to T8 10 representing early epidural abscess or epidural hygroma.  No evidence of discitis or osteomyelitis.  ESR is 13.  She underwent a lumbar puncture on 3/13 with CSF analysis not consistent with infection.  Patient only had 3 white cells, normal glucose and protein.  CSF culture is negative and meningitis/encephalitis panel by PCR is also negative.  Blood cultures on 3/12 are negative to date.  She is currently on ceftriaxone.  Infectious disease service consulted for antibiotic recommendations.      All other review of systems reviewed and negative except those documented above in the HPI.     PMH:   Past Medical History:   Diagnosis Date   • Craniopharyngioma (HCC)    • Thyroid disease        PSH:  Past Surgical History:   Procedure Laterality Date   • TRANSSPHENOIDAL RESECTION         FAMILY HX:  Reviewed and not pertinent to the patient's clinical  presentation    SOCIAL HX:  Social History     Socioeconomic History   • Marital status: Single     Spouse name: Not on file   • Number of children: Not on file   • Years of education: Not on file   • Highest education level: Not on file   Occupational History   • Not on file   Tobacco Use   • Smoking status: Never Smoker   • Smokeless tobacco: Never Used   Vaping Use   • Vaping Use: Never used   Substance and Sexual Activity   • Alcohol use: Not Currently     Comment: occ   • Drug use: No   • Sexual activity: Not on file   Other Topics Concern   • Not on file   Social History Narrative   • Not on file     Social Determinants of Health     Financial Resource Strain: Not on file   Food Insecurity: Not on file   Transportation Needs: Not on file   Physical Activity: Not on file   Stress: Not on file   Social Connections: Not on file   Intimate Partner Violence: Not on file   Housing Stability: Not on file     Social History     Tobacco Use   Smoking Status Never Smoker   Smokeless Tobacco Never Used     Social History     Substance and Sexual Activity   Alcohol Use Not Currently    Comment: occ       Allergies/Intolerances:  Allergies   Allergen Reactions   • Pork Allergy Unspecified     Unspecified       History reviewed with the patient     Other Current Medications:    Current Facility-Administered Medications:   •  lactated ringers infusion, , Intravenous, Continuous, Nayana Ogden, A.P.R.N., Last Rate: 75 mL/hr at 03/14/22 0828, New Bag at 03/14/22 0828  •  levothyroxine (SYNTHROID) tablet 100 mcg, 100 mcg, Oral, AM ESWillian M.D., 100 mcg at 03/14/22 0445  •  senna-docusate (PERICOLACE or SENOKOT S) 8.6-50 MG per tablet 2 Tablet, 2 Tablet, Oral, BID, 2 Tablet at 03/13/22 0605 **AND** polyethylene glycol/lytes (MIRALAX) PACKET 1 Packet, 1 Packet, Oral, QDAY PRN **AND** magnesium hydroxide (MILK OF MAGNESIA) suspension 30 mL, 30 mL, Oral, QDAY PRN **AND** bisacodyl (DULCOLAX) suppository 10 mg, 10  mg, Rectal, QDAY PRN, Willian Slaughter M.D.  •  [Held by provider] enoxaparin (LOVENOX) inj 40 mg, 40 mg, Subcutaneous, DAILY, Willian Slaughter M.D., 40 mg at 03/14/22 0445  •  acetaminophen (Tylenol) tablet 650 mg, 650 mg, Oral, Q6HRS PRN, Willian Slaughter M.D., 650 mg at 03/13/22 0605  •  Notify provider if pain remains uncontrolled, , , CONTINUOUS **AND** Use the Numeric Rating Scale (NRS), Murray-Baker Faces (WBF), or FLACC on regular floors and Critical-Care Pain Observation Tool (CPOT) on ICUs/Trauma to assess pain, , , CONTINUOUS **AND** Pulse Ox, , , CONTINUOUS **AND** Pharmacy Consult Request ...Pain Management Review 1 Each, 1 Each, Other, PHARMACY TO DOSE **AND** If patient difficult to arouse and/or has respiratory depression (respiratory rate of 10 or less), stop any opiates that are currently infusing and call a Rapid Response., , , CONTINUOUS, Willian Slaughter M.D.  •  oxyCODONE immediate-release (ROXICODONE) tablet 2.5 mg, 2.5 mg, Oral, Q3HRS PRN, 2.5 mg at 03/13/22 1847 **OR** oxyCODONE immediate-release (ROXICODONE) tablet 5 mg, 5 mg, Oral, Q3HRS PRN, 5 mg at 03/14/22 0827 **OR** HYDROmorphone (Dilaudid) injection 0.25 mg, 0.25 mg, Intravenous, Q3HRS PRN, Willian Slaughter M.D.  •  cefTRIAXone (Rocephin) syringe 1 g, 1 g, Intravenous, Q24HRS, Willian Slaughter M.D., 1 g at 03/13/22 2101  •  labetalol (NORMODYNE/TRANDATE) injection 10 mg, 10 mg, Intravenous, Q4HRS PRN, Willian Slaughter M.D.  •  ondansetron (ZOFRAN) syringe/vial injection 4 mg, 4 mg, Intravenous, Q4HRS PRN, Willian Slaughter M.D.  •  ondansetron (ZOFRAN ODT) dispertab 4 mg, 4 mg, Oral, Q4HRS PRN, Willian Slaughter M.D.  •  promethazine (PHENERGAN) tablet 12.5-25 mg, 12.5-25 mg, Oral, Q4HRS PRN, Willian Slaughter M.D.  •  promethazine (PHENERGAN) suppository 12.5-25 mg, 12.5-25 mg, Rectal, Q4HRS PRN, Willian Slaughter M.D.  •  prochlorperazine (COMPAZINE) injection 5-10 mg, 5-10 mg, Intravenous, Q4HRS PRN, Willian Slaughter M.D.  •  hydrocortisone sodium  "succinate PF (Solu-CORTEF) 100 MG injection 100 mg, 100 mg, Intravenous, Q8HRS, Willian Slaughter M.D., 100 mg at 22 0108  •  carboxymethylcellulose (REFRESH TEARS) 0.5 % ophthalmic drops 1 Drop, 1 Drop, Both Eyes, PRN, EVELYN Pardo, 1 Drop at 22 0837  [unfilled]    Most Recent Vital Signs:  /61   Pulse 71   Temp 37.1 °C (98.8 °F) (Temporal)   Resp 18   Ht 1.372 m (4' 6\")   Wt 58.8 kg (129 lb 10.1 oz)   SpO2 94%   BMI 31.26 kg/m²   Temp  Av.3 °C (99.1 °F)  Min: 36.1 °C (96.9 °F)  Max: 39.4 °C (103 °F)    Physical Exam:  General: well nourished, no diaphoresis, well-appearing, no acute distress  HEENT: sclera anicteric, PERRL, extraocular muscles intact, mucous membranes moist, oropharynx clear and moist, no oral lesions or exudate  Neck: supple, no lymphadenopathy  Chest: CTAB, no rales, rhonchi or wheezes, normal work of breathing.  Cardiac: regular rate and rhythm, normal S1 S2, no murmurs, rubs or gallops  Abdomen: + bowel sounds, soft, non-tender, non-distended, no hepatosplenomegaly  Back: Upper thoracic tenderness to palpation  Extremities: WWP, no edema, 2+ pedal pulses  Skin: warm and dry, no rashes or worrisome lesions  Neuro: Alert and oriented times 3, non-focal exam, speech fluent, full range of motion to bilateral upper and lower extremities  Psych: normal mood and behavior, pleasant; memory intact, normal judgement    Pertinent Lab Results:  Recent Labs     22   WBC 13.1* 15.0*      Recent Labs     22   HEMOGLOBIN 13.6 11.3*   HEMATOCRIT 40.0 32.9*   MCV 90.1 89.4   MCH 30.6 30.7   PLATELETCT 250 247         Recent Labs     03/12/22  2012 03/14/22  0141   SODIUM 139 142   POTASSIUM 3.8 3.1*   CHLORIDE 104 108   CO2 23 19*   CREATININE 0.83 0.62        Recent Labs     22  0141   ALBUMIN 4.7 3.9        Pertinent Micro:  Results     Procedure Component Value Units Date/Time    " "Respiratory Panel By PCR [[id number]] Collected: 03/13/22 2100    Order Status: Completed Specimen: Respirate from Nasopharyngeal Updated: 03/14/22 0228     Adenovirus, PCR Not Detected     SARS-CoV-2 (COVID-19) RNA by ANIYA NotDetected     Comment: PATIENTS: Important information regarding your results and instructions can  be found at [email or web address]   \"After your  Covid-19 Test\"    [facility] providers: PLEASE REFER TO DE-ESCALATION AND RETESTING PROTOCOL  on [email or web address]    **The [employer] Respiratory Panel 2.1 (RP2.1) RT-PCR test is FDA authorized.          Coronavirus 229E, PCR Not Detected     Coronavirus HKU1, PCR Not Detected     Coronavirus NL63, PCR Not Detected     Coronavirus OC43, PCR Not Detected     Human Metapneumovirus, PCR Not Detected     Rhino/Enterovirus, PCR Not Detected     Influenza A, PCR Not Detected     Influenza B, PCR Not Detected     Parainfluenza 1, PCR Not Detected     Parainfluenza 2, PCR Not Detected     Parainfluenza 3, PCR Not Detected     Parainfluenza 4, PCR Not Detected     RSV (Respiratory Syncytial Virus), PCR Not Detected     Bordetella parapertussis (HB0943), PCR Not Detected     Bordetella pertussis (ptxP), PCR Not Detected     Mycoplasma pneumoniae, PCR Not Detected     Chlamydia pneumoniae, PCR Not Detected    Narrative:      Collected By: [id number] KRISS HSU  UTILIZATION ALERT: Has Flu/RSV/COVID PCR testing been  performed, resulted reviewed, and consulted with Infectious  Diseases or PICU Provider Teams?->No  Have you been in close contact with a person who is suspected  or known to be positive for COVID-19 within the last 30 days  (e.g. last seen that person < 30 days ago)->Yes    CSF Culture [[id number]] Collected: 03/13/22 1317    Order Status: Sent Specimen: CSF from Tap Updated: 03/13/22 1517     Significant Indicator NEG     Source CSF     Site TAP     Culture Result -     Gram Stain Result No organisms seen.    Narrative:   " "   Collected By: [id number] RAÚL ELLIS  Collected By: [id number] RAÚL ELLIS    GRAM STAIN [[id number]] Collected: 03/13/22 1317    Order Status: Completed Specimen: CSF Updated: 03/13/22 1517     Significant Indicator .     Source CSF     Site TAP     Gram Stain Result No organisms seen.    Narrative:      Collected By: [id number] RAÚL ELLIS  Collected By: [id number] RAÚL ELLIS    BLOOD CULTURE [[id number]] Collected: 03/12/22 2033    Order Status: Completed Specimen: Blood from Peripheral Updated: 03/13/22 0949     Significant Indicator NEG     Source BLD     Site PERIPHERAL     Culture Result No Growth  Note: Blood cultures are incubated for 5 days and  are monitored continuously.Positive blood cultures  are called to the RN and reported as soon as  they are identified.      Narrative:      Per Hospital Policy: Only change Specimen Src: to \"Line\" if  specified by physician order.  Per Hospital Policy: Only change Specimen Src: to \"Line\" if    BLOOD CULTURE [[id number]] Collected: 03/12/22 2012    Order Status: Completed Specimen: Blood from Peripheral Updated: 03/13/22 0949     Significant Indicator NEG     Source BLD     Site PERIPHERAL     Culture Result No Growth  Note: Blood cultures are incubated for 5 days and  are monitored continuously.Positive blood cultures  are called to the RN and reported as soon as  they are identified.      Narrative:      Per Hospital Policy: Only change Specimen Src: to \"Line\" if  specified by physician order.  Left AC    Blood Culture [[id number]]     Order Status: Canceled Specimen: Blood from Peripheral     Blood Culture [[id number]]     Order Status: Canceled Specimen: Blood from Peripheral     Urine Culture [[id number]] Collected: 03/13/22 0000    Order Status: Canceled Specimen: Other from Urine, Clean Catch     BLOOD CULTURE [[id number]] Collected: 03/13/22 0000    Order Status: Canceled Specimen: Other from Peripheral     CSF CULTURE [[id number]]     Order Status: Canceled " Specimen: CSF from Tap     GRAM STAIN ONLY [[id number]]     Order Status: Canceled Specimen: CSF     URINALYSIS [[id number]]  (Abnormal) Collected: 03/12/22 2023    Order Status: Completed Specimen: Urine Updated: 03/12/22 2043     Color Yellow     Character Clear     Specific Gravity 1.003     Ph 7.5     Glucose Negative mg/dL      Ketones Negative mg/dL      Protein Negative mg/dL      Bilirubin Negative     Urobilinogen, Urine 0.2     Nitrite Negative     Leukocyte Esterase Negative     Occult Blood Small     Micro Urine Req Microscopic    Narrative:      Indication for culture:->Evaluation for sepsis without a  clear source of infection    URINE CULTURE(NEW) [[id number]] Collected: 03/12/22 2023    Order Status: Sent Specimen: Urine Updated: 03/12/22 2034    Narrative:      Indication for culture:->Evaluation for sepsis without a  clear source of infection        No results found for: BLOODCULTU, BLDCULT, BCHOLD     Studies:  CT-ABDOMEN-PELVIS WITH    Result Date: 3/12/2022  3/12/2022 11:00 PM HISTORY/REASON FOR EXAM:  Fever and diffuse abdominal pain.. TECHNIQUE/EXAM DESCRIPTION:   CT scan of the abdomen and pelvis with contrast. Contrast-enhanced helical scanning was obtained from the diaphragmatic domes through the pubic symphysis following the bolus administration of nonionic contrast without complication. 100 mL of Omnipaque 350 nonionic contrast was administered without complication. Low dose optimization technique was utilized for this CT exam including automated exposure control and adjustment of the mA and/or kV according to patient size. COMPARISON: 1/29/2021 FINDINGS: Lower Chest: There is a calcified granuloma within the left lung base.. Liver: There is fatty change. Spleen: Unremarkable. Pancreas: Unremarkable. Gallbladder: No calcified stones. Biliary: Nondilated. Adrenal glands: Normal. Kidneys: Unremarkable without hydronephrosis. Bowel: No obstruction or acute inflammation. Lymph nodes: No  adenopathy. Vasculature: Unremarkable. Musculoskeletal: No acute or destructive process. Pelvis: No adenopathy or free fluid.     No evidence of bowel obstruction or inflammatory change within the abdomen or pelvis.    CT-HEAD W/O    Result Date: 3/12/2022  3/12/2022 11:00 PM HISTORY/REASON FOR EXAM: Headache. TECHNIQUE/EXAM DESCRIPTION AND NUMBER OF VIEWS: CT of the head without contrast. Up to date radiation dose reduction adjustments have been utilized to meet ALARA standards for radiation dose reduction. COMPARISON: None available FINDINGS: There is no evidence of mass or mass effect. CSF spaces are normal. There is no periventricular chronic small vessel ischemic change present. There is no intracranial hemorrhage seen. The calvarium is intact. There is no scalp injury. There is no evidence of sinus disease.     No evidence of acute intracranial process.     DX-CHEST-PORTABLE (1 VIEW)    Result Date: 3/12/2022  3/12/2022 8:40 PM HISTORY/REASON FOR EXAM: Sepsis TECHNIQUE/EXAM DESCRIPTION AND NUMBER OF VIEWS: Single portable view of the chest. COMPARISON: None FINDINGS: There is no evidence of focal consolidation or evidence of pulmonary edema. There is no pleural effusion. The heart is normal in size.     No evidence of acute cardiopulmonary process.    MR-CERVICAL SPINE-WITH & W/O    Result Date: 3/14/2022  3/13/2022 11:57 PM HISTORY/REASON FOR EXAM:  Suspected Epidural abscess. Back pain TECHNIQUE/EXAM DESCRIPTION: MRI of the cervical spine without and with contrast. The study was performed on a [employer] Signa 1.5 [place] MRI scanner. T1 sagittal, T2 fast spin-echo sagittal, and gradient echo axial images were obtained of the cervical spine. Optional T2 fat-suppressed sagittal images may also be obtained. T1 post-contrast fat suppressed sagittal images were obtained of the cervical spine. Optional T1 post-contrast axial images may be obtained. 10 mL ProHance gadolinium contrast was administered intravenously.  COMPARISON: MRI thoracic spine from the same date FINDINGS: Alignment in the cervical spine is normal. Marrow signal in the vertebral bodies is within normal limits. There is no abnormal osseous enhancement or other abnormal extradural enhancement. The disc spaces are intact at all cervical levels. There are no significant osteophytic changes. The prevertebral and paraspinous soft tissues are unremarkable. There is no evidence of osteomyelitis, diskitis, or epidural abscess. There are no anomalies at the craniovertebral junction. The cervical spinal cord is normal in caliber and signal throughout its course. There is no abnormal cord enhancement. There is no abnormal intradural extramedullary enhancement. At C2-3 through C7-T1 there is no significant disc bulge or protrusion. The neural foramina are intact at all cervical levels. There is no congenital or acquired central spinal stenosis at any cervical level. There is no significant facet arthropathy.     1. MRI OF THE CERVICAL SPINE WITHOUT AND WITH CONTRAST WITHIN NORMAL LIMITS.    MR-LUMBAR SPINE-WITH & W/O    Result Date: 3/13/2022  3/12/2022 11:41 PM HISTORY/REASON FOR EXAM:  Epidural abscess; Febrile, tachycardic, severe tenderness over L3-L4, concern for possible epidural abscess or infection TECHNIQUE/EXAM DESCRIPTION: MRI of the lumbar spine without and with contrast. The study was performed on a [facility] Signa 1.5 [place] MRI scanner. T1 sagittal, T2 fast spin-echo sagittal, and T2 axial images were obtained of the lumbar spine. T1 postcontrast fat-suppressed sagittal images were obtained. 10 mL ProHance contrast was administered intravenously. COMPARISON:  CT abdomen pelvis 3/12/2022 FINDINGS: Lumbar spine alignment is normal. Vertebral body heights are preserved. Intervertebral disc heights are normal. There is a small hemangioma within T12. Bone marrow signal is within normal limits. The conus terminates at the L1 level. T12-L1: Mild facet degeneration. No  stenosis. L1-2: Broad-based disc bulge and bilateral mild facet degeneration. There is no significant spinal canal stenosis or foraminal narrowing. L2-3: Broad-based disc bulge and bilateral mild facet degeneration. There is a small midline dorsal annular fissure. There is no significant spinal canal stenosis or foraminal narrowing. L3-4: Broad-based disc bulge and bilateral facet degeneration. There is no significant spinal canal stenosis or foraminal narrowing. L4-5: Broad-based disc bulge and bilateral facet degeneration. There is no significant spinal canal stenosis or foraminal narrowing. L5-S1: Broad-based disc bulge and bilateral facet degeneration without significant spinal canal stenosis or foraminal narrowing. Postcontrast images through the lumbar spine do not demonstrate any abnormal enhancement. The prevertebral soft tissues are within normal limits. No focal abnormality is identified.     Mild lumbar spine degenerative changes without canal stenosis or foraminal narrowing. No abnormal enhancement is identified.    MR-THORACIC SPINE-WITH & W/O    Result Date: 3/14/2022  3/13/2022 11:57 PM HISTORY/REASON FOR EXAM:  Epidural abscess. TECHNIQUE/EXAM DESCRIPTION: MRI of the thoracic spine without and with contrast. The study was performed on a [employer] Signa 1.5 [place] MRI scanner. T1 sagittal, T2 fast spin-echo sagittal, and T2 axial images were obtained of the thoracic spine. T1 post-contrast fat suppressed sagittal images were obtained. Optional T1 post-contrast axial images may be obtained. 10 mL ProHance gadolinium contrast was administered intravenously. COMPARISON:  MRI cervical spine 3/14/2022 FINDINGS: Alignment in the thoracic spine is normal. Marrow signal in the vertebral bodies is normal. The disc spaces are well-maintained. There is no abnormal T2 signal or enhancement in the disc spaces. There is no evidence of discitis or osteomyelitis. There is a thin crescentic rim of dorsal epidural fluid  signal throughout most of the thoracic spine extending from T1 down to T10. This measures up to about 1.5 mm in thickness (example: T2 axial image 19, series 7). There is corresponding thin linear enhancement (T1 postcontrast fat-suppressed sagittal image 7, series 20). This could represent an early epidural abscess or epidural hygroma. There is no significant mass effect on the dorsal thecal sac. There is no central stenosis at any thoracic level. The thoracic spinal cord is normal in caliber and signal throughout its course. There is no abnormal cord enhancement. There is no abnormal intradural extra medullary enhancement. The prevertebral and paraspinous soft tissues are unremarkable. The thoracic neural foramina are intact at all levels.     1.  Thin crescentic dorsal epidural fluid collection extending from T1 down to T10. This measures up to about 1.5 mm in thickness. This could represent an early epidural abscess or epidural hygroma. There is no significant mass effect on the dorsal thecal sac. No central stenosis at any thoracic level. 2.  No evidence of discitis or osteomyelitis. 3.  No myelopathic cord signal.      IMPRESSION:   1.  Thoracic epidural abscess    2.  Leukocytosis  3.  History of craniopharyngioma status post resection 2019  4.  Panhypopituitarism    PLAN:   Johana Weston is a 41 y.o. [place]adorean-speaking woman with a history of craniopharyngioma status post resection in 2019 with subsequent panhypopituitarism admitted on 3/12/2022 secondary to headache, fever and low back pain.  Patient was found to have an epidural fluid collection extending from T1 down to T10 on MRI concerning for early epidural abscess but there is no evidence of discitis or osteomyelitis.  Blood cultures on admission are negative to date.  She is currently on ceftriaxone with improvement in her fevers and back pain.  She is awaiting neurosurgery evaluation.    -Continue ceftriaxone 2 g daily  -Agree with  neurosurgery evaluation for possible drainage  -If no plans for surgery, will plan a 6-week course of IV antibiotics.  Patient has Medicaid fee-for-service and thus is not a candidate for home IV antibiotics.  She will need to go to the [facility] infusion center    Plan of care discussed with EVELYN Lang and Aleksandar, . Will continue to follow    Katrin Altamirano M.D.      Please note that this dictation was created using voice recognition software. I have worked with technical experts from [facility] to optimize the interface.  I have made every reasonable attempt to correct obvious errors, but there may be errors of grammar and possibly content that I did not discover before finalizing the note.    Addendum:  Contacted by APRN that patient evaluated by neurosurgery and will not need surgery  Thus will plan a 6-week course of IV antibiotics and repeat MRI of the thoracic spine with and without contrast prior to completion of her IV antibiotic course  PICC line ordered for tomorrow  [facility] outpatient infusion orders for ceftriaxone 2 g daily done today  Stop antibiotics 04/23/2022  Outpatient MRI of the thoracic spine with contrast ordered today

## 2022-03-14 NOTE — ASSESSMENT & PLAN NOTE
TSH < 0.0005 , T4 1.91  Will continue home Synthroid replacement   Recommend recheck in 4-6 weeks

## 2022-03-14 NOTE — PROGRESS NOTES
Received report from previous shift RN.  Assessment complete.  Patient A&O x 4. Patient calls appropriately.  Patient ambulates with no assist.    Patient has 0/10 pain.   Denies N&V. Tolerating clear liquid diet.  + void, + flatus, 3/13 loose BM.  Patient on RA, denies SOB.     Reviewed plan of care with patient. Call light and personal belongings within reach. Hourly rounding in place. All needs met at this time.

## 2022-03-14 NOTE — PROGRESS NOTES
"Pt is Thai speaking.  use for medication administration and assessments.   Assumed care of patient at 0645. Bedside report received. Assessment complete.  AA&Ox4. Denies CP/SOB.  Reporting 8/10 pain. Medicated per MAR.  Educated patient regarding pharmacologic and non pharmacologic modalities for pain management.  Skin per flowsheet.  Pt is currently NPO. Denies N/V.  + void. - BM. Last BM 3/13.  Pt ambulates independently.  All needs met at this time. Call light within reach. Pt calls appropriately. Bed low and locked, non skid socks in place. Hourly rounding in place.     /61   Pulse 71   Temp 37.1 °C (98.8 °F) (Temporal)   Resp 18   Ht 1.372 m (4' 6\")   Wt 58.8 kg (129 lb 10.1 oz)   SpO2 94%   BMI 31.26 kg/m²     "

## 2022-03-14 NOTE — PROGRESS NOTES
Hospital Medicine Daily Progress Note    Date of Service  3/14/2022    Chief Complaint  Johana Weston is a 41 y.o. female admitted 3/12/2022 with headache, fever and back pain.    Hospital Course  Johana Weston is a 41 y.o. female with a history of craniopharyngioma and thyroid disease who presented for evaluation of headache, fever, and low back pain that started the day prior to admission. She was started on empiric Rocephin and admitted to Hospitalist service for further management and work-up . LP was done 3/13 and fluid analysis not suggestive of meningitis. MRI Lumbar/cervical spine unremarkable. MRI thoracic spine with thin crescentic dorsal epidural fluid collection extending from T1 down to T10 measuring about 1.5 mm in thickness. ID and Neurosurgery consulted.       Interval Problem Update  3/13: T.max 103. Tachycardia improving. Soft blood pressures. 500mL IVFB ordered. MR lumbar spine unremarkable. MRI cervical and thoracic spine with contrast ordered. S/p LP and fluid sent for cultures and analysis. Patient says her back pain and headache are improving. Neuro exam is intact. No point tenderness. Preliminary cultures negative.   3/14: Afebrile overnight. Tachycardia resolved and BP improved. CSF studies not suggestive of meningitis, however MRI thoracic with epidural fluid collection extending from T1 - T10, suspicious for abscess. ID consulted. I discussed case with NSG, Dr. Lucero, who agreed with IV abx management given patient was clinically improving .No neuro deficits on exam. Patient says back pain stable and no neuro deficits on exam. Worsening leukocytosis. Lactic acidosis resolved. Renal function at baseline.    I have personally seen and examined the patient at bedside. I discussed the plan of care with patient, bedside RN and charge RN.    Consultants/Specialty  infectious disease    Code Status  Full Code    Disposition  Patient is not medically cleared for  discharge.   Anticipate discharge to to home with close outpatient follow-up.  I have placed the appropriate orders for post-discharge needs.    Review of Systems  Review of Systems   Constitutional: Positive for malaise/fatigue. Negative for chills, diaphoresis, fever and weight loss.   HENT: Negative for sore throat.    Eyes: Negative for blurred vision, double vision, photophobia, pain, discharge and redness.   Respiratory: Negative for cough, shortness of breath, wheezing and stridor.    Cardiovascular: Negative for chest pain, palpitations and leg swelling.   Gastrointestinal: Negative for abdominal pain, diarrhea, heartburn, nausea and vomiting.   Genitourinary: Negative for dysuria, flank pain, frequency, hematuria and urgency.   Musculoskeletal: Positive for back pain (5/10, upper back ) and myalgias. Negative for neck pain.   Skin: Negative for itching and rash.   Neurological: Negative for dizziness, tingling, tremors, sensory change, speech change, focal weakness, seizures, weakness and headaches.   Endo/Heme/Allergies: Positive for environmental allergies. Negative for polydipsia. Does not bruise/bleed easily.   Psychiatric/Behavioral: Negative for memory loss. The patient does not have insomnia.          services were used in the patient's primary language of Croatian.   Name or Number: Gm   Mode of interpretation: iPad  Content of Interpretation:  MRI and LP results, lab results, ROS , discussion of possible surgery pending Neurosurgery evaluation    Physical Exam  Temp:  [36.2 °C (97.2 °F)-37.7 °C (99.9 °F)] 36.5 °C (97.7 °F)  Pulse:  [60-88] 60  Resp:  [17-18] 17  BP: ()/(58-86) 92/58  SpO2:  [94 %-99 %] 96 %    Physical Exam  Vitals and nursing note reviewed.   Constitutional:       General: She is not in acute distress.     Appearance: She is obese. She is not ill-appearing, toxic-appearing or diaphoretic.   HENT:      Head: Normocephalic.      Right Ear: External  ear normal.      Left Ear: External ear normal.      Mouth/Throat:      Mouth: Mucous membranes are moist.      Pharynx: Oropharynx is clear.   Eyes:      General: No visual field deficit or scleral icterus.     Extraocular Movements: Extraocular movements intact.      Conjunctiva/sclera:      Right eye: Right conjunctiva is not injected. No exudate.     Pupils: Pupils are equal, round, and reactive to light.   Cardiovascular:      Rate and Rhythm: Normal rate and regular rhythm.      Pulses: Normal pulses.   Pulmonary:      Effort: Pulmonary effort is normal.      Breath sounds: Normal breath sounds.   Abdominal:      General: There is distension.      Palpations: Abdomen is soft.      Tenderness: There is no abdominal tenderness. There is no right CVA tenderness, left CVA tenderness or guarding.   Musculoskeletal:         General: No signs of injury.      Cervical back: Normal range of motion and neck supple. No rigidity or tenderness.      Right lower leg: No edema.      Left lower leg: No edema.      Comments: Significant paraspinal tenderness to thoracic and  lumbar region . Pain is worse in upper back compared to lower back with palpation, but does not worsen with spinal or upper extremity movement    Skin:     General: Skin is warm and dry.      Findings: No bruising, lesion or rash.   Neurological:      General: No focal deficit present.      Mental Status: She is alert and oriented to person, place, and time.      Cranial Nerves: No dysarthria or facial asymmetry.      Sensory: No sensory deficit.      Motor: No weakness.      Gait: Gait normal.      Comments: Negative Brudzinski and Kernig   Equal strength bilaterally    Ambulating in room independently with strong and steady gait   No inducible or spontaneous clonus   Negative babinski    Psychiatric:         Mood and Affect: Mood normal.         Thought Content: Thought content normal.         Judgment: Judgment normal.         Fluids    Intake/Output  Summary (Last 24 hours) at 3/14/2022 1402  Last data filed at 3/14/2022 1105  Gross per 24 hour   Intake 120 ml   Output 0 ml   Net 120 ml       Laboratory  Recent Labs     03/12/22 2012 03/14/22  0141   WBC 13.1* 15.0*   RBC 4.44 3.68*   HEMOGLOBIN 13.6 11.3*   HEMATOCRIT 40.0 32.9*   MCV 90.1 89.4   MCH 30.6 30.7   MCHC 34.0 34.3   RDW 44.3 44.6   PLATELETCT 250 247   MPV 9.7 10.0     Recent Labs     03/12/22 2012 03/14/22  0141   SODIUM 139 142   POTASSIUM 3.8 3.1*   CHLORIDE 104 108   CO2 23 19*   GLUCOSE 99 125*   BUN 13 7*   CREATININE 0.83 0.62   CALCIUM 10.0 8.6     Recent Labs     03/13/22  1507 03/14/22  1202   APTT  --  36.2*   INR 1.33* 1.17*               Imaging  MR-THORACIC SPINE-WITH & W/O   Final Result      1.  Thin crescentic dorsal epidural fluid collection extending from T1 down to T10. This measures up to about 1.5 mm in thickness. This could represent an early epidural abscess or epidural hygroma. There is no significant mass effect on the dorsal    thecal sac. No central stenosis at any thoracic level.   2.  No evidence of discitis or osteomyelitis.   3.  No myelopathic cord signal.      MR-CERVICAL SPINE-WITH & W/O   Final Result      1. MRI OF THE CERVICAL SPINE WITHOUT AND WITH CONTRAST WITHIN NORMAL LIMITS.      MR-LUMBAR SPINE-WITH & W/O   Final Result         Mild lumbar spine degenerative changes without canal stenosis or foraminal narrowing. No abnormal enhancement is identified.      CT-ABDOMEN-PELVIS WITH   Final Result      No evidence of bowel obstruction or inflammatory change within the abdomen or pelvis.      CT-HEAD W/O   Final Result      No evidence of acute intracranial process.         DX-CHEST-PORTABLE (1 VIEW)   Final Result      No evidence of acute cardiopulmonary process.           Assessment/Plan  * Abscess in epidural space of thoracic spine- (present on admission)  Assessment & Plan  Admitted for fevers and acute onset back pain   Started on empiric Rocephin on  admission   S/p LP on 3/13. CSF analysis not suggestive of menengitis/encephalitis . Will follow cultures   MRI thoracic spine revealed thin crescentic dorsal epidural fluid collection extending from T1 down to T10 concerning for early developing abscess   Infectious Disease consulted for abx recommendations -- recommended 6 weeks IV abx . PICC line to be placed 48hr after negative blood cx   I discussed case with [facility] Neurosurgery who recommended against surgical drainage, since patient is clinically improving with abx alone and has no neuro deficits on exam.   Continue pain control           Sepsis (HCC)- (present on admission)  Assessment & Plan  This is Sepsis Present on admission  Source is epidural abscess   Sepsis protocol initiated on admission and she received sepsis bolus     - Continue empiric abx and follow ID recommendations   - VSS on room air this morning. Last fever was 3/12 @ 2214  - Lactic acidosis resolved   - Preliminary blood cultures NGTD . Continue to follow   - CSF cultures in process         Acquired hypothyroidism- (present on admission)  Assessment & Plan  TSH < 0.0005 , T4 1.91  Will continue home Synthroid replacement   Recommend recheck in 4-6 weeks     History of COVID-19- (present on admission)  Assessment & Plan  S/p COVID infection in 1/2021  She has received her vaccinations but not her booster  Repeat COVID-19 on 3/13 negative     Secondary adrenal insufficiency (HCC)- (present on admission)  Assessment & Plan  Random cortisol 2.4   She was started on IV hydrocortisone on admission. Will continue x 3 days and then can be transitioned back to orals     Hypokalemia- (present on admission)  Assessment & Plan  K 3.1   Replaced PO   Follow on BMP        VTE prophylaxis: SCDs/TEDs and enoxaparin ppx    I have performed a physical exam and reviewed and updated ROS and Plan today (3/14/2022). In review of yesterday's note (3/13/2022), there are no changes except as documented  above.

## 2022-03-14 NOTE — DISCHARGE PLANNING
Care Transition Team Discharge Planning    Anticipates discharge disposition:  • Home with OP Infusion Follow Up    Action:  Pt was discussed in ICT rounds today and per Nayana Glaser, plan is to wait for Neurosurgery's evaluation .     Per ID notes , plan is to continue Ceftriaxone 2 g daily  and plan is to wait for neurosurgery evaluation for possible drainage  Per ID,  no plans for surgery, will plan a 6-week course of IV antibiotics.  Patient has Medicaid fee-for-service and Pt might have a big copay so Pt need to go to the [facility]. Will talk to Pt about this.     Per Dr Altamirano in [facility]  , Pt will need a six week course of IV antibiotic on discharge.     This RN CM spoke with Pt using .   Per Pt she rents a room in a trailer.    This RN CM expllained about her need of 6 weeks of IV anitibiotic.    Per pt she will ask her friend to drve for her to [facility] for the first 2 weeks and after which she will take the bus to complete her antibiotic coure at [facility].    This RN CM received a call from Marilee,  at [facility]. Pt has an appointment on March 16, Wednesday at 16:30 pm at [facility] ar [address] [place] [address]. Pt must receive first dose before discharging.     Barriers to Discharge:   Pending medical clearance    Plan:  • CM to continue to assist Pt with discharge as needed

## 2022-03-14 NOTE — CARE PLAN
Problem: Pain - Standard  Goal: Alleviation of pain or a reduction in pain to the patient’s comfort goal  Outcome: Progressing     Problem: Knowledge Deficit - Standard  Goal: Patient and family/care givers will demonstrate understanding of plan of care, disease process/condition, diagnostic tests and medications  Outcome: Progressing     Problem: Fluid Volume  Goal: Fluid volume balance will be maintained  Outcome: Progressing       The patient is Watcher - Medium risk of patient condition declining or worsening    Shift Goals  Clinical Goals: pain management, OOB activity.   Patient Goals: Rest    Progress made toward(s) clinical / shift goals:  Patient reported pain to be well managed throughout shift. Patient was able to rest and ambulate throughout shift.

## 2022-03-15 ENCOUNTER — APPOINTMENT (OUTPATIENT)
Dept: RADIOLOGY | Facility: MEDICAL CENTER | Age: 42
DRG: 871 | End: 2022-03-15
Attending: INTERNAL MEDICINE
Payer: MEDICAID

## 2022-03-15 ENCOUNTER — PHARMACY VISIT (OUTPATIENT)
Dept: PHARMACY | Facility: MEDICAL CENTER | Age: 42
End: 2022-03-15
Payer: COMMERCIAL

## 2022-03-15 VITALS
RESPIRATION RATE: 18 BRPM | HEIGHT: 55 IN | DIASTOLIC BLOOD PRESSURE: 63 MMHG | WEIGHT: 129.63 LBS | HEART RATE: 58 BPM | OXYGEN SATURATION: 100 % | SYSTOLIC BLOOD PRESSURE: 102 MMHG | TEMPERATURE: 98 F | BODY MASS INDEX: 30 KG/M2

## 2022-03-15 LAB
ANION GAP SERPL CALC-SCNC: 13 MMOL/L (ref 7–16)
BACTERIA UR CULT: NORMAL
BASOPHILS # BLD AUTO: 0.1 % (ref 0–1.8)
BASOPHILS # BLD: 0.02 K/UL (ref 0–0.12)
BUN SERPL-MCNC: 6 MG/DL (ref 8–22)
CALCIUM SERPL-MCNC: 8.9 MG/DL (ref 8.5–10.5)
CHLORIDE SERPL-SCNC: 110 MMOL/L (ref 96–112)
CO2 SERPL-SCNC: 23 MMOL/L (ref 20–33)
CREAT SERPL-MCNC: 0.54 MG/DL (ref 0.5–1.4)
EOSINOPHIL # BLD AUTO: 0 K/UL (ref 0–0.51)
EOSINOPHIL NFR BLD: 0 % (ref 0–6.9)
ERYTHROCYTE [DISTWIDTH] IN BLOOD BY AUTOMATED COUNT: 43.8 FL (ref 35.9–50)
GFR SERPLBLD CREATININE-BSD FMLA CKD-EPI: 120 ML/MIN/1.73 M 2
GLUCOSE SERPL-MCNC: 111 MG/DL (ref 65–99)
HCT VFR BLD AUTO: 33.3 % (ref 37–47)
HGB BLD-MCNC: 11.6 G/DL (ref 12–16)
IMM GRANULOCYTES # BLD AUTO: 0.13 K/UL (ref 0–0.11)
IMM GRANULOCYTES NFR BLD AUTO: 0.9 % (ref 0–0.9)
LYMPHOCYTES # BLD AUTO: 2.91 K/UL (ref 1–4.8)
LYMPHOCYTES NFR BLD: 20.7 % (ref 22–41)
MCH RBC QN AUTO: 30.5 PG (ref 27–33)
MCHC RBC AUTO-ENTMCNC: 34.8 G/DL (ref 33.6–35)
MCV RBC AUTO: 87.6 FL (ref 81.4–97.8)
MONOCYTES # BLD AUTO: 0.54 K/UL (ref 0–0.85)
MONOCYTES NFR BLD AUTO: 3.8 % (ref 0–13.4)
NEUTROPHILS # BLD AUTO: 10.47 K/UL (ref 2–7.15)
NEUTROPHILS NFR BLD: 74.5 % (ref 44–72)
NRBC # BLD AUTO: 0 K/UL
NRBC BLD-RTO: 0 /100 WBC
NUCLEAR IGG SER QL IA: NORMAL
PLATELET # BLD AUTO: 245 K/UL (ref 164–446)
PMV BLD AUTO: 10.5 FL (ref 9–12.9)
POTASSIUM SERPL-SCNC: 3.4 MMOL/L (ref 3.6–5.5)
RBC # BLD AUTO: 3.8 M/UL (ref 4.2–5.4)
SIGNIFICANT IND 70042: NORMAL
SITE SITE: NORMAL
SODIUM SERPL-SCNC: 146 MMOL/L (ref 135–145)
SOURCE SOURCE: NORMAL
WBC # BLD AUTO: 14.1 K/UL (ref 4.8–10.8)

## 2022-03-15 PROCEDURE — C1751 CATH, INF, PER/CENT/MIDLINE: HCPCS

## 2022-03-15 PROCEDURE — RXMED WILLOW AMBULATORY MEDICATION CHARGE: Performed by: FAMILY MEDICINE

## 2022-03-15 PROCEDURE — 700111 HCHG RX REV CODE 636 W/ 250 OVERRIDE (IP): Performed by: INTERNAL MEDICINE

## 2022-03-15 PROCEDURE — A9270 NON-COVERED ITEM OR SERVICE: HCPCS | Performed by: FAMILY MEDICINE

## 2022-03-15 PROCEDURE — 85025 COMPLETE CBC W/AUTO DIFF WBC: CPT

## 2022-03-15 PROCEDURE — 36415 COLL VENOUS BLD VENIPUNCTURE: CPT

## 2022-03-15 PROCEDURE — 99233 SBSQ HOSP IP/OBS HIGH 50: CPT | Performed by: INTERNAL MEDICINE

## 2022-03-15 PROCEDURE — 700102 HCHG RX REV CODE 250 W/ 637 OVERRIDE(OP): Performed by: FAMILY MEDICINE

## 2022-03-15 PROCEDURE — 80048 BASIC METABOLIC PNL TOTAL CA: CPT

## 2022-03-15 PROCEDURE — A9270 NON-COVERED ITEM OR SERVICE: HCPCS | Performed by: INTERNAL MEDICINE

## 2022-03-15 PROCEDURE — 02HV33Z INSERTION OF INFUSION DEVICE INTO SUPERIOR VENA CAVA, PERCUTANEOUS APPROACH: ICD-10-PCS | Performed by: INTERNAL MEDICINE

## 2022-03-15 PROCEDURE — 700102 HCHG RX REV CODE 250 W/ 637 OVERRIDE(OP): Performed by: INTERNAL MEDICINE

## 2022-03-15 PROCEDURE — 99239 HOSP IP/OBS DSCHRG MGMT >30: CPT | Performed by: FAMILY MEDICINE

## 2022-03-15 RX ORDER — OXYCODONE HYDROCHLORIDE 5 MG/1
5 TABLET ORAL EVERY 6 HOURS PRN
Qty: 20 TABLET | Refills: 0 | Status: SHIPPED | OUTPATIENT
Start: 2022-03-15 | End: 2022-03-20

## 2022-03-15 RX ORDER — POTASSIUM CHLORIDE 20 MEQ/1
20 TABLET, EXTENDED RELEASE ORAL DAILY
Status: DISCONTINUED | OUTPATIENT
Start: 2022-03-15 | End: 2022-03-15 | Stop reason: HOSPADM

## 2022-03-15 RX ADMIN — CARBOXYMETHYLCELLULOSE SODIUM 1 DROP: 5 SOLUTION/ DROPS OPHTHALMIC at 08:37

## 2022-03-15 RX ADMIN — HYDROCORTISONE SODIUM SUCCINATE 100 MG: 100 INJECTION, POWDER, FOR SOLUTION INTRAMUSCULAR; INTRAVENOUS at 00:53

## 2022-03-15 RX ADMIN — OXYCODONE 5 MG: 5 TABLET ORAL at 04:14

## 2022-03-15 RX ADMIN — CEFTRIAXONE SODIUM 2 G: 10 INJECTION, POWDER, FOR SOLUTION INTRAVENOUS at 14:43

## 2022-03-15 RX ADMIN — OXYCODONE 5 MG: 5 TABLET ORAL at 14:42

## 2022-03-15 RX ADMIN — LEVOTHYROXINE SODIUM 100 MCG: 0.1 TABLET ORAL at 04:14

## 2022-03-15 RX ADMIN — HYDROCORTISONE SODIUM SUCCINATE 100 MG: 100 INJECTION, POWDER, FOR SOLUTION INTRAMUSCULAR; INTRAVENOUS at 10:54

## 2022-03-15 RX ADMIN — OXYCODONE 5 MG: 5 TABLET ORAL at 10:54

## 2022-03-15 RX ADMIN — POTASSIUM CHLORIDE 20 MEQ: 1500 TABLET, EXTENDED RELEASE ORAL at 08:24

## 2022-03-15 ASSESSMENT — ENCOUNTER SYMPTOMS
VOMITING: 0
HEADACHES: 0
FEVER: 0
NAUSEA: 0
COUGH: 0
BACK PAIN: 1
ABDOMINAL PAIN: 0
DIZZINESS: 0
CHILLS: 0
SHORTNESS OF BREATH: 0
DIARRHEA: 0

## 2022-03-15 ASSESSMENT — PAIN DESCRIPTION - PAIN TYPE
TYPE: ACUTE PAIN

## 2022-03-15 NOTE — DISCHARGE PLANNING
Anticipated Discharge Disposition: Home with outpatient infusion    Action: Patient discussed during IDT rounds.  Arrangements have been made for the patient to start with outpatient infusion on 03/16.  However, patient still needs to have a PICC line place prior to discharge.      Barriers to Discharge: PICC line placement     Plan: Case coordination to continue to follow up with medical team to discuss discharge barriers.

## 2022-03-15 NOTE — PROGRESS NOTES
Received report from previous shift RN.  Assessment complete.  Patient A&O x 4. Patient calls appropriately.  Patient ambulates with no assist.    Patient has 0-8/10 pain, pain meds given per MAR.  Denies N&V. Tolerating regular diet.  + void, + flatus, 3/13 loose BM.  Patient on RA, denies SOB.     Reviewed plan of care with patient. Call light and personal belongings within reach. Hourly rounding in place. All needs met at this time

## 2022-03-15 NOTE — DISCHARGE SUMMARY
Discharge Summary    CHIEF COMPLAINT ON ADMISSION  Chief Complaint   Patient presents with   • Fever     Fever and chills started around noon today   • Back Pain     Pt also reporting pain in her nose    • Headache     Pain 9/10 started around noon today, pt took advil 600mg but had no effects on pain       Reason for Admission  Back Pain     Admission Date  3/12/2022    CODE STATUS  Full Code    HPI & HOSPITAL COURSE  This is a 41 y.o. female here with sepsis and was started on empiric coverage with IV vancomycin and Rocephin.  Lumbar puncture was done which was noted to be negative.  Further work-up showed an epidural abscess.  Neurosurgery was then called on the case, anddid not recommend any surgical intervention or procedure.  Patient was noted to have no neurological deficits.  Infectious disease was then consulted on the case.  Recommendation was IV Rocephin with end date of 4/23/2022 and a PICC line was placed.  MRI of the thoracic spine was ordered for a repeat in 4 weeks prior to completion of antibiotic course.  Patient also with known history of adrenal insufficiency, she did receive IV Solu-Cortef as a stress dose during her inpatient stay.    A qualified  was used to interpret Khmer during this encounter.  ’s name/ID number was [id number] and mode of interpretation was iPad.      Therefore, she is discharged in good and stable condition to home with close outpatient follow-up.    The patient met 2-midnight criteria for an inpatient stay at the time of discharge.    Discharge Date  3/15/2022    FOLLOW UP ITEMS POST DISCHARGE  Follow-up with ID in 6 weeks  Follow-up with neurosurgery in 6 weeks  MRI of thoracic spine will be repeated in 4 weeks    DISCHARGE DIAGNOSES  Principal Problem:    Abscess in epidural space of thoracic spine POA: Yes  Active Problems:    Sepsis (HCC) POA: Yes    Hypokalemia POA: Yes    Secondary adrenal insufficiency (HCC) POA: Yes    History of  COVID-19 POA: Yes    Acquired hypothyroidism POA: Yes  Resolved Problems:    Craniopharyngioma (HCC) POA: Yes      FOLLOW UP  Future Appointments   Date Time Provider Department Center   3/16/2022  [facility]   3/17/2022  4[facility]   3/18/2022  4[facility]30 [facility]   3/19/2022  4:30 [facility]   3/20/2022  4:45 PM [facility]   3/21/2022  4:30 [facility]   3/22/2022  4:30 [facility]   3[facility]23/2022  4:30 [facility]   3/24/2022  4:30 [facility]   3/25/2022  4:30 PM [facility]   3/26/2022  4:30 PM [facility]   3/27/2022  4:45 [facility]   3/28/2022  4:30 [facility]   3/29/2022  4[facility]30 [facility]   3/30/2022  4:30 [facility]   3/31/2022  4:30 [facility]   4/1/2022  4:30 PM [facility]   4/2/2022  3:00 PM [facility]   4/3/2022  4:30 [facility]   4/4/2022  4:30 [facility]   4/5/2022  4:30 [facility]   4/6/2022  4:30 [facility]   4/7/2022  4:30 [facility]   4/8/2022  4:30 [facility]   4/9/2022  4:30 PM [facility]   4/10/2022  4:30 [facility]   4/11/2022  4:30 PM [facility]   4/12/2022  4:30 PM [facility]   4/13/2022  4:30 PM [facility]   4/14/2022  4:30 PM [facility]   4/15/2022  4:30 PM [facility]   4/16/2022  4:30 PM [facility] [address]   4/17/2022  4:30 PM [facility] [address]   4/18/2022  4:30 PM [facility] [address]   4/19/2022  4:30 PM [facility] [address]   4/20/2022  4:30 PM [facility] [address]   4/21/2022  4:30 PM [facility] [address]   4/22/2022  4:30 PM [facility]   4/23/2022  4:30 PM [facility] [address]     Bairon Lucero M.D.  [address]  [place] [address]  [phone]    In 6 weeks      Katrin Altamirano M.D.  [address]  [address]  [place] [address]  [phone]    In 6 weeks        MEDICATIONS ON DISCHARGE     Medication List      START taking these medications      Instructions   cefTRIAXone 2 g/20 mL   Start taking on: March 16, 2022  Commonly known as: Rocephin   Infuse 20 mL into a venous catheter every 24 hours for 38 days.  Dose: 2 g     oxyCODONE immediate-release 5 MG Tabs  Commonly known as: ROXICODONE   Take 1 Tablet by mouth every 6 hours as needed for Severe Pain for up to 5 days.  Dose: 5 mg        CONTINUE taking these medications      Instructions   hydrocortisone 10 MG Tabs  Commonly known as: CORTEF   Take 10 mg by mouth 2 times a day. 1 tablet in the morning  1 tablet in the evening  Dose: 10 mg     levothyroxine 100 MCG Tabs  Commonly known as: SYNTHROID   Take 100 mcg by mouth every morning on an empty stomach.  Dose: 100 mcg        STOP taking these medications    ibuprofen 200 MG Tabs  Commonly known as: MOTRIN            Allergies  Allergies   Allergen Reactions   • Pork Allergy Unspecified     Unspecified       DIET  Orders Placed This Encounter   Procedures   • Diet Order Diet: Regular     Standing Status:   Standing     Number of Occurrences:   1     Order Specific Question:   Diet:     Answer:   Regular [1]       ACTIVITY  As tolerated.  5-lb weight restriction LEFT arm    CONSULTATIONS  ID - Adarsh  Neurosurgery - Nic    PROCEDURES  Lumbar puncture    3/13/2022    LABORATORY  Lab Results   Component Value Date    SODIUM 146 (H) 03/15/2022    POTASSIUM 3.4 (L) 03/15/2022    CHLORIDE 110 03/15/2022    CO2 23 03/15/2022    GLUCOSE 111 (H) 03/15/2022    BUN 6 (L) 03/15/2022    CREATININE 0.54 03/15/2022        Lab Results   Component Value Date    WBC 14.1 (H) 03/15/2022    HEMOGLOBIN 11.6 (L) 03/15/2022    HEMATOCRIT 33.3 (L) 03/15/2022    PLATELETCT 245 03/15/2022        Total time of the discharge process exceeds 40 minutes.

## 2022-03-15 NOTE — DISCHARGE INSTRUCTIONS
"PICC Line Instructions     How to Care for your PICC   Do not take a bath, swim, or use hot tubs when you have a PICC. Cover PICC line with clear plastic wrap and tape to keep it dry while showering.   Check the PICC insertion site daily for leakage, redness, swelling, or pain.   Flush the PICC as directed by your health care provider. Let your health care provider know right away if the PICC is difficult to flush or does not flush. Do not use force to flush the PICC.   Do not use a syringe that is less than 10 mL to flush the PICC.   Avoid blood pressure checks on the arm with the PICC.   Do not take the PICC out yourself. Only a trained clinical professional should remove the PICC.   Make sure you or anyone who access your PICC Line washes their hands before using the line.   Make sure the hub of the line is \"scrubbed\" prior to using the line.     Dressing Changes   Change the PICC dressing as instructed by your health care provider.   Change your PICC dressing if it becomes loose or wet.     When to seek medical attention   PICC is accidentally pulled all the way out.   There is any type of drainage, redness, or swelling where the PICC enters the skin.   Noticeable increase in arm circumference due to swelling of arm.   You cannot flush the PICC, it is difficult to flush, or the PICC leaks around the insertion site when it is flushed.   You hear a \"flushing\" sound when the PICC is flushed.   You notice a hole or tear in the PICC.   You develop chills or a fever.      Guía de cuidados en el hogar de catéter central de inserción periférica (CCIP)  PICC Home Care Guide    Un catéter central de inserción periférica (CCIP) es kane forma de acceso intravenoso (I.V.) que permite que los medicamentos y los líquidos intravenosos (I.V.) se distribuyan rápidamente en el organismo. Un CCIP es un tubo hammad, welch y flexible (catéter) que se inserta en kane vena de la parte superior del brazo. El catéter finaliza en kane vena " paul en el pecho (vena cava superior o VCS). Después de que se inserta el catéter, se puede franklin kane radiografía de tórax para asegurarse de que esté en el lugar correcto.  Puede ser necesario colocar el catéter por diferentes motivos, por ejemplo:  · Para suministrar medicamentos y nutrición líquida.  · Para administrar líquidos intravenosos (I.V.) y productos de la rhina.  · Si hay dificultad para colocar un catéter intravenoso periférico.  Si se cuida adecuadamente, el catéter puede permanecer en maya lugar komal varios meses. Un CCIP también permite que la persona abandone el hospital y regrese a maya casa más rápidamente. Un familiar, médico o un equipo de atención médica a domicilio pueden encargarse de los medicamentos y del cuidado del CCIP en el hogar.  ¿Cuáles son los riesgos?  Generalmente, tener un CCIP es seguro. Sin embargo, pueden ocurrir complicaciones, por ejemplo:  · Formación de un coágulo sanguíneo (trombo) en el CCIP o en la punta de alexa.  · Formación de un coágulo sanguíneo en kane vena (trombosis venosa profunda) o un coágulo que se desplaza hasta el pulmón (embolia pulmonar).  · Inflamación de la vena (flebitis) en la que se colocó el CCIP.  · Infección. La infección del torrente sanguíneo asociada con la vía central (CLABSI) es kane infección grave que, por lo general, requiere de hospitalización.  · Movimiento del CCIP (desplazamiento). La punta del CCIP se puede  de maya posición original debido a kane actividad física excesiva, tos forzada, estornudos o vómitos.  · Un alyson o rotura del catéter. Es importante no utilizar tijeras cerca del CCIP.  · Irritación o lesión del nervio o tendón komal la inserción del CCIP.  Cómo cuidar el CCIP  Prevenir problemas  · Usted y los médicos deben lavarse las alondra con jabón, con frecuencia. Lávese las alondra:  ? Antes de tocar la línea del CCIP o el dispositivo de infusión.  ? Antes de cambiar kane venda (vendaje).  · Irrigue el CCIP en la forma en  que le indicó el profesional. Informe de inmediato al médico si le resulta difícil purgar el CCIP o no puede hacerlo. No use la fuerza para irrigarlo.  · No use kane jeringa de menos de 10 ml para irrigarlo.  · Evite los controles de la presión arterial en el brazo en el que se colocó el CCIP.  · Nunca tire del catéter.  · No se quite el catéter usted mismo. Solo un profesional entrenado puede hacerlo.  · Use únicamente suministros limpios y estériles. Mantenga los suministros en un lugar seco. No vuelva a usar agujas, jeringas o demás suministros. Si lo hace, puede provocar kane infección.  · Mantenga a las mascotas y a los niños alejados del CCIP.  · Controle el lugar de la inserción del CCIP todos los días para detectar si hay signos de infección. Esté atento a los siguientes signos:  ? Pérdidas.  ? Dolor, hinchazón o enrojecimiento.  ? Líquido o rhina.  ? Calor.  ? Pus o mal olor.  Cuidado del vendaje del CCIP  · Mantenga el apósito del catéter (vendaje) limpio y seco para evitar las infecciones.  · No tome tiffany de inmersión, no nade ni use el jacuzzi hasta que el médico lo autorice. Pregúntele al médico si puede ducharse. Prince vez solo le permitan franklin tiffany de esponja. Cuando se le permita bañarse:  ? Pídale al médico que le enseñe cómo vendar el CCIP.  ? Cubra el CCIP con un envoltorio de plástico limpio y cinta adhesiva para que no se moje mientras se ducha.  · Siga las indicaciones del médico acerca de los cuidados del lugar de la inserción y el vendaje. Sathish lo siguiente:  ? Lávese las alondra con agua y jabón antes de cambiar las vendas (vendajes). Use desinfectante para alondra si no dispone de agua y jabón.  ? Cambie el vendaje kira se lo haya indicado el médico.  ? No retire los puntos (suturas), la goma para cerrar la piel o las tiras adhesivas. Es posible que estos cierres cutáneos deban permanecer en la piel komal 2 semanas o más tiempo. Si los bordes de las tiras adhesivas empiezan a despegarse y  enroscarse, puede recortar los que están sueltos. No retire las tiras adhesivas por completo a menos que el médico se lo indique.  · Cámbielo si está flojo o mojado.  Instrucciones generales    · Lleve consigo kane tarjeta de identificación de CCIP o use un brazalete de alerta médico en todo momento.  · Mantenga el tubo sujetado en todo momento, excepto cuando se use.  · Lleve consigo kane abrazadera con bordes suaves para colocar en el tubo en homa de que se rompa.  · No use tijeras u objetos punzantes cerca del tubo.  · Puede inclinar el brazo y moverlo libremente. Si el catéter está cerca o en el lugar en que dobla el codo, evite la actividad en la que deba  repetidas veces el codo.  · Evite levantar objetos pesados kira se lo haya indicado el médico.  · Concurra a todas las visitas de control kira se lo haya indicado el médico. Navy es importante.  Eliminación de suministros  · Deseche las agujas y las jeringas en un contenedor para desechos destinado a objetos punzantes(recipiente para objetos punzantes). Puede comprar un recipiente para objetos punzantes en kane farmacia, o puede hacer bryant usted mismo con kane botella de plástico rígida y un cobertor.  · Coloque vendas usadas o bolsas de infusión en kane bolsa plástica. Tire jeanette bolsa en el contenedor de basura.  Comuníquese con un médico si:  · Siente dolor en el brazo, el oído, la bishnu o los dientes.  · Tiene fiebre o siente escalofríos.  · Enrojecimiento, hinchazón o dolor alrededor del lugar de la inserción.  · Tiene líquido o rhina que sale del lugar de la inserción.  · El lugar de la inserción está caliente al tacto.  · Tiene pus o percibe mal olor que proviene del lugar de la inserción.  · La piel se siente dura y está elevada alrededor del lugar de la inserción.  Solicite ayuda de inmediato si:  · El CCIP se sale accidentalmente. Si esto ocurre, cubra el lugar de la inserción con un vendaje o kane gasa. No deseche el CCIP. El médico necesitará  examinarlo.  · El catéter fue tironeado y se chan salido parcialmente. No trate de volver a poner el CCIP.  · Usted no puede purgarlo, le resulta difícil hacerlo o pierde líquido alrededor del lugar de inserción cuando lo hace.  · Escucha un ruido al purgarlo.  · Siente que el corazón late rápidamente o faltan latidos.  · El CCIP tiene un orificio o kane rotura.  · Tiene hinchado el brazo en el que se insertó el catéter.  · Tiene kane shawnee elise subiéndole por el brazo desde el lugar de inserción del CCIP.  Resumen  · Un Catéter central de inserción periférica (CCIP) es un tubo hammad, welch y flexible (catéter) que se inserta en kane vena de la parte superior del brazo.  · El catéter lo inserta un enfermero o un médico, utilizando kane técnica estéril. Solo un profesional entrenado puede retirarlo.  · Lleve la identificación siempre con usted.  · Evite los controles de la presión arterial en el brazo en el que se colocó el CCIP.  · Si se cuida adecuadamente, el catéter puede permanecer en maya lugar komal varios meses. Un CCIP también permite que la persona abandone el hospital y regrese a maya casa más rápidamente.  Esta información no tiene kira fin reemplazar el consejo del médico. Asegúrese de hacerle al médico cualquier pregunta que tenga.  Document Released: 09/26/2009 Document Revised: 05/30/2018 Document Reviewed: 05/30/2018  Elsevier Patient Education © 2020 [employer]    Discharge Instructions    Discharged to home by car with friend. Discharged via wheelchair, hospital escort: Yes.  Special equipment needed: Not Applicable    Be sure to schedule a follow-up appointment with your primary care doctor or any specialists as instructed.     Discharge Plan:   Influenza Vaccine Indication: Not indicated: Previously immunized this influenza season and > 8 years of age (Unkown date of vaccine)    I understand that a diet low in cholesterol, fat, and sodium is recommended for good health. Unless I have been given  specific instructions below for another diet, I accept this instruction as my diet prescription.   Other diet: Regular    Special Instructions: None    · Is patient discharged on Warfarin / Coumadin?   No     Depression / Suicide Risk    As you are discharged from this [facility] facility, it is important to learn how to keep safe from harming yourself.    Recognize the warning signs:  · Abrupt changes in personality, positive or negative- including increase in energy   · Giving away possessions  · Change in eating patterns- significant weight changes-  positive or negative  · Change in sleeping patterns- unable to sleep or sleeping all the time   · Unwillingness or inability to communicate  · Depression  · Unusual sadness, discouragement and loneliness  · Talk of wanting to die  · Neglect of personal appearance   · Rebelliousness- reckless behavior  · Withdrawal from people/activities they love  · Confusion- inability to concentrate     If you or a loved one observes any of these behaviors or has concerns about self-harm, here's what you can do:  · Talk about it- your feelings and reasons for harming yourself  · Remove any means that you might use to hurt yourself (examples: pills, rope, extension cords, firearm)  · Get professional help from the community (Mental Health, Substance Abuse, psychological counseling)  · Do not be alone:Call your Safe Contact- someone whom you trust who will be there for you.  · Call your local CRISIS HOTLINE 075-5084 or 934-924-2028  · Call your local Children's Mobile Crisis Response Team [place] (449) 225-8983 or www.[employer]  · Call the toll free National Suicide Prevention Hotlines   · National Suicide Prevention Lifeline 275-983-ENWZ (1639)  · National Hope Line Network 800-SUICIDE (505-5115)

## 2022-03-15 NOTE — DISCHARGE PLANNING
Meds-to-Beds: Discharge prescription orders listed below delivered to patient's bedside. AGNES Mendoza notified. Patient counseled. Patient elected to have co-payment billed to patient account.     Current Outpatient Medications   Medication Sig Dispense Refill   • oxyCODONE immediate-release (ROXICODONE) 5 MG Tab Take 1 Tablet by mouth every 6 hours as needed for Severe Pain for up to 5 days. 20 Tablet 0      Cynthia Underwood, PharmD

## 2022-03-15 NOTE — PROCEDURES
Vascular Access Team     Patient is Czech speaking,  tablet was used for communication, pre-procedure, consent, intra-procedure, and for post-procedure instructions and questions.  ID# [id number] Prisca.    Date of Insertion: 3/15/2022  Arm Circumference: 27.5  Internal length: 41  External Length: 3  Vein Occupancy %: 38   Reason for PICC: abx  Labs: WBC 14.1, , BUN 6, Cr 0.54, GFR >60, INR 1.17     Consents confirmed, vessel patency confirmed with ultrasound. Risks and benefits of procedure explained to patient and education regarding central line associated bloodstream infections provided. Questions answered.      PICC placed in LUE per licensed provider order with ultrasound guidance.  4 Fr, 1 lumen PICC placed in Basilic vein after 1 attempt(s). 2 mL of 1% lidocaine injected intradermally at the insertion site. A 21 gauge microintroducer needle was visualized entering the vein and modified Seldinger technique was used to obtain access to the vein. 41 cm catheter inserted and brisk blood return was observed from each lumen upon aspiration. Line secured at the 3 cm marker. TCS stylet removed and observed to be fully intact. Each lumen flushed using pulsatile method without resistance with 10 mL 0.9% normal saline. PICC line secured with Biopatch and Tegaderm.     PICC tip placement location is confirmed by nurse to be in the Superior Vena Cava (SVC) utilizing 3CG technology. PICC line is appropriate for use at this time. Patient tolerated procedure well, without complications.  Patient condition relayed to primary RN or ordering physician via this post procedure note in the EMR.      Ultrasound images uploaded to PACS and viewable in the EMR - yes  Ultrasound imaged printed and placed in paper chart - no     [employer] Power PICC ref # [id number], Lot # [id number], Expiration Date 04/30/2023

## 2022-03-15 NOTE — CARE PLAN
The patient is Stable - Low risk of patient condition declining or worsening    Shift Goals  Clinical Goals: pain management, rest  Patient Goals: rest    Progress made toward(s) clinical / shift goals:    Problem: Pain - Standard  Goal: Alleviation of pain or a reduction in pain to the patient’s comfort goal  Outcome: Progressing     Problem: Knowledge Deficit - Standard  Goal: Patient and family/care givers will demonstrate understanding of plan of care, disease process/condition, diagnostic tests and medications  Outcome: Progressing       Patient is not progressing towards the following goals:

## 2022-03-15 NOTE — PROGRESS NOTES
Patient is Mongolian speaking.  used at bedside for assessment and medication administration.   Assumed care of patient at 0645. Bedside report received. Assessment complete.  AA&Ox4. Denies CP/SOB.  Reporting 0/10 pain. No intervention needed at this time.   Educated patient regarding pharmacologic and non pharmacologic modalities for pain management.  Skin per flowsheet.  Tolerating regular diet. Denies N/V.  + void. - BM. Last BM 3/13.   Pt ambulates independently.  All needs met at this time. Call light within reach. Pt calls appropriately. Bed low and locked, non skid socks in place. Hourly rounding in place.

## 2022-03-15 NOTE — PROGRESS NOTES
Infectious Disease Progress Note    Author: Katrin Altamirano M.D. Date & Time of service: 3/15/2022  8:32 AM    Chief Complaint:  Follow-up for thoracic epidural abscess    Interval History:  3/15 afebrile WBC 14.1 tolerating antibiotic without any issues.  Plan of care discussed with patient in detail    Labs Reviewed and Medications Reviewed.    Review of Systems:  Review of Systems   Constitutional: Negative for chills and fever.   Respiratory: Negative for cough and shortness of breath.    Gastrointestinal: Negative for abdominal pain, diarrhea, nausea and vomiting.   Musculoskeletal: Positive for back pain.        Improving   Neurological: Negative for dizziness and headaches.       Hemodynamics:  Temp (24hrs), Av.9 °C (98.4 °F), Min:36.5 °C (97.7 °F), Max:37.3 °C (99.1 °F)  Temperature: 36.9 °C (98.4 °F)  Pulse  Av.7  Min: 49  Max: 127   Blood Pressure: 107/60       Physical Exam:  Physical Exam  Vitals and nursing note reviewed.   Constitutional:       Appearance: Normal appearance.   HENT:      Mouth/Throat:      Mouth: Mucous membranes are moist.   Eyes:      Extraocular Movements: Extraocular movements intact.      Pupils: Pupils are equal, round, and reactive to light.   Cardiovascular:      Rate and Rhythm: Normal rate.   Pulmonary:      Effort: Pulmonary effort is normal. No respiratory distress.      Breath sounds: No wheezing.   Abdominal:      Palpations: Abdomen is soft.   Musculoskeletal:         General: Normal range of motion.      Comments: Back: Upper thoracic spinal tenderness to palpation   Skin:     General: Skin is warm and dry.   Neurological:      General: No focal deficit present.      Mental Status: She is alert and oriented to person, place, and time.   Psychiatric:         Mood and Affect: Mood normal.         Behavior: Behavior normal.         Meds:    Current Facility-Administered Medications:   •  potassium chloride SA  •  [START ON 3/16/2022] hydrocortisone sodium  succinate PF  •  cefTRIAXone (ROCEPHIN) IV  •  levothyroxine  •  senna-docusate **AND** polyethylene glycol/lytes **AND** magnesium hydroxide **AND** bisacodyl  •  acetaminophen  •  Notify provider if pain remains uncontrolled **AND** Use the Numeric Rating Scale (NRS), Murray-Baker Faces (WBF), or FLACC on regular floors and Critical-Care Pain Observation Tool (CPOT) on ICUs/Trauma to assess pain **AND** Pulse Ox **AND** Pharmacy Consult Request **AND** If patient difficult to arouse and/or has respiratory depression (respiratory rate of 10 or less), stop any opiates that are currently infusing and call a Rapid Response.  •  oxyCODONE immediate-release **OR** oxyCODONE immediate-release **OR** HYDROmorphone  •  labetalol  •  ondansetron  •  ondansetron  •  promethazine  •  promethazine  •  prochlorperazine  •  hydrocortisone sodium succinate PF  •  carboxymethylcellulose    Labs:  Recent Labs     03/12/22  2012 03/14/22  0141 03/15/22  0107   WBC 13.1* 15.0* 14.1*   RBC 4.44 3.68* 3.80*   HEMOGLOBIN 13.6 11.3* 11.6*   HEMATOCRIT 40.0 32.9* 33.3*   MCV 90.1 89.4 87.6   MCH 30.6 30.7 30.5   RDW 44.3 44.6 43.8   PLATELETCT 250 247 245   MPV 9.7 10.0 10.5   NEUTSPOLYS 81.10*  --  74.50*   LYMPHOCYTES 13.60*  --  20.70*   MONOCYTES 3.90  --  3.80   EOSINOPHILS 0.70  --  0.00   BASOPHILS 0.20  --  0.10     Recent Labs     03/12/22  2012 03/14/22  0141 03/15/22  0107   SODIUM 139 142 146*   POTASSIUM 3.8 3.1* 3.4*   CHLORIDE 104 108 110   CO2 23 19* 23   GLUCOSE 99 125* 111*   BUN 13 7* 6*   CPKTOTAL  --  133  --      Recent Labs     03/12/22  2012 03/14/22  0141 03/15/22  0107   ALBUMIN 4.7 3.9  --    TBILIRUBIN 0.3 0.3  --    ALKPHOSPHAT 117* 86  --    TOTPROTEIN 7.4 6.5  --    ALTSGPT 16 15  --    ASTSGOT 18 13  --    CREATININE 0.83 0.62 0.54       Imaging:  CT-ABDOMEN-PELVIS WITH    Result Date: 3/12/2022  3/12/2022 11:00 PM HISTORY/REASON FOR EXAM:  Fever and diffuse abdominal pain.. TECHNIQUE/EXAM DESCRIPTION:   CT  scan of the abdomen and pelvis with contrast. Contrast-enhanced helical scanning was obtained from the diaphragmatic domes through the pubic symphysis following the bolus administration of nonionic contrast without complication. 100 mL of Omnipaque 350 nonionic contrast was administered without complication. Low dose optimization technique was utilized for this CT exam including automated exposure control and adjustment of the mA and/or kV according to patient size. COMPARISON: 1/29/2021 FINDINGS: Lower Chest: There is a calcified granuloma within the left lung base.. Liver: There is fatty change. Spleen: Unremarkable. Pancreas: Unremarkable. Gallbladder: No calcified stones. Biliary: Nondilated. Adrenal glands: Normal. Kidneys: Unremarkable without hydronephrosis. Bowel: No obstruction or acute inflammation. Lymph nodes: No adenopathy. Vasculature: Unremarkable. Musculoskeletal: No acute or destructive process. Pelvis: No adenopathy or free fluid.     No evidence of bowel obstruction or inflammatory change within the abdomen or pelvis.    CT-HEAD W/O    Result Date: 3/12/2022  3/12/2022 11:00 PM HISTORY/REASON FOR EXAM: Headache. TECHNIQUE/EXAM DESCRIPTION AND NUMBER OF VIEWS: CT of the head without contrast. Up to date radiation dose reduction adjustments have been utilized to meet ALARA standards for radiation dose reduction. COMPARISON: None available FINDINGS: There is no evidence of mass or mass effect. CSF spaces are normal. There is no periventricular chronic small vessel ischemic change present. There is no intracranial hemorrhage seen. The calvarium is intact. There is no scalp injury. There is no evidence of sinus disease.     No evidence of acute intracranial process.     DX-CHEST-PORTABLE (1 VIEW)    Result Date: 3/12/2022  3/12/2022 8:40 PM HISTORY/REASON FOR EXAM: Sepsis TECHNIQUE/EXAM DESCRIPTION AND NUMBER OF VIEWS: Single portable view of the chest. COMPARISON: None FINDINGS: There is no evidence  of focal consolidation or evidence of pulmonary edema. There is no pleural effusion. The heart is normal in size.     No evidence of acute cardiopulmonary process.    MR-CERVICAL SPINE-WITH & W/O    Result Date: 3/14/2022  3/13/2022 11:57 PM HISTORY/REASON FOR EXAM:  Suspected Epidural abscess. Back pain TECHNIQUE/EXAM DESCRIPTION: MRI of the cervical spine without and with contrast. The study was performed on a [employer] Signa 1.5 [place] MRI scanner. T1 sagittal, T2 fast spin-echo sagittal, and gradient echo axial images were obtained of the cervical spine. Optional T2 fat-suppressed sagittal images may also be obtained. T1 post-contrast fat suppressed sagittal images were obtained of the cervical spine. Optional T1 post-contrast axial images may be obtained. 10 mL ProHance gadolinium contrast was administered intravenously. COMPARISON: MRI thoracic spine from the same date FINDINGS: Alignment in the cervical spine is normal. Marrow signal in the vertebral bodies is within normal limits. There is no abnormal osseous enhancement or other abnormal extradural enhancement. The disc spaces are intact at all cervical levels. There are no significant osteophytic changes. The prevertebral and paraspinous soft tissues are unremarkable. There is no evidence of osteomyelitis, diskitis, or epidural abscess. There are no anomalies at the craniovertebral junction. The cervical spinal cord is normal in caliber and signal throughout its course. There is no abnormal cord enhancement. There is no abnormal intradural extramedullary enhancement. At C2-3 through C7-T1 there is no significant disc bulge or protrusion. The neural foramina are intact at all cervical levels. There is no congenital or acquired central spinal stenosis at any cervical level. There is no significant facet arthropathy.     1. MRI OF THE CERVICAL SPINE WITHOUT AND WITH CONTRAST WITHIN NORMAL LIMITS.    MR-LUMBAR SPINE-WITH & W/O    Result Date: 3/13/2022  3/12/2022  11:41 PM HISTORY/REASON FOR EXAM:  Epidural abscess; Febrile, tachycardic, severe tenderness over L3-L4, concern for possible epidural abscess or infection TECHNIQUE/EXAM DESCRIPTION: MRI of the lumbar spine without and with contrast. The study was performed on a [employer] Signa 1.5 [place] MRI scanner. T1 sagittal, T2 fast spin-echo sagittal, and T2 axial images were obtained of the lumbar spine. T1 postcontrast fat-suppressed sagittal images were obtained. 10 mL ProHance contrast was administered intravenously. COMPARISON:  CT abdomen pelvis 3/12/2022 FINDINGS: Lumbar spine alignment is normal. Vertebral body heights are preserved. Intervertebral disc heights are normal. There is a small hemangioma within T12. Bone marrow signal is within normal limits. The conus terminates at the L1 level. T12-L1: Mild facet degeneration. No stenosis. L1-2: Broad-based disc bulge and bilateral mild facet degeneration. There is no significant spinal canal stenosis or foraminal narrowing. L2-3: Broad-based disc bulge and bilateral mild facet degeneration. There is a small midline dorsal annular fissure. There is no significant spinal canal stenosis or foraminal narrowing. L3-4: Broad-based disc bulge and bilateral facet degeneration. There is no significant spinal canal stenosis or foraminal narrowing. L4-5: Broad-based disc bulge and bilateral facet degeneration. There is no significant spinal canal stenosis or foraminal narrowing. L5-S1: Broad-based disc bulge and bilateral facet degeneration without significant spinal canal stenosis or foraminal narrowing. Postcontrast images through the lumbar spine do not demonstrate any abnormal enhancement. The prevertebral soft tissues are within normal limits. No focal abnormality is identified.     Mild lumbar spine degenerative changes without canal stenosis or foraminal narrowing. No abnormal enhancement is identified.    MR-THORACIC SPINE-WITH & W/O    Result Date: 3/14/2022  3/13/2022 11:57  PM HISTORY/REASON FOR EXAM:  Epidural abscess. TECHNIQUE/EXAM DESCRIPTION: MRI of the thoracic spine without and with contrast. The study was performed on a [employer] Signa 1.5 [place] MRI scanner. T1 sagittal, T2 fast spin-echo sagittal, and T2 axial images were obtained of the thoracic spine. T1 post-contrast fat suppressed sagittal images were obtained. Optional T1 post-contrast axial images may be obtained. 10 mL ProHance gadolinium contrast was administered intravenously. COMPARISON:  MRI cervical spine 3/14/2022 FINDINGS: Alignment in the thoracic spine is normal. Marrow signal in the vertebral bodies is normal. The disc spaces are well-maintained. There is no abnormal T2 signal or enhancement in the disc spaces. There is no evidence of discitis or osteomyelitis. There is a thin crescentic rim of dorsal epidural fluid signal throughout most of the thoracic spine extending from T1 down to T10. This measures up to about 1.5 mm in thickness (example: T2 axial image 19, series 7). There is corresponding thin linear enhancement (T1 postcontrast fat-suppressed sagittal image 7, series 20). This could represent an early epidural abscess or epidural hygroma. There is no significant mass effect on the dorsal thecal sac. There is no central stenosis at any thoracic level. The thoracic spinal cord is normal in caliber and signal throughout its course. There is no abnormal cord enhancement. There is no abnormal intradural extra medullary enhancement. The prevertebral and paraspinous soft tissues are unremarkable. The thoracic neural foramina are intact at all levels.     1.  Thin crescentic dorsal epidural fluid collection extending from T1 down to T10. This measures up to about 1.5 mm in thickness. This could represent an early epidural abscess or epidural hygroma. There is no significant mass effect on the dorsal thecal sac. No central stenosis at any thoracic level. 2.  No evidence of discitis or osteomyelitis. 3.  No  "myelopathic cord signal.      Micro:  Results     Procedure Component Value Units Date/Time    CSF Culture [[id number]] Collected: 03/13/22 1317    Order Status: Completed Specimen: CSF from Tap Updated: 03/15/22 0802     Significant Indicator NEG     Source CSF     Site TAP     Culture Result No growth at 48 hours.     Gram Stain Result No organisms seen.    Narrative:      Collected By: [id number] RAÚL ELLIS  Collected By: [id number] RAÚL ELLIS    URINE CULTURE(NEW) [[id number]] Collected: 03/12/22 2023    Order Status: Completed Specimen: Urine Updated: 03/15/22 0748     Significant Indicator NEG     Source UR     Site -     Culture Result No growth at 48 hours.    Narrative:      Indication for culture:->Evaluation for sepsis without a  clear source of infection  Indication for culture:->Evaluation for sepsis without a    Respiratory Panel By PCR [[id number]] Collected: 03/13/22 2100    Order Status: Completed Specimen: Respirate from Nasopharyngeal Updated: 03/14/22 0228     Adenovirus, PCR Not Detected     SARS-CoV-2 (COVID-19) RNA by ANIYA NotDetected     Comment: PATIENTS: Important information regarding your results and instructions can  be found at [email or web address]   \"After your  Covid-19 Test\"    [facility] providers: PLEASE REFER TO DE-ESCALATION AND RETESTING PROTOCOL  on [email or web address]    **The [employer] Respiratory Panel 2.1 (RP2.1) RT-PCR test is FDA authorized.          Coronavirus 229E, PCR Not Detected     Coronavirus HKU1, PCR Not Detected     Coronavirus NL63, PCR Not Detected     Coronavirus OC43, PCR Not Detected     Human Metapneumovirus, PCR Not Detected     Rhino/Enterovirus, PCR Not Detected     Influenza A, PCR Not Detected     Influenza B, PCR Not Detected     Parainfluenza 1, PCR Not Detected     Parainfluenza 2, PCR Not Detected     Parainfluenza 3, PCR Not Detected     Parainfluenza 4, PCR Not Detected     RSV (Respiratory Syncytial Virus), PCR Not Detected     " "Bordetella parapertussis ([id number]), PCR Not Detected     Bordetella pertussis (ptxP), PCR Not Detected     Mycoplasma pneumoniae, PCR Not Detected     Chlamydia pneumoniae, PCR Not Detected    Narrative:      Collected By: [id number] HARIKADOMENIC FILI K  UTILIZATION ALERT: Has Flu/RSV/COVID PCR testing been  performed, resulted reviewed, and consulted with Infectious  Diseases or PICU Provider Teams?->No  Have you been in close contact with a person who is suspected  or known to be positive for COVID-19 within the last 30 days  (e.g. last seen that person < 30 days ago)->Yes    GRAM STAIN [[id number]] Collected: 03/13/22 1317    Order Status: Completed Specimen: CSF Updated: 03/13/22 1517     Significant Indicator .     Source CSF     Site TAP     Gram Stain Result No organisms seen.    Narrative:      Collected By: [id number] RAÚL ELLIS  Collected By: [id number] RAÚL ELLIS    BLOOD CULTURE [[id number]] Collected: 03/12/22 2033    Order Status: Completed Specimen: Blood from Peripheral Updated: 03/13/22 0949     Significant Indicator NEG     Source BLD     Site PERIPHERAL     Culture Result No Growth  Note: Blood cultures are incubated for 5 days and  are monitored continuously.Positive blood cultures  are called to the RN and reported as soon as  they are identified.      Narrative:      Per Hospital Policy: Only change Specimen Src: to \"Line\" if  specified by physician order.  Per Hospital Policy: Only change Specimen Src: to \"Line\" if    BLOOD CULTURE [[id number]] Collected: 03/12/22 2012    Order Status: Completed Specimen: Blood from Peripheral Updated: 03/13/22 0949     Significant Indicator NEG     Source BLD     Site PERIPHERAL     Culture Result No Growth  Note: Blood cultures are incubated for 5 days and  are monitored continuously.Positive blood cultures  are called to the RN and reported as soon as  they are identified.      Narrative:      Per Hospital Policy: Only change Specimen Src: to \"Line\" if  specified " by physician order.  Left AC    Blood Culture [[id number]]     Order Status: Canceled Specimen: Blood from Peripheral     Blood Culture [[id number]]     Order Status: Canceled Specimen: Blood from Peripheral     Urine Culture [[id number]] Collected: 03/13/22 0000    Order Status: Canceled Specimen: Other from Urine, Clean Catch     BLOOD CULTURE [[id number]] Collected: 03/13/22 0000    Order Status: Canceled Specimen: Other from Peripheral     CSF CULTURE [[id number]]     Order Status: Canceled Specimen: CSF from Tap     GRAM STAIN ONLY [[id number]]     Order Status: Canceled Specimen: CSF     URINALYSIS [[id number]]  (Abnormal) Collected: 03/12/22 2023    Order Status: Completed Specimen: Urine Updated: 03/12/22 2043     Color Yellow     Character Clear     Specific Gravity 1.003     Ph 7.5     Glucose Negative mg/dL      Ketones Negative mg/dL      Protein Negative mg/dL      Bilirubin Negative     Urobilinogen, Urine 0.2     Nitrite Negative     Leukocyte Esterase Negative     Occult Blood Small     Micro Urine Req Microscopic    Narrative:      Indication for culture:->Evaluation for sepsis without a  clear source of infection          Assessment:  Active Hospital Problems    Diagnosis    • *Abscess in epidural space of thoracic spine [G06.1]    • Acquired hypothyroidism [E03.9]    • Sepsis (HCC) [A41.9]    • History of COVID-19 [Z86.16]    • Secondary adrenal insufficiency (HCC) [E27.49]    • Hypokalemia [E87.6]      41 y.o. 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  Patient was found to have an epidural fluid collection extending from T1 down to T10 on MRI concerning for early epidural abscess but there is no evidence of discitis or osteomyelitis.  Blood cultures on admission are negative to date.  She is currently on ceftriaxone with improvement in her fevers and  back pain.    Pertinent diagnoses:  Thoracic epidural abscess  Leukocytosis  History of craniopharyngioma status post resection in 2019  Panhypopituitarism    Plan:  -Continue IV ceftriaxone 2 g daily  -Per report, no plans on surgical intervention  -Will plan a 6-week course of IV antibiotics.  Stop date 4/23/2022  -Repeat MRI of the thoracic spine with contrast prior to completion of IV antibiotic course.  Outpatient MRI ordered on 3/14  -PICC line    Disposition:  [facility] outpatient infusion.  Okay to discharge patient from ID standpoint once outpatient infusion appointments have been arranged    Follow-up in the ID clinic    Discussed with internal medicine/Dr. Del Castillo.  ID signing off.

## 2022-03-16 ENCOUNTER — OUTPATIENT INFUSION SERVICES (OUTPATIENT)
Dept: ONCOLOGY | Facility: MEDICAL CENTER | Age: 42
End: 2022-03-16
Attending: INTERNAL MEDICINE
Payer: COMMERCIAL

## 2022-03-16 VITALS
TEMPERATURE: 97 F | SYSTOLIC BLOOD PRESSURE: 100 MMHG | BODY MASS INDEX: 27.97 KG/M2 | DIASTOLIC BLOOD PRESSURE: 62 MMHG | RESPIRATION RATE: 18 BRPM | WEIGHT: 129.63 LBS | HEIGHT: 57 IN | OXYGEN SATURATION: 95 % | HEART RATE: 62 BPM

## 2022-03-16 DIAGNOSIS — G06.1 ABSCESS IN EPIDURAL SPACE OF THORACIC SPINE: ICD-10-CM

## 2022-03-16 LAB
BACTERIA CSF CULT: NORMAL
GRAM STN SPEC: NORMAL
SIGNIFICANT IND 70042: NORMAL
SITE SITE: NORMAL
SOURCE SOURCE: NORMAL

## 2022-03-16 PROCEDURE — 96375 TX/PRO/DX INJ NEW DRUG ADDON: CPT

## 2022-03-16 PROCEDURE — 700111 HCHG RX REV CODE 636 W/ 250 OVERRIDE (IP): Performed by: INTERNAL MEDICINE

## 2022-03-16 PROCEDURE — 96365 THER/PROPH/DIAG IV INF INIT: CPT

## 2022-03-16 PROCEDURE — 700105 HCHG RX REV CODE 258: Performed by: INTERNAL MEDICINE

## 2022-03-16 RX ORDER — HEPARIN SODIUM (PORCINE) LOCK FLUSH IV SOLN 100 UNIT/ML 100 UNIT/ML
500 SOLUTION INTRAVENOUS PRN
Status: CANCELLED | OUTPATIENT
Start: 2022-03-17

## 2022-03-16 RX ORDER — 0.9 % SODIUM CHLORIDE 0.9 %
VIAL (ML) INJECTION PRN
Status: CANCELLED | OUTPATIENT
Start: 2022-03-17

## 2022-03-16 RX ORDER — ONDANSETRON 2 MG/ML
4 INJECTION INTRAMUSCULAR; INTRAVENOUS ONCE
Status: COMPLETED | OUTPATIENT
Start: 2022-03-16 | End: 2022-03-16

## 2022-03-16 RX ORDER — 0.9 % SODIUM CHLORIDE 0.9 %
10 VIAL (ML) INJECTION PRN
Status: CANCELLED | OUTPATIENT
Start: 2022-03-17

## 2022-03-16 RX ORDER — 0.9 % SODIUM CHLORIDE 0.9 %
3 VIAL (ML) INJECTION PRN
Status: CANCELLED | OUTPATIENT
Start: 2022-03-17

## 2022-03-16 RX ADMIN — CEFTRIAXONE SODIUM 2 G: 2 INJECTION, POWDER, FOR SOLUTION INTRAMUSCULAR; INTRAVENOUS at 17:24

## 2022-03-16 RX ADMIN — ONDANSETRON 4 MG: 2 INJECTION INTRAMUSCULAR; INTRAVENOUS at 17:49

## 2022-03-16 ASSESSMENT — PAIN DESCRIPTION - PAIN TYPE: TYPE: ACUTE PAIN

## 2022-03-16 ASSESSMENT — FIBROSIS 4 INDEX: FIB4 SCORE: 0.53

## 2022-03-16 NOTE — PROGRESS NOTES
"Pt presents to [facility] for daily ceftriaxone.  services in use. L PICC line in place; brisk blood return observed and pt tolerated well. Ceftriaxone infused with no s/s of adverse reactions. Pt complaining of severe frontal headache and nausea; pt dry heaving. Pt stating that she \"has not had a bowel movement in days\" and her PRN home oxycodone is not \"helping\". This RN called Dr. Altamirano and orders obtained for a one time IV Zofran push during visit. Pt educated on importance of bowel health and the effects of narcotics. This RN reinforced education with  and all questions answered at this time. Brisk blood return observed from PICC line before flushed and wrapped in gauze for tomorrow. Next appointment confirmed and education provided. Pt discharged to self care with all personal belongings and in NAD.  "

## 2022-03-16 NOTE — PROGRESS NOTES
Pt is primarily Arabic speaking.  used for discharge education.   Patient discharged home per MD orders. Patient ambulating wihtout assistance, on room air saturating >90%.   Discharge education provided, incisional care, follow up appointments, medication safety, patient demonstrated and verbalized understanding.   All questions answered. Tolerating diet. Voiding without difficulty. Patient educated to call MD or return to ED for concerns.   PIVs removed.

## 2022-03-17 ENCOUNTER — OUTPATIENT INFUSION SERVICES (OUTPATIENT)
Dept: ONCOLOGY | Facility: MEDICAL CENTER | Age: 42
End: 2022-03-17
Attending: INTERNAL MEDICINE
Payer: COMMERCIAL

## 2022-03-17 VITALS
RESPIRATION RATE: 18 BRPM | DIASTOLIC BLOOD PRESSURE: 65 MMHG | HEART RATE: 55 BPM | OXYGEN SATURATION: 98 % | SYSTOLIC BLOOD PRESSURE: 111 MMHG | TEMPERATURE: 97.5 F

## 2022-03-17 DIAGNOSIS — G06.1 ABSCESS IN EPIDURAL SPACE OF THORACIC SPINE: ICD-10-CM

## 2022-03-17 PROCEDURE — 700111 HCHG RX REV CODE 636 W/ 250 OVERRIDE (IP): Performed by: INTERNAL MEDICINE

## 2022-03-17 PROCEDURE — 700105 HCHG RX REV CODE 258: Performed by: INTERNAL MEDICINE

## 2022-03-17 PROCEDURE — 96365 THER/PROPH/DIAG IV INF INIT: CPT

## 2022-03-17 RX ORDER — 0.9 % SODIUM CHLORIDE 0.9 %
VIAL (ML) INJECTION PRN
Status: CANCELLED | OUTPATIENT
Start: 2022-03-18

## 2022-03-17 RX ORDER — 0.9 % SODIUM CHLORIDE 0.9 %
10 VIAL (ML) INJECTION PRN
Status: CANCELLED | OUTPATIENT
Start: 2022-03-18

## 2022-03-17 RX ORDER — 0.9 % SODIUM CHLORIDE 0.9 %
3 VIAL (ML) INJECTION PRN
Status: CANCELLED | OUTPATIENT
Start: 2022-03-18

## 2022-03-17 RX ORDER — HEPARIN SODIUM (PORCINE) LOCK FLUSH IV SOLN 100 UNIT/ML 100 UNIT/ML
500 SOLUTION INTRAVENOUS PRN
Status: CANCELLED | OUTPATIENT
Start: 2022-03-18

## 2022-03-17 RX ADMIN — CEFTRIAXONE SODIUM 2 G: 2 INJECTION, POWDER, FOR SOLUTION INTRAMUSCULAR; INTRAVENOUS at 16:40

## 2022-03-17 ASSESSMENT — PAIN DESCRIPTION - PAIN TYPE: TYPE: ACUTE PAIN

## 2022-03-18 ENCOUNTER — OUTPATIENT INFUSION SERVICES (OUTPATIENT)
Dept: ONCOLOGY | Facility: MEDICAL CENTER | Age: 42
End: 2022-03-18
Attending: INTERNAL MEDICINE
Payer: COMMERCIAL

## 2022-03-18 VITALS
HEART RATE: 58 BPM | SYSTOLIC BLOOD PRESSURE: 100 MMHG | DIASTOLIC BLOOD PRESSURE: 61 MMHG | RESPIRATION RATE: 18 BRPM | TEMPERATURE: 97.2 F | OXYGEN SATURATION: 99 %

## 2022-03-18 DIAGNOSIS — G06.1 ABSCESS IN EPIDURAL SPACE OF THORACIC SPINE: ICD-10-CM

## 2022-03-18 PROCEDURE — 96365 THER/PROPH/DIAG IV INF INIT: CPT

## 2022-03-18 PROCEDURE — 700105 HCHG RX REV CODE 258: Performed by: INTERNAL MEDICINE

## 2022-03-18 PROCEDURE — 700111 HCHG RX REV CODE 636 W/ 250 OVERRIDE (IP): Performed by: INTERNAL MEDICINE

## 2022-03-18 RX ORDER — 0.9 % SODIUM CHLORIDE 0.9 %
3 VIAL (ML) INJECTION PRN
Status: CANCELLED | OUTPATIENT
Start: 2022-03-19

## 2022-03-18 RX ORDER — 0.9 % SODIUM CHLORIDE 0.9 %
VIAL (ML) INJECTION PRN
Status: CANCELLED | OUTPATIENT
Start: 2022-03-19

## 2022-03-18 RX ORDER — 0.9 % SODIUM CHLORIDE 0.9 %
10 VIAL (ML) INJECTION PRN
Status: CANCELLED | OUTPATIENT
Start: 2022-03-19

## 2022-03-18 RX ORDER — HEPARIN SODIUM (PORCINE) LOCK FLUSH IV SOLN 100 UNIT/ML 100 UNIT/ML
500 SOLUTION INTRAVENOUS PRN
Status: CANCELLED | OUTPATIENT
Start: 2022-03-19

## 2022-03-18 RX ADMIN — CEFTRIAXONE SODIUM 2 G: 2 INJECTION, POWDER, FOR SOLUTION INTRAMUSCULAR; INTRAVENOUS at 16:55

## 2022-03-18 ASSESSMENT — PAIN DESCRIPTION - PAIN TYPE: TYPE: ACUTE PAIN

## 2022-03-18 NOTE — PROGRESS NOTES
returns for IVABX. Rocephin infused as ordered. Johana tolerated well and without incident. PICC line in good condition and has positive blood return. PICC line flushed with saline per protocol. Johana DC'd home in good condition and in NAD. Returns daily. Appointment confirm for next treatment.

## 2022-03-19 ENCOUNTER — OUTPATIENT INFUSION SERVICES (OUTPATIENT)
Dept: ONCOLOGY | Facility: MEDICAL CENTER | Age: 42
End: 2022-03-19
Attending: INTERNAL MEDICINE
Payer: COMMERCIAL

## 2022-03-19 VITALS
SYSTOLIC BLOOD PRESSURE: 103 MMHG | TEMPERATURE: 97.8 F | DIASTOLIC BLOOD PRESSURE: 75 MMHG | HEART RATE: 67 BPM | OXYGEN SATURATION: 100 % | RESPIRATION RATE: 18 BRPM

## 2022-03-19 DIAGNOSIS — G06.1 ABSCESS IN EPIDURAL SPACE OF THORACIC SPINE: ICD-10-CM

## 2022-03-19 PROCEDURE — 700105 HCHG RX REV CODE 258: Performed by: INTERNAL MEDICINE

## 2022-03-19 PROCEDURE — 96365 THER/PROPH/DIAG IV INF INIT: CPT

## 2022-03-19 PROCEDURE — 700111 HCHG RX REV CODE 636 W/ 250 OVERRIDE (IP): Performed by: INTERNAL MEDICINE

## 2022-03-19 RX ORDER — 0.9 % SODIUM CHLORIDE 0.9 %
10 VIAL (ML) INJECTION PRN
Status: CANCELLED | OUTPATIENT
Start: 2022-03-20

## 2022-03-19 RX ORDER — 0.9 % SODIUM CHLORIDE 0.9 %
3 VIAL (ML) INJECTION PRN
Status: CANCELLED | OUTPATIENT
Start: 2022-03-20

## 2022-03-19 RX ORDER — 0.9 % SODIUM CHLORIDE 0.9 %
VIAL (ML) INJECTION PRN
Status: CANCELLED | OUTPATIENT
Start: 2022-03-20

## 2022-03-19 RX ORDER — HEPARIN SODIUM (PORCINE) LOCK FLUSH IV SOLN 100 UNIT/ML 100 UNIT/ML
500 SOLUTION INTRAVENOUS PRN
Status: CANCELLED | OUTPATIENT
Start: 2022-03-20

## 2022-03-19 RX ADMIN — CEFTRIAXONE SODIUM 2 G: 2 INJECTION, POWDER, FOR SOLUTION INTRAMUSCULAR; INTRAVENOUS at 16:33

## 2022-03-19 NOTE — PROGRESS NOTES
returns for IVABX for Abscess in epidural space of thoracic spine. Reports tolerating past treatment well. Rocephin infused as ordered. Johana tolerated well and without incident. PICC line in good condition and has positive blood return. PICC line flushed with saline per protocol. Johana DC'd home in good condition and in NAD. Returns daily. Appointment confirm for next treatment.

## 2022-03-19 NOTE — PROGRESS NOTES
Patient to [facility] for Rocephin infusion. PICC line flushed with + blood return. Rocephin infused with no s/s of infusion reaction. PICC line flushed with + blood return. Wrapped in gauze and sleeve. Patient has appointment tomorrow. Patient to home in care of self.

## 2022-03-20 ENCOUNTER — OUTPATIENT INFUSION SERVICES (OUTPATIENT)
Dept: ONCOLOGY | Facility: MEDICAL CENTER | Age: 42
End: 2022-03-20
Attending: INTERNAL MEDICINE
Payer: COMMERCIAL

## 2022-03-20 VITALS
OXYGEN SATURATION: 97 % | HEART RATE: 80 BPM | TEMPERATURE: 97.8 F | SYSTOLIC BLOOD PRESSURE: 98 MMHG | DIASTOLIC BLOOD PRESSURE: 75 MMHG | RESPIRATION RATE: 16 BRPM

## 2022-03-20 DIAGNOSIS — G06.1 ABSCESS IN EPIDURAL SPACE OF THORACIC SPINE: ICD-10-CM

## 2022-03-20 PROCEDURE — 96365 THER/PROPH/DIAG IV INF INIT: CPT

## 2022-03-20 PROCEDURE — 700111 HCHG RX REV CODE 636 W/ 250 OVERRIDE (IP): Performed by: INTERNAL MEDICINE

## 2022-03-20 PROCEDURE — 700105 HCHG RX REV CODE 258: Performed by: INTERNAL MEDICINE

## 2022-03-20 RX ORDER — 0.9 % SODIUM CHLORIDE 0.9 %
10 VIAL (ML) INJECTION PRN
Status: CANCELLED | OUTPATIENT
Start: 2022-03-21

## 2022-03-20 RX ORDER — HEPARIN SODIUM (PORCINE) LOCK FLUSH IV SOLN 100 UNIT/ML 100 UNIT/ML
500 SOLUTION INTRAVENOUS PRN
Status: CANCELLED | OUTPATIENT
Start: 2022-03-21

## 2022-03-20 RX ORDER — 0.9 % SODIUM CHLORIDE 0.9 %
3 VIAL (ML) INJECTION PRN
Status: CANCELLED | OUTPATIENT
Start: 2022-03-21

## 2022-03-20 RX ORDER — 0.9 % SODIUM CHLORIDE 0.9 %
VIAL (ML) INJECTION PRN
Status: CANCELLED | OUTPATIENT
Start: 2022-03-21

## 2022-03-20 RX ADMIN — CEFTRIAXONE SODIUM 2 G: 2 INJECTION, POWDER, FOR SOLUTION INTRAMUSCULAR; INTRAVENOUS at 16:57

## 2022-03-20 ASSESSMENT — PAIN DESCRIPTION - PAIN TYPE: TYPE: ACUTE PAIN

## 2022-03-21 ENCOUNTER — OUTPATIENT INFUSION SERVICES (OUTPATIENT)
Dept: ONCOLOGY | Facility: MEDICAL CENTER | Age: 42
End: 2022-03-21
Attending: INTERNAL MEDICINE
Payer: COMMERCIAL

## 2022-03-21 VITALS
TEMPERATURE: 97.2 F | RESPIRATION RATE: 18 BRPM | WEIGHT: 129.19 LBS | HEART RATE: 73 BPM | OXYGEN SATURATION: 97 % | DIASTOLIC BLOOD PRESSURE: 65 MMHG | HEIGHT: 57 IN | BODY MASS INDEX: 27.87 KG/M2 | SYSTOLIC BLOOD PRESSURE: 98 MMHG

## 2022-03-21 DIAGNOSIS — G06.1 ABSCESS IN EPIDURAL SPACE OF THORACIC SPINE: ICD-10-CM

## 2022-03-21 LAB
ALBUMIN SERPL BCP-MCNC: 3.9 G/DL (ref 3.2–4.9)
ALBUMIN/GLOB SERPL: 1.6 G/DL
ALP SERPL-CCNC: 104 U/L (ref 30–99)
ALT SERPL-CCNC: 19 U/L (ref 2–50)
ANION GAP SERPL CALC-SCNC: 11 MMOL/L (ref 7–16)
AST SERPL-CCNC: 24 U/L (ref 12–45)
BASOPHILS # BLD AUTO: 0.3 % (ref 0–1.8)
BASOPHILS # BLD: 0.03 K/UL (ref 0–0.12)
BILIRUB SERPL-MCNC: <0.2 MG/DL (ref 0.1–1.5)
BUN SERPL-MCNC: 8 MG/DL (ref 8–22)
CALCIUM SERPL-MCNC: 8.3 MG/DL (ref 8.5–10.5)
CHLORIDE SERPL-SCNC: 109 MMOL/L (ref 96–112)
CO2 SERPL-SCNC: 20 MMOL/L (ref 20–33)
CREAT SERPL-MCNC: 0.54 MG/DL (ref 0.5–1.4)
CRP SERPL HS-MCNC: 0.38 MG/DL (ref 0–0.75)
EOSINOPHIL # BLD AUTO: 0.16 K/UL (ref 0–0.51)
EOSINOPHIL NFR BLD: 1.9 % (ref 0–6.9)
ERYTHROCYTE [DISTWIDTH] IN BLOOD BY AUTOMATED COUNT: 43.7 FL (ref 35.9–50)
ERYTHROCYTE [SEDIMENTATION RATE] IN BLOOD BY WESTERGREN METHOD: 8 MM/HOUR (ref 0–25)
GFR SERPLBLD CREATININE-BSD FMLA CKD-EPI: 120 ML/MIN/1.73 M 2
GLOBULIN SER CALC-MCNC: 2.4 G/DL (ref 1.9–3.5)
GLUCOSE SERPL-MCNC: 121 MG/DL (ref 65–99)
HCT VFR BLD AUTO: 35.7 % (ref 37–47)
HGB BLD-MCNC: 12.3 G/DL (ref 12–16)
IMM GRANULOCYTES # BLD AUTO: 0.06 K/UL (ref 0–0.11)
IMM GRANULOCYTES NFR BLD AUTO: 0.7 % (ref 0–0.9)
LYMPHOCYTES # BLD AUTO: 2.93 K/UL (ref 1–4.8)
LYMPHOCYTES NFR BLD: 34 % (ref 22–41)
MCH RBC QN AUTO: 31 PG (ref 27–33)
MCHC RBC AUTO-ENTMCNC: 34.5 G/DL (ref 33.6–35)
MCV RBC AUTO: 89.9 FL (ref 81.4–97.8)
MONOCYTES # BLD AUTO: 0.28 K/UL (ref 0–0.85)
MONOCYTES NFR BLD AUTO: 3.2 % (ref 0–13.4)
NEUTROPHILS # BLD AUTO: 5.16 K/UL (ref 2–7.15)
NEUTROPHILS NFR BLD: 59.9 % (ref 44–72)
NRBC # BLD AUTO: 0 K/UL
NRBC BLD-RTO: 0 /100 WBC
OUTPT INFUS CBC COMMENT OICOM: ABNORMAL
PLATELET # BLD AUTO: 320 K/UL (ref 164–446)
PMV BLD AUTO: 9.6 FL (ref 9–12.9)
POTASSIUM SERPL-SCNC: 4.1 MMOL/L (ref 3.6–5.5)
PROT SERPL-MCNC: 6.3 G/DL (ref 6–8.2)
RBC # BLD AUTO: 3.97 M/UL (ref 4.2–5.4)
SODIUM SERPL-SCNC: 140 MMOL/L (ref 135–145)
WBC # BLD AUTO: 8.6 K/UL (ref 4.8–10.8)

## 2022-03-21 PROCEDURE — 36592 COLLECT BLOOD FROM PICC: CPT

## 2022-03-21 PROCEDURE — 700111 HCHG RX REV CODE 636 W/ 250 OVERRIDE (IP): Performed by: INTERNAL MEDICINE

## 2022-03-21 PROCEDURE — 85025 COMPLETE CBC W/AUTO DIFF WBC: CPT

## 2022-03-21 PROCEDURE — 80053 COMPREHEN METABOLIC PANEL: CPT

## 2022-03-21 PROCEDURE — 96365 THER/PROPH/DIAG IV INF INIT: CPT

## 2022-03-21 PROCEDURE — 85652 RBC SED RATE AUTOMATED: CPT

## 2022-03-21 PROCEDURE — 700105 HCHG RX REV CODE 258: Performed by: INTERNAL MEDICINE

## 2022-03-21 PROCEDURE — 86140 C-REACTIVE PROTEIN: CPT

## 2022-03-21 RX ORDER — 0.9 % SODIUM CHLORIDE 0.9 %
3 VIAL (ML) INJECTION PRN
Status: CANCELLED | OUTPATIENT
Start: 2022-03-22

## 2022-03-21 RX ORDER — 0.9 % SODIUM CHLORIDE 0.9 %
10 VIAL (ML) INJECTION PRN
Status: CANCELLED | OUTPATIENT
Start: 2022-03-22

## 2022-03-21 RX ORDER — HEPARIN SODIUM (PORCINE) LOCK FLUSH IV SOLN 100 UNIT/ML 100 UNIT/ML
500 SOLUTION INTRAVENOUS PRN
Status: CANCELLED | OUTPATIENT
Start: 2022-03-22

## 2022-03-21 RX ORDER — 0.9 % SODIUM CHLORIDE 0.9 %
VIAL (ML) INJECTION PRN
Status: CANCELLED | OUTPATIENT
Start: 2022-03-22

## 2022-03-21 RX ADMIN — CEFTRIAXONE SODIUM 2 G: 2 INJECTION, POWDER, FOR SOLUTION INTRAMUSCULAR; INTRAVENOUS at 16:45

## 2022-03-21 ASSESSMENT — FIBROSIS 4 INDEX: FIB4 SCORE: 0.53

## 2022-03-21 NOTE — PROGRESS NOTES
Patient presented to [facility] for daily antibiotic therapy. Interpretive services used for this visit. CANELO PICC flushed easily, kaity briskly. Ceftriaxone infused as ordered. Patient tolerated well. PICC flushed with NS per policy. Patient left [facility] in no apparent distress.  Next appointment time confirmed prior to departure.

## 2022-03-21 NOTE — PROGRESS NOTES
Pt is here today for antibiotic therapy. Language line used for traslation. Pt denies any new problems or issues, denies any S/S of infection. PICC to left upper arm, clean/dry and intact no redness noted. Labs drawn from PICC.  Flushed well with good blood return. Pt tolerated infusion well. Flushed PICC line, discharged pt home with return appointment.

## 2022-03-22 ENCOUNTER — OUTPATIENT INFUSION SERVICES (OUTPATIENT)
Dept: ONCOLOGY | Facility: MEDICAL CENTER | Age: 42
End: 2022-03-22
Attending: INTERNAL MEDICINE
Payer: COMMERCIAL

## 2022-03-22 VITALS
SYSTOLIC BLOOD PRESSURE: 95 MMHG | TEMPERATURE: 98 F | RESPIRATION RATE: 18 BRPM | OXYGEN SATURATION: 98 % | DIASTOLIC BLOOD PRESSURE: 60 MMHG | HEART RATE: 64 BPM

## 2022-03-22 DIAGNOSIS — G06.1 ABSCESS IN EPIDURAL SPACE OF THORACIC SPINE: ICD-10-CM

## 2022-03-22 PROCEDURE — 700111 HCHG RX REV CODE 636 W/ 250 OVERRIDE (IP): Performed by: INTERNAL MEDICINE

## 2022-03-22 PROCEDURE — 700105 HCHG RX REV CODE 258: Performed by: INTERNAL MEDICINE

## 2022-03-22 PROCEDURE — 96365 THER/PROPH/DIAG IV INF INIT: CPT

## 2022-03-22 RX ORDER — 0.9 % SODIUM CHLORIDE 0.9 %
VIAL (ML) INJECTION PRN
Status: CANCELLED | OUTPATIENT
Start: 2022-03-23

## 2022-03-22 RX ORDER — HEPARIN SODIUM (PORCINE) LOCK FLUSH IV SOLN 100 UNIT/ML 100 UNIT/ML
500 SOLUTION INTRAVENOUS PRN
Status: CANCELLED | OUTPATIENT
Start: 2022-03-23

## 2022-03-22 RX ORDER — 0.9 % SODIUM CHLORIDE 0.9 %
10 VIAL (ML) INJECTION PRN
Status: CANCELLED | OUTPATIENT
Start: 2022-03-23

## 2022-03-22 RX ORDER — 0.9 % SODIUM CHLORIDE 0.9 %
3 VIAL (ML) INJECTION PRN
Status: CANCELLED | OUTPATIENT
Start: 2022-03-23

## 2022-03-22 RX ADMIN — CEFTRIAXONE SODIUM 2 G: 2 INJECTION, POWDER, FOR SOLUTION INTRAMUSCULAR; INTRAVENOUS at 16:33

## 2022-03-23 ENCOUNTER — OUTPATIENT INFUSION SERVICES (OUTPATIENT)
Dept: ONCOLOGY | Facility: MEDICAL CENTER | Age: 42
End: 2022-03-23
Attending: INTERNAL MEDICINE
Payer: COMMERCIAL

## 2022-03-23 VITALS
DIASTOLIC BLOOD PRESSURE: 78 MMHG | HEART RATE: 64 BPM | OXYGEN SATURATION: 100 % | RESPIRATION RATE: 16 BRPM | TEMPERATURE: 98 F | SYSTOLIC BLOOD PRESSURE: 102 MMHG

## 2022-03-23 DIAGNOSIS — G06.1 ABSCESS IN EPIDURAL SPACE OF THORACIC SPINE: ICD-10-CM

## 2022-03-23 PROCEDURE — 700105 HCHG RX REV CODE 258: Performed by: INTERNAL MEDICINE

## 2022-03-23 PROCEDURE — 96365 THER/PROPH/DIAG IV INF INIT: CPT

## 2022-03-23 PROCEDURE — 700111 HCHG RX REV CODE 636 W/ 250 OVERRIDE (IP): Performed by: INTERNAL MEDICINE

## 2022-03-23 RX ORDER — 0.9 % SODIUM CHLORIDE 0.9 %
VIAL (ML) INJECTION PRN
Status: CANCELLED | OUTPATIENT
Start: 2022-03-24

## 2022-03-23 RX ORDER — HEPARIN SODIUM (PORCINE) LOCK FLUSH IV SOLN 100 UNIT/ML 100 UNIT/ML
500 SOLUTION INTRAVENOUS PRN
Status: CANCELLED | OUTPATIENT
Start: 2022-03-24

## 2022-03-23 RX ORDER — 0.9 % SODIUM CHLORIDE 0.9 %
10 VIAL (ML) INJECTION PRN
Status: CANCELLED | OUTPATIENT
Start: 2022-03-24

## 2022-03-23 RX ORDER — 0.9 % SODIUM CHLORIDE 0.9 %
3 VIAL (ML) INJECTION PRN
Status: CANCELLED | OUTPATIENT
Start: 2022-03-24

## 2022-03-23 RX ADMIN — CEFTRIAXONE SODIUM 2 G: 2 INJECTION, POWDER, FOR SOLUTION INTRAMUSCULAR; INTRAVENOUS at 16:41

## 2022-03-23 NOTE — PROGRESS NOTES
Patient presented to [facility] for daily antibiotic therapy. Interpretive services used for this visit. CANELO flushed easily, kaity briskly. Ceftriaxone infused as ordered. Patient tolerated well. PICC dressing change done. Site without s/sx if infection. PICC flushed with NS per policy, and clave changed. Patient left [facility] in no apparent distress.

## 2022-03-24 ENCOUNTER — OUTPATIENT INFUSION SERVICES (OUTPATIENT)
Dept: ONCOLOGY | Facility: MEDICAL CENTER | Age: 42
End: 2022-03-24
Attending: INTERNAL MEDICINE
Payer: COMMERCIAL

## 2022-03-24 VITALS
SYSTOLIC BLOOD PRESSURE: 97 MMHG | RESPIRATION RATE: 18 BRPM | OXYGEN SATURATION: 98 % | HEART RATE: 76 BPM | TEMPERATURE: 97.8 F | DIASTOLIC BLOOD PRESSURE: 55 MMHG

## 2022-03-24 DIAGNOSIS — G06.1 ABSCESS IN EPIDURAL SPACE OF THORACIC SPINE: ICD-10-CM

## 2022-03-24 PROCEDURE — 96374 THER/PROPH/DIAG INJ IV PUSH: CPT

## 2022-03-24 PROCEDURE — 700105 HCHG RX REV CODE 258: Performed by: INTERNAL MEDICINE

## 2022-03-24 PROCEDURE — 700111 HCHG RX REV CODE 636 W/ 250 OVERRIDE (IP): Performed by: INTERNAL MEDICINE

## 2022-03-24 PROCEDURE — 96365 THER/PROPH/DIAG IV INF INIT: CPT

## 2022-03-24 RX ORDER — 0.9 % SODIUM CHLORIDE 0.9 %
VIAL (ML) INJECTION PRN
Status: CANCELLED | OUTPATIENT
Start: 2022-03-25

## 2022-03-24 RX ORDER — 0.9 % SODIUM CHLORIDE 0.9 %
3 VIAL (ML) INJECTION PRN
Status: CANCELLED | OUTPATIENT
Start: 2022-03-25

## 2022-03-24 RX ORDER — 0.9 % SODIUM CHLORIDE 0.9 %
10 VIAL (ML) INJECTION PRN
Status: CANCELLED | OUTPATIENT
Start: 2022-03-25

## 2022-03-24 RX ORDER — HEPARIN SODIUM (PORCINE) LOCK FLUSH IV SOLN 100 UNIT/ML 100 UNIT/ML
500 SOLUTION INTRAVENOUS PRN
Status: CANCELLED | OUTPATIENT
Start: 2022-03-25

## 2022-03-24 RX ADMIN — CEFTRIAXONE SODIUM 2 G: 2 INJECTION, POWDER, FOR SOLUTION INTRAMUSCULAR; INTRAVENOUS at 16:45

## 2022-03-24 NOTE — PROGRESS NOTES
Pt presents to [facility] for daily ceftriaxone. L PICC line in place; brisk blood return observed and pt tolerated well. Ceftriaxone infused with no s/s of adverse reactions. Brisk blood return observed from PICC line before flushed and wrapped in gauze for tomorrow. Next appointment confirmed and education provided. Pt discharged to self care with all personal belongings and in NAD.

## 2022-03-25 ENCOUNTER — OUTPATIENT INFUSION SERVICES (OUTPATIENT)
Dept: ONCOLOGY | Facility: MEDICAL CENTER | Age: 42
End: 2022-03-25
Attending: INTERNAL MEDICINE
Payer: COMMERCIAL

## 2022-03-25 VITALS
OXYGEN SATURATION: 98 % | SYSTOLIC BLOOD PRESSURE: 90 MMHG | TEMPERATURE: 97.6 F | RESPIRATION RATE: 18 BRPM | DIASTOLIC BLOOD PRESSURE: 60 MMHG | HEART RATE: 88 BPM

## 2022-03-25 DIAGNOSIS — G06.1 ABSCESS IN EPIDURAL SPACE OF THORACIC SPINE: ICD-10-CM

## 2022-03-25 PROCEDURE — 96374 THER/PROPH/DIAG INJ IV PUSH: CPT

## 2022-03-25 PROCEDURE — 96365 THER/PROPH/DIAG IV INF INIT: CPT

## 2022-03-25 PROCEDURE — 700111 HCHG RX REV CODE 636 W/ 250 OVERRIDE (IP): Performed by: INTERNAL MEDICINE

## 2022-03-25 PROCEDURE — 700105 HCHG RX REV CODE 258: Performed by: INTERNAL MEDICINE

## 2022-03-25 RX ORDER — 0.9 % SODIUM CHLORIDE 0.9 %
10 VIAL (ML) INJECTION PRN
Status: CANCELLED | OUTPATIENT
Start: 2022-03-26

## 2022-03-25 RX ORDER — 0.9 % SODIUM CHLORIDE 0.9 %
3 VIAL (ML) INJECTION PRN
Status: CANCELLED | OUTPATIENT
Start: 2022-03-26

## 2022-03-25 RX ORDER — HEPARIN SODIUM (PORCINE) LOCK FLUSH IV SOLN 100 UNIT/ML 100 UNIT/ML
500 SOLUTION INTRAVENOUS PRN
Status: CANCELLED | OUTPATIENT
Start: 2022-03-26

## 2022-03-25 RX ORDER — 0.9 % SODIUM CHLORIDE 0.9 %
VIAL (ML) INJECTION PRN
Status: CANCELLED | OUTPATIENT
Start: 2022-03-26

## 2022-03-25 RX ADMIN — CEFTRIAXONE SODIUM 2 G: 2 INJECTION, POWDER, FOR SOLUTION INTRAMUSCULAR; INTRAVENOUS at 16:35

## 2022-03-25 NOTE — PROGRESS NOTES
Pt arrived ambulatory to [facility] for daily antibiotics. CANELO PICC with brisk blood return, clave changed. Pt medicated per MAR. Pt tolerated treatment without s/s adverse reaction. PICC flushed with NS, wrapped with gauze and mesh sleeve. Pt discharged to self care, NAD. Pt to return tomorrow.

## 2022-03-25 NOTE — PROGRESS NOTES
Pt arrived ambulatory to [facility] for daily antibiotic infusion. CANELO PICC with brisk blood return. Pt medicated per MAR. Pt tolerated treatment without s/s adverse reaction. PICC flushed with NS, wrapped with gauze and mesh sleeve. Pt to return tomorrow.

## 2022-03-26 ENCOUNTER — OUTPATIENT INFUSION SERVICES (OUTPATIENT)
Dept: ONCOLOGY | Facility: MEDICAL CENTER | Age: 42
End: 2022-03-26
Attending: INTERNAL MEDICINE
Payer: COMMERCIAL

## 2022-03-26 VITALS
OXYGEN SATURATION: 98 % | DIASTOLIC BLOOD PRESSURE: 59 MMHG | TEMPERATURE: 98.3 F | RESPIRATION RATE: 18 BRPM | SYSTOLIC BLOOD PRESSURE: 96 MMHG | HEART RATE: 97 BPM

## 2022-03-26 DIAGNOSIS — G06.1 ABSCESS IN EPIDURAL SPACE OF THORACIC SPINE: ICD-10-CM

## 2022-03-26 PROCEDURE — 700105 HCHG RX REV CODE 258: Performed by: INTERNAL MEDICINE

## 2022-03-26 PROCEDURE — 96365 THER/PROPH/DIAG IV INF INIT: CPT

## 2022-03-26 PROCEDURE — 700111 HCHG RX REV CODE 636 W/ 250 OVERRIDE (IP): Performed by: INTERNAL MEDICINE

## 2022-03-26 RX ORDER — 0.9 % SODIUM CHLORIDE 0.9 %
10 VIAL (ML) INJECTION PRN
Status: CANCELLED | OUTPATIENT
Start: 2022-03-27

## 2022-03-26 RX ORDER — 0.9 % SODIUM CHLORIDE 0.9 %
3 VIAL (ML) INJECTION PRN
Status: CANCELLED | OUTPATIENT
Start: 2022-03-27

## 2022-03-26 RX ORDER — 0.9 % SODIUM CHLORIDE 0.9 %
VIAL (ML) INJECTION PRN
Status: CANCELLED | OUTPATIENT
Start: 2022-03-27

## 2022-03-26 RX ORDER — HEPARIN SODIUM (PORCINE) LOCK FLUSH IV SOLN 100 UNIT/ML 100 UNIT/ML
500 SOLUTION INTRAVENOUS PRN
Status: CANCELLED | OUTPATIENT
Start: 2022-03-27

## 2022-03-26 RX ADMIN — CEFTRIAXONE SODIUM 2 G: 2 INJECTION, POWDER, FOR SOLUTION INTRAMUSCULAR; INTRAVENOUS at 15:57

## 2022-03-26 NOTE — PROGRESS NOTES
Pt returns for daily IV Ceftriaxone. L PICC in place. Line flushed and patent, brisk blood return observed.  services utilized to perform assessment and discuss plan of care. Abx infused as ordered. Pt laurie well. Line flushed clear, PICC flushed and site wrapped. Pt knows to cont to return daily, discharged home under self care in no apparent distress.

## 2022-03-27 ENCOUNTER — OUTPATIENT INFUSION SERVICES (OUTPATIENT)
Dept: ONCOLOGY | Facility: MEDICAL CENTER | Age: 42
End: 2022-03-27
Attending: INTERNAL MEDICINE
Payer: COMMERCIAL

## 2022-03-27 VITALS
TEMPERATURE: 98.4 F | WEIGHT: 129.19 LBS | DIASTOLIC BLOOD PRESSURE: 65 MMHG | RESPIRATION RATE: 18 BRPM | SYSTOLIC BLOOD PRESSURE: 103 MMHG | HEART RATE: 74 BPM | BODY MASS INDEX: 28.26 KG/M2

## 2022-03-27 DIAGNOSIS — G06.1 ABSCESS IN EPIDURAL SPACE OF THORACIC SPINE: ICD-10-CM

## 2022-03-27 PROCEDURE — 700111 HCHG RX REV CODE 636 W/ 250 OVERRIDE (IP): Performed by: INTERNAL MEDICINE

## 2022-03-27 PROCEDURE — 96365 THER/PROPH/DIAG IV INF INIT: CPT

## 2022-03-27 PROCEDURE — 700105 HCHG RX REV CODE 258: Performed by: INTERNAL MEDICINE

## 2022-03-27 RX ORDER — 0.9 % SODIUM CHLORIDE 0.9 %
VIAL (ML) INJECTION PRN
Status: CANCELLED | OUTPATIENT
Start: 2022-03-28

## 2022-03-27 RX ORDER — 0.9 % SODIUM CHLORIDE 0.9 %
10 VIAL (ML) INJECTION PRN
Status: CANCELLED | OUTPATIENT
Start: 2022-03-28

## 2022-03-27 RX ORDER — 0.9 % SODIUM CHLORIDE 0.9 %
3 VIAL (ML) INJECTION PRN
Status: CANCELLED | OUTPATIENT
Start: 2022-03-28

## 2022-03-27 RX ORDER — HEPARIN SODIUM (PORCINE) LOCK FLUSH IV SOLN 100 UNIT/ML 100 UNIT/ML
500 SOLUTION INTRAVENOUS PRN
Status: CANCELLED | OUTPATIENT
Start: 2022-03-28

## 2022-03-27 RX ADMIN — CEFTRIAXONE SODIUM 2 G: 2 INJECTION, POWDER, FOR SOLUTION INTRAMUSCULAR; INTRAVENOUS at 16:11

## 2022-03-27 ASSESSMENT — FIBROSIS 4 INDEX: FIB4 SCORE: 0.67

## 2022-03-27 NOTE — PROGRESS NOTES
Johana arrived ambulatory to [facility] for daily antibiotic infusion. CANELO PICC with brisk blood return. Pt medicated per MAR. Pt tolerated treatment without s/s adverse reaction. PICC flushed with NS, wrapped with gauze and mesh sleeve. Pt discharged to self care, NAD. Pt to return tomorrow.

## 2022-03-28 ENCOUNTER — OUTPATIENT INFUSION SERVICES (OUTPATIENT)
Dept: ONCOLOGY | Facility: MEDICAL CENTER | Age: 42
End: 2022-03-28
Attending: INTERNAL MEDICINE
Payer: COMMERCIAL

## 2022-03-28 VITALS
HEIGHT: 57 IN | DIASTOLIC BLOOD PRESSURE: 59 MMHG | SYSTOLIC BLOOD PRESSURE: 87 MMHG | WEIGHT: 133.6 LBS | BODY MASS INDEX: 28.82 KG/M2 | RESPIRATION RATE: 18 BRPM | TEMPERATURE: 97.9 F | HEART RATE: 69 BPM

## 2022-03-28 DIAGNOSIS — G06.1 ABSCESS IN EPIDURAL SPACE OF THORACIC SPINE: ICD-10-CM

## 2022-03-28 LAB
ALBUMIN SERPL BCP-MCNC: 3.9 G/DL (ref 3.2–4.9)
ALBUMIN/GLOB SERPL: 2 G/DL
ALP SERPL-CCNC: 109 U/L (ref 30–99)
ALT SERPL-CCNC: 40 U/L (ref 2–50)
ANION GAP SERPL CALC-SCNC: 11 MMOL/L (ref 7–16)
AST SERPL-CCNC: 36 U/L (ref 12–45)
BASOPHILS # BLD AUTO: 0.7 % (ref 0–1.8)
BASOPHILS # BLD: 0.05 K/UL (ref 0–0.12)
BILIRUB SERPL-MCNC: <0.2 MG/DL (ref 0.1–1.5)
BUN SERPL-MCNC: 9 MG/DL (ref 8–22)
CALCIUM SERPL-MCNC: 8.4 MG/DL (ref 8.5–10.5)
CHLORIDE SERPL-SCNC: 106 MMOL/L (ref 96–112)
CO2 SERPL-SCNC: 21 MMOL/L (ref 20–33)
CREAT SERPL-MCNC: 0.5 MG/DL (ref 0.5–1.4)
EOSINOPHIL # BLD AUTO: 0.17 K/UL (ref 0–0.51)
EOSINOPHIL NFR BLD: 2.3 % (ref 0–6.9)
ERYTHROCYTE [DISTWIDTH] IN BLOOD BY AUTOMATED COUNT: 44.5 FL (ref 35.9–50)
GFR SERPLBLD CREATININE-BSD FMLA CKD-EPI: 122 ML/MIN/1.73 M 2
GLOBULIN SER CALC-MCNC: 2 G/DL (ref 1.9–3.5)
GLUCOSE SERPL-MCNC: 137 MG/DL (ref 65–99)
HCT VFR BLD AUTO: 32.4 % (ref 37–47)
HGB BLD-MCNC: 11.1 G/DL (ref 12–16)
IMM GRANULOCYTES # BLD AUTO: 0.04 K/UL (ref 0–0.11)
IMM GRANULOCYTES NFR BLD AUTO: 0.5 % (ref 0–0.9)
LYMPHOCYTES # BLD AUTO: 2.3 K/UL (ref 1–4.8)
LYMPHOCYTES NFR BLD: 31.4 % (ref 22–41)
MCH RBC QN AUTO: 31.1 PG (ref 27–33)
MCHC RBC AUTO-ENTMCNC: 34.3 G/DL (ref 33.6–35)
MCV RBC AUTO: 90.8 FL (ref 81.4–97.8)
MONOCYTES # BLD AUTO: 0.32 K/UL (ref 0–0.85)
MONOCYTES NFR BLD AUTO: 4.4 % (ref 0–13.4)
NEUTROPHILS # BLD AUTO: 4.45 K/UL (ref 2–7.15)
NEUTROPHILS NFR BLD: 60.7 % (ref 44–72)
NRBC # BLD AUTO: 0 K/UL
NRBC BLD-RTO: 0 /100 WBC
OUTPT INFUS CBC COMMENT OICOM: ABNORMAL
PLATELET # BLD AUTO: 282 K/UL (ref 164–446)
PMV BLD AUTO: 9.8 FL (ref 9–12.9)
POTASSIUM SERPL-SCNC: 4 MMOL/L (ref 3.6–5.5)
PROT SERPL-MCNC: 5.9 G/DL (ref 6–8.2)
RBC # BLD AUTO: 3.57 M/UL (ref 4.2–5.4)
SODIUM SERPL-SCNC: 138 MMOL/L (ref 135–145)
WBC # BLD AUTO: 7.3 K/UL (ref 4.8–10.8)

## 2022-03-28 PROCEDURE — 700105 HCHG RX REV CODE 258: Performed by: INTERNAL MEDICINE

## 2022-03-28 PROCEDURE — 80053 COMPREHEN METABOLIC PANEL: CPT

## 2022-03-28 PROCEDURE — 85025 COMPLETE CBC W/AUTO DIFF WBC: CPT

## 2022-03-28 PROCEDURE — 36592 COLLECT BLOOD FROM PICC: CPT

## 2022-03-28 PROCEDURE — 700111 HCHG RX REV CODE 636 W/ 250 OVERRIDE (IP): Performed by: INTERNAL MEDICINE

## 2022-03-28 PROCEDURE — 96365 THER/PROPH/DIAG IV INF INIT: CPT

## 2022-03-28 RX ORDER — 0.9 % SODIUM CHLORIDE 0.9 %
10 VIAL (ML) INJECTION PRN
Status: CANCELLED | OUTPATIENT
Start: 2022-03-29

## 2022-03-28 RX ORDER — 0.9 % SODIUM CHLORIDE 0.9 %
3 VIAL (ML) INJECTION PRN
Status: CANCELLED | OUTPATIENT
Start: 2022-03-29

## 2022-03-28 RX ORDER — 0.9 % SODIUM CHLORIDE 0.9 %
VIAL (ML) INJECTION PRN
Status: CANCELLED | OUTPATIENT
Start: 2022-03-29

## 2022-03-28 RX ORDER — ACETAMINOPHEN 500 MG
500-1000 TABLET ORAL EVERY 6 HOURS PRN
COMMUNITY

## 2022-03-28 RX ORDER — HEPARIN SODIUM (PORCINE) LOCK FLUSH IV SOLN 100 UNIT/ML 100 UNIT/ML
500 SOLUTION INTRAVENOUS PRN
Status: CANCELLED | OUTPATIENT
Start: 2022-03-29

## 2022-03-28 RX ADMIN — CEFTRIAXONE SODIUM 2 G: 2 INJECTION, POWDER, FOR SOLUTION INTRAMUSCULAR; INTRAVENOUS at 16:52

## 2022-03-28 ASSESSMENT — FIBROSIS 4 INDEX: FIB4 SCORE: 0.67

## 2022-03-29 ENCOUNTER — OUTPATIENT INFUSION SERVICES (OUTPATIENT)
Dept: ONCOLOGY | Facility: MEDICAL CENTER | Age: 42
End: 2022-03-29
Attending: INTERNAL MEDICINE
Payer: COMMERCIAL

## 2022-03-29 VITALS
RESPIRATION RATE: 18 BRPM | OXYGEN SATURATION: 98 % | SYSTOLIC BLOOD PRESSURE: 89 MMHG | TEMPERATURE: 97.4 F | DIASTOLIC BLOOD PRESSURE: 57 MMHG | HEART RATE: 69 BPM

## 2022-03-29 DIAGNOSIS — G06.1 ABSCESS IN EPIDURAL SPACE OF THORACIC SPINE: ICD-10-CM

## 2022-03-29 PROCEDURE — 700105 HCHG RX REV CODE 258: Performed by: INTERNAL MEDICINE

## 2022-03-29 PROCEDURE — 700111 HCHG RX REV CODE 636 W/ 250 OVERRIDE (IP): Performed by: INTERNAL MEDICINE

## 2022-03-29 PROCEDURE — 99211 OFF/OP EST MAY X REQ PHY/QHP: CPT

## 2022-03-29 PROCEDURE — 96365 THER/PROPH/DIAG IV INF INIT: CPT

## 2022-03-29 RX ORDER — 0.9 % SODIUM CHLORIDE 0.9 %
3 VIAL (ML) INJECTION PRN
Status: CANCELLED | OUTPATIENT
Start: 2022-03-30

## 2022-03-29 RX ORDER — 0.9 % SODIUM CHLORIDE 0.9 %
10 VIAL (ML) INJECTION PRN
Status: CANCELLED | OUTPATIENT
Start: 2022-03-30

## 2022-03-29 RX ORDER — HEPARIN SODIUM (PORCINE) LOCK FLUSH IV SOLN 100 UNIT/ML 100 UNIT/ML
500 SOLUTION INTRAVENOUS PRN
Status: CANCELLED | OUTPATIENT
Start: 2022-03-30

## 2022-03-29 RX ORDER — 0.9 % SODIUM CHLORIDE 0.9 %
VIAL (ML) INJECTION PRN
Status: CANCELLED | OUTPATIENT
Start: 2022-03-30

## 2022-03-29 RX ADMIN — CEFTRIAXONE SODIUM 2 G: 2 INJECTION, POWDER, FOR SOLUTION INTRAMUSCULAR; INTRAVENOUS at 16:38

## 2022-03-29 NOTE — PROGRESS NOTES
Pt returns to IS for Rocephin for thoracic spine abscess.  [place] interpretor used to discuss plan of care and complete assessment.  Pt denies pain or other complaints over night.  LUE PICC flushed with NS and brisk blood return observed.  Labs drawn as ordered.  Rocephin infused without adverse reaction.  PICC flushed with NS and line secured with sleeve.  Confirmed tomorrow's appt time with pt.  Pt dc home to self care.

## 2022-03-30 ENCOUNTER — OUTPATIENT INFUSION SERVICES (OUTPATIENT)
Dept: ONCOLOGY | Facility: MEDICAL CENTER | Age: 42
End: 2022-03-30
Attending: INTERNAL MEDICINE
Payer: COMMERCIAL

## 2022-03-30 VITALS
HEART RATE: 67 BPM | DIASTOLIC BLOOD PRESSURE: 62 MMHG | SYSTOLIC BLOOD PRESSURE: 94 MMHG | RESPIRATION RATE: 18 BRPM | OXYGEN SATURATION: 99 % | TEMPERATURE: 98.9 F

## 2022-03-30 DIAGNOSIS — G06.1 ABSCESS IN EPIDURAL SPACE OF THORACIC SPINE: ICD-10-CM

## 2022-03-30 PROCEDURE — 700105 HCHG RX REV CODE 258: Performed by: INTERNAL MEDICINE

## 2022-03-30 PROCEDURE — 96365 THER/PROPH/DIAG IV INF INIT: CPT

## 2022-03-30 PROCEDURE — 700111 HCHG RX REV CODE 636 W/ 250 OVERRIDE (IP): Performed by: INTERNAL MEDICINE

## 2022-03-30 RX ORDER — 0.9 % SODIUM CHLORIDE 0.9 %
10 VIAL (ML) INJECTION PRN
Status: CANCELLED | OUTPATIENT
Start: 2022-03-31

## 2022-03-30 RX ORDER — 0.9 % SODIUM CHLORIDE 0.9 %
VIAL (ML) INJECTION PRN
Status: CANCELLED | OUTPATIENT
Start: 2022-03-31

## 2022-03-30 RX ORDER — 0.9 % SODIUM CHLORIDE 0.9 %
3 VIAL (ML) INJECTION PRN
Status: CANCELLED | OUTPATIENT
Start: 2022-03-31

## 2022-03-30 RX ORDER — HEPARIN SODIUM (PORCINE) LOCK FLUSH IV SOLN 100 UNIT/ML 100 UNIT/ML
500 SOLUTION INTRAVENOUS PRN
Status: CANCELLED | OUTPATIENT
Start: 2022-03-31

## 2022-03-30 RX ADMIN — CEFTRIAXONE SODIUM 2 G: 2 INJECTION, POWDER, FOR SOLUTION INTRAMUSCULAR; INTRAVENOUS at 16:17

## 2022-03-30 NOTE — PROGRESS NOTES
Johana arrives to [facility] for daily Rocephin for abscess in the epidural space of the thoracic spine. Utilized  services #[id number] Hay for assessment. Patient denies acute health concerns. Denies GI complications nor fatigue. Patient reports feeling much better compared to the first week on IV antibiotics. LUE  PICC flushes easily and has brisk blood return. Rocephin infused per MAR without issues. Patient to return tomorrow 3/31. Discharged home to self care in no apparent distress.

## 2022-03-30 NOTE — PROGRESS NOTES
Patient in the clinic for IV ceftriaxone.  services in use. L PICC line in place; brisk blood return. Site intact. Patient denies any discomfort. Tolerated infusion well. PICC line dressing changed per protocol, connector changed as well. PICC line flushed with saline post infusion per protocol. Left clinic ambulatory without any distress. RTC tomorrow as scheduled.

## 2022-03-31 ENCOUNTER — OUTPATIENT INFUSION SERVICES (OUTPATIENT)
Dept: ONCOLOGY | Facility: MEDICAL CENTER | Age: 42
End: 2022-03-31
Attending: INTERNAL MEDICINE
Payer: COMMERCIAL

## 2022-03-31 VITALS
HEART RATE: 69 BPM | OXYGEN SATURATION: 100 % | RESPIRATION RATE: 18 BRPM | DIASTOLIC BLOOD PRESSURE: 60 MMHG | TEMPERATURE: 97.8 F | SYSTOLIC BLOOD PRESSURE: 93 MMHG

## 2022-03-31 DIAGNOSIS — G06.1 ABSCESS IN EPIDURAL SPACE OF THORACIC SPINE: ICD-10-CM

## 2022-03-31 PROCEDURE — 700111 HCHG RX REV CODE 636 W/ 250 OVERRIDE (IP): Performed by: INTERNAL MEDICINE

## 2022-03-31 PROCEDURE — 96365 THER/PROPH/DIAG IV INF INIT: CPT

## 2022-03-31 PROCEDURE — 700105 HCHG RX REV CODE 258: Performed by: INTERNAL MEDICINE

## 2022-03-31 RX ORDER — HEPARIN SODIUM (PORCINE) LOCK FLUSH IV SOLN 100 UNIT/ML 100 UNIT/ML
500 SOLUTION INTRAVENOUS PRN
Status: CANCELLED | OUTPATIENT
Start: 2022-04-01

## 2022-03-31 RX ORDER — 0.9 % SODIUM CHLORIDE 0.9 %
10 VIAL (ML) INJECTION PRN
Status: CANCELLED | OUTPATIENT
Start: 2022-04-01

## 2022-03-31 RX ORDER — 0.9 % SODIUM CHLORIDE 0.9 %
VIAL (ML) INJECTION PRN
Status: CANCELLED | OUTPATIENT
Start: 2022-04-01

## 2022-03-31 RX ORDER — 0.9 % SODIUM CHLORIDE 0.9 %
3 VIAL (ML) INJECTION PRN
Status: CANCELLED | OUTPATIENT
Start: 2022-04-01

## 2022-03-31 RX ADMIN — CEFTRIAXONE SODIUM 2 G: 2 INJECTION, POWDER, FOR SOLUTION INTRAMUSCULAR; INTRAVENOUS at 16:42

## 2022-04-01 ENCOUNTER — OUTPATIENT INFUSION SERVICES (OUTPATIENT)
Dept: ONCOLOGY | Facility: MEDICAL CENTER | Age: 42
End: 2022-04-01
Attending: INTERNAL MEDICINE
Payer: COMMERCIAL

## 2022-04-01 VITALS
HEART RATE: 72 BPM | TEMPERATURE: 97.4 F | DIASTOLIC BLOOD PRESSURE: 54 MMHG | SYSTOLIC BLOOD PRESSURE: 92 MMHG | OXYGEN SATURATION: 98 % | RESPIRATION RATE: 16 BRPM

## 2022-04-01 DIAGNOSIS — G06.1 ABSCESS IN EPIDURAL SPACE OF THORACIC SPINE: ICD-10-CM

## 2022-04-01 PROCEDURE — 700105 HCHG RX REV CODE 258: Performed by: INTERNAL MEDICINE

## 2022-04-01 PROCEDURE — 96365 THER/PROPH/DIAG IV INF INIT: CPT

## 2022-04-01 PROCEDURE — 700111 HCHG RX REV CODE 636 W/ 250 OVERRIDE (IP): Performed by: INTERNAL MEDICINE

## 2022-04-01 RX ORDER — 0.9 % SODIUM CHLORIDE 0.9 %
VIAL (ML) INJECTION PRN
Status: CANCELLED | OUTPATIENT
Start: 2022-04-02

## 2022-04-01 RX ORDER — 0.9 % SODIUM CHLORIDE 0.9 %
10 VIAL (ML) INJECTION PRN
Status: CANCELLED | OUTPATIENT
Start: 2022-04-02

## 2022-04-01 RX ORDER — HEPARIN SODIUM (PORCINE) LOCK FLUSH IV SOLN 100 UNIT/ML 100 UNIT/ML
500 SOLUTION INTRAVENOUS PRN
Status: CANCELLED | OUTPATIENT
Start: 2022-04-02

## 2022-04-01 RX ORDER — 0.9 % SODIUM CHLORIDE 0.9 %
3 VIAL (ML) INJECTION PRN
Status: CANCELLED | OUTPATIENT
Start: 2022-04-02

## 2022-04-01 RX ADMIN — CEFTRIAXONE SODIUM 2 G: 2 INJECTION, POWDER, FOR SOLUTION INTRAMUSCULAR; INTRAVENOUS at 16:42

## 2022-04-01 NOTE — PROGRESS NOTES
Pt presents to [facility] for daily ceftriaxone.  services in use. L PICC line in place; brisk blood return observed and pt tolerated well. Ceftriaxone infused with no s/s of adverse reactions. Brisk blood return observed from PICC line before flushed and wrapped in gauze for tomorrow. Next appointment confirmed and education provided. Pt discharged to self care with all personal belongings and in NAD.

## 2022-04-02 ENCOUNTER — OUTPATIENT INFUSION SERVICES (OUTPATIENT)
Dept: ONCOLOGY | Facility: MEDICAL CENTER | Age: 42
End: 2022-04-02
Attending: INTERNAL MEDICINE
Payer: COMMERCIAL

## 2022-04-02 VITALS
HEART RATE: 75 BPM | SYSTOLIC BLOOD PRESSURE: 95 MMHG | BODY MASS INDEX: 28.82 KG/M2 | TEMPERATURE: 97.5 F | HEIGHT: 57 IN | WEIGHT: 133.6 LBS | DIASTOLIC BLOOD PRESSURE: 65 MMHG | OXYGEN SATURATION: 100 % | RESPIRATION RATE: 18 BRPM

## 2022-04-02 DIAGNOSIS — G06.1 ABSCESS IN EPIDURAL SPACE OF THORACIC SPINE: ICD-10-CM

## 2022-04-02 PROCEDURE — 700105 HCHG RX REV CODE 258: Performed by: INTERNAL MEDICINE

## 2022-04-02 PROCEDURE — 700111 HCHG RX REV CODE 636 W/ 250 OVERRIDE (IP): Performed by: INTERNAL MEDICINE

## 2022-04-02 PROCEDURE — 96365 THER/PROPH/DIAG IV INF INIT: CPT

## 2022-04-02 RX ORDER — 0.9 % SODIUM CHLORIDE 0.9 %
3 VIAL (ML) INJECTION PRN
Status: CANCELLED | OUTPATIENT
Start: 2022-04-03

## 2022-04-02 RX ORDER — 0.9 % SODIUM CHLORIDE 0.9 %
10 VIAL (ML) INJECTION PRN
Status: CANCELLED | OUTPATIENT
Start: 2022-04-03

## 2022-04-02 RX ORDER — HEPARIN SODIUM (PORCINE) LOCK FLUSH IV SOLN 100 UNIT/ML 100 UNIT/ML
500 SOLUTION INTRAVENOUS PRN
Status: CANCELLED | OUTPATIENT
Start: 2022-04-03

## 2022-04-02 RX ORDER — 0.9 % SODIUM CHLORIDE 0.9 %
VIAL (ML) INJECTION PRN
Status: CANCELLED | OUTPATIENT
Start: 2022-04-03

## 2022-04-02 RX ADMIN — CEFTRIAXONE SODIUM 2 G: 2 INJECTION, POWDER, FOR SOLUTION INTRAMUSCULAR; INTRAVENOUS at 16:35

## 2022-04-02 ASSESSMENT — FIBROSIS 4 INDEX: FIB4 SCORE: 0.79

## 2022-04-02 NOTE — PROGRESS NOTES
Pt arrived ambulatory to [facility] for daily antibiotic infusion. PICC with brisk blood return, flushed with NS. Pt medicated per MAR. Pt tolerated treatment without s/s adverse reaction. PICC flushed with NS, wrapped with gauze and mesh sleeve. Pt discharged to self care, NAD. Pt to return tomorrow.

## 2022-04-02 NOTE — PROGRESS NOTES
Pt presented to infusion center for daily ceftriaxone. Pt reports no significant changes since yesterday. Left arm PICC line in place, flushed and brisk blood return observed. Ceftriaxone infused with no s/s of adverse reaction. PICC line flushed, brisk blood return again observed, wrapped in gauze and protective sleeve. Has next apptJohana discharged home to self care.

## 2022-04-03 ENCOUNTER — OUTPATIENT INFUSION SERVICES (OUTPATIENT)
Dept: ONCOLOGY | Facility: MEDICAL CENTER | Age: 42
End: 2022-04-03
Attending: INTERNAL MEDICINE
Payer: COMMERCIAL

## 2022-04-03 VITALS
TEMPERATURE: 98.4 F | HEART RATE: 81 BPM | SYSTOLIC BLOOD PRESSURE: 95 MMHG | DIASTOLIC BLOOD PRESSURE: 63 MMHG | RESPIRATION RATE: 18 BRPM | OXYGEN SATURATION: 100 %

## 2022-04-03 DIAGNOSIS — G06.1 ABSCESS IN EPIDURAL SPACE OF THORACIC SPINE: ICD-10-CM

## 2022-04-03 PROCEDURE — 96365 THER/PROPH/DIAG IV INF INIT: CPT

## 2022-04-03 PROCEDURE — 700105 HCHG RX REV CODE 258: Performed by: INTERNAL MEDICINE

## 2022-04-03 PROCEDURE — 700111 HCHG RX REV CODE 636 W/ 250 OVERRIDE (IP): Performed by: INTERNAL MEDICINE

## 2022-04-03 RX ORDER — 0.9 % SODIUM CHLORIDE 0.9 %
10 VIAL (ML) INJECTION PRN
Status: CANCELLED | OUTPATIENT
Start: 2022-04-04

## 2022-04-03 RX ORDER — 0.9 % SODIUM CHLORIDE 0.9 %
3 VIAL (ML) INJECTION PRN
Status: CANCELLED | OUTPATIENT
Start: 2022-04-04

## 2022-04-03 RX ORDER — HEPARIN SODIUM (PORCINE) LOCK FLUSH IV SOLN 100 UNIT/ML 100 UNIT/ML
500 SOLUTION INTRAVENOUS PRN
Status: CANCELLED | OUTPATIENT
Start: 2022-04-04

## 2022-04-03 RX ORDER — 0.9 % SODIUM CHLORIDE 0.9 %
VIAL (ML) INJECTION PRN
Status: CANCELLED | OUTPATIENT
Start: 2022-04-04

## 2022-04-03 RX ADMIN — CEFTRIAXONE SODIUM 2 G: 2 INJECTION, POWDER, FOR SOLUTION INTRAMUSCULAR; INTRAVENOUS at 15:30

## 2022-04-04 ENCOUNTER — OUTPATIENT INFUSION SERVICES (OUTPATIENT)
Dept: ONCOLOGY | Facility: MEDICAL CENTER | Age: 42
End: 2022-04-04
Attending: INTERNAL MEDICINE
Payer: COMMERCIAL

## 2022-04-04 VITALS
OXYGEN SATURATION: 100 % | RESPIRATION RATE: 18 BRPM | HEART RATE: 78 BPM | DIASTOLIC BLOOD PRESSURE: 63 MMHG | TEMPERATURE: 97.6 F | SYSTOLIC BLOOD PRESSURE: 101 MMHG

## 2022-04-04 DIAGNOSIS — G06.1 ABSCESS IN EPIDURAL SPACE OF THORACIC SPINE: ICD-10-CM

## 2022-04-04 LAB
ALBUMIN SERPL BCP-MCNC: 4.1 G/DL (ref 3.2–4.9)
ALBUMIN/GLOB SERPL: 1.7 G/DL
ALP SERPL-CCNC: 109 U/L (ref 30–99)
ALT SERPL-CCNC: 49 U/L (ref 2–50)
ANION GAP SERPL CALC-SCNC: 11 MMOL/L (ref 7–16)
AST SERPL-CCNC: 37 U/L (ref 12–45)
BASOPHILS # BLD AUTO: 0.7 % (ref 0–1.8)
BASOPHILS # BLD: 0.04 K/UL (ref 0–0.12)
BILIRUB SERPL-MCNC: <0.2 MG/DL (ref 0.1–1.5)
BUN SERPL-MCNC: 10 MG/DL (ref 8–22)
CALCIUM SERPL-MCNC: 8.9 MG/DL (ref 8.5–10.5)
CHLORIDE SERPL-SCNC: 107 MMOL/L (ref 96–112)
CO2 SERPL-SCNC: 22 MMOL/L (ref 20–33)
CREAT SERPL-MCNC: 0.54 MG/DL (ref 0.5–1.4)
CRP SERPL HS-MCNC: <0.3 MG/DL (ref 0–0.75)
EOSINOPHIL # BLD AUTO: 0.24 K/UL (ref 0–0.51)
EOSINOPHIL NFR BLD: 4.1 % (ref 0–6.9)
ERYTHROCYTE [DISTWIDTH] IN BLOOD BY AUTOMATED COUNT: 44.5 FL (ref 35.9–50)
ERYTHROCYTE [SEDIMENTATION RATE] IN BLOOD BY WESTERGREN METHOD: 9 MM/HOUR (ref 0–25)
GFR SERPLBLD CREATININE-BSD FMLA CKD-EPI: 120 ML/MIN/1.73 M 2
GLOBULIN SER CALC-MCNC: 2.4 G/DL (ref 1.9–3.5)
GLUCOSE SERPL-MCNC: 106 MG/DL (ref 65–99)
HCT VFR BLD AUTO: 35.6 % (ref 37–47)
HGB BLD-MCNC: 12.5 G/DL (ref 12–16)
IMM GRANULOCYTES # BLD AUTO: 0.01 K/UL (ref 0–0.11)
IMM GRANULOCYTES NFR BLD AUTO: 0.2 % (ref 0–0.9)
LYMPHOCYTES # BLD AUTO: 3.02 K/UL (ref 1–4.8)
LYMPHOCYTES NFR BLD: 51.7 % (ref 22–41)
MCH RBC QN AUTO: 31.6 PG (ref 27–33)
MCHC RBC AUTO-ENTMCNC: 35.1 G/DL (ref 33.6–35)
MCV RBC AUTO: 89.9 FL (ref 81.4–97.8)
MONOCYTES # BLD AUTO: 0.3 K/UL (ref 0–0.85)
MONOCYTES NFR BLD AUTO: 5.1 % (ref 0–13.4)
NEUTROPHILS # BLD AUTO: 2.23 K/UL (ref 2–7.15)
NEUTROPHILS NFR BLD: 38.2 % (ref 44–72)
NRBC # BLD AUTO: 0 K/UL
NRBC BLD-RTO: 0 /100 WBC
OUTPT INFUS CBC COMMENT OICOM: ABNORMAL
PLATELET # BLD AUTO: 279 K/UL (ref 164–446)
PMV BLD AUTO: 9.3 FL (ref 9–12.9)
POTASSIUM SERPL-SCNC: 3.8 MMOL/L (ref 3.6–5.5)
PROT SERPL-MCNC: 6.5 G/DL (ref 6–8.2)
RBC # BLD AUTO: 3.96 M/UL (ref 4.2–5.4)
SODIUM SERPL-SCNC: 140 MMOL/L (ref 135–145)
WBC # BLD AUTO: 5.8 K/UL (ref 4.8–10.8)

## 2022-04-04 PROCEDURE — 80053 COMPREHEN METABOLIC PANEL: CPT

## 2022-04-04 PROCEDURE — 86140 C-REACTIVE PROTEIN: CPT

## 2022-04-04 PROCEDURE — 700111 HCHG RX REV CODE 636 W/ 250 OVERRIDE (IP): Performed by: INTERNAL MEDICINE

## 2022-04-04 PROCEDURE — 85025 COMPLETE CBC W/AUTO DIFF WBC: CPT

## 2022-04-04 PROCEDURE — 96365 THER/PROPH/DIAG IV INF INIT: CPT

## 2022-04-04 PROCEDURE — 85652 RBC SED RATE AUTOMATED: CPT

## 2022-04-04 PROCEDURE — 700105 HCHG RX REV CODE 258: Performed by: INTERNAL MEDICINE

## 2022-04-04 RX ORDER — HEPARIN SODIUM (PORCINE) LOCK FLUSH IV SOLN 100 UNIT/ML 100 UNIT/ML
500 SOLUTION INTRAVENOUS PRN
Status: CANCELLED | OUTPATIENT
Start: 2022-04-05

## 2022-04-04 RX ORDER — 0.9 % SODIUM CHLORIDE 0.9 %
VIAL (ML) INJECTION PRN
Status: CANCELLED | OUTPATIENT
Start: 2022-04-05

## 2022-04-04 RX ORDER — 0.9 % SODIUM CHLORIDE 0.9 %
10 VIAL (ML) INJECTION PRN
Status: CANCELLED | OUTPATIENT
Start: 2022-04-05

## 2022-04-04 RX ORDER — 0.9 % SODIUM CHLORIDE 0.9 %
3 VIAL (ML) INJECTION PRN
Status: CANCELLED | OUTPATIENT
Start: 2022-04-05

## 2022-04-04 RX ADMIN — CEFTRIAXONE SODIUM 2 G: 2 INJECTION, POWDER, FOR SOLUTION INTRAMUSCULAR; INTRAVENOUS at 16:32

## 2022-04-04 NOTE — PROGRESS NOTES
Pt arrived [facility] to [facility] for daily antibiotic infusion. PICC with brisk blood return, flushed with NS. Pt medicated per MAR. Pt tolerated treatment without s/s adverse reaction. PICC flushed with NS, wrapped with gauze and mesh sleeve. Pt discharged to self care in no apparent distress. Pt to return tomorrow.

## 2022-04-05 ENCOUNTER — OUTPATIENT INFUSION SERVICES (OUTPATIENT)
Dept: ONCOLOGY | Facility: MEDICAL CENTER | Age: 42
End: 2022-04-05
Attending: INTERNAL MEDICINE
Payer: COMMERCIAL

## 2022-04-05 VITALS
HEART RATE: 77 BPM | DIASTOLIC BLOOD PRESSURE: 60 MMHG | TEMPERATURE: 98.3 F | RESPIRATION RATE: 18 BRPM | OXYGEN SATURATION: 97 % | SYSTOLIC BLOOD PRESSURE: 96 MMHG

## 2022-04-05 DIAGNOSIS — G06.1 ABSCESS IN EPIDURAL SPACE OF THORACIC SPINE: ICD-10-CM

## 2022-04-05 PROCEDURE — 96365 THER/PROPH/DIAG IV INF INIT: CPT

## 2022-04-05 PROCEDURE — 700105 HCHG RX REV CODE 258: Performed by: INTERNAL MEDICINE

## 2022-04-05 PROCEDURE — 99211 OFF/OP EST MAY X REQ PHY/QHP: CPT

## 2022-04-05 PROCEDURE — 700111 HCHG RX REV CODE 636 W/ 250 OVERRIDE (IP): Performed by: INTERNAL MEDICINE

## 2022-04-05 RX ORDER — 0.9 % SODIUM CHLORIDE 0.9 %
10 VIAL (ML) INJECTION PRN
Status: CANCELLED | OUTPATIENT
Start: 2022-04-06

## 2022-04-05 RX ORDER — 0.9 % SODIUM CHLORIDE 0.9 %
3 VIAL (ML) INJECTION PRN
Status: CANCELLED | OUTPATIENT
Start: 2022-04-06

## 2022-04-05 RX ORDER — 0.9 % SODIUM CHLORIDE 0.9 %
VIAL (ML) INJECTION PRN
Status: CANCELLED | OUTPATIENT
Start: 2022-04-06

## 2022-04-05 RX ORDER — HEPARIN SODIUM (PORCINE) LOCK FLUSH IV SOLN 100 UNIT/ML 100 UNIT/ML
500 SOLUTION INTRAVENOUS PRN
Status: CANCELLED | OUTPATIENT
Start: 2022-04-06

## 2022-04-05 RX ADMIN — CEFTRIAXONE SODIUM 2 G: 2 INJECTION, POWDER, FOR SOLUTION INTRAMUSCULAR; INTRAVENOUS at 16:40

## 2022-04-05 NOTE — PROGRESS NOTES
Patient to [facility] for Rocephin infusion. PICC line flushed with + blood return, labs drawn. Rocephin infused with no s/s of infusion reaction. PICC line flushed with + blood return. Patient has appointment tomorrow. Patient to home in care of self.

## 2022-04-06 ENCOUNTER — OUTPATIENT INFUSION SERVICES (OUTPATIENT)
Dept: ONCOLOGY | Facility: MEDICAL CENTER | Age: 42
End: 2022-04-06
Attending: INTERNAL MEDICINE
Payer: COMMERCIAL

## 2022-04-06 VITALS
DIASTOLIC BLOOD PRESSURE: 58 MMHG | RESPIRATION RATE: 18 BRPM | HEART RATE: 80 BPM | SYSTOLIC BLOOD PRESSURE: 94 MMHG | TEMPERATURE: 98 F | OXYGEN SATURATION: 98 %

## 2022-04-06 DIAGNOSIS — G06.1 ABSCESS IN EPIDURAL SPACE OF THORACIC SPINE: ICD-10-CM

## 2022-04-06 PROCEDURE — 700105 HCHG RX REV CODE 258: Performed by: INTERNAL MEDICINE

## 2022-04-06 PROCEDURE — 700111 HCHG RX REV CODE 636 W/ 250 OVERRIDE (IP): Performed by: INTERNAL MEDICINE

## 2022-04-06 PROCEDURE — 96365 THER/PROPH/DIAG IV INF INIT: CPT

## 2022-04-06 RX ORDER — 0.9 % SODIUM CHLORIDE 0.9 %
10 VIAL (ML) INJECTION PRN
Status: CANCELLED | OUTPATIENT
Start: 2022-04-07

## 2022-04-06 RX ORDER — 0.9 % SODIUM CHLORIDE 0.9 %
VIAL (ML) INJECTION PRN
Status: CANCELLED | OUTPATIENT
Start: 2022-04-07

## 2022-04-06 RX ORDER — 0.9 % SODIUM CHLORIDE 0.9 %
3 VIAL (ML) INJECTION PRN
Status: CANCELLED | OUTPATIENT
Start: 2022-04-07

## 2022-04-06 RX ORDER — HEPARIN SODIUM (PORCINE) LOCK FLUSH IV SOLN 100 UNIT/ML 100 UNIT/ML
500 SOLUTION INTRAVENOUS PRN
Status: CANCELLED | OUTPATIENT
Start: 2022-04-07

## 2022-04-06 RX ADMIN — CEFTRIAXONE SODIUM 2 G: 2 INJECTION, POWDER, FOR SOLUTION INTRAMUSCULAR; INTRAVENOUS at 16:45

## 2022-04-06 NOTE — PROGRESS NOTES
Patient to [facility] for Rocephin infusion. PICC line flushed with positive blood return. Rocephin infused with no s/s of infusion reaction. PICC line dressing and clave changed per policy. PICC line flushed with positive blood return. Patient has appointment tomorrow. Patient to home in care of self.

## 2022-04-07 ENCOUNTER — OUTPATIENT INFUSION SERVICES (OUTPATIENT)
Dept: ONCOLOGY | Facility: MEDICAL CENTER | Age: 42
End: 2022-04-07
Attending: INTERNAL MEDICINE
Payer: COMMERCIAL

## 2022-04-07 VITALS
RESPIRATION RATE: 18 BRPM | OXYGEN SATURATION: 99 % | TEMPERATURE: 98 F | DIASTOLIC BLOOD PRESSURE: 69 MMHG | HEART RATE: 88 BPM | SYSTOLIC BLOOD PRESSURE: 106 MMHG

## 2022-04-07 DIAGNOSIS — G06.1 ABSCESS IN EPIDURAL SPACE OF THORACIC SPINE: ICD-10-CM

## 2022-04-07 PROCEDURE — 96365 THER/PROPH/DIAG IV INF INIT: CPT

## 2022-04-07 PROCEDURE — 700105 HCHG RX REV CODE 258: Performed by: INTERNAL MEDICINE

## 2022-04-07 PROCEDURE — 700111 HCHG RX REV CODE 636 W/ 250 OVERRIDE (IP): Performed by: INTERNAL MEDICINE

## 2022-04-07 RX ORDER — 0.9 % SODIUM CHLORIDE 0.9 %
10 VIAL (ML) INJECTION PRN
Status: CANCELLED | OUTPATIENT
Start: 2022-04-08

## 2022-04-07 RX ORDER — 0.9 % SODIUM CHLORIDE 0.9 %
VIAL (ML) INJECTION PRN
Status: CANCELLED | OUTPATIENT
Start: 2022-04-08

## 2022-04-07 RX ORDER — 0.9 % SODIUM CHLORIDE 0.9 %
3 VIAL (ML) INJECTION PRN
Status: CANCELLED | OUTPATIENT
Start: 2022-04-08

## 2022-04-07 RX ORDER — HEPARIN SODIUM (PORCINE) LOCK FLUSH IV SOLN 100 UNIT/ML 100 UNIT/ML
500 SOLUTION INTRAVENOUS PRN
Status: CANCELLED | OUTPATIENT
Start: 2022-04-08

## 2022-04-07 RX ADMIN — CEFTRIAXONE SODIUM 2 G: 2 INJECTION, POWDER, FOR SOLUTION INTRAMUSCULAR; INTRAVENOUS at 16:33

## 2022-04-07 NOTE — PROGRESS NOTES
Pt returns to IS for Rocephin for thoracic spine abscess.   Pt denies pain or other complaints over night.  LUE PICC flushed with NS and brisk blood return observed.  Rocephin infused without adverse reaction.  PICC flushed with NS and line secured with sleeve.  Confirmed tomorrow's appt time with pt.  Pt dc home to self care.

## 2022-04-07 NOTE — PROGRESS NOTES
Johana arrives to IS for daily antibiotic therapy.  Interpretor Ipad utilized for assessments and POC.  Johana verbalizes understanding and agreement.  CANELO PICC intact, flushes easily with brisk blood return.  Rocephin administered per MAR, Johana tolerated well.  PICC flushed, + blood return observed.  PICC wrapped in gauze and protective sleeve.  Johana discharged in NAD, next appointment confirmed.

## 2022-04-08 ENCOUNTER — OUTPATIENT INFUSION SERVICES (OUTPATIENT)
Dept: ONCOLOGY | Facility: MEDICAL CENTER | Age: 42
End: 2022-04-08
Attending: INTERNAL MEDICINE
Payer: COMMERCIAL

## 2022-04-08 VITALS
HEART RATE: 78 BPM | TEMPERATURE: 98 F | OXYGEN SATURATION: 98 % | SYSTOLIC BLOOD PRESSURE: 106 MMHG | RESPIRATION RATE: 18 BRPM | DIASTOLIC BLOOD PRESSURE: 59 MMHG

## 2022-04-08 DIAGNOSIS — G06.1 ABSCESS IN EPIDURAL SPACE OF THORACIC SPINE: ICD-10-CM

## 2022-04-08 PROCEDURE — 96365 THER/PROPH/DIAG IV INF INIT: CPT

## 2022-04-08 PROCEDURE — 700111 HCHG RX REV CODE 636 W/ 250 OVERRIDE (IP): Performed by: INTERNAL MEDICINE

## 2022-04-08 PROCEDURE — 700105 HCHG RX REV CODE 258: Performed by: INTERNAL MEDICINE

## 2022-04-08 RX ORDER — HEPARIN SODIUM (PORCINE) LOCK FLUSH IV SOLN 100 UNIT/ML 100 UNIT/ML
500 SOLUTION INTRAVENOUS PRN
Status: CANCELLED | OUTPATIENT
Start: 2022-04-09

## 2022-04-08 RX ORDER — 0.9 % SODIUM CHLORIDE 0.9 %
10 VIAL (ML) INJECTION PRN
Status: CANCELLED | OUTPATIENT
Start: 2022-04-09

## 2022-04-08 RX ORDER — 0.9 % SODIUM CHLORIDE 0.9 %
3 VIAL (ML) INJECTION PRN
Status: CANCELLED | OUTPATIENT
Start: 2022-04-09

## 2022-04-08 RX ORDER — 0.9 % SODIUM CHLORIDE 0.9 %
VIAL (ML) INJECTION PRN
Status: CANCELLED | OUTPATIENT
Start: 2022-04-09

## 2022-04-08 RX ADMIN — CEFTRIAXONE SODIUM 2 G: 2 INJECTION, POWDER, FOR SOLUTION INTRAMUSCULAR; INTRAVENOUS at 16:35

## 2022-04-08 NOTE — PROGRESS NOTES
Pt is here today for Rocephin infusion. Pt denies any changes in condition. LUE PICC clean dry and intact, flushed well with good blood return. Rocephin infused, tolerated well. PICC flushed, clave changed, cap and sleeve placed. Discharged home in stable condition. Confirmed next appointment.

## 2022-04-09 ENCOUNTER — OUTPATIENT INFUSION SERVICES (OUTPATIENT)
Dept: ONCOLOGY | Facility: MEDICAL CENTER | Age: 42
End: 2022-04-09
Attending: INTERNAL MEDICINE
Payer: COMMERCIAL

## 2022-04-09 VITALS
RESPIRATION RATE: 18 BRPM | DIASTOLIC BLOOD PRESSURE: 59 MMHG | HEART RATE: 67 BPM | TEMPERATURE: 98.4 F | OXYGEN SATURATION: 98 % | SYSTOLIC BLOOD PRESSURE: 92 MMHG

## 2022-04-09 DIAGNOSIS — G06.1 ABSCESS IN EPIDURAL SPACE OF THORACIC SPINE: ICD-10-CM

## 2022-04-09 PROCEDURE — 700111 HCHG RX REV CODE 636 W/ 250 OVERRIDE (IP): Performed by: INTERNAL MEDICINE

## 2022-04-09 PROCEDURE — 96365 THER/PROPH/DIAG IV INF INIT: CPT

## 2022-04-09 PROCEDURE — 700105 HCHG RX REV CODE 258: Performed by: INTERNAL MEDICINE

## 2022-04-09 RX ORDER — 0.9 % SODIUM CHLORIDE 0.9 %
10 VIAL (ML) INJECTION PRN
Status: CANCELLED | OUTPATIENT
Start: 2022-04-10

## 2022-04-09 RX ORDER — 0.9 % SODIUM CHLORIDE 0.9 %
VIAL (ML) INJECTION PRN
Status: CANCELLED | OUTPATIENT
Start: 2022-04-10

## 2022-04-09 RX ORDER — 0.9 % SODIUM CHLORIDE 0.9 %
3 VIAL (ML) INJECTION PRN
Status: CANCELLED | OUTPATIENT
Start: 2022-04-10

## 2022-04-09 RX ORDER — HEPARIN SODIUM (PORCINE) LOCK FLUSH IV SOLN 100 UNIT/ML 100 UNIT/ML
500 SOLUTION INTRAVENOUS PRN
Status: CANCELLED | OUTPATIENT
Start: 2022-04-10

## 2022-04-09 RX ADMIN — CEFTRIAXONE SODIUM 2 G: 2 INJECTION, POWDER, FOR SOLUTION INTRAMUSCULAR; INTRAVENOUS at 16:18

## 2022-04-09 NOTE — PROGRESS NOTES
Pt presented to infusion center for daily Rocephin. Pt reports no significant changes since yesterday. Left arm PICC line in place, flushed and brisk blood return observed. Rocephin infused with no s/s of adverse reaction. PICC line flushed, brisk blood return again observed, wrapped in gauze and protective sleeve. Has next appt, left on foot to self care.

## 2022-04-10 ENCOUNTER — OUTPATIENT INFUSION SERVICES (OUTPATIENT)
Dept: ONCOLOGY | Facility: MEDICAL CENTER | Age: 42
End: 2022-04-10
Attending: INTERNAL MEDICINE
Payer: COMMERCIAL

## 2022-04-10 VITALS
RESPIRATION RATE: 20 BRPM | WEIGHT: 133.6 LBS | DIASTOLIC BLOOD PRESSURE: 56 MMHG | HEART RATE: 86 BPM | OXYGEN SATURATION: 97 % | TEMPERATURE: 97.7 F | BODY MASS INDEX: 28.82 KG/M2 | SYSTOLIC BLOOD PRESSURE: 86 MMHG

## 2022-04-10 DIAGNOSIS — G06.1 ABSCESS IN EPIDURAL SPACE OF THORACIC SPINE: ICD-10-CM

## 2022-04-10 PROCEDURE — 700105 HCHG RX REV CODE 258: Performed by: INTERNAL MEDICINE

## 2022-04-10 PROCEDURE — 96365 THER/PROPH/DIAG IV INF INIT: CPT

## 2022-04-10 PROCEDURE — 700111 HCHG RX REV CODE 636 W/ 250 OVERRIDE (IP): Performed by: INTERNAL MEDICINE

## 2022-04-10 RX ORDER — 0.9 % SODIUM CHLORIDE 0.9 %
10 VIAL (ML) INJECTION PRN
Status: CANCELLED | OUTPATIENT
Start: 2022-04-11

## 2022-04-10 RX ORDER — 0.9 % SODIUM CHLORIDE 0.9 %
VIAL (ML) INJECTION PRN
Status: CANCELLED | OUTPATIENT
Start: 2022-04-11

## 2022-04-10 RX ORDER — HEPARIN SODIUM (PORCINE) LOCK FLUSH IV SOLN 100 UNIT/ML 100 UNIT/ML
500 SOLUTION INTRAVENOUS PRN
Status: CANCELLED | OUTPATIENT
Start: 2022-04-11

## 2022-04-10 RX ORDER — 0.9 % SODIUM CHLORIDE 0.9 %
3 VIAL (ML) INJECTION PRN
Status: CANCELLED | OUTPATIENT
Start: 2022-04-11

## 2022-04-10 RX ADMIN — CEFTRIAXONE SODIUM 2 G: 2 INJECTION, POWDER, FOR SOLUTION INTRAMUSCULAR; INTRAVENOUS at 14:19

## 2022-04-10 ASSESSMENT — FIBROSIS 4 INDEX: FIB4 SCORE: 0.74

## 2022-04-10 NOTE — PROGRESS NOTES
Pt arrived ambulatory to  for daily Rocephin infusion.  POC discussed.  LUE PICC line in place, blood return confirmed.  Rocephin infused as ordered without complication.  Line flushed per policy and covered with gauze and arm wrap.  Next appointment confirmed.  Pt discharged from IS in [facility] under self care.

## 2022-04-11 ENCOUNTER — OUTPATIENT INFUSION SERVICES (OUTPATIENT)
Dept: ONCOLOGY | Facility: MEDICAL CENTER | Age: 42
End: 2022-04-11
Attending: INTERNAL MEDICINE
Payer: COMMERCIAL

## 2022-04-11 VITALS
OXYGEN SATURATION: 100 % | HEIGHT: 57 IN | TEMPERATURE: 98 F | BODY MASS INDEX: 29.58 KG/M2 | RESPIRATION RATE: 18 BRPM | SYSTOLIC BLOOD PRESSURE: 98 MMHG | WEIGHT: 137.13 LBS | HEART RATE: 66 BPM | DIASTOLIC BLOOD PRESSURE: 63 MMHG

## 2022-04-11 DIAGNOSIS — G06.1 ABSCESS IN EPIDURAL SPACE OF THORACIC SPINE: ICD-10-CM

## 2022-04-11 LAB
ALBUMIN SERPL BCP-MCNC: 3.8 G/DL (ref 3.2–4.9)
ALBUMIN/GLOB SERPL: 1.7 G/DL
ALP SERPL-CCNC: 110 U/L (ref 30–99)
ALT SERPL-CCNC: 27 U/L (ref 2–50)
ANION GAP SERPL CALC-SCNC: 10 MMOL/L (ref 7–16)
AST SERPL-CCNC: 22 U/L (ref 12–45)
BASOPHILS # BLD AUTO: 0.5 % (ref 0–1.8)
BASOPHILS # BLD: 0.03 K/UL (ref 0–0.12)
BILIRUB SERPL-MCNC: <0.2 MG/DL (ref 0.1–1.5)
BUN SERPL-MCNC: 14 MG/DL (ref 8–22)
CALCIUM SERPL-MCNC: 8.5 MG/DL (ref 8.5–10.5)
CHLORIDE SERPL-SCNC: 109 MMOL/L (ref 96–112)
CO2 SERPL-SCNC: 20 MMOL/L (ref 20–33)
CREAT SERPL-MCNC: 0.48 MG/DL (ref 0.5–1.4)
EOSINOPHIL # BLD AUTO: 0.31 K/UL (ref 0–0.51)
EOSINOPHIL NFR BLD: 5 % (ref 0–6.9)
ERYTHROCYTE [DISTWIDTH] IN BLOOD BY AUTOMATED COUNT: 44 FL (ref 35.9–50)
GFR SERPLBLD CREATININE-BSD FMLA CKD-EPI: 123 ML/MIN/1.73 M 2
GLOBULIN SER CALC-MCNC: 2.2 G/DL (ref 1.9–3.5)
GLUCOSE SERPL-MCNC: 123 MG/DL (ref 65–99)
HCT VFR BLD AUTO: 31.9 % (ref 37–47)
HGB BLD-MCNC: 11.2 G/DL (ref 12–16)
IMM GRANULOCYTES # BLD AUTO: 0.02 K/UL (ref 0–0.11)
IMM GRANULOCYTES NFR BLD AUTO: 0.3 % (ref 0–0.9)
LYMPHOCYTES # BLD AUTO: 3.16 K/UL (ref 1–4.8)
LYMPHOCYTES NFR BLD: 51 % (ref 22–41)
MCH RBC QN AUTO: 31.5 PG (ref 27–33)
MCHC RBC AUTO-ENTMCNC: 35.1 G/DL (ref 33.6–35)
MCV RBC AUTO: 89.6 FL (ref 81.4–97.8)
MONOCYTES # BLD AUTO: 0.35 K/UL (ref 0–0.85)
MONOCYTES NFR BLD AUTO: 5.6 % (ref 0–13.4)
NEUTROPHILS # BLD AUTO: 2.33 K/UL (ref 2–7.15)
NEUTROPHILS NFR BLD: 37.6 % (ref 44–72)
NRBC # BLD AUTO: 0 K/UL
NRBC BLD-RTO: 0 /100 WBC
OUTPT INFUS CBC COMMENT OICOM: ABNORMAL
PLATELET # BLD AUTO: 263 K/UL (ref 164–446)
PMV BLD AUTO: 9.7 FL (ref 9–12.9)
POTASSIUM SERPL-SCNC: 3.6 MMOL/L (ref 3.6–5.5)
PROT SERPL-MCNC: 6 G/DL (ref 6–8.2)
RBC # BLD AUTO: 3.56 M/UL (ref 4.2–5.4)
SODIUM SERPL-SCNC: 139 MMOL/L (ref 135–145)
WBC # BLD AUTO: 6.2 K/UL (ref 4.8–10.8)

## 2022-04-11 PROCEDURE — 700111 HCHG RX REV CODE 636 W/ 250 OVERRIDE (IP): Performed by: INTERNAL MEDICINE

## 2022-04-11 PROCEDURE — 700105 HCHG RX REV CODE 258: Performed by: INTERNAL MEDICINE

## 2022-04-11 PROCEDURE — 85025 COMPLETE CBC W/AUTO DIFF WBC: CPT

## 2022-04-11 PROCEDURE — 96365 THER/PROPH/DIAG IV INF INIT: CPT

## 2022-04-11 PROCEDURE — 80053 COMPREHEN METABOLIC PANEL: CPT

## 2022-04-11 RX ORDER — 0.9 % SODIUM CHLORIDE 0.9 %
10 VIAL (ML) INJECTION PRN
Status: CANCELLED | OUTPATIENT
Start: 2022-04-12

## 2022-04-11 RX ORDER — HEPARIN SODIUM (PORCINE) LOCK FLUSH IV SOLN 100 UNIT/ML 100 UNIT/ML
500 SOLUTION INTRAVENOUS PRN
Status: CANCELLED | OUTPATIENT
Start: 2022-04-12

## 2022-04-11 RX ORDER — 0.9 % SODIUM CHLORIDE 0.9 %
VIAL (ML) INJECTION PRN
Status: CANCELLED | OUTPATIENT
Start: 2022-04-12

## 2022-04-11 RX ORDER — 0.9 % SODIUM CHLORIDE 0.9 %
3 VIAL (ML) INJECTION PRN
Status: CANCELLED | OUTPATIENT
Start: 2022-04-12

## 2022-04-11 RX ADMIN — CEFTRIAXONE SODIUM 2 G: 2 INJECTION, POWDER, FOR SOLUTION INTRAMUSCULAR; INTRAVENOUS at 16:50

## 2022-04-11 ASSESSMENT — FIBROSIS 4 INDEX: FIB4 SCORE: 0.74

## 2022-04-12 ENCOUNTER — OUTPATIENT INFUSION SERVICES (OUTPATIENT)
Dept: ONCOLOGY | Facility: MEDICAL CENTER | Age: 42
End: 2022-04-12
Attending: INTERNAL MEDICINE
Payer: COMMERCIAL

## 2022-04-12 VITALS
SYSTOLIC BLOOD PRESSURE: 97 MMHG | RESPIRATION RATE: 18 BRPM | OXYGEN SATURATION: 98 % | TEMPERATURE: 98 F | HEART RATE: 68 BPM | DIASTOLIC BLOOD PRESSURE: 58 MMHG

## 2022-04-12 DIAGNOSIS — G06.1 ABSCESS IN EPIDURAL SPACE OF THORACIC SPINE: ICD-10-CM

## 2022-04-12 PROCEDURE — 96365 THER/PROPH/DIAG IV INF INIT: CPT

## 2022-04-12 PROCEDURE — 700111 HCHG RX REV CODE 636 W/ 250 OVERRIDE (IP): Performed by: INTERNAL MEDICINE

## 2022-04-12 PROCEDURE — 700105 HCHG RX REV CODE 258: Performed by: INTERNAL MEDICINE

## 2022-04-12 RX ORDER — HEPARIN SODIUM (PORCINE) LOCK FLUSH IV SOLN 100 UNIT/ML 100 UNIT/ML
500 SOLUTION INTRAVENOUS PRN
Status: CANCELLED | OUTPATIENT
Start: 2022-04-13

## 2022-04-12 RX ORDER — 0.9 % SODIUM CHLORIDE 0.9 %
VIAL (ML) INJECTION PRN
Status: CANCELLED | OUTPATIENT
Start: 2022-04-13

## 2022-04-12 RX ORDER — 0.9 % SODIUM CHLORIDE 0.9 %
10 VIAL (ML) INJECTION PRN
Status: CANCELLED | OUTPATIENT
Start: 2022-04-13

## 2022-04-12 RX ORDER — 0.9 % SODIUM CHLORIDE 0.9 %
3 VIAL (ML) INJECTION PRN
Status: CANCELLED | OUTPATIENT
Start: 2022-04-13

## 2022-04-12 RX ADMIN — CEFTRIAXONE SODIUM 2 G: 2 INJECTION, POWDER, FOR SOLUTION INTRAMUSCULAR; INTRAVENOUS at 16:11

## 2022-04-12 NOTE — PROGRESS NOTES
Pt returns to IS for Rocephin for thoracic spine abscess.   Pt denies pain or other complaints over night.  LUE PICC flushed with NS and brisk blood return observed.  Labs drawn via PICC as ordered.  Rocephin infused without adverse reaction.  PICC flushed with NS and line secured with sleeve.  Confirmed tomorrow's appt time with pt.  Pt dc home to self care.

## 2022-04-12 NOTE — PROGRESS NOTES
Johana arrives to IS for daily antibiotic therapy.  Johana voices no changes from yesterday.  CANELO PICC intact, flushes easily with brisk blood return.  Rocephin administered per MAR, Johana tolerated well.  PICC dressing and clave changed per protocol  PICC flushed, + blood return observed.  PICC wrapped in gauze and protective sleeve.  Johana discharged in NAD, next appointment confirmed.

## 2022-04-13 ENCOUNTER — OUTPATIENT INFUSION SERVICES (OUTPATIENT)
Dept: ONCOLOGY | Facility: MEDICAL CENTER | Age: 42
End: 2022-04-13
Attending: INTERNAL MEDICINE
Payer: COMMERCIAL

## 2022-04-13 VITALS
RESPIRATION RATE: 18 BRPM | SYSTOLIC BLOOD PRESSURE: 95 MMHG | DIASTOLIC BLOOD PRESSURE: 69 MMHG | OXYGEN SATURATION: 98 % | TEMPERATURE: 97 F | HEART RATE: 76 BPM

## 2022-04-13 DIAGNOSIS — G06.1 ABSCESS IN EPIDURAL SPACE OF THORACIC SPINE: ICD-10-CM

## 2022-04-13 PROCEDURE — 96365 THER/PROPH/DIAG IV INF INIT: CPT

## 2022-04-13 PROCEDURE — 700111 HCHG RX REV CODE 636 W/ 250 OVERRIDE (IP): Performed by: INTERNAL MEDICINE

## 2022-04-13 PROCEDURE — 700105 HCHG RX REV CODE 258: Performed by: INTERNAL MEDICINE

## 2022-04-13 RX ORDER — HEPARIN SODIUM (PORCINE) LOCK FLUSH IV SOLN 100 UNIT/ML 100 UNIT/ML
500 SOLUTION INTRAVENOUS PRN
Status: CANCELLED | OUTPATIENT
Start: 2022-04-14

## 2022-04-13 RX ORDER — 0.9 % SODIUM CHLORIDE 0.9 %
10 VIAL (ML) INJECTION PRN
Status: CANCELLED | OUTPATIENT
Start: 2022-04-14

## 2022-04-13 RX ORDER — 0.9 % SODIUM CHLORIDE 0.9 %
3 VIAL (ML) INJECTION PRN
Status: CANCELLED | OUTPATIENT
Start: 2022-04-14

## 2022-04-13 RX ORDER — 0.9 % SODIUM CHLORIDE 0.9 %
VIAL (ML) INJECTION PRN
Status: CANCELLED | OUTPATIENT
Start: 2022-04-14

## 2022-04-13 RX ADMIN — CEFTRIAXONE SODIUM 2 G: 2 INJECTION, POWDER, FOR SOLUTION INTRAMUSCULAR; INTRAVENOUS at 16:45

## 2022-04-14 ENCOUNTER — OUTPATIENT INFUSION SERVICES (OUTPATIENT)
Dept: ONCOLOGY | Facility: MEDICAL CENTER | Age: 42
End: 2022-04-14
Attending: INTERNAL MEDICINE
Payer: COMMERCIAL

## 2022-04-14 VITALS
DIASTOLIC BLOOD PRESSURE: 65 MMHG | SYSTOLIC BLOOD PRESSURE: 104 MMHG | TEMPERATURE: 97.9 F | RESPIRATION RATE: 18 BRPM | OXYGEN SATURATION: 98 % | HEART RATE: 78 BPM

## 2022-04-14 DIAGNOSIS — G06.1 ABSCESS IN EPIDURAL SPACE OF THORACIC SPINE: ICD-10-CM

## 2022-04-14 PROCEDURE — 700111 HCHG RX REV CODE 636 W/ 250 OVERRIDE (IP): Performed by: INTERNAL MEDICINE

## 2022-04-14 PROCEDURE — 96365 THER/PROPH/DIAG IV INF INIT: CPT

## 2022-04-14 PROCEDURE — 700105 HCHG RX REV CODE 258: Performed by: INTERNAL MEDICINE

## 2022-04-14 RX ORDER — 0.9 % SODIUM CHLORIDE 0.9 %
10 VIAL (ML) INJECTION PRN
Status: CANCELLED | OUTPATIENT
Start: 2022-04-15

## 2022-04-14 RX ORDER — 0.9 % SODIUM CHLORIDE 0.9 %
3 VIAL (ML) INJECTION PRN
Status: CANCELLED | OUTPATIENT
Start: 2022-04-15

## 2022-04-14 RX ORDER — HEPARIN SODIUM (PORCINE) LOCK FLUSH IV SOLN 100 UNIT/ML 100 UNIT/ML
500 SOLUTION INTRAVENOUS PRN
Status: CANCELLED | OUTPATIENT
Start: 2022-04-15

## 2022-04-14 RX ORDER — 0.9 % SODIUM CHLORIDE 0.9 %
VIAL (ML) INJECTION PRN
Status: CANCELLED | OUTPATIENT
Start: 2022-04-15

## 2022-04-14 RX ADMIN — CEFTRIAXONE SODIUM 2 G: 2 INJECTION, POWDER, FOR SOLUTION INTRAMUSCULAR; INTRAVENOUS at 16:52

## 2022-04-14 NOTE — PROGRESS NOTES
Pt returns to IS for Rocephin for thoracic spine abscess.   [place] interpretor used to discuss plan of care, complete assessment and answer pt's questions.  Pt denies pain or other complaints over night.  Informed pt she will need to call to set up follow up appts with Infectious Disease and Neurosurgeon before the end of her treatment.  Provided pt with the phone numbers for those offices so she can call in the morning.  LUE PICC flushed with NS and brisk blood return observed.  Rocephin infused without adverse reaction.  PICC flushed with NS and line secured with sleeve.  Confirmed tomorrow's appt time with pt.  Pt dc home to self care.

## 2022-04-15 ENCOUNTER — OUTPATIENT INFUSION SERVICES (OUTPATIENT)
Dept: ONCOLOGY | Facility: MEDICAL CENTER | Age: 42
End: 2022-04-15
Attending: INTERNAL MEDICINE
Payer: COMMERCIAL

## 2022-04-15 VITALS
SYSTOLIC BLOOD PRESSURE: 96 MMHG | WEIGHT: 134.7 LBS | DIASTOLIC BLOOD PRESSURE: 63 MMHG | HEART RATE: 79 BPM | HEIGHT: 58 IN | OXYGEN SATURATION: 99 % | TEMPERATURE: 97.6 F | BODY MASS INDEX: 28.28 KG/M2

## 2022-04-15 DIAGNOSIS — G06.1 ABSCESS IN EPIDURAL SPACE OF THORACIC SPINE: ICD-10-CM

## 2022-04-15 PROCEDURE — 700111 HCHG RX REV CODE 636 W/ 250 OVERRIDE (IP): Performed by: INTERNAL MEDICINE

## 2022-04-15 PROCEDURE — 700105 HCHG RX REV CODE 258: Performed by: INTERNAL MEDICINE

## 2022-04-15 PROCEDURE — 96365 THER/PROPH/DIAG IV INF INIT: CPT

## 2022-04-15 RX ORDER — 0.9 % SODIUM CHLORIDE 0.9 %
VIAL (ML) INJECTION PRN
Status: CANCELLED | OUTPATIENT
Start: 2022-04-16

## 2022-04-15 RX ORDER — 0.9 % SODIUM CHLORIDE 0.9 %
10 VIAL (ML) INJECTION PRN
Status: CANCELLED | OUTPATIENT
Start: 2022-04-16

## 2022-04-15 RX ORDER — 0.9 % SODIUM CHLORIDE 0.9 %
3 VIAL (ML) INJECTION PRN
Status: CANCELLED | OUTPATIENT
Start: 2022-04-16

## 2022-04-15 RX ORDER — HEPARIN SODIUM (PORCINE) LOCK FLUSH IV SOLN 100 UNIT/ML 100 UNIT/ML
500 SOLUTION INTRAVENOUS PRN
Status: CANCELLED | OUTPATIENT
Start: 2022-04-16

## 2022-04-15 RX ADMIN — CEFTRIAXONE SODIUM 2 G: 2 INJECTION, POWDER, FOR SOLUTION INTRAMUSCULAR; INTRAVENOUS at 16:37

## 2022-04-15 ASSESSMENT — FIBROSIS 4 INDEX: FIB4 SCORE: 0.63

## 2022-04-15 NOTE — PROGRESS NOTES
Pt to [facility] for daily Rocephin infusion for T-spine abscess.  Pt informed of extension of abx therapy through , pt states her understanding.  Pt updated on appt times.  Pt PICC line flushed per protocol w/ brisk blood return noted.  Rocephin infused through PICC with no adverse reactions.  Pt PICC again flushed per protocol w/ brisk blood return noted, wrapped in gauze and protective sleeve placed.  Pt left on foot in NAD, next appt in place

## 2022-04-16 ENCOUNTER — OUTPATIENT INFUSION SERVICES (OUTPATIENT)
Dept: ONCOLOGY | Facility: MEDICAL CENTER | Age: 42
End: 2022-04-16
Attending: INTERNAL MEDICINE
Payer: COMMERCIAL

## 2022-04-16 VITALS
TEMPERATURE: 97.3 F | OXYGEN SATURATION: 99 % | RESPIRATION RATE: 16 BRPM | HEART RATE: 73 BPM | SYSTOLIC BLOOD PRESSURE: 116 MMHG | DIASTOLIC BLOOD PRESSURE: 68 MMHG

## 2022-04-16 DIAGNOSIS — G06.1 ABSCESS IN EPIDURAL SPACE OF THORACIC SPINE: ICD-10-CM

## 2022-04-16 PROCEDURE — 700111 HCHG RX REV CODE 636 W/ 250 OVERRIDE (IP): Performed by: INTERNAL MEDICINE

## 2022-04-16 PROCEDURE — 700105 HCHG RX REV CODE 258: Performed by: INTERNAL MEDICINE

## 2022-04-16 PROCEDURE — 96365 THER/PROPH/DIAG IV INF INIT: CPT

## 2022-04-16 RX ORDER — 0.9 % SODIUM CHLORIDE 0.9 %
10 VIAL (ML) INJECTION PRN
Status: CANCELLED | OUTPATIENT
Start: 2022-04-17

## 2022-04-16 RX ORDER — 0.9 % SODIUM CHLORIDE 0.9 %
VIAL (ML) INJECTION PRN
Status: CANCELLED | OUTPATIENT
Start: 2022-04-17

## 2022-04-16 RX ORDER — HEPARIN SODIUM (PORCINE) LOCK FLUSH IV SOLN 100 UNIT/ML 100 UNIT/ML
500 SOLUTION INTRAVENOUS PRN
Status: CANCELLED | OUTPATIENT
Start: 2022-04-17

## 2022-04-16 RX ORDER — 0.9 % SODIUM CHLORIDE 0.9 %
3 VIAL (ML) INJECTION PRN
Status: CANCELLED | OUTPATIENT
Start: 2022-04-17

## 2022-04-16 RX ADMIN — CEFTRIAXONE SODIUM 2 G: 2 INJECTION, POWDER, FOR SOLUTION INTRAMUSCULAR; INTRAVENOUS at 14:33

## 2022-04-16 NOTE — PROGRESS NOTES
Pt to [facility] for daily Rocephin infusion for T-spine abscess.  Pt updated on new appt time for tomorrow per pt request.  Pt PICC line flushed per protocol w/ brisk blood return noted, clave changed per protocol.  Rocephin infused through PICC with no adverse reactions.  Pt PICC again flushed per protocol w/ brisk blood return noted, wrapped in gauze and protective sleeve placed.  Pt left on foot in NAD, next appt in place

## 2022-04-17 ENCOUNTER — OUTPATIENT INFUSION SERVICES (OUTPATIENT)
Dept: ONCOLOGY | Facility: MEDICAL CENTER | Age: 42
End: 2022-04-17
Attending: INTERNAL MEDICINE
Payer: COMMERCIAL

## 2022-04-17 VITALS
DIASTOLIC BLOOD PRESSURE: 67 MMHG | HEART RATE: 74 BPM | OXYGEN SATURATION: 98 % | SYSTOLIC BLOOD PRESSURE: 100 MMHG | TEMPERATURE: 98.2 F | RESPIRATION RATE: 15 BRPM

## 2022-04-17 DIAGNOSIS — G06.1 ABSCESS IN EPIDURAL SPACE OF THORACIC SPINE: ICD-10-CM

## 2022-04-17 PROCEDURE — 700111 HCHG RX REV CODE 636 W/ 250 OVERRIDE (IP): Performed by: INTERNAL MEDICINE

## 2022-04-17 PROCEDURE — 700105 HCHG RX REV CODE 258: Performed by: INTERNAL MEDICINE

## 2022-04-17 PROCEDURE — 96365 THER/PROPH/DIAG IV INF INIT: CPT

## 2022-04-17 RX ORDER — 0.9 % SODIUM CHLORIDE 0.9 %
3 VIAL (ML) INJECTION PRN
Status: CANCELLED | OUTPATIENT
Start: 2022-04-18

## 2022-04-17 RX ORDER — HEPARIN SODIUM (PORCINE) LOCK FLUSH IV SOLN 100 UNIT/ML 100 UNIT/ML
500 SOLUTION INTRAVENOUS PRN
Status: CANCELLED | OUTPATIENT
Start: 2022-04-18

## 2022-04-17 RX ORDER — 0.9 % SODIUM CHLORIDE 0.9 %
10 VIAL (ML) INJECTION PRN
Status: CANCELLED | OUTPATIENT
Start: 2022-04-18

## 2022-04-17 RX ORDER — 0.9 % SODIUM CHLORIDE 0.9 %
VIAL (ML) INJECTION PRN
Status: CANCELLED | OUTPATIENT
Start: 2022-04-18

## 2022-04-17 RX ADMIN — CEFTRIAXONE SODIUM 2 G: 2 INJECTION, POWDER, FOR SOLUTION INTRAMUSCULAR; INTRAVENOUS at 15:15

## 2022-04-17 NOTE — PROGRESS NOTES
Patient in the clinic for IV ceftriaxone.  services declined today. L PICC line in place; brisk blood return. Site intact. Patient denies any discomfort. Tolerated infusion well.  PICC line flushed with saline post infusion per protocol. Left clinic ambulatory without any distress. RTC tomorrow as scheduled.

## 2022-04-17 NOTE — PROGRESS NOTES
Patient in the clinic for IV ceftriaxone.  services in use. L PICC line in place; brisk blood return. Site intact. Patient denies any discomfort. Tolerated infusion well. PICC line dressing reinforced. PICC line flushed with saline post infusion per protocol. Left clinic ambulatory without any distress. RTC tomorrow as scheduled.

## 2022-04-18 ENCOUNTER — OUTPATIENT INFUSION SERVICES (OUTPATIENT)
Dept: ONCOLOGY | Facility: MEDICAL CENTER | Age: 42
End: 2022-04-18
Attending: INTERNAL MEDICINE
Payer: COMMERCIAL

## 2022-04-18 VITALS
RESPIRATION RATE: 18 BRPM | DIASTOLIC BLOOD PRESSURE: 60 MMHG | HEIGHT: 58 IN | BODY MASS INDEX: 28.69 KG/M2 | SYSTOLIC BLOOD PRESSURE: 95 MMHG | OXYGEN SATURATION: 98 % | HEART RATE: 80 BPM | WEIGHT: 136.69 LBS | TEMPERATURE: 98 F

## 2022-04-18 DIAGNOSIS — G06.1 ABSCESS IN EPIDURAL SPACE OF THORACIC SPINE: ICD-10-CM

## 2022-04-18 LAB
ALBUMIN SERPL BCP-MCNC: 4 G/DL (ref 3.2–4.9)
ALBUMIN/GLOB SERPL: 1.7 G/DL
ALP SERPL-CCNC: 109 U/L (ref 30–99)
ALT SERPL-CCNC: 23 U/L (ref 2–50)
ANION GAP SERPL CALC-SCNC: 10 MMOL/L (ref 7–16)
AST SERPL-CCNC: 25 U/L (ref 12–45)
BASOPHILS # BLD AUTO: 0.4 % (ref 0–1.8)
BASOPHILS # BLD: 0.03 K/UL (ref 0–0.12)
BILIRUB SERPL-MCNC: <0.2 MG/DL (ref 0.1–1.5)
BUN SERPL-MCNC: 9 MG/DL (ref 8–22)
CALCIUM SERPL-MCNC: 8.7 MG/DL (ref 8.5–10.5)
CHLORIDE SERPL-SCNC: 106 MMOL/L (ref 96–112)
CO2 SERPL-SCNC: 22 MMOL/L (ref 20–33)
CREAT SERPL-MCNC: 0.52 MG/DL (ref 0.5–1.4)
CRP SERPL HS-MCNC: <0.3 MG/DL (ref 0–0.75)
EOSINOPHIL # BLD AUTO: 0.17 K/UL (ref 0–0.51)
EOSINOPHIL NFR BLD: 2.4 % (ref 0–6.9)
ERYTHROCYTE [DISTWIDTH] IN BLOOD BY AUTOMATED COUNT: 42 FL (ref 35.9–50)
ERYTHROCYTE [SEDIMENTATION RATE] IN BLOOD BY WESTERGREN METHOD: 8 MM/HOUR (ref 0–25)
GFR SERPLBLD CREATININE-BSD FMLA CKD-EPI: 121 ML/MIN/1.73 M 2
GLOBULIN SER CALC-MCNC: 2.3 G/DL (ref 1.9–3.5)
GLUCOSE SERPL-MCNC: 131 MG/DL (ref 65–99)
HCT VFR BLD AUTO: 34.9 % (ref 37–47)
HGB BLD-MCNC: 12.4 G/DL (ref 12–16)
IMM GRANULOCYTES # BLD AUTO: 0.02 K/UL (ref 0–0.11)
IMM GRANULOCYTES NFR BLD AUTO: 0.3 % (ref 0–0.9)
LYMPHOCYTES # BLD AUTO: 2.81 K/UL (ref 1–4.8)
LYMPHOCYTES NFR BLD: 38.9 % (ref 22–41)
MCH RBC QN AUTO: 31.6 PG (ref 27–33)
MCHC RBC AUTO-ENTMCNC: 35.5 G/DL (ref 33.6–35)
MCV RBC AUTO: 89 FL (ref 81.4–97.8)
MONOCYTES # BLD AUTO: 0.23 K/UL (ref 0–0.85)
MONOCYTES NFR BLD AUTO: 3.2 % (ref 0–13.4)
NEUTROPHILS # BLD AUTO: 3.96 K/UL (ref 2–7.15)
NEUTROPHILS NFR BLD: 54.8 % (ref 44–72)
NRBC # BLD AUTO: 0 K/UL
NRBC BLD-RTO: 0 /100 WBC
OUTPT INFUS CBC COMMENT OICOM: ABNORMAL
PLATELET # BLD AUTO: 320 K/UL (ref 164–446)
PMV BLD AUTO: 9.7 FL (ref 9–12.9)
POTASSIUM SERPL-SCNC: 4.4 MMOL/L (ref 3.6–5.5)
PROT SERPL-MCNC: 6.3 G/DL (ref 6–8.2)
RBC # BLD AUTO: 3.92 M/UL (ref 4.2–5.4)
SODIUM SERPL-SCNC: 138 MMOL/L (ref 135–145)
WBC # BLD AUTO: 7.2 K/UL (ref 4.8–10.8)

## 2022-04-18 PROCEDURE — 80053 COMPREHEN METABOLIC PANEL: CPT

## 2022-04-18 PROCEDURE — 700111 HCHG RX REV CODE 636 W/ 250 OVERRIDE (IP): Performed by: INTERNAL MEDICINE

## 2022-04-18 PROCEDURE — 85652 RBC SED RATE AUTOMATED: CPT

## 2022-04-18 PROCEDURE — 700105 HCHG RX REV CODE 258: Performed by: INTERNAL MEDICINE

## 2022-04-18 PROCEDURE — 96365 THER/PROPH/DIAG IV INF INIT: CPT

## 2022-04-18 PROCEDURE — 86140 C-REACTIVE PROTEIN: CPT

## 2022-04-18 PROCEDURE — 85025 COMPLETE CBC W/AUTO DIFF WBC: CPT

## 2022-04-18 RX ORDER — 0.9 % SODIUM CHLORIDE 0.9 %
10 VIAL (ML) INJECTION PRN
Status: CANCELLED | OUTPATIENT
Start: 2022-04-19

## 2022-04-18 RX ORDER — 0.9 % SODIUM CHLORIDE 0.9 %
VIAL (ML) INJECTION PRN
Status: CANCELLED | OUTPATIENT
Start: 2022-04-19

## 2022-04-18 RX ORDER — 0.9 % SODIUM CHLORIDE 0.9 %
3 VIAL (ML) INJECTION PRN
Status: CANCELLED | OUTPATIENT
Start: 2022-04-19

## 2022-04-18 RX ORDER — HEPARIN SODIUM (PORCINE) LOCK FLUSH IV SOLN 100 UNIT/ML 100 UNIT/ML
500 SOLUTION INTRAVENOUS PRN
Status: CANCELLED | OUTPATIENT
Start: 2022-04-19

## 2022-04-18 RX ADMIN — CEFTRIAXONE SODIUM 2 G: 2 INJECTION, POWDER, FOR SOLUTION INTRAMUSCULAR; INTRAVENOUS at 16:52

## 2022-04-18 ASSESSMENT — FIBROSIS 4 INDEX
FIB4 SCORE: 0.63
FIB4 SCORE: 0.63

## 2022-04-19 ENCOUNTER — OUTPATIENT INFUSION SERVICES (OUTPATIENT)
Dept: ONCOLOGY | Facility: MEDICAL CENTER | Age: 42
End: 2022-04-19
Attending: INTERNAL MEDICINE
Payer: COMMERCIAL

## 2022-04-19 VITALS
RESPIRATION RATE: 18 BRPM | HEART RATE: 72 BPM | DIASTOLIC BLOOD PRESSURE: 58 MMHG | OXYGEN SATURATION: 98 % | TEMPERATURE: 97.7 F | SYSTOLIC BLOOD PRESSURE: 104 MMHG

## 2022-04-19 DIAGNOSIS — G06.1 ABSCESS IN EPIDURAL SPACE OF THORACIC SPINE: ICD-10-CM

## 2022-04-19 PROCEDURE — 700105 HCHG RX REV CODE 258: Performed by: INTERNAL MEDICINE

## 2022-04-19 PROCEDURE — 96365 THER/PROPH/DIAG IV INF INIT: CPT

## 2022-04-19 PROCEDURE — 700111 HCHG RX REV CODE 636 W/ 250 OVERRIDE (IP): Performed by: INTERNAL MEDICINE

## 2022-04-19 RX ORDER — 0.9 % SODIUM CHLORIDE 0.9 %
3 VIAL (ML) INJECTION PRN
Status: CANCELLED | OUTPATIENT
Start: 2022-04-20

## 2022-04-19 RX ORDER — 0.9 % SODIUM CHLORIDE 0.9 %
VIAL (ML) INJECTION PRN
Status: CANCELLED | OUTPATIENT
Start: 2022-04-20

## 2022-04-19 RX ORDER — HEPARIN SODIUM (PORCINE) LOCK FLUSH IV SOLN 100 UNIT/ML 100 UNIT/ML
500 SOLUTION INTRAVENOUS PRN
Status: CANCELLED | OUTPATIENT
Start: 2022-04-20

## 2022-04-19 RX ORDER — 0.9 % SODIUM CHLORIDE 0.9 %
10 VIAL (ML) INJECTION PRN
Status: CANCELLED | OUTPATIENT
Start: 2022-04-20

## 2022-04-19 RX ADMIN — CEFTRIAXONE SODIUM 2 G: 2 INJECTION, POWDER, FOR SOLUTION INTRAMUSCULAR; INTRAVENOUS at 16:43

## 2022-04-19 NOTE — PROGRESS NOTES
Pt to [facility] for daily Rocephin infusion for T-spine abscess.  Pt updated on new appt time for tomorrow per pt request.  Pt PICC line flushed per protocol w/ brisk blood return noted.  Rocephin infused through PICC with no adverse reactions.  Pt PICC again flushed per protocol w/ brisk blood return noted, wrapped in gauze and protective sleeve placed.  Pt left on foot in NAD, next appt in place

## 2022-04-20 ENCOUNTER — OUTPATIENT INFUSION SERVICES (OUTPATIENT)
Dept: ONCOLOGY | Facility: MEDICAL CENTER | Age: 42
End: 2022-04-20
Attending: INTERNAL MEDICINE
Payer: COMMERCIAL

## 2022-04-20 VITALS
OXYGEN SATURATION: 98 % | RESPIRATION RATE: 18 BRPM | TEMPERATURE: 98 F | HEART RATE: 75 BPM | DIASTOLIC BLOOD PRESSURE: 54 MMHG | SYSTOLIC BLOOD PRESSURE: 90 MMHG

## 2022-04-20 DIAGNOSIS — G06.1 ABSCESS IN EPIDURAL SPACE OF THORACIC SPINE: ICD-10-CM

## 2022-04-20 PROCEDURE — 700111 HCHG RX REV CODE 636 W/ 250 OVERRIDE (IP): Performed by: INTERNAL MEDICINE

## 2022-04-20 PROCEDURE — 700105 HCHG RX REV CODE 258: Performed by: INTERNAL MEDICINE

## 2022-04-20 PROCEDURE — 96365 THER/PROPH/DIAG IV INF INIT: CPT

## 2022-04-20 RX ORDER — 0.9 % SODIUM CHLORIDE 0.9 %
VIAL (ML) INJECTION PRN
Status: CANCELLED | OUTPATIENT
Start: 2022-04-21

## 2022-04-20 RX ORDER — 0.9 % SODIUM CHLORIDE 0.9 %
10 VIAL (ML) INJECTION PRN
Status: CANCELLED | OUTPATIENT
Start: 2022-04-21

## 2022-04-20 RX ORDER — 0.9 % SODIUM CHLORIDE 0.9 %
3 VIAL (ML) INJECTION PRN
Status: CANCELLED | OUTPATIENT
Start: 2022-04-21

## 2022-04-20 RX ORDER — HEPARIN SODIUM (PORCINE) LOCK FLUSH IV SOLN 100 UNIT/ML 100 UNIT/ML
500 SOLUTION INTRAVENOUS PRN
Status: CANCELLED | OUTPATIENT
Start: 2022-04-21

## 2022-04-20 RX ADMIN — CEFTRIAXONE SODIUM 2 G: 2 INJECTION, POWDER, FOR SOLUTION INTRAMUSCULAR; INTRAVENOUS at 16:35

## 2022-04-20 NOTE — PROGRESS NOTES
Pt presents to [facility] for daily ceftriaxone. L PICC line in place; brisk blood return observed and pt tolerated well. PICC dressing and IV connector changed per protocol. Ceftriaxone infused with no s/s of adverse reactions. Brisk blood return observed from PICC line before flushed and wrapped in gauze for tomorrow. Next appointment confirmed and education provided. Pt discharged to self care with all personal belongings and in NAD.

## 2022-04-21 ENCOUNTER — OUTPATIENT INFUSION SERVICES (OUTPATIENT)
Dept: ONCOLOGY | Facility: MEDICAL CENTER | Age: 42
End: 2022-04-21
Attending: INTERNAL MEDICINE
Payer: COMMERCIAL

## 2022-04-21 DIAGNOSIS — G06.1 ABSCESS IN EPIDURAL SPACE OF THORACIC SPINE: ICD-10-CM

## 2022-04-21 PROCEDURE — 96374 THER/PROPH/DIAG INJ IV PUSH: CPT

## 2022-04-21 PROCEDURE — 700105 HCHG RX REV CODE 258: Performed by: INTERNAL MEDICINE

## 2022-04-21 PROCEDURE — 96365 THER/PROPH/DIAG IV INF INIT: CPT

## 2022-04-21 PROCEDURE — 700111 HCHG RX REV CODE 636 W/ 250 OVERRIDE (IP): Performed by: INTERNAL MEDICINE

## 2022-04-21 RX ORDER — 0.9 % SODIUM CHLORIDE 0.9 %
VIAL (ML) INJECTION PRN
Status: CANCELLED | OUTPATIENT
Start: 2022-04-22

## 2022-04-21 RX ORDER — 0.9 % SODIUM CHLORIDE 0.9 %
10 VIAL (ML) INJECTION PRN
Status: CANCELLED | OUTPATIENT
Start: 2022-04-22

## 2022-04-21 RX ORDER — HEPARIN SODIUM (PORCINE) LOCK FLUSH IV SOLN 100 UNIT/ML 100 UNIT/ML
500 SOLUTION INTRAVENOUS PRN
Status: CANCELLED | OUTPATIENT
Start: 2022-04-22

## 2022-04-21 RX ORDER — 0.9 % SODIUM CHLORIDE 0.9 %
3 VIAL (ML) INJECTION PRN
Status: CANCELLED | OUTPATIENT
Start: 2022-04-22

## 2022-04-21 RX ADMIN — CEFTRIAXONE SODIUM 2 G: 2 INJECTION, POWDER, FOR SOLUTION INTRAMUSCULAR; INTRAVENOUS at 16:14

## 2022-04-21 NOTE — PROGRESS NOTES
Patient in the clinic for IV ceftriaxone.  services declined today. L PICC line in place; brisk blood return. Site intact. Patient denies any acute discomfort. Tolerated infusion well.  PICC line flushed with saline post infusion per protocol. Left clinic ambulatory without any distress. RTC tomorrow as scheduled.

## 2022-04-22 ENCOUNTER — TELEPHONE (OUTPATIENT)
Dept: VASCULAR LAB | Facility: MEDICAL CENTER | Age: 42
End: 2022-04-22
Payer: COMMERCIAL

## 2022-04-22 ENCOUNTER — OUTPATIENT INFUSION SERVICES (OUTPATIENT)
Dept: ONCOLOGY | Facility: MEDICAL CENTER | Age: 42
End: 2022-04-22
Attending: INTERNAL MEDICINE
Payer: COMMERCIAL

## 2022-04-22 VITALS
DIASTOLIC BLOOD PRESSURE: 56 MMHG | TEMPERATURE: 98 F | RESPIRATION RATE: 18 BRPM | HEART RATE: 65 BPM | OXYGEN SATURATION: 98 % | SYSTOLIC BLOOD PRESSURE: 93 MMHG

## 2022-04-22 DIAGNOSIS — G06.1 ABSCESS IN EPIDURAL SPACE OF THORACIC SPINE: ICD-10-CM

## 2022-04-22 PROCEDURE — 96365 THER/PROPH/DIAG IV INF INIT: CPT

## 2022-04-22 PROCEDURE — 700105 HCHG RX REV CODE 258: Performed by: INTERNAL MEDICINE

## 2022-04-22 PROCEDURE — 700111 HCHG RX REV CODE 636 W/ 250 OVERRIDE (IP): Performed by: INTERNAL MEDICINE

## 2022-04-22 RX ORDER — 0.9 % SODIUM CHLORIDE 0.9 %
10 VIAL (ML) INJECTION PRN
Status: CANCELLED | OUTPATIENT
Start: 2022-04-23

## 2022-04-22 RX ORDER — 0.9 % SODIUM CHLORIDE 0.9 %
3 VIAL (ML) INJECTION PRN
Status: CANCELLED | OUTPATIENT
Start: 2022-04-23

## 2022-04-22 RX ORDER — 0.9 % SODIUM CHLORIDE 0.9 %
VIAL (ML) INJECTION PRN
Status: CANCELLED | OUTPATIENT
Start: 2022-04-23

## 2022-04-22 RX ORDER — HEPARIN SODIUM (PORCINE) LOCK FLUSH IV SOLN 100 UNIT/ML 100 UNIT/ML
500 SOLUTION INTRAVENOUS PRN
Status: CANCELLED | OUTPATIENT
Start: 2022-04-23

## 2022-04-22 RX ADMIN — CEFTRIAXONE SODIUM 2 G: 2 INJECTION, POWDER, FOR SOLUTION INTRAMUSCULAR; INTRAVENOUS at 16:58

## 2022-04-22 NOTE — TELEPHONE ENCOUNTER
Patient's antibiotic therapy with ceftriaxone set to end on 4/23 - however, patient needs to have repeat MRI obtained to determine duration of antibiotic therapy. Called patient utilizing  [employer] (ID [id number]). Informed her of need to schedule MRI as soon as possible, and provided phone number to schedule ([phone]). Antibiotic therapy extended by 2 weeks for now (end 5/7) per Dr. Appiah, as patient needs to continue antibiotic therapy until MRI results available. Patient verbalized understanding and had no further questions.    Hannah Juarez, PharmD  PGY2 Infectious Diseases Pharmacy Resident

## 2022-04-23 ENCOUNTER — OUTPATIENT INFUSION SERVICES (OUTPATIENT)
Dept: ONCOLOGY | Facility: MEDICAL CENTER | Age: 42
End: 2022-04-23
Attending: INTERNAL MEDICINE
Payer: COMMERCIAL

## 2022-04-23 VITALS
RESPIRATION RATE: 18 BRPM | DIASTOLIC BLOOD PRESSURE: 66 MMHG | SYSTOLIC BLOOD PRESSURE: 104 MMHG | TEMPERATURE: 97.4 F | HEART RATE: 62 BPM | OXYGEN SATURATION: 98 %

## 2022-04-23 DIAGNOSIS — G06.1 ABSCESS IN EPIDURAL SPACE OF THORACIC SPINE: ICD-10-CM

## 2022-04-23 PROCEDURE — 700105 HCHG RX REV CODE 258: Performed by: INTERNAL MEDICINE

## 2022-04-23 PROCEDURE — 96365 THER/PROPH/DIAG IV INF INIT: CPT

## 2022-04-23 PROCEDURE — 700111 HCHG RX REV CODE 636 W/ 250 OVERRIDE (IP): Performed by: INTERNAL MEDICINE

## 2022-04-23 RX ORDER — 0.9 % SODIUM CHLORIDE 0.9 %
10 VIAL (ML) INJECTION PRN
Status: CANCELLED | OUTPATIENT
Start: 2022-04-24

## 2022-04-23 RX ORDER — HEPARIN SODIUM (PORCINE) LOCK FLUSH IV SOLN 100 UNIT/ML 100 UNIT/ML
500 SOLUTION INTRAVENOUS PRN
Status: CANCELLED | OUTPATIENT
Start: 2022-04-24

## 2022-04-23 RX ORDER — 0.9 % SODIUM CHLORIDE 0.9 %
VIAL (ML) INJECTION PRN
Status: CANCELLED | OUTPATIENT
Start: 2022-04-24

## 2022-04-23 RX ORDER — 0.9 % SODIUM CHLORIDE 0.9 %
3 VIAL (ML) INJECTION PRN
Status: CANCELLED | OUTPATIENT
Start: 2022-04-24

## 2022-04-23 RX ADMIN — CEFTRIAXONE SODIUM 2 G: 2 INJECTION, POWDER, FOR SOLUTION INTRAMUSCULAR; INTRAVENOUS at 15:31

## 2022-04-23 NOTE — PROGRESS NOTES
Pt [facility] to hospitals for daily antibiotic infusion. CANELO PICC with brisk blood return noted, flushed with NS. Pt medicated per MAR. Pt tolerated treatment without s/s adverse reaction. PICC flushed with NS, wrapped with guaze and mesh sleeve. Pt to return tomorrow at 1445 for next appt.     After reading pt's PMH notes from 4/22/2022, pt's antibiotic therapy has been extended by Dr Bijal CRAIN. Pt's new end treatment date is 5/7/2022. Scheduling contacted for future appts.

## 2022-04-24 ENCOUNTER — OUTPATIENT INFUSION SERVICES (OUTPATIENT)
Dept: ONCOLOGY | Facility: MEDICAL CENTER | Age: 42
End: 2022-04-24
Attending: INTERNAL MEDICINE
Payer: COMMERCIAL

## 2022-04-24 VITALS
HEART RATE: 61 BPM | TEMPERATURE: 97.5 F | SYSTOLIC BLOOD PRESSURE: 98 MMHG | OXYGEN SATURATION: 100 % | RESPIRATION RATE: 18 BRPM | DIASTOLIC BLOOD PRESSURE: 64 MMHG | BODY MASS INDEX: 28.34 KG/M2 | WEIGHT: 134.48 LBS

## 2022-04-24 DIAGNOSIS — G06.1 ABSCESS IN EPIDURAL SPACE OF THORACIC SPINE: ICD-10-CM

## 2022-04-24 PROCEDURE — 96365 THER/PROPH/DIAG IV INF INIT: CPT

## 2022-04-24 PROCEDURE — 700111 HCHG RX REV CODE 636 W/ 250 OVERRIDE (IP): Performed by: INTERNAL MEDICINE

## 2022-04-24 PROCEDURE — 700105 HCHG RX REV CODE 258: Performed by: INTERNAL MEDICINE

## 2022-04-24 RX ORDER — 0.9 % SODIUM CHLORIDE 0.9 %
10 VIAL (ML) INJECTION PRN
Status: CANCELLED | OUTPATIENT
Start: 2022-04-25

## 2022-04-24 RX ORDER — 0.9 % SODIUM CHLORIDE 0.9 %
3 VIAL (ML) INJECTION PRN
Status: CANCELLED | OUTPATIENT
Start: 2022-04-25

## 2022-04-24 RX ORDER — HEPARIN SODIUM (PORCINE) LOCK FLUSH IV SOLN 100 UNIT/ML 100 UNIT/ML
500 SOLUTION INTRAVENOUS PRN
Status: CANCELLED | OUTPATIENT
Start: 2022-04-25

## 2022-04-24 RX ORDER — 0.9 % SODIUM CHLORIDE 0.9 %
VIAL (ML) INJECTION PRN
Status: CANCELLED | OUTPATIENT
Start: 2022-04-25

## 2022-04-24 RX ADMIN — CEFTRIAXONE SODIUM 2 G: 2 INJECTION, POWDER, FOR SOLUTION INTRAMUSCULAR; INTRAVENOUS at 14:14

## 2022-04-24 ASSESSMENT — FIBROSIS 4 INDEX: FIB4 SCORE: 0.64

## 2022-04-24 NOTE — PROGRESS NOTES
Pt arrived ambulatory to [facility] for daily antibiotic infusion. Pt with call light in reach. Pt given water and seema crackers.     CANELO PICC with brisk blood return. Pt medicated per MAR. Pt tolerated treatment without s/s adverse reaction. PICC flushed with NS, wrapped with gauze and mesh sleeve. Pt discharged to self care, [facility]. Pt given printout of future appts.

## 2022-04-25 ENCOUNTER — OUTPATIENT INFUSION SERVICES (OUTPATIENT)
Dept: ONCOLOGY | Facility: MEDICAL CENTER | Age: 42
End: 2022-04-25
Attending: INTERNAL MEDICINE
Payer: COMMERCIAL

## 2022-04-25 VITALS
SYSTOLIC BLOOD PRESSURE: 96 MMHG | OXYGEN SATURATION: 100 % | HEART RATE: 67 BPM | WEIGHT: 138.67 LBS | DIASTOLIC BLOOD PRESSURE: 65 MMHG | BODY MASS INDEX: 29.23 KG/M2 | RESPIRATION RATE: 18 BRPM | TEMPERATURE: 97.5 F

## 2022-04-25 DIAGNOSIS — G06.1 ABSCESS IN EPIDURAL SPACE OF THORACIC SPINE: ICD-10-CM

## 2022-04-25 LAB
ALBUMIN SERPL BCP-MCNC: 3.8 G/DL (ref 3.2–4.9)
ALBUMIN/GLOB SERPL: 1.8 G/DL
ALP SERPL-CCNC: 102 U/L (ref 30–99)
ALT SERPL-CCNC: 23 U/L (ref 2–50)
ANION GAP SERPL CALC-SCNC: 9 MMOL/L (ref 7–16)
AST SERPL-CCNC: 24 U/L (ref 12–45)
BASOPHILS # BLD AUTO: 0.6 % (ref 0–1.8)
BASOPHILS # BLD: 0.04 K/UL (ref 0–0.12)
BILIRUB SERPL-MCNC: <0.2 MG/DL (ref 0.1–1.5)
BUN SERPL-MCNC: 10 MG/DL (ref 8–22)
CALCIUM SERPL-MCNC: 9 MG/DL (ref 8.5–10.5)
CHLORIDE SERPL-SCNC: 105 MMOL/L (ref 96–112)
CO2 SERPL-SCNC: 26 MMOL/L (ref 20–33)
CREAT SERPL-MCNC: 0.5 MG/DL (ref 0.5–1.4)
EOSINOPHIL # BLD AUTO: 0.23 K/UL (ref 0–0.51)
EOSINOPHIL NFR BLD: 3.3 % (ref 0–6.9)
ERYTHROCYTE [DISTWIDTH] IN BLOOD BY AUTOMATED COUNT: 42.1 FL (ref 35.9–50)
GFR SERPLBLD CREATININE-BSD FMLA CKD-EPI: 122 ML/MIN/1.73 M 2
GLOBULIN SER CALC-MCNC: 2.1 G/DL (ref 1.9–3.5)
GLUCOSE SERPL-MCNC: 111 MG/DL (ref 65–99)
HCT VFR BLD AUTO: 34.6 % (ref 37–47)
HGB BLD-MCNC: 11.9 G/DL (ref 12–16)
IMM GRANULOCYTES # BLD AUTO: 0.03 K/UL (ref 0–0.11)
IMM GRANULOCYTES NFR BLD AUTO: 0.4 % (ref 0–0.9)
LYMPHOCYTES # BLD AUTO: 3.43 K/UL (ref 1–4.8)
LYMPHOCYTES NFR BLD: 49.8 % (ref 22–41)
MCH RBC QN AUTO: 31.2 PG (ref 27–33)
MCHC RBC AUTO-ENTMCNC: 34.4 G/DL (ref 33.6–35)
MCV RBC AUTO: 90.6 FL (ref 81.4–97.8)
MONOCYTES # BLD AUTO: 0.35 K/UL (ref 0–0.85)
MONOCYTES NFR BLD AUTO: 5.1 % (ref 0–13.4)
NEUTROPHILS # BLD AUTO: 2.81 K/UL (ref 2–7.15)
NEUTROPHILS NFR BLD: 40.8 % (ref 44–72)
NRBC # BLD AUTO: 0 K/UL
NRBC BLD-RTO: 0 /100 WBC
OUTPT INFUS CBC COMMENT OICOM: ABNORMAL
PLATELET # BLD AUTO: 272 K/UL (ref 164–446)
PMV BLD AUTO: 10.1 FL (ref 9–12.9)
POTASSIUM SERPL-SCNC: 3.8 MMOL/L (ref 3.6–5.5)
PROT SERPL-MCNC: 5.9 G/DL (ref 6–8.2)
RBC # BLD AUTO: 3.82 M/UL (ref 4.2–5.4)
SODIUM SERPL-SCNC: 140 MMOL/L (ref 135–145)
WBC # BLD AUTO: 6.9 K/UL (ref 4.8–10.8)

## 2022-04-25 PROCEDURE — 700105 HCHG RX REV CODE 258: Performed by: INTERNAL MEDICINE

## 2022-04-25 PROCEDURE — 700111 HCHG RX REV CODE 636 W/ 250 OVERRIDE (IP): Performed by: INTERNAL MEDICINE

## 2022-04-25 PROCEDURE — 96365 THER/PROPH/DIAG IV INF INIT: CPT

## 2022-04-25 PROCEDURE — 80053 COMPREHEN METABOLIC PANEL: CPT

## 2022-04-25 PROCEDURE — 85025 COMPLETE CBC W/AUTO DIFF WBC: CPT

## 2022-04-25 RX ORDER — 0.9 % SODIUM CHLORIDE 0.9 %
3 VIAL (ML) INJECTION PRN
Status: CANCELLED | OUTPATIENT
Start: 2022-04-26

## 2022-04-25 RX ORDER — 0.9 % SODIUM CHLORIDE 0.9 %
VIAL (ML) INJECTION PRN
Status: CANCELLED | OUTPATIENT
Start: 2022-04-26

## 2022-04-25 RX ORDER — HEPARIN SODIUM (PORCINE) LOCK FLUSH IV SOLN 100 UNIT/ML 100 UNIT/ML
500 SOLUTION INTRAVENOUS PRN
Status: CANCELLED | OUTPATIENT
Start: 2022-04-26

## 2022-04-25 RX ORDER — 0.9 % SODIUM CHLORIDE 0.9 %
10 VIAL (ML) INJECTION PRN
Status: CANCELLED | OUTPATIENT
Start: 2022-04-26

## 2022-04-25 RX ADMIN — CEFTRIAXONE SODIUM 2 G: 2 INJECTION, POWDER, FOR SOLUTION INTRAMUSCULAR; INTRAVENOUS at 17:19

## 2022-04-25 ASSESSMENT — FIBROSIS 4 INDEX: FIB4 SCORE: 0.72

## 2022-04-25 NOTE — PROGRESS NOTES
Johana presents to IS for daily antibiotic therapy.  Johana reports slight irritation at edges of PICC line dressing.  PICC flushes easily with brisk blood return.  PICC dressing changed per patient request, changed in sterile fashion,  hypoallergenic dressing placed to PICC.  Ceftriaxone administered per MAR, Johana tolerated well.  PICC flushed with NS, wrapped in gauze and protective sleeve.  Johana discharged in NAD, next appointment confirmed.

## 2022-04-26 ENCOUNTER — OUTPATIENT INFUSION SERVICES (OUTPATIENT)
Dept: ONCOLOGY | Facility: MEDICAL CENTER | Age: 42
End: 2022-04-26
Attending: INTERNAL MEDICINE
Payer: COMMERCIAL

## 2022-04-26 VITALS
SYSTOLIC BLOOD PRESSURE: 90 MMHG | OXYGEN SATURATION: 98 % | HEART RATE: 62 BPM | DIASTOLIC BLOOD PRESSURE: 58 MMHG | RESPIRATION RATE: 18 BRPM | TEMPERATURE: 98 F

## 2022-04-26 DIAGNOSIS — G06.1 ABSCESS IN EPIDURAL SPACE OF THORACIC SPINE: ICD-10-CM

## 2022-04-26 PROCEDURE — 700111 HCHG RX REV CODE 636 W/ 250 OVERRIDE (IP): Performed by: INTERNAL MEDICINE

## 2022-04-26 PROCEDURE — 96365 THER/PROPH/DIAG IV INF INIT: CPT

## 2022-04-26 PROCEDURE — 700105 HCHG RX REV CODE 258: Performed by: INTERNAL MEDICINE

## 2022-04-26 RX ORDER — 0.9 % SODIUM CHLORIDE 0.9 %
10 VIAL (ML) INJECTION PRN
Status: CANCELLED | OUTPATIENT
Start: 2022-04-27

## 2022-04-26 RX ORDER — HEPARIN SODIUM (PORCINE) LOCK FLUSH IV SOLN 100 UNIT/ML 100 UNIT/ML
500 SOLUTION INTRAVENOUS PRN
Status: CANCELLED | OUTPATIENT
Start: 2022-04-27

## 2022-04-26 RX ORDER — 0.9 % SODIUM CHLORIDE 0.9 %
3 VIAL (ML) INJECTION PRN
Status: CANCELLED | OUTPATIENT
Start: 2022-04-27

## 2022-04-26 RX ORDER — 0.9 % SODIUM CHLORIDE 0.9 %
VIAL (ML) INJECTION PRN
Status: CANCELLED | OUTPATIENT
Start: 2022-04-27

## 2022-04-26 RX ADMIN — CEFTRIAXONE SODIUM 2 G: 2 INJECTION, POWDER, FOR SOLUTION INTRAMUSCULAR; INTRAVENOUS at 17:15

## 2022-04-26 NOTE — PROGRESS NOTES
Patient presented to [facility] for daily antibiotic therapy. CANELO PICC flushed easily, kaity briskly.Weekly labs drawn. Ceftriaxone infused as ordered. Patient tolerated well. PICC flushed with NS per policy. Patient left [facility] in no apparent distress.  Copy of appointment schedule given to patient prior to departure. Appointment times reviewed.

## 2022-04-27 ENCOUNTER — OUTPATIENT INFUSION SERVICES (OUTPATIENT)
Dept: ONCOLOGY | Facility: MEDICAL CENTER | Age: 42
End: 2022-04-27
Attending: INTERNAL MEDICINE
Payer: COMMERCIAL

## 2022-04-27 VITALS
DIASTOLIC BLOOD PRESSURE: 68 MMHG | OXYGEN SATURATION: 98 % | SYSTOLIC BLOOD PRESSURE: 96 MMHG | HEART RATE: 82 BPM | RESPIRATION RATE: 18 BRPM | TEMPERATURE: 98 F

## 2022-04-27 DIAGNOSIS — G06.1 ABSCESS IN EPIDURAL SPACE OF THORACIC SPINE: ICD-10-CM

## 2022-04-27 PROCEDURE — 700111 HCHG RX REV CODE 636 W/ 250 OVERRIDE (IP): Performed by: INTERNAL MEDICINE

## 2022-04-27 PROCEDURE — 96365 THER/PROPH/DIAG IV INF INIT: CPT

## 2022-04-27 PROCEDURE — 700105 HCHG RX REV CODE 258: Performed by: INTERNAL MEDICINE

## 2022-04-27 RX ORDER — HEPARIN SODIUM (PORCINE) LOCK FLUSH IV SOLN 100 UNIT/ML 100 UNIT/ML
500 SOLUTION INTRAVENOUS PRN
Status: CANCELLED | OUTPATIENT
Start: 2022-04-28

## 2022-04-27 RX ORDER — 0.9 % SODIUM CHLORIDE 0.9 %
10 VIAL (ML) INJECTION PRN
Status: CANCELLED | OUTPATIENT
Start: 2022-04-28

## 2022-04-27 RX ORDER — 0.9 % SODIUM CHLORIDE 0.9 %
VIAL (ML) INJECTION PRN
Status: CANCELLED | OUTPATIENT
Start: 2022-04-28

## 2022-04-27 RX ORDER — 0.9 % SODIUM CHLORIDE 0.9 %
3 VIAL (ML) INJECTION PRN
Status: CANCELLED | OUTPATIENT
Start: 2022-04-28

## 2022-04-27 RX ADMIN — CEFTRIAXONE SODIUM 2 G: 2 INJECTION, POWDER, FOR SOLUTION INTRAMUSCULAR; INTRAVENOUS at 17:08

## 2022-04-28 ENCOUNTER — TELEPHONE (OUTPATIENT)
Dept: INFECTIOUS DISEASES | Facility: MEDICAL CENTER | Age: 42
End: 2022-04-28

## 2022-04-28 ENCOUNTER — OUTPATIENT INFUSION SERVICES (OUTPATIENT)
Dept: ONCOLOGY | Facility: MEDICAL CENTER | Age: 42
End: 2022-04-28
Attending: INTERNAL MEDICINE
Payer: COMMERCIAL

## 2022-04-28 VITALS
RESPIRATION RATE: 18 BRPM | DIASTOLIC BLOOD PRESSURE: 62 MMHG | HEART RATE: 90 BPM | OXYGEN SATURATION: 98 % | TEMPERATURE: 98 F | SYSTOLIC BLOOD PRESSURE: 99 MMHG

## 2022-04-28 DIAGNOSIS — G06.1 ABSCESS IN EPIDURAL SPACE OF THORACIC SPINE: ICD-10-CM

## 2022-04-28 PROCEDURE — 96365 THER/PROPH/DIAG IV INF INIT: CPT

## 2022-04-28 PROCEDURE — 700105 HCHG RX REV CODE 258: Performed by: INTERNAL MEDICINE

## 2022-04-28 PROCEDURE — 700111 HCHG RX REV CODE 636 W/ 250 OVERRIDE (IP): Performed by: INTERNAL MEDICINE

## 2022-04-28 RX ORDER — 0.9 % SODIUM CHLORIDE 0.9 %
10 VIAL (ML) INJECTION PRN
Status: CANCELLED | OUTPATIENT
Start: 2022-04-29

## 2022-04-28 RX ORDER — 0.9 % SODIUM CHLORIDE 0.9 %
VIAL (ML) INJECTION PRN
Status: CANCELLED | OUTPATIENT
Start: 2022-04-29

## 2022-04-28 RX ORDER — HEPARIN SODIUM (PORCINE) LOCK FLUSH IV SOLN 100 UNIT/ML 100 UNIT/ML
500 SOLUTION INTRAVENOUS PRN
Status: CANCELLED | OUTPATIENT
Start: 2022-04-29

## 2022-04-28 RX ORDER — 0.9 % SODIUM CHLORIDE 0.9 %
3 VIAL (ML) INJECTION PRN
Status: CANCELLED | OUTPATIENT
Start: 2022-04-29

## 2022-04-28 RX ADMIN — CEFTRIAXONE SODIUM 2 G: 2 INJECTION, POWDER, FOR SOLUTION INTRAMUSCULAR; INTRAVENOUS at 16:56

## 2022-04-28 NOTE — TELEPHONE ENCOUNTER
Called patient and left message for hospital follow up appt. ABX extended for 2 weeks until 5/7 however pts repeat MRI currently scheduled for 5/16

## 2022-04-28 NOTE — PROGRESS NOTES
Patient arrived ambulatory to IS for daily Rocephin.  Patient denies any acute changes.  LUE PICC line flushes well with good blood return noted.  Antibiotic infused per order, patient tolerated well.  PICC line flushed and covered.  Confirmed next appointment and patient ambulated out of clinic in no apparent distress.

## 2022-04-29 ENCOUNTER — OUTPATIENT INFUSION SERVICES (OUTPATIENT)
Dept: ONCOLOGY | Facility: MEDICAL CENTER | Age: 42
End: 2022-04-29
Attending: INTERNAL MEDICINE
Payer: COMMERCIAL

## 2022-04-29 VITALS
DIASTOLIC BLOOD PRESSURE: 64 MMHG | TEMPERATURE: 97.2 F | SYSTOLIC BLOOD PRESSURE: 103 MMHG | HEART RATE: 83 BPM | RESPIRATION RATE: 18 BRPM | OXYGEN SATURATION: 98 %

## 2022-04-29 DIAGNOSIS — G06.1 ABSCESS IN EPIDURAL SPACE OF THORACIC SPINE: ICD-10-CM

## 2022-04-29 PROCEDURE — 96365 THER/PROPH/DIAG IV INF INIT: CPT

## 2022-04-29 PROCEDURE — 700111 HCHG RX REV CODE 636 W/ 250 OVERRIDE (IP): Performed by: INTERNAL MEDICINE

## 2022-04-29 PROCEDURE — 700105 HCHG RX REV CODE 258: Performed by: INTERNAL MEDICINE

## 2022-04-29 RX ORDER — 0.9 % SODIUM CHLORIDE 0.9 %
3 VIAL (ML) INJECTION PRN
Status: CANCELLED | OUTPATIENT
Start: 2022-04-30

## 2022-04-29 RX ORDER — 0.9 % SODIUM CHLORIDE 0.9 %
10 VIAL (ML) INJECTION PRN
Status: CANCELLED | OUTPATIENT
Start: 2022-04-30

## 2022-04-29 RX ORDER — 0.9 % SODIUM CHLORIDE 0.9 %
VIAL (ML) INJECTION PRN
Status: CANCELLED | OUTPATIENT
Start: 2022-04-30

## 2022-04-29 RX ORDER — HEPARIN SODIUM (PORCINE) LOCK FLUSH IV SOLN 100 UNIT/ML 100 UNIT/ML
500 SOLUTION INTRAVENOUS PRN
Status: CANCELLED | OUTPATIENT
Start: 2022-04-30

## 2022-04-29 RX ADMIN — CEFTRIAXONE SODIUM 2 G: 2 INJECTION, POWDER, FOR SOLUTION INTRAMUSCULAR; INTRAVENOUS at 17:00

## 2022-04-29 NOTE — TELEPHONE ENCOUNTER
IV Antibiotics (Appt needed )    Delfino Appiah M.D.  You 20 hours ago (10:39 AM)         No need to extend the antibiotics any further.  Please see if you can schedule her in our clinic after the MRI.     Message text      You  Delfino Appiah M.D. 21 hours ago (9:40 AM)         Gave patient my extension for return call. Would you like to extend abx until 5/16?? Already extended for 2 weeks. Please advise

## 2022-04-29 NOTE — PROGRESS NOTES
Pt presented to infusion center for daily Rocephin. Pt reports no significant changes since yesterday. Left arm PICC line in place, flushed and brisk blood return observed. Rocephin infused with no s/s of adverse reaction. Dressing changed in sterile fashion. PICC line flushed, brisk blood return again observed, wrapped in gauze and protective sleeve. Has next appt, left on foot to self care.

## 2022-04-30 ENCOUNTER — OUTPATIENT INFUSION SERVICES (OUTPATIENT)
Dept: ONCOLOGY | Facility: MEDICAL CENTER | Age: 42
End: 2022-04-30
Attending: INTERNAL MEDICINE
Payer: COMMERCIAL

## 2022-04-30 VITALS
HEART RATE: 80 BPM | OXYGEN SATURATION: 98 % | TEMPERATURE: 97.4 F | SYSTOLIC BLOOD PRESSURE: 105 MMHG | RESPIRATION RATE: 18 BRPM | DIASTOLIC BLOOD PRESSURE: 65 MMHG

## 2022-04-30 DIAGNOSIS — G06.1 ABSCESS IN EPIDURAL SPACE OF THORACIC SPINE: ICD-10-CM

## 2022-04-30 PROCEDURE — 700105 HCHG RX REV CODE 258: Performed by: INTERNAL MEDICINE

## 2022-04-30 PROCEDURE — 700111 HCHG RX REV CODE 636 W/ 250 OVERRIDE (IP): Performed by: INTERNAL MEDICINE

## 2022-04-30 PROCEDURE — 96365 THER/PROPH/DIAG IV INF INIT: CPT

## 2022-04-30 RX ORDER — 0.9 % SODIUM CHLORIDE 0.9 %
10 VIAL (ML) INJECTION PRN
Status: CANCELLED | OUTPATIENT
Start: 2022-05-01

## 2022-04-30 RX ORDER — HEPARIN SODIUM (PORCINE) LOCK FLUSH IV SOLN 100 UNIT/ML 100 UNIT/ML
500 SOLUTION INTRAVENOUS PRN
Status: CANCELLED | OUTPATIENT
Start: 2022-05-01

## 2022-04-30 RX ORDER — 0.9 % SODIUM CHLORIDE 0.9 %
VIAL (ML) INJECTION PRN
Status: CANCELLED | OUTPATIENT
Start: 2022-05-01

## 2022-04-30 RX ORDER — 0.9 % SODIUM CHLORIDE 0.9 %
3 VIAL (ML) INJECTION PRN
Status: CANCELLED | OUTPATIENT
Start: 2022-05-01

## 2022-04-30 RX ADMIN — CEFTRIAXONE SODIUM 2 G: 2 INJECTION, POWDER, FOR SOLUTION INTRAMUSCULAR; INTRAVENOUS at 16:49

## 2022-04-30 NOTE — PROGRESS NOTES
Pt presented to infusion center for daily ceftriaxone. Pt reports no significant changes since yesterday. Left arm PICC line in place, flushed and brisk blood return observed. Ceftriaxone infused with no s/s of adverse reaction. PICC line flushed, brisk blood return again observed, clave changed, wrapped in gauze and protective sleeve. Has next apptJohana discharged home to self care.

## 2022-05-01 ENCOUNTER — OUTPATIENT INFUSION SERVICES (OUTPATIENT)
Dept: ONCOLOGY | Facility: MEDICAL CENTER | Age: 42
End: 2022-05-01
Attending: INTERNAL MEDICINE
Payer: COMMERCIAL

## 2022-05-01 VITALS
OXYGEN SATURATION: 98 % | TEMPERATURE: 98.7 F | SYSTOLIC BLOOD PRESSURE: 101 MMHG | RESPIRATION RATE: 18 BRPM | HEART RATE: 82 BPM | DIASTOLIC BLOOD PRESSURE: 60 MMHG

## 2022-05-01 DIAGNOSIS — G06.1 ABSCESS IN EPIDURAL SPACE OF THORACIC SPINE: ICD-10-CM

## 2022-05-01 PROCEDURE — 96365 THER/PROPH/DIAG IV INF INIT: CPT

## 2022-05-01 PROCEDURE — 700111 HCHG RX REV CODE 636 W/ 250 OVERRIDE (IP): Performed by: INTERNAL MEDICINE

## 2022-05-01 PROCEDURE — 700105 HCHG RX REV CODE 258: Performed by: INTERNAL MEDICINE

## 2022-05-01 RX ORDER — 0.9 % SODIUM CHLORIDE 0.9 %
VIAL (ML) INJECTION PRN
Status: CANCELLED | OUTPATIENT
Start: 2022-05-02

## 2022-05-01 RX ORDER — HEPARIN SODIUM (PORCINE) LOCK FLUSH IV SOLN 100 UNIT/ML 100 UNIT/ML
500 SOLUTION INTRAVENOUS PRN
Status: CANCELLED | OUTPATIENT
Start: 2022-05-02

## 2022-05-01 RX ORDER — 0.9 % SODIUM CHLORIDE 0.9 %
10 VIAL (ML) INJECTION PRN
Status: CANCELLED | OUTPATIENT
Start: 2022-05-02

## 2022-05-01 RX ORDER — 0.9 % SODIUM CHLORIDE 0.9 %
3 VIAL (ML) INJECTION PRN
Status: CANCELLED | OUTPATIENT
Start: 2022-05-02

## 2022-05-01 RX ADMIN — CEFTRIAXONE SODIUM 2 G: 2 INJECTION, POWDER, FOR SOLUTION INTRAMUSCULAR; INTRAVENOUS at 15:08

## 2022-05-01 NOTE — PROGRESS NOTES
Patient to [facility] for ro[facility]phin infusion. PICC line flushed with + blood return. Rocephin infused with no s/s of infusion reaction. PICC line flushed with + blood return. Patient has appointment tomorrow. Patient to home in care of self.

## 2022-05-01 NOTE — PROGRESS NOTES
Patient to [facility] for rocephin infusion. PICC line flushed with + blood return. Rocephin infused with no s/s of infusion reaction. PICC line flushed with + blood return. Patient has appointment tomorrow. Patient to home in care of self.

## 2022-05-02 ENCOUNTER — OUTPATIENT INFUSION SERVICES (OUTPATIENT)
Dept: ONCOLOGY | Facility: MEDICAL CENTER | Age: 42
End: 2022-05-02
Attending: INTERNAL MEDICINE
Payer: COMMERCIAL

## 2022-05-02 VITALS
HEIGHT: 58 IN | BODY MASS INDEX: 29.39 KG/M2 | HEART RATE: 80 BPM | RESPIRATION RATE: 18 BRPM | TEMPERATURE: 98 F | OXYGEN SATURATION: 98 % | SYSTOLIC BLOOD PRESSURE: 98 MMHG | WEIGHT: 139.99 LBS | DIASTOLIC BLOOD PRESSURE: 61 MMHG

## 2022-05-02 DIAGNOSIS — G06.1 ABSCESS IN EPIDURAL SPACE OF THORACIC SPINE: ICD-10-CM

## 2022-05-02 LAB
ALBUMIN SERPL BCP-MCNC: 3.8 G/DL (ref 3.2–4.9)
ALBUMIN/GLOB SERPL: 1.7 G/DL
ALP SERPL-CCNC: 141 U/L (ref 30–99)
ALT SERPL-CCNC: 103 U/L (ref 2–50)
ANION GAP SERPL CALC-SCNC: 12 MMOL/L (ref 7–16)
AST SERPL-CCNC: 220 U/L (ref 12–45)
BASOPHILS # BLD AUTO: 0.3 % (ref 0–1.8)
BASOPHILS # BLD: 0.02 K/UL (ref 0–0.12)
BILIRUB SERPL-MCNC: 0.4 MG/DL (ref 0.1–1.5)
BUN SERPL-MCNC: 16 MG/DL (ref 8–22)
CALCIUM SERPL-MCNC: 8.7 MG/DL (ref 8.5–10.5)
CHLORIDE SERPL-SCNC: 107 MMOL/L (ref 96–112)
CO2 SERPL-SCNC: 21 MMOL/L (ref 20–33)
CREAT SERPL-MCNC: 0.52 MG/DL (ref 0.5–1.4)
CRP SERPL HS-MCNC: <0.3 MG/DL (ref 0–0.75)
EOSINOPHIL # BLD AUTO: 0.16 K/UL (ref 0–0.51)
EOSINOPHIL NFR BLD: 2.5 % (ref 0–6.9)
ERYTHROCYTE [DISTWIDTH] IN BLOOD BY AUTOMATED COUNT: 42.2 FL (ref 35.9–50)
ERYTHROCYTE [SEDIMENTATION RATE] IN BLOOD BY WESTERGREN METHOD: 5 MM/HOUR (ref 0–25)
GFR SERPLBLD CREATININE-BSD FMLA CKD-EPI: 121 ML/MIN/1.73 M 2
GLOBULIN SER CALC-MCNC: 2.3 G/DL (ref 1.9–3.5)
GLUCOSE SERPL-MCNC: 117 MG/DL (ref 65–99)
HCT VFR BLD AUTO: 33.1 % (ref 37–47)
HGB BLD-MCNC: 11.8 G/DL (ref 12–16)
IMM GRANULOCYTES # BLD AUTO: 0.02 K/UL (ref 0–0.11)
IMM GRANULOCYTES NFR BLD AUTO: 0.3 % (ref 0–0.9)
LYMPHOCYTES # BLD AUTO: 2.1 K/UL (ref 1–4.8)
LYMPHOCYTES NFR BLD: 32.3 % (ref 22–41)
MCH RBC QN AUTO: 31.9 PG (ref 27–33)
MCHC RBC AUTO-ENTMCNC: 35.6 G/DL (ref 33.6–35)
MCV RBC AUTO: 89.5 FL (ref 81.4–97.8)
MONOCYTES # BLD AUTO: 0.35 K/UL (ref 0–0.85)
MONOCYTES NFR BLD AUTO: 5.4 % (ref 0–13.4)
NEUTROPHILS # BLD AUTO: 3.86 K/UL (ref 2–7.15)
NEUTROPHILS NFR BLD: 59.2 % (ref 44–72)
NRBC # BLD AUTO: 0 K/UL
NRBC BLD-RTO: 0 /100 WBC
OUTPT INFUS CBC COMMENT OICOM: ABNORMAL
PLATELET # BLD AUTO: 231 K/UL (ref 164–446)
PMV BLD AUTO: 10 FL (ref 9–12.9)
POTASSIUM SERPL-SCNC: 3.6 MMOL/L (ref 3.6–5.5)
PROT SERPL-MCNC: 6.1 G/DL (ref 6–8.2)
RBC # BLD AUTO: 3.7 M/UL (ref 4.2–5.4)
SODIUM SERPL-SCNC: 140 MMOL/L (ref 135–145)
WBC # BLD AUTO: 6.5 K/UL (ref 4.8–10.8)

## 2022-05-02 PROCEDURE — 86140 C-REACTIVE PROTEIN: CPT

## 2022-05-02 PROCEDURE — 85025 COMPLETE CBC W/AUTO DIFF WBC: CPT

## 2022-05-02 PROCEDURE — 700111 HCHG RX REV CODE 636 W/ 250 OVERRIDE (IP): Performed by: INTERNAL MEDICINE

## 2022-05-02 PROCEDURE — 96365 THER/PROPH/DIAG IV INF INIT: CPT

## 2022-05-02 PROCEDURE — 80053 COMPREHEN METABOLIC PANEL: CPT

## 2022-05-02 PROCEDURE — 700105 HCHG RX REV CODE 258: Performed by: INTERNAL MEDICINE

## 2022-05-02 PROCEDURE — 85652 RBC SED RATE AUTOMATED: CPT

## 2022-05-02 RX ORDER — 0.9 % SODIUM CHLORIDE 0.9 %
3 VIAL (ML) INJECTION PRN
Status: CANCELLED | OUTPATIENT
Start: 2022-05-03

## 2022-05-02 RX ORDER — HEPARIN SODIUM (PORCINE) LOCK FLUSH IV SOLN 100 UNIT/ML 100 UNIT/ML
500 SOLUTION INTRAVENOUS PRN
Status: CANCELLED | OUTPATIENT
Start: 2022-05-03

## 2022-05-02 RX ORDER — 0.9 % SODIUM CHLORIDE 0.9 %
10 VIAL (ML) INJECTION PRN
Status: CANCELLED | OUTPATIENT
Start: 2022-05-03

## 2022-05-02 RX ORDER — 0.9 % SODIUM CHLORIDE 0.9 %
VIAL (ML) INJECTION PRN
Status: CANCELLED | OUTPATIENT
Start: 2022-05-03

## 2022-05-02 RX ADMIN — CEFTRIAXONE SODIUM 2 G: 2 INJECTION, POWDER, FOR SOLUTION INTRAMUSCULAR; INTRAVENOUS at 17:15

## 2022-05-02 ASSESSMENT — FIBROSIS 4 INDEX: FIB4 SCORE: 0.72

## 2022-05-03 ENCOUNTER — OUTPATIENT INFUSION SERVICES (OUTPATIENT)
Dept: ONCOLOGY | Facility: MEDICAL CENTER | Age: 42
End: 2022-05-03
Attending: INTERNAL MEDICINE
Payer: COMMERCIAL

## 2022-05-03 ENCOUNTER — DOCUMENTATION (OUTPATIENT)
Dept: INFECTIOUS DISEASES | Facility: MEDICAL CENTER | Age: 42
End: 2022-05-03
Payer: COMMERCIAL

## 2022-05-03 VITALS
SYSTOLIC BLOOD PRESSURE: 91 MMHG | DIASTOLIC BLOOD PRESSURE: 57 MMHG | OXYGEN SATURATION: 96 % | HEART RATE: 80 BPM | TEMPERATURE: 98.5 F | RESPIRATION RATE: 16 BRPM

## 2022-05-03 DIAGNOSIS — G06.1 ABSCESS IN EPIDURAL SPACE OF THORACIC SPINE: ICD-10-CM

## 2022-05-03 DIAGNOSIS — R74.01 TRANSAMINITIS: ICD-10-CM

## 2022-05-03 PROCEDURE — 99211 OFF/OP EST MAY X REQ PHY/QHP: CPT

## 2022-05-03 RX ORDER — HEPARIN SODIUM (PORCINE) LOCK FLUSH IV SOLN 100 UNIT/ML 100 UNIT/ML
500 SOLUTION INTRAVENOUS PRN
Status: CANCELLED | OUTPATIENT
Start: 2022-05-09

## 2022-05-03 RX ORDER — 0.9 % SODIUM CHLORIDE 0.9 %
VIAL (ML) INJECTION PRN
Status: CANCELLED | OUTPATIENT
Start: 2022-05-09

## 2022-05-03 RX ORDER — 0.9 % SODIUM CHLORIDE 0.9 %
3 VIAL (ML) INJECTION PRN
Status: CANCELLED | OUTPATIENT
Start: 2022-05-09

## 2022-05-03 RX ORDER — 0.9 % SODIUM CHLORIDE 0.9 %
10 VIAL (ML) INJECTION PRN
Status: CANCELLED | OUTPATIENT
Start: 2022-05-09

## 2022-05-03 RX ORDER — AMOXICILLIN AND CLAVULANATE POTASSIUM 875; 125 MG/1; MG/1
1 TABLET, FILM COATED ORAL 2 TIMES DAILY
Qty: 28 TABLET | Refills: 0 | Status: SHIPPED | OUTPATIENT
Start: 2022-05-03 | End: 2022-05-17

## 2022-05-03 NOTE — PROGRESS NOTES
Reviewed patient's labs and with new onset transaminitis.  Patient is on ceftriaxone which has potential to cause biliary sludging which could present as transaminitis.  IV antibiotics already extended by 2 weeks with no plans to extend further per chart review.  She is scheduled for repeat MRI later this month.    --- Will recommend stopping ceftriaxone and will transition to Augmentin 875/125 BID until MRI can be completed -orders placed  --- Recommend repeat labs on 5/5 to ensure no worsening of her transaminitis- order plaaced  --- We will request MA notify the infusion service so this information can be relayed to the patient.  --- Please also asked the patient to refrain from any alcohol use while on antibiotics and to limit Tylenol.      Opal Arnett MD

## 2022-05-03 NOTE — PROGRESS NOTES
Pt arrived ambulatory to [facility] for daily antibiotic infusion. CANELO PICC with brisk blood return. Monday labs drawn as ordered. Pt with call light in reach for safety. Pt medicated per MAR. Pt tolerated treatment without s/s adverse reaction. PICC flushed with NS wrapped with gauze and mesh sleeve. Pt discharged to self care, NAD. Pt to return tomorrow.

## 2022-05-04 ENCOUNTER — APPOINTMENT (OUTPATIENT)
Dept: ONCOLOGY | Facility: MEDICAL CENTER | Age: 42
End: 2022-05-04
Attending: INTERNAL MEDICINE
Payer: COMMERCIAL

## 2022-05-04 NOTE — PROGRESS NOTES
Pt ambulatory to Infusion for daily abx therapy.  Communication in pt's therapy plan to discontinue pt's IV abx due to pt labs showing transaminitis from Rocephin.  Dr Arnett sent rx to pt's pharmacy for PO Augmentin, pharmacy verified w/ pt, she states she will  rx in the morning.  Pt's PICC line to be pulled today and pt to return on Thursday for repeat CMP.   used to relay information to pt, all pt questions answered.  Pt PICC line pulled, pressure dressing placed, pt monitored for 30 minutes w/ no s/s of bleeding.  Pt educated through  on s/s of poss infection and when to seek medical tx, pt stated her understanding.  Pt left on foot in care of self in NAD.  Pt to return Thursday.

## 2022-05-05 ENCOUNTER — OUTPATIENT INFUSION SERVICES (OUTPATIENT)
Dept: ONCOLOGY | Facility: MEDICAL CENTER | Age: 42
End: 2022-05-05
Attending: INTERNAL MEDICINE
Payer: COMMERCIAL

## 2022-05-05 VITALS
DIASTOLIC BLOOD PRESSURE: 68 MMHG | OXYGEN SATURATION: 98 % | HEART RATE: 78 BPM | RESPIRATION RATE: 18 BRPM | TEMPERATURE: 97.6 F | SYSTOLIC BLOOD PRESSURE: 94 MMHG

## 2022-05-05 DIAGNOSIS — G06.1 ABSCESS IN EPIDURAL SPACE OF THORACIC SPINE: ICD-10-CM

## 2022-05-05 LAB
ALBUMIN SERPL BCP-MCNC: 4 G/DL (ref 3.2–4.9)
ALBUMIN/GLOB SERPL: 1.4 G/DL
ALP SERPL-CCNC: 193 U/L (ref 30–99)
ALT SERPL-CCNC: 118 U/L (ref 2–50)
ANION GAP SERPL CALC-SCNC: 12 MMOL/L (ref 7–16)
AST SERPL-CCNC: 50 U/L (ref 12–45)
BILIRUB SERPL-MCNC: 0.2 MG/DL (ref 0.1–1.5)
BUN SERPL-MCNC: 11 MG/DL (ref 8–22)
CALCIUM SERPL-MCNC: 9.1 MG/DL (ref 8.5–10.5)
CHLORIDE SERPL-SCNC: 105 MMOL/L (ref 96–112)
CO2 SERPL-SCNC: 21 MMOL/L (ref 20–33)
CREAT SERPL-MCNC: 0.55 MG/DL (ref 0.5–1.4)
GFR SERPLBLD CREATININE-BSD FMLA CKD-EPI: 119 ML/MIN/1.73 M 2
GLOBULIN SER CALC-MCNC: 2.8 G/DL (ref 1.9–3.5)
GLUCOSE SERPL-MCNC: 104 MG/DL (ref 65–99)
POTASSIUM SERPL-SCNC: 4.1 MMOL/L (ref 3.6–5.5)
PROT SERPL-MCNC: 6.8 G/DL (ref 6–8.2)
SODIUM SERPL-SCNC: 138 MMOL/L (ref 135–145)

## 2022-05-05 PROCEDURE — 80053 COMPREHEN METABOLIC PANEL: CPT

## 2022-05-05 PROCEDURE — 36415 COLL VENOUS BLD VENIPUNCTURE: CPT

## 2022-05-05 RX ORDER — 0.9 % SODIUM CHLORIDE 0.9 %
VIAL (ML) INJECTION PRN
Status: CANCELLED | OUTPATIENT
Start: 2022-05-09

## 2022-05-05 RX ORDER — 0.9 % SODIUM CHLORIDE 0.9 %
3 VIAL (ML) INJECTION PRN
Status: CANCELLED | OUTPATIENT
Start: 2022-05-09

## 2022-05-05 RX ORDER — 0.9 % SODIUM CHLORIDE 0.9 %
10 VIAL (ML) INJECTION PRN
Status: CANCELLED | OUTPATIENT
Start: 2022-05-09

## 2022-05-05 RX ORDER — HEPARIN SODIUM (PORCINE) LOCK FLUSH IV SOLN 100 UNIT/ML 100 UNIT/ML
500 SOLUTION INTRAVENOUS PRN
Status: CANCELLED | OUTPATIENT
Start: 2022-05-09

## 2022-05-06 ENCOUNTER — APPOINTMENT (OUTPATIENT)
Dept: ONCOLOGY | Facility: MEDICAL CENTER | Age: 42
End: 2022-05-06
Attending: INTERNAL MEDICINE
Payer: COMMERCIAL

## 2022-05-06 NOTE — PROGRESS NOTES
Pt presented for lab draw only. Confirmed she had picked up her Augmentin and will start today. CMP drawn as ordered from [facility] with 23G butterfly needle, gauze drsg placed. Pt left on foot to self care.

## 2022-05-07 ENCOUNTER — APPOINTMENT (OUTPATIENT)
Dept: ONCOLOGY | Facility: MEDICAL CENTER | Age: 42
End: 2022-05-07
Attending: INTERNAL MEDICINE
Payer: COMMERCIAL

## 2022-05-10 ENCOUNTER — TELEPHONE (OUTPATIENT)
Dept: INFECTIOUS DISEASES | Facility: MEDICAL CENTER | Age: 42
End: 2022-05-10
Payer: COMMERCIAL

## 2022-05-10 NOTE — TELEPHONE ENCOUNTER
Delfino Appiah M.D.  to Me          10:39 AM  No need to extend the antibiotics any further.  Please see if you can schedule her in our clinic after the MRI.       LVM for pt to return our call to schedule appt after MRI 5/16

## 2022-06-02 ENCOUNTER — APPOINTMENT (OUTPATIENT)
Dept: RADIOLOGY | Facility: MEDICAL CENTER | Age: 42
End: 2022-06-02
Attending: INTERNAL MEDICINE
Payer: COMMERCIAL

## 2022-07-15 NOTE — PROGRESS NOTES
BP 88/48. Paging Dr. Solorio to make aware. Patient asymptomatic.    MD made aware and will take a look at patient chart and possibly add bolus.    On oral azithromycin, ethambutol and rifampin since 6/9.    Pulmonary following
